# Patient Record
Sex: MALE | Race: WHITE | NOT HISPANIC OR LATINO | Employment: OTHER | URBAN - METROPOLITAN AREA
[De-identification: names, ages, dates, MRNs, and addresses within clinical notes are randomized per-mention and may not be internally consistent; named-entity substitution may affect disease eponyms.]

---

## 2017-01-10 RX ORDER — PRAVASTATIN SODIUM 40 MG
40 TABLET ORAL
Qty: 30 TAB | Refills: 0 | Status: SHIPPED | OUTPATIENT
Start: 2017-01-10 | End: 2017-01-12 | Stop reason: SDUPTHER

## 2017-01-12 ENCOUNTER — OFFICE VISIT (OUTPATIENT)
Dept: MEDICAL GROUP | Facility: MEDICAL CENTER | Age: 62
End: 2017-01-12
Payer: COMMERCIAL

## 2017-01-12 VITALS
BODY MASS INDEX: 27.43 KG/M2 | DIASTOLIC BLOOD PRESSURE: 98 MMHG | WEIGHT: 191.58 LBS | RESPIRATION RATE: 16 BRPM | SYSTOLIC BLOOD PRESSURE: 130 MMHG | HEIGHT: 70 IN | OXYGEN SATURATION: 95 % | TEMPERATURE: 97.2 F | HEART RATE: 75 BPM

## 2017-01-12 DIAGNOSIS — I10 ESSENTIAL HYPERTENSION: ICD-10-CM

## 2017-01-12 DIAGNOSIS — Z12.5 SCREENING PSA (PROSTATE SPECIFIC ANTIGEN): ICD-10-CM

## 2017-01-12 DIAGNOSIS — E11.9 TYPE 2 DIABETES MELLITUS WITHOUT COMPLICATION, WITHOUT LONG-TERM CURRENT USE OF INSULIN (HCC): ICD-10-CM

## 2017-01-12 DIAGNOSIS — E78.5 HYPERLIPIDEMIA, UNSPECIFIED HYPERLIPIDEMIA TYPE: ICD-10-CM

## 2017-01-12 PROCEDURE — 99214 OFFICE O/P EST MOD 30 MIN: CPT | Performed by: PHYSICIAN ASSISTANT

## 2017-01-12 RX ORDER — METOPROLOL SUCCINATE 25 MG/1
25 TABLET, EXTENDED RELEASE ORAL
Qty: 90 TAB | Refills: 1 | Status: SHIPPED | OUTPATIENT
Start: 2017-01-12 | End: 2017-08-10 | Stop reason: SDUPTHER

## 2017-01-12 RX ORDER — LISINOPRIL 10 MG/1
10 TABLET ORAL
Qty: 90 TAB | Refills: 1 | Status: SHIPPED | OUTPATIENT
Start: 2017-01-12 | End: 2017-01-17

## 2017-01-12 RX ORDER — HYDROCHLOROTHIAZIDE 12.5 MG/1
CAPSULE, GELATIN COATED ORAL
Qty: 90 CAP | Refills: 1 | Status: SHIPPED | OUTPATIENT
Start: 2017-01-12 | End: 2017-01-17

## 2017-01-12 RX ORDER — PRAVASTATIN SODIUM 40 MG
40 TABLET ORAL
Qty: 90 TAB | Refills: 1 | Status: SHIPPED | OUTPATIENT
Start: 2017-01-12 | End: 2017-08-10 | Stop reason: SDUPTHER

## 2017-01-12 ASSESSMENT — PATIENT HEALTH QUESTIONNAIRE - PHQ9: CLINICAL INTERPRETATION OF PHQ2 SCORE: 0

## 2017-01-12 NOTE — PROGRESS NOTES
Chief Complaint   Patient presents with   • Follow-Up       HISTORY OF PRESENT ILLNESS: Patient is a 61 y.o. male established patient who presents today to follow-up    Hypertension  chronic in nature. Currently taking metoprolol, hydrochlorothiazide, lisinopril. Stable. A symptomatically. Requesting refill on medications.    Hyperlipidemia  Chronic. Currently taking pravastatin on a daily basis. States no side effects to medication. Review of medical records shows labs out of date, ordered accordingly today    Type 2 diabetes mellitus without complication  Initially diagnosed while living in [facility]. Currently taking metformin 500 mg twice a day. Patient states doing well. Patient states doing well. Patient states that he is not checking his sugars. Patient does need a refill on his metformin. Last A1c shows controlled diabetes.      Patient Active Problem List    Diagnosis Date Noted   • Essential hypertension 11/10/2015   • HLD (hyperlipidemia) 11/10/2015   • Type 2 diabetes mellitus without complication (HCC) 11/10/2015   • Pain, chronic 11/10/2015       Allergies:Flexeril and Penicillins    Current Outpatient Prescriptions   Medication Sig Dispense Refill   • hydrochlorothiazide (MICROZIDE) 12.5 MG capsule TAKE  ONE CAPSULE BY MOUTH DAILY 90 Cap 01   • metoprolol SR (TOPROL XL) 25 MG TABLET SR 24 HR Take 1 Tab by mouth every day. 90 Tab 1   • pravastatin (PRAVACHOL) 40 MG tablet Take 1 Tab by mouth every day. 90 Tab 1   • lisinopril (PRINIVIL) 10 MG Tab Take 1 Tab by mouth every day. 90 Tab 01   • metformin (GLUCOPHAGE) 500 MG Tab Take 1 Tab by mouth 2 times a day, with meals. 180 Tab 1   • naproxen (NAPROSYN) 500 MG Tab Take 1 Tab by mouth 2 times a day, with meals. 60 Tab 0   • vitamin D (CHOLECALCIFEROL) 1000 UNIT Tab Take 2,000 Units by mouth every day.     • aspirin EC (ECOTRIN) 81 MG TBEC Take 81 mg by mouth every day.     • gabapentin (NEURONTIN) 100 MG CAPS Take 100 mg by mouth 3 times a day.   "   • tramadol (ULTRAM) 50 MG TABS Take  mg by mouth every four hours as needed for Mild Pain.       No current facility-administered medications for this visit.       Social History   Substance Use Topics   • Smoking status: Former Smoker   • Smokeless tobacco: Never Used      Comment: quit one year ago (October 2014)   • Alcohol Use: 0.0 oz/week     0 Standard drinks or equivalent per week      Comment: 6 pack a week )only beer)       No family status information on file.   No family history on file.    Review of Systems:   Constitutional: Negative for fever, chills, weight loss and malaise/fatigue.   HENT: Negative for ear pain, nosebleeds, congestion, sore throat and neck pain.    Eyes: Negative for blurred vision.   Respiratory: Negative for cough, sputum production, shortness of breath and wheezing.    Cardiovascular: Negative for chest pain, palpitations, orthopnea and leg swelling.   Gastrointestinal: Negative for heartburn, nausea, vomiting and abdominal pain.   Genitourinary: Negative for dysuria, urgency and frequency.   Musculoskeletal: Negative for myalgias, back pain and joint pain.   Skin: Negative for rash and itching.   Neurological: Negative for dizziness, tingling, tremors, sensory change, focal weakness and headaches.   Endo/Heme/Allergies: Does not bruise/bleed easily.   Psychiatric/Behavioral: Negative for depression, suicidal ideas and memory loss.  The patient is not nervous/anxious and does not have insomnia.    All other systems reviewed and are negative except as in HPI.    Exam:  Blood pressure 130/98, pulse 75, temperature 36.2 °C (97.2 °F), resp. rate 16, height 1.778 m (5' 10\"), weight 86.9 kg (191 lb 9.3 oz), SpO2 95 %.  General:  Well nourished, well developed male in NAD  Head: is grossly normal.  Neck: Supple without JVD or bruit. Thyroid is not enlarged.  Pulmonary: Clear to ausculation. Normal effort. No rales, ronchi, or wheezing.  Cardiovascular: Regular rate and rhythm " without murmur. Carotid and radial pulses are intact and equal bilaterally.  Extremities: no clubbing, cyanosis, or edema.  Diabetic Foot Exam: No ulcers or skin lesions present, patient tested with a 10 g force and is sensitive bilaterally throughout the ball of the foot, great toe and heel. Positive onychomycosis noted bilateral feet      Please note that this dictation was created using voice recognition software. I have made every reasonable attempt to correct obvious errors, but I expect that there are errors of grammar and possibly content that I did not discover before finalizing the note.    Assessment/Plan:  1. Type 2 diabetes mellitus without complication, without long-term current use of insulin (HCC)  metformin (GLUCOPHAGE) 500 MG Tab    CBC WITH DIFFERENTIAL    COMP METABOLIC PANEL    URINALYSIS,CULTURE IF INDICATED    HEMOGLOBIN A1C    MICROALBUMIN CREAT RATIO URINE    Diabetic Monofilament Lower Extremity Exam   2. Essential hypertension  hydrochlorothiazide (MICROZIDE) 12.5 MG capsule    metoprolol SR (TOPROL XL) 25 MG TABLET SR 24 HR    lisinopril (PRINIVIL) 10 MG Tab    CBC WITH DIFFERENTIAL    COMP METABOLIC PANEL    TSH    URINALYSIS,CULTURE IF INDICATED    MICROALBUMIN CREAT RATIO URINE   3. Screening PSA (prostate specific antigen)  CBC WITH DIFFERENTIAL    COMP METABOLIC PANEL    URINALYSIS,CULTURE IF INDICATED    PROSTATE SPECIFIC AG SCREENING   4. Hyperlipidemia, unspecified hyperlipidemia type  pravastatin (PRAVACHOL) 40 MG tablet    LIPID PROFILE

## 2017-01-13 ENCOUNTER — HOSPITAL ENCOUNTER (OUTPATIENT)
Dept: LAB | Facility: MEDICAL CENTER | Age: 62
End: 2017-01-13
Attending: PHYSICIAN ASSISTANT
Payer: COMMERCIAL

## 2017-01-13 DIAGNOSIS — E78.5 HYPERLIPIDEMIA, UNSPECIFIED HYPERLIPIDEMIA TYPE: ICD-10-CM

## 2017-01-13 DIAGNOSIS — I10 ESSENTIAL HYPERTENSION: ICD-10-CM

## 2017-01-13 DIAGNOSIS — Z12.5 SCREENING PSA (PROSTATE SPECIFIC ANTIGEN): ICD-10-CM

## 2017-01-13 DIAGNOSIS — E11.9 TYPE 2 DIABETES MELLITUS WITHOUT COMPLICATION, WITHOUT LONG-TERM CURRENT USE OF INSULIN (HCC): ICD-10-CM

## 2017-01-13 LAB
ALBUMIN SERPL BCP-MCNC: 4.3 G/DL (ref 3.2–4.9)
ALBUMIN/GLOB SERPL: 1.4 G/DL
ALP SERPL-CCNC: 57 U/L (ref 30–99)
ALT SERPL-CCNC: 19 U/L (ref 2–50)
ANION GAP SERPL CALC-SCNC: 7 MMOL/L (ref 0–11.9)
APPEARANCE UR: CLEAR
AST SERPL-CCNC: 19 U/L (ref 12–45)
BASOPHILS # BLD AUTO: 0.1 K/UL (ref 0–0.12)
BASOPHILS NFR BLD AUTO: 1.1 % (ref 0–1.8)
BILIRUB SERPL-MCNC: 0.5 MG/DL (ref 0.1–1.5)
BILIRUB UR QL STRIP.AUTO: NEGATIVE
BUN SERPL-MCNC: 8 MG/DL (ref 8–22)
CALCIUM SERPL-MCNC: 9.5 MG/DL (ref 8.5–10.5)
CHLORIDE SERPL-SCNC: 99 MMOL/L (ref 96–112)
CHOLEST SERPL-MCNC: 179 MG/DL (ref 100–199)
CO2 SERPL-SCNC: 27 MMOL/L (ref 20–33)
COLOR UR AUTO: NORMAL
CREAT SERPL-MCNC: 0.69 MG/DL (ref 0.5–1.4)
CREAT UR-MCNC: 58.5 MG/DL
CULTURE IF INDICATED INDCX: NO UA CULTURE
EOSINOPHIL # BLD: 0.16 K/UL (ref 0–0.51)
EOSINOPHIL NFR BLD AUTO: 1.8 % (ref 0–6.9)
ERYTHROCYTE [DISTWIDTH] IN BLOOD BY AUTOMATED COUNT: 46 FL (ref 35.9–50)
EST. AVERAGE GLUCOSE BLD GHB EST-MCNC: 143 MG/DL
GLOBULIN SER CALC-MCNC: 3.1 G/DL (ref 1.9–3.5)
GLUCOSE SERPL-MCNC: 148 MG/DL (ref 65–99)
GLUCOSE UR STRIP.AUTO-MCNC: NEGATIVE MG/DL
HBA1C MFR BLD: 6.6 % (ref 0–5.6)
HCT VFR BLD AUTO: 42.2 % (ref 42–52)
HDLC SERPL-MCNC: 81 MG/DL
HGB BLD-MCNC: 15.2 G/DL (ref 14–18)
IMM GRANULOCYTES # BLD AUTO: 0.02 K/UL (ref 0–0.11)
IMM GRANULOCYTES NFR BLD AUTO: 0.2 % (ref 0–0.9)
KETONES UR STRIP.AUTO-MCNC: NEGATIVE MG/DL
LDLC SERPL CALC-MCNC: 84 MG/DL
LEUKOCYTE ESTERASE UR QL STRIP.AUTO: NEGATIVE
LYMPHOCYTES # BLD: 3.15 K/UL (ref 1–4.8)
LYMPHOCYTES NFR BLD AUTO: 34.5 % (ref 22–41)
MCH RBC QN AUTO: 34.8 PG (ref 27–33)
MCHC RBC AUTO-ENTMCNC: 36 G/DL (ref 33.7–35.3)
MCV RBC AUTO: 96.6 FL (ref 81.4–97.8)
MICRO URNS: NORMAL
MICROALBUMIN UR-MCNC: 6 MG/DL
MICROALBUMIN/CREAT UR: 103 MG/G (ref 0–30)
MONOCYTES # BLD: 0.79 K/UL (ref 0–0.85)
MONOCYTES NFR BLD AUTO: 8.6 % (ref 0–13.4)
NEUTROPHILS # BLD: 4.92 K/UL (ref 1.82–7.42)
NEUTROPHILS NFR BLD AUTO: 53.8 % (ref 44–72)
NITRITE UR QL STRIP.AUTO: NEGATIVE
NRBC # BLD AUTO: 0 K/UL
NRBC BLD-RTO: 0 /100 WBC
PH UR: 7 [PH]
PLATELET # BLD AUTO: 350 K/UL (ref 164–446)
PMV BLD AUTO: 10.3 FL (ref 9–12.9)
POTASSIUM SERPL-SCNC: 4.6 MMOL/L (ref 3.6–5.5)
PROT SERPL-MCNC: 7.4 G/DL (ref 6–8.2)
PROT UR QL STRIP: NEGATIVE MG/DL
PSA SERPL DL<=0.01 NG/ML-MCNC: 0.78 NG/ML (ref 0–4)
RBC # BLD AUTO: 4.37 M/UL (ref 4.7–6.1)
RBC UR QL AUTO: NEGATIVE
SODIUM SERPL-SCNC: 133 MMOL/L (ref 135–145)
SP GR UR STRIP.AUTO: 1.01
TRIGL SERPL-MCNC: 70 MG/DL (ref 0–149)
TSH SERPL DL<=0.005 MIU/L-ACNC: 1.04 UIU/ML (ref 0.3–3.7)
WBC # BLD AUTO: 9.1 K/UL (ref 4.8–10.8)

## 2017-01-13 PROCEDURE — 82570 ASSAY OF URINE CREATININE: CPT

## 2017-01-13 PROCEDURE — 80053 COMPREHEN METABOLIC PANEL: CPT

## 2017-01-13 PROCEDURE — 82043 UR ALBUMIN QUANTITATIVE: CPT

## 2017-01-13 PROCEDURE — 84443 ASSAY THYROID STIM HORMONE: CPT

## 2017-01-13 PROCEDURE — 83036 HEMOGLOBIN GLYCOSYLATED A1C: CPT

## 2017-01-13 PROCEDURE — 80061 LIPID PANEL: CPT

## 2017-01-13 PROCEDURE — 81003 URINALYSIS AUTO W/O SCOPE: CPT

## 2017-01-13 PROCEDURE — 84153 ASSAY OF PSA TOTAL: CPT

## 2017-01-13 PROCEDURE — 85025 COMPLETE CBC W/AUTO DIFF WBC: CPT

## 2017-01-13 PROCEDURE — 36415 COLL VENOUS BLD VENIPUNCTURE: CPT

## 2017-01-16 ENCOUNTER — TELEPHONE (OUTPATIENT)
Dept: MEDICAL GROUP | Facility: MEDICAL CENTER | Age: 62
End: 2017-01-16

## 2017-01-16 NOTE — TELEPHONE ENCOUNTER
----- Message from Agustín Nguyen PA-C sent at 1/16/2017  7:27 AM PST -----  Review of labs: Please have patient schedule follow-up to discuss    (Note to provider during visit:  microalb  And need to change meds)

## 2017-01-17 ENCOUNTER — OFFICE VISIT (OUTPATIENT)
Dept: MEDICAL GROUP | Facility: MEDICAL CENTER | Age: 62
End: 2017-01-17
Payer: COMMERCIAL

## 2017-01-17 VITALS
TEMPERATURE: 97.7 F | DIASTOLIC BLOOD PRESSURE: 89 MMHG | HEIGHT: 70 IN | OXYGEN SATURATION: 95 % | RESPIRATION RATE: 16 BRPM | HEART RATE: 63 BPM | WEIGHT: 186.73 LBS | BODY MASS INDEX: 26.73 KG/M2 | SYSTOLIC BLOOD PRESSURE: 130 MMHG

## 2017-01-17 DIAGNOSIS — I10 ESSENTIAL HYPERTENSION: ICD-10-CM

## 2017-01-17 DIAGNOSIS — E78.5 HYPERLIPIDEMIA, UNSPECIFIED HYPERLIPIDEMIA TYPE: ICD-10-CM

## 2017-01-17 DIAGNOSIS — E11.9 TYPE 2 DIABETES MELLITUS WITHOUT COMPLICATION, WITHOUT LONG-TERM CURRENT USE OF INSULIN (HCC): ICD-10-CM

## 2017-01-17 DIAGNOSIS — R80.9 MICROALBUMINURIA: ICD-10-CM

## 2017-01-17 PROCEDURE — 99214 OFFICE O/P EST MOD 30 MIN: CPT | Performed by: PHYSICIAN ASSISTANT

## 2017-01-17 RX ORDER — LISINOPRIL 20 MG/1
20 TABLET ORAL DAILY
Qty: 30 TAB | Refills: 2 | Status: SHIPPED | OUTPATIENT
Start: 2017-01-17 | End: 2017-04-27 | Stop reason: SDUPTHER

## 2017-01-17 NOTE — MR AVS SNAPSHOT
"        Fabio Vergara   2017 2:00 PM   Office Visit   MRN: [id number]    Department:  [place] [address]   Dept Phone:  [phone]    Description:  Male : 1955   Provider:  Agustín Nguyen PA-C           Reason for Visit     Follow-Up           Allergies as of 2017     Allergen Noted Reactions    Flexeril [Cyclobenzaprine] 10/07/2014       Weakness, spacie    Penicillins 2009       Doesn't know the reaction  had this allergy when he was a child      You were diagnosed with     Microalbuminuria   [476714]   Monitor for the next 2-3 months. Adjust medications. If trending upward, referred to nephrology    Essential hypertension   [9710160]   Discontinue hydrochlorothiazide. Increase lisinopril to 20 mg. Continue on metoprolol    Type 2 diabetes mellitus without complication, without long-term current use of insulin (CMS-HCC)   [3019935]       Hyperlipidemia, unspecified hyperlipidemia type   [7963696]         Vital Signs     Blood Pressure Pulse Temperature Respirations Height Weight    130/89 mmHg 63 36.5 °C (97.7 °F) 16 1.778 m (5' 10\") 84.7 kg (186 lb 11.7 oz)    Body Mass Index Oxygen Saturation Smoking Status             26.79 kg/m2 95% Former Smoker         Basic Information     Date Of Birth Sex Race Ethnicity Preferred Language    1955 Male White Non- English      Problem List              ICD-10-CM Priority Class Noted - Resolved    Essential hypertension I10   11/10/2015 - Present    HLD (hyperlipidemia) E78.5   11/10/2015 - Present    Type 2 diabetes mellitus without complication (CMS-HCC) E11.9   11/10/2015 - Present    Pain, chronic G89.29   11/10/2015 - Present      Health Maintenance        Date Due Completion Dates    RETINAL SCREENING 5/15/1971 ---    IMM PNEUMOCOCCAL 19-64 (ADULT) MEDIUM RISK SERIES (1 of 1 - PPSV23) 5/15/1972 ---    COLONOSCOPY 5/15/2003 ---    IMM ZOSTER VACCINE 5/15/2013 ---    A1C SCREENING 2017, 2016, 2016, " 8/28/2015, 3/2/2015, 7/2/2014, 7/22/2013, 8/7/2012, 3/28/2012    DIABETES MONOFILAMENT / LE EXAM 1/12/2018 1/12/2017    FASTING LIPID PROFILE 1/13/2018 1/13/2017, 1/12/2016, 8/28/2015, 3/2/2015, 7/2/2014, 7/22/2013, 8/7/2012, 3/28/2012    URINE ACR / MICROALBUMIN 1/13/2018 1/13/2017, 1/12/2016, 8/28/2015, 7/22/2013, 3/28/2012    SERUM CREATININE 1/13/2018 1/13/2017, 5/2/2016, 1/12/2016, 8/28/2015, 3/2/2015, 7/2/2014, 7/22/2013, 8/7/2012, 3/28/2012    IMM DTaP/Tdap/Td Vaccine (2 - Td) 11/19/2020 11/19/2010            Current Immunizations     Influenza TIV (IM) 10/13/2016    Tdap Vaccine 11/19/2010      Below and/or attached are the medications your provider expects you to take. Review all of your home medications and newly ordered medications with your provider and/or pharmacist. Follow medication instructions as directed by your provider and/or pharmacist. Please keep your medication list with you and share with your provider. Update the information when medications are discontinued, doses are changed, or new medications (including over-the-counter products) are added; and carry medication information at all times in the event of emergency situations     Allergies:  FLEXERIL - (reactions not documented)     PENICILLINS - (reactions not documented)               Medications  Valid as of: January 17, 2017 -  2:34 PM    Generic Name Brand Name Tablet Size Instructions for use    Aspirin (Tablet Delayed Response) ECOTRIN 81 MG Take 81 mg by mouth every day.        Cholecalciferol (Tab) cholecalciferol 1000 UNIT Take 2,000 Units by mouth every day.        Gabapentin (Cap) NEURONTIN 100 MG Take 100 mg by mouth 3 times a day.        Lisinopril (Tab) PRINIVIL 20 MG Take 1 Tab by mouth every day.        MetFORMIN HCl (Tab) GLUCOPHAGE 500 MG Take 1 Tab by mouth 2 times a day, with meals.        Metoprolol Succinate (TABLET SR 24 HR) TOPROL XL 25 MG Take 1 Tab by mouth every day.        Naproxen (Tab) NAPROSYN 500 MG Take 1  Tab by mouth 2 times a day, with meals.        Pravastatin Sodium (Tab) PRAVACHOL 40 MG Take 1 Tab by mouth every day.        TraMADol HCl (Tab) ULTRAM 50 MG Take  mg by mouth every four hours as needed for Mild Pain.        .                 Medicines prescribed today were sent to:     [id number] - [place], [place] - [address]    [address] [place] [address]    Phone: [phone] Fax: [phone]    Open 24 Hours?: No      Medication refill instructions:       If your prescription bottle indicates you have medication refills left, it is not necessary to call your provider’s office. Please contact your pharmacy and they will refill your medication.    If your prescription bottle indicates you do not have any refills left, you may request refills at any time through one of the following ways: The online DataFlyte system (except Urgent Care), by calling your provider’s office, or by asking your pharmacy to contact your provider’s office with a refill request. Medication refills are processed only during regular business hours and may not be available until the next business day. Your provider may request additional information or to have a follow-up visit with you prior to refilling your medication.   *Please Note: Medication refills are assigned a new Rx number when refilled electronically. Your pharmacy may indicate that no refills were authorized even though a new prescription for the same medication is available at the pharmacy. Please request the medicine by name with the pharmacy before contacting your provider for a refill.        Your To Do List     Future Labs/Procedures Complete By Expires    BASIC METABOLIC PANEL  As directed 1/18/2018    MICROALBUMIN CREAT RATIO URINE  As directed 1/17/2018         DataFlyte Access Code: [email or web address]  Expires: 2/16/2017  2:34 PM    DataFlyte  A secure, online tool to manage your health information     [employer]’s DataFlyte® is a secure, online  tool that connects you to your personalized health information from the privacy of your home -- day or night - making it very easy for you to manage your healthcare. Once the activation process is completed, you can even access your medical information using the Odojo gissell, which is available for free in the Apple Gissell store or Google Play store.     Odojo provides the following levels of access (as shown below):   My Chart Features   [facility] Doctor [facility]  Specialists [facility]  Urgent  Care Non-[facility]  Primary Care  Doctor   Email your healthcare team securely and privately 24/7 X X X    Manage appointments: schedule your next appointment; view details of past/upcoming appointments X      Request prescription refills. X      View recent personal medical records, including lab and immunizations X X X X   View health record, including health history, allergies, medications X X X X   Read reports about your outpatient visits, procedures, consult and ER notes X X X X   See your discharge summary, which is a recap of your hospital and/or ER visit that includes your diagnosis, lab results, and care plan. X X       How to register for Odojo:  1. Go to  [email or web address].  2. Click on the Sign Up Now box, which takes you to the New Member Sign Up page. You will need to provide the following information:  a. Enter your Odojo Access Code exactly as it appears at the top of this page. (You will not need to use this code after you’ve completed the sign-up process. If you do not sign up before the expiration date, you must request a new code.)   b. Enter your date of birth.   c. Enter your home email address.   d. Click Submit, and follow the next screen’s instructions.  3. Create a Odojo ID. This will be your Odojo login ID and cannot be changed, so think of one that is secure and easy to remember.  4. Create a Odojo password. You can change your password at any time.  5. Enter your Password  Reset Question and Answer. This can be used at a later time if you forget your password.   6. Enter your e-mail address. This allows you to receive e-mail notifications when new information is available in [employer].  7. Click Sign Up. You can now view your health information.    For assistance activating your [employer] account, call [phone]

## 2017-01-17 NOTE — ASSESSMENT & PLAN NOTE
Chronic in nature. Currently taking metformin 500 mg twice a day. Recent labs have been drawn, see below.

## 2017-01-17 NOTE — ASSESSMENT & PLAN NOTE
Chronic in nature. Currently taking metoprolol 5 mg once a day, lisinopril 10 mg once a day, and hydrochlorothiazide 25 mg once a day.

## 2017-01-17 NOTE — PROGRESS NOTES
Chief Complaint   Patient presents with   • Follow-Up       HISTORY OF PRESENT ILLNESS: Patient is a 61 y.o. male established patient who presents today to follow-up on chronic conditions    Type 2 diabetes mellitus without complication  Chronic in nature. Currently taking metformin 500 mg twice a day. Recent labs have been drawn, see below.    HLD (hyperlipidemia)  Chronic nature. Patient taking pravastatin 40 mg daily basis.    Essential hypertension  Chronic in nature. Currently taking metoprolol 5 mg once a day, lisinopril 10 mg once a day, and hydrochlorothiazide 25 mg once a day.        Patient Active Problem List    Diagnosis Date Noted   • Essential hypertension 11/10/2015   • HLD (hyperlipidemia) 11/10/2015   • Type 2 diabetes mellitus without complication (CMS-[facility]) 11/10/2015   • Pain, chronic 11/10/2015       Allergies:Flexeril and Penicillins    Current Outpatient Prescriptions   Medication Sig Dispense Refill   • metoprolol SR (TOPROL XL) 25 MG TABLET SR 24 HR Take 1 Tab by mouth every day. 90 Tab 1   • pravastatin (PRAVACHOL) 40 MG tablet Take 1 Tab by mouth every day. 90 Tab 1   • metformin (GLUCOPHAGE) 500 MG Tab Take 1 Tab by mouth 2 times a day, with meals. 180 Tab 1   • naproxen (NAPROSYN) 500 MG Tab Take 1 Tab by mouth 2 times a day, with meals. 60 Tab 0   • vitamin D (CHOLECALCIFEROL) 1000 UNIT Tab Take 2,000 Units by mouth every day.     • aspirin EC (ECOTRIN) 81 MG TBEC Take 81 mg by mouth every day.     • gabapentin (NEURONTIN) 100 MG CAPS Take 100 mg by mouth 3 times a day.     • tramadol (ULTRAM) 50 MG TABS Take  mg by mouth every four hours as needed for Mild Pain.       No current facility-administered medications for this visit.       Social History   Substance Use Topics   • Smoking status: Former Smoker   • Smokeless tobacco: Never Used      Comment: quit one year ago (October 2014)   • Alcohol Use: 0.0 oz/week     0 Standard drinks or equivalent per week      Comment: 6 pack a  "week )only beer)       No family status information on file.   No family history on file.    Review of Systems:   Constitutional: Negative for fever, chills, weight loss and malaise/fatigue.   HENT: Negative for ear pain, nosebleeds, congestion, sore throat and neck pain.    Eyes: Negative for blurred vision.   Respiratory: Negative for cough, sputum production, shortness of breath and wheezing.    Cardiovascular: Negative for chest pain, palpitations, orthopnea and leg swelling.   Gastrointestinal: Negative for heartburn, nausea, vomiting and abdominal pain.   Genitourinary: Negative for dysuria, urgency and frequency.   Musculoskeletal: Negative for myalgias, back pain and joint pain.   Skin: Negative for rash and itching.   Neurological: Negative for dizziness, tingling, tremors, sensory change, focal weakness and headaches.   Endo/Heme/Allergies: Does not bruise/bleed easily.   Psychiatric/Behavioral: Negative for depression, suicidal ideas and memory loss.  The patient is not nervous/anxious and does not have insomnia.    All other systems reviewed and are negative except as in HPI.    Exam:  Blood pressure 130/89, pulse 63, temperature 36.5 °C (97.7 °F), resp. rate 16, height 1.778 m (5' 10\"), weight 84.7 kg (186 lb 11.7 oz), SpO2 95 %.  General:  Well nourished, well developed male in NAD  Head: is grossly normal.  HEENT: Eyes conjunctiva is clear, lids without ptosis, pupils equal round and reactive to light and accommodation.  Ears normal shape and contour, canals are clear bilaterally, TMs with good light reflex and appear normal.  Nasal mucosa pale and edematous with clear rhinorrhea.  Oropharynx benign.  Sinuses (frontal and maxillary) nontender to palpation.  Neck: Supple without JVD or bruit. Thyroid is not enlarged.  Pulmonary: Clear to ausculation. Normal effort. No rales, ronchi, or wheezing.  Cardiovascular: Regular rate and rhythm without murmur. Carotid and radial pulses are intact and equal " bilaterally.  Extremities: no clubbing, cyanosis, or edema.    LABS: January 13, 2017: Results reviewed and discussed with the patient, questions answered. CBC: Blood cell count 9.1, hemoglobin 15.2, hematocrit 42.2, platelet 350. Comprehensive metabolic panel: Sodium 133, potassium 4.4, chloride 99, bicarbonate 27, BUN 8, creatinine 0.69, glucose 148, GFR 60. Alkaline phosphatase 57, AST 19, ALT 19. Urinalysis negative leukocytes, negative nitrites, negative glucose, negative protein, negative bilirubin. Lipid panel: Total cholesterol 179, tragus rate 70, HDL 81, LDL 84. Microalbumin creatinine ratio 103. Hemoglobin A1c 6.6. TSH 1.040. PSA 0.78.    Medical decision-making and discussion: A 61-year-old sickly to follow-up on chronic conditions. cholesterol well-controlled, blood pressure well controlled, and diabetes shows good control at 6.6 and stable. positive microalbumin urea noted. after discussion with patient we are going to hold off on nephrology consultation at this present time. we are going to discontinue hydrochlorothiazide and increase lisinopril which may be stressing patient's kidney. we will recheck microalbumin as well as metabolic panel in 1-2 months. if still elevated we may look at possibly changing patient from metformin to a different diabetes medication such as glyburide or glimepiride.  The patient that if microalbumin increasing, we will refer nephrology at such time.    Please note that this dictation was created using voice recognition software. I have made every reasonable attempt to correct obvious errors, but I expect that there are errors of grammar and possibly content that I did not discover before finalizing the note.    Assessment/Plan:  1. Microalbuminuria  lisinopril (PRINIVIL) 20 MG Tab    BASIC METABOLIC PANEL    MICROALBUMIN CREAT RATIO URINE    Monitor for the next 2-3 months. Adjust medications. If trending upward, referred to nephrology   2. Essential hypertension   lisinopril (PRINIVIL) 20 MG Tab    BASIC METABOLIC PANEL    MICROALBUMIN CREAT RATIO URINE    Discontinue hydrochlorothiazide. Increase lisinopril to 20 mg. Continue on metoprolol   3. Type 2 diabetes mellitus without complication, without long-term current use of insulin (CMS-HCC)  lisinopril (PRINIVIL) 20 MG Tab    BASIC METABOLIC PANEL    MICROALBUMIN CREAT RATIO URINE   4. Hyperlipidemia, unspecified hyperlipidemia type

## 2017-03-16 ENCOUNTER — HOSPITAL ENCOUNTER (OUTPATIENT)
Dept: LAB | Facility: MEDICAL CENTER | Age: 62
End: 2017-03-16
Attending: PHYSICIAN ASSISTANT
Payer: COMMERCIAL

## 2017-03-16 DIAGNOSIS — E11.9 TYPE 2 DIABETES MELLITUS WITHOUT COMPLICATION, WITHOUT LONG-TERM CURRENT USE OF INSULIN (HCC): ICD-10-CM

## 2017-03-16 DIAGNOSIS — I10 ESSENTIAL HYPERTENSION: ICD-10-CM

## 2017-03-16 DIAGNOSIS — R80.9 MICROALBUMINURIA: ICD-10-CM

## 2017-03-16 LAB
ANION GAP SERPL CALC-SCNC: 13 MMOL/L (ref 0–11.9)
BUN SERPL-MCNC: 6 MG/DL (ref 8–22)
CALCIUM SERPL-MCNC: 9.1 MG/DL (ref 8.5–10.5)
CHLORIDE SERPL-SCNC: 99 MMOL/L (ref 96–112)
CO2 SERPL-SCNC: 25 MMOL/L (ref 20–33)
CREAT SERPL-MCNC: 0.66 MG/DL (ref 0.5–1.4)
CREAT UR-MCNC: 127.9 MG/DL
GLUCOSE SERPL-MCNC: 130 MG/DL (ref 65–99)
MICROALBUMIN UR-MCNC: 10.6 MG/DL
MICROALBUMIN/CREAT UR: 83 MG/G (ref 0–30)
POTASSIUM SERPL-SCNC: 4 MMOL/L (ref 3.6–5.5)
SODIUM SERPL-SCNC: 137 MMOL/L (ref 135–145)

## 2017-03-16 PROCEDURE — 36415 COLL VENOUS BLD VENIPUNCTURE: CPT

## 2017-03-16 PROCEDURE — 80048 BASIC METABOLIC PNL TOTAL CA: CPT

## 2017-03-16 PROCEDURE — 82043 UR ALBUMIN QUANTITATIVE: CPT

## 2017-03-16 PROCEDURE — 82570 ASSAY OF URINE CREATININE: CPT

## 2017-03-20 ENCOUNTER — TELEPHONE (OUTPATIENT)
Dept: MEDICAL GROUP | Facility: MEDICAL CENTER | Age: 62
End: 2017-03-20

## 2017-03-20 NOTE — TELEPHONE ENCOUNTER
----- Message from Agustín Nguyen PA-C sent at 3/20/2017  8:03 AM PDT -----  Kidneys are getting slightly better. Please have patient's schedule follow-up so we can discuss in more detail, and I would like to alter medications little more

## 2017-03-20 NOTE — TELEPHONE ENCOUNTER
Phone Number Called: [phone]     Message: Left message for patient about labs and to make an appointment.     Left Message for patient to call back: N\A

## 2017-03-22 NOTE — TELEPHONE ENCOUNTER
Phone Number Called: [phone] (home)     Message: Left msg with wife to have pt call back.     Left Message for patient to call back: N\A

## 2017-03-24 ENCOUNTER — HOSPITAL ENCOUNTER (OUTPATIENT)
Dept: RADIOLOGY | Facility: MEDICAL CENTER | Age: 62
End: 2017-03-24
Attending: PHYSICIAN ASSISTANT
Payer: COMMERCIAL

## 2017-03-24 DIAGNOSIS — M54.12 BRACHIAL NEURITIS OR RADICULITIS NOS: ICD-10-CM

## 2017-03-24 PROCEDURE — 72050 X-RAY EXAM NECK SPINE 4/5VWS: CPT

## 2017-03-27 ENCOUNTER — NON-PROVIDER VISIT (OUTPATIENT)
Dept: MEDICAL GROUP | Facility: MEDICAL CENTER | Age: 62
End: 2017-03-27
Payer: COMMERCIAL

## 2017-03-27 ENCOUNTER — OFFICE VISIT (OUTPATIENT)
Dept: MEDICAL GROUP | Facility: MEDICAL CENTER | Age: 62
End: 2017-03-27
Payer: COMMERCIAL

## 2017-03-27 VITALS
WEIGHT: 186 LBS | BODY MASS INDEX: 26.63 KG/M2 | HEART RATE: 86 BPM | TEMPERATURE: 98.2 F | DIASTOLIC BLOOD PRESSURE: 88 MMHG | RESPIRATION RATE: 16 BRPM | SYSTOLIC BLOOD PRESSURE: 130 MMHG | OXYGEN SATURATION: 95 % | HEIGHT: 70 IN

## 2017-03-27 DIAGNOSIS — I10 ESSENTIAL HYPERTENSION: ICD-10-CM

## 2017-03-27 DIAGNOSIS — E11.9 TYPE 2 DIABETES MELLITUS WITHOUT COMPLICATION, WITHOUT LONG-TERM CURRENT USE OF INSULIN (HCC): ICD-10-CM

## 2017-03-27 PROCEDURE — 99214 OFFICE O/P EST MOD 30 MIN: CPT | Performed by: PHYSICIAN ASSISTANT

## 2017-03-27 NOTE — PROGRESS NOTES
Chief Complaint   Patient presents with   • Follow-Up       HISTORY OF PRESENT ILLNESS: Patient is a 61 y.o. male established patient who presents today to follow up    Type 2 diabetes mellitus without complication  Chronic in nature. Patient states that he is currently taking metformin 1000 mg twice a day. Patient recently had labs in January 2017 which did show positive microalbuminuria. It was initially determined that we would work with patient's blood pressure and therefore we went and discontinue hydrochlorothiazide and increased lisinopril. Patient recently had labs redrawn please see labs below.    Essential hypertension  Patient was taking metoprolol 25 mg, lisinopril 10 mg and hydrochlorothiazide 25 mg. In January 2017 was noted elevated microalbumin and therefore we discontinue hydrochlorothiazide and we increase the fosinopril to 20 mg. Patient states he's been doing well, stable. assymptomatic.     Also reviewed health maintenance shows that retinal screening is out of date. He does follow up with optometrist.  new retinal  screening will be done today at completion of appointment    Patient Active Problem List    Diagnosis Date Noted   • Essential hypertension 11/10/2015   • HLD (hyperlipidemia) 11/10/2015   • Type 2 diabetes mellitus without complication (CMS-[facility]) 11/10/2015   • Pain, chronic 11/10/2015     Allergies:Flexeril and Penicillins    Current Outpatient Prescriptions   Medication Sig Dispense Refill   • lisinopril (PRINIVIL) 20 MG Tab Take 1 Tab by mouth every day. 30 Tab 2   • metoprolol SR (TOPROL XL) 25 MG TABLET SR 24 HR Take 1 Tab by mouth every day. 90 Tab 1   • pravastatin (PRAVACHOL) 40 MG tablet Take 1 Tab by mouth every day. 90 Tab 1   • metformin (GLUCOPHAGE) 500 MG Tab Take 1 Tab by mouth 2 times a day, with meals. 180 Tab 1   • naproxen (NAPROSYN) 500 MG Tab Take 1 Tab by mouth 2 times a day, with meals. 60 Tab 0   • vitamin D (CHOLECALCIFEROL) 1000 UNIT Tab Take 2,000 Units by  "mouth every day.     • aspirin EC (ECOTRIN) 81 MG TBEC Take 81 mg by mouth every day.     • gabapentin (NEURONTIN) 100 MG CAPS Take 100 mg by mouth 3 times a day.     • tramadol (ULTRAM) 50 MG TABS Take  mg by mouth every four hours as needed for Mild Pain.       No current facility-administered medications for this visit.     Social History   Substance Use Topics   • Smoking status: Former Smoker   • Smokeless tobacco: Never Used      Comment: quit one year ago (October 2014)   • Alcohol Use: 0.0 oz/week     0 Standard drinks or equivalent per week      Comment: 6 pack a week )only beer)       No family status information on file.   No family history on file.    Review of Systems:   Constitutional: Negative for fever, chills, weight loss and malaise/fatigue.   HENT: Negative for ear pain, nosebleeds, congestion, sore throat and neck pain.    Eyes: Negative for blurred vision.   Respiratory: Negative for cough, sputum production, shortness of breath and wheezing.    Cardiovascular: Negative for chest pain, palpitations, orthopnea and leg swelling.   Gastrointestinal: Negative for heartburn, nausea, vomiting and abdominal pain.   Genitourinary: Negative for dysuria, urgency and frequency.   Musculoskeletal: Negative for myalgias, back pain and joint pain.   Skin: Negative for rash and itching.   Neurological: Negative for dizziness, tingling, tremors, sensory change, focal weakness and headaches.   Endo/Heme/Allergies: Does not bruise/bleed easily.   Psychiatric/Behavioral: Negative for depression, suicidal ideas and memory loss.  The patient is not nervous/anxious and does not have insomnia.    All other systems reviewed and are negative except as in HPI.    Exam:  Blood pressure 130/88, pulse 86, temperature 36.8 °C (98.2 °F), resp. rate 16, height 1.778 m (5' 10\"), weight 84.369 kg (186 lb), SpO2 95 %.  General:  Well nourished, well developed male in NAD  Head: is grossly normal.  Neck: Supple without JVD " or bruit. Thyroid is not enlarged.  Pulmonary: Clear to ausculation. Normal effort. No rales, ronchi, or wheezing.  Cardiovascular: Regular rate and rhythm without murmur. Carotid and radial pulses are intact and equal bilaterally.  Extremities: no clubbing, cyanosis, or edema.    Medical decision-making and discussion: In review of labs it is noted that patient does have elevated glucose. Microalbumin has gone down from 103 down to 83 but is still elevated. In discussion with patient we are going to half patient's metformin to which she is taking 1000 mg twice a day and go down to 500 mg twice a day. We will continue to monitor. Discussed with patient that if sugars and A1c trend upward we will place patient on a new medication that will be more kidney friendly. Also did discuss that even with discontinuation of hydrochlorothiazide and if we get patient off metformin if A1c is still greater than 50 we will refer to nephrology, pt agrees with plan    Please note that this dictation was created using voice recognition software. I have made every reasonable attempt to correct obvious errors, but I expect that there are errors of grammar and possibly content that I did not discover before finalizing the note.    Assessment/Plan:  1. Type 2 diabetes mellitus without complication, without long-term current use of insulin (CMS-[facility])     2. Essential hypertension

## 2017-03-27 NOTE — ASSESSMENT & PLAN NOTE
Patient was taking metoprolol 25 mg, lisinopril 10 mg and hydrochlorothiazide 25 mg. In January 2017 was noted elevated microalbumin and therefore we discontinue hydrochlorothiazide and we increase the fosinopril to 20 mg. Patient states he's been doing well, stable. assymptomatic.

## 2017-03-27 NOTE — MR AVS SNAPSHOT
"Fabio Vergara   3/27/2017 10:20 AM   Office Visit   MRN: [id number]    Department:  [place] [address]   Dept Phone:  [phone]    Description:  Male : 1955   Provider:  Agustín Nguyen PA-C           Reason for Visit     Follow-Up           Allergies as of 3/27/2017     Allergen Noted Reactions    Flexeril [Cyclobenzaprine] 10/07/2014       Weakness, spacie    Penicillins 2009       Doesn't know the reaction  had this allergy when he was a child      You were diagnosed with     Type 2 diabetes mellitus without complication, without long-term current use of insulin (CMS-HCC)   [6264804]       Essential hypertension   [4813968]         Vital Signs     Blood Pressure Pulse Temperature Respirations Height Weight    130/88 mmHg 86 36.8 °C (98.2 °F) 16 1.778 m (5' 10\") 84.369 kg (186 lb)    Body Mass Index Oxygen Saturation Smoking Status             26.69 kg/m2 95% Former Smoker         Basic Information     Date Of Birth Sex Race Ethnicity Preferred Language    1955 Male White Non- English      Your appointments     2017 10:00 AM   Established Patient with Agustín Nguyen PA-C   [facility]    [address]  [address]  [facility] [address] [phone]           You will be receiving a confirmation call a few days before your appointment from our automated call confirmation system.              Problem List              ICD-10-CM Priority Class Noted - Resolved    Essential hypertension I10   11/10/2015 - Present    HLD (hyperlipidemia) E78.5   11/10/2015 - Present    Type 2 diabetes mellitus without complication (CMS-HCC) E11.9   11/10/2015 - Present    Pain, chronic G89.29   11/10/2015 - Present      Health Maintenance        Date Due Completion Dates    RETINAL SCREENING 5/15/1971 ---    IMM PNEUMOCOCCAL 19-64 (ADULT) MEDIUM RISK SERIES (1 of 1 - PPSV23) 5/15/1972 ---    COLONOSCOPY 5/15/2003 ---    IMM ZOSTER " VACCINE 5/15/2013 ---    A1C SCREENING 7/13/2017 1/13/2017, 5/2/2016, 1/12/2016, 8/28/2015, 3/2/2015, 7/2/2014, 7/22/2013, 8/7/2012, 3/28/2012    DIABETES MONOFILAMENT / LE EXAM 1/12/2018 1/12/2017    FASTING LIPID PROFILE 1/13/2018 1/13/2017, 1/12/2016, 8/28/2015, 3/2/2015, 7/2/2014, 7/22/2013, 8/7/2012, 3/28/2012    URINE ACR / MICROALBUMIN 3/16/2018 3/16/2017, 1/13/2017, 1/12/2016, 8/28/2015, 7/22/2013, 3/28/2012    SERUM CREATININE 3/16/2018 3/16/2017, 1/13/2017, 5/2/2016, 1/12/2016, 8/28/2015, 3/2/2015, 7/2/2014, 7/22/2013, 8/7/2012, 3/28/2012    IMM DTaP/Tdap/Td Vaccine (2 - Td) 11/19/2020 11/19/2010            Current Immunizations     Influenza TIV (IM) 10/13/2016    Tdap Vaccine 11/19/2010      Below and/or attached are the medications your provider expects you to take. Review all of your home medications and newly ordered medications with your provider and/or pharmacist. Follow medication instructions as directed by your provider and/or pharmacist. Please keep your medication list with you and share with your provider. Update the information when medications are discontinued, doses are changed, or new medications (including over-the-counter products) are added; and carry medication information at all times in the event of emergency situations     Allergies:  FLEXERIL - (reactions not documented)     PENICILLINS - (reactions not documented)               Medications  Valid as of: March 27, 2017 - 11:03 AM    Generic Name Brand Name Tablet Size Instructions for use    Aspirin (Tablet Delayed Response) ECOTRIN 81 MG Take 81 mg by mouth every day.        Cholecalciferol (Tab) cholecalciferol 1000 UNIT Take 2,000 Units by mouth every day.        Gabapentin (Cap) NEURONTIN 100 MG Take 100 mg by mouth 3 times a day.        Lisinopril (Tab) PRINIVIL 20 MG Take 1 Tab by mouth every day.        MetFORMIN HCl (Tab) GLUCOPHAGE 500 MG Take 1 Tab by mouth 2 times a day, with meals.        Metoprolol Succinate (TABLET SR  24 HR) TOPROL XL 25 MG Take 1 Tab by mouth every day.        Naproxen (Tab) NAPROSYN 500 MG Take 1 Tab by mouth 2 times a day, with meals.        Pravastatin Sodium (Tab) PRAVACHOL 40 MG Take 1 Tab by mouth every day.        TraMADol HCl (Tab) ULTRAM 50 MG Take  mg by mouth every four hours as needed for Mild Pain.        .                 Medicines prescribed today were sent to:     [id number] - [place], [place] - [address]    [address] [facility] [place] [address]    Phone: [phone] Fax: [phone]    Open 24 Hours?: No      Medication refill instructions:       If your prescription bottle indicates you have medication refills left, it is not necessary to call your provider’s office. Please contact your pharmacy and they will refill your medication.    If your prescription bottle indicates you do not have any refills left, you may request refills at any time through one of the following ways: The online [employer] system (except Urgent Care), by calling your provider’s office, or by asking your pharmacy to contact your provider’s office with a refill request. Medication refills are processed only during regular business hours and may not be available until the next business day. Your provider may request additional information or to have a follow-up visit with you prior to refilling your medication.   *Please Note: Medication refills are assigned a new Rx number when refilled electronically. Your pharmacy may indicate that no refills were authorized even though a new prescription for the same medication is available at the pharmacy. Please request the medicine by name with the pharmacy before contacting your provider for a refill.           OQVestirhart Status: Patient Declined

## 2017-03-27 NOTE — ASSESSMENT & PLAN NOTE
Chronic in nature. Patient states that he is currently taking metformin 1000 mg twice a day. Patient recently had labs in January 2017 which did show positive microalbuminuria. It was initially determined that we would work with patient's blood pressure and therefore we went and discontinue hydrochlorothiazide and increased lisinopril. Patient recently had labs redrawn please see labs below.

## 2017-03-27 NOTE — MR AVS SNAPSHOT
Fabio Gonzales Ashlis   3/27/2017 10:45 AM   Non-Provider Visit   MRN: [id number]    Department:  [facility]   Dept Phone:  [phone]    Description:  Male : 1955   Provider:  [facility]           Reason for Visit     Other retinal eye exam      Allergies as of 3/27/2017     Allergen Noted Reactions    Flexeril [Cyclobenzaprine] 10/07/2014       Weakness, spacie    Penicillins 2009       Doesn't know the reaction  had this allergy when he was a child      Vital Signs     Smoking Status                   Former Smoker           Basic Information     Date Of Birth Sex Race Ethnicity Preferred Language    1955 Male White Non- English      Your appointments     2017 10:00 AM   Established Patient with Agustín Nguyen PA-C   [facility] ([facility]    [address]  [address]  [place] [address] [phone]           You will be receiving a confirmation call a few days before your appointment from our automated call confirmation system.              Problem List              ICD-10-CM Priority Class Noted - Resolved    Essential hypertension I10   11/10/2015 - Present    HLD (hyperlipidemia) E78.5   11/10/2015 - Present    Type 2 diabetes mellitus without complication (CMS-HCC) E11.9   11/10/2015 - Present    Pain, chronic G89.29   11/10/2015 - Present      Health Maintenance        Date Due Completion Dates    RETINAL SCREENING 5/15/1971 ---    IMM PNEUMOCOCCAL 19-64 (ADULT) MEDIUM RISK SERIES (1 of 1 - PPSV23) 5/15/1972 ---    COLONOSCOPY 5/15/2003 ---    IMM ZOSTER VACCINE 5/15/2013 ---    A1C SCREENING 2017, 2016, 2016, 2015, 3/2/2015, 2014, 2013, 2012, 3/28/2012    DIABETES MONOFILAMENT / LE EXAM 2018    FASTING LIPID PROFILE 2018, 2016, 2015, 3/2/2015, 2014, 2013, 2012, 3/28/2012    URINE ACR / MICROALBUMIN 3/16/2018  3/16/2017, 1/13/2017, 1/12/2016, 8/28/2015, 7/22/2013, 3/28/2012    SERUM CREATININE 3/16/2018 3/16/2017, 1/13/2017, 5/2/2016, 1/12/2016, 8/28/2015, 3/2/2015, 7/2/2014, 7/22/2013, 8/7/2012, 3/28/2012    IMM DTaP/Tdap/Td Vaccine (2 - Td) 11/19/2020 11/19/2010            Current Immunizations     Influenza TIV (IM) 10/13/2016    Tdap Vaccine 11/19/2010      Below and/or attached are the medications your provider expects you to take. Review all of your home medications and newly ordered medications with your provider and/or pharmacist. Follow medication instructions as directed by your provider and/or pharmacist. Please keep your medication list with you and share with your provider. Update the information when medications are discontinued, doses are changed, or new medications (including over-the-counter products) are added; and carry medication information at all times in the event of emergency situations     Allergies:  FLEXERIL - (reactions not documented)     PENICILLINS - (reactions not documented)               Medications  Valid as of: March 27, 2017 - 11:15 AM    Generic Name Brand Name Tablet Size Instructions for use    Aspirin (Tablet Delayed Response) ECOTRIN 81 MG Take 81 mg by mouth every day.        Cholecalciferol (Tab) cholecalciferol 1000 UNIT Take 2,000 Units by mouth every day.        Gabapentin (Cap) NEURONTIN 100 MG Take 100 mg by mouth 3 times a day.        Lisinopril (Tab) PRINIVIL 20 MG Take 1 Tab by mouth every day.        MetFORMIN HCl (Tab) GLUCOPHAGE 500 MG Take 1 Tab by mouth 2 times a day, with meals.        Metoprolol Succinate (TABLET SR 24 HR) TOPROL XL 25 MG Take 1 Tab by mouth every day.        Naproxen (Tab) NAPROSYN 500 MG Take 1 Tab by mouth 2 times a day, with meals.        Pravastatin Sodium (Tab) PRAVACHOL 40 MG Take 1 Tab by mouth every day.        TraMADol HCl (Tab) ULTRAM 50 MG Take  mg by mouth every four hours as needed for Mild Pain.        .                    Medicines prescribed today were sent to:     [id number] - [place], [place] - [address]    [address] [place] [address]    Phone: [phone] Fax: [phone]    Open 24 Hours?: No      Medication refill instructions:       If your prescription bottle indicates you have medication refills left, it is not necessary to call your provider’s office. Please contact your pharmacy and they will refill your medication.    If your prescription bottle indicates you do not have any refills left, you may request refills at any time through one of the following ways: The online [employer] system (except Urgent Care), by calling your provider’s office, or by asking your pharmacy to contact your provider’s office with a refill request. Medication refills are processed only during regular business hours and may not be available until the next business day. Your provider may request additional information or to have a follow-up visit with you prior to refilling your medication.   *Please Note: Medication refills are assigned a new Rx number when refilled electronically. Your pharmacy may indicate that no refills were authorized even though a new prescription for the same medication is available at the pharmacy. Please request the medicine by name with the pharmacy before contacting your provider for a refill.           MyChart Status: Patient Declined

## 2017-03-27 NOTE — PROGRESS NOTES
Fabio Vergara is a 61 y.o. male here for a non-provider visit for diabetic eye exam. Eye dilation was unsuccessful and has been notified to contact his eye physician for proper review.    If abnormal was an in office provider notified today (if so, indicate provider)? Yes  Routed to PCP? Yes

## 2017-04-11 ENCOUNTER — HOSPITAL ENCOUNTER (OUTPATIENT)
Dept: RADIOLOGY | Facility: REHABILITATION | Age: 62
End: 2017-04-11
Attending: SPECIALIST
Payer: COMMERCIAL

## 2017-04-11 ENCOUNTER — HOSPITAL ENCOUNTER (OUTPATIENT)
Dept: PAIN MANAGEMENT | Facility: REHABILITATION | Age: 62
End: 2017-04-11
Attending: SPECIALIST
Payer: COMMERCIAL

## 2017-04-11 VITALS
HEART RATE: 70 BPM | TEMPERATURE: 97.3 F | BODY MASS INDEX: 27.36 KG/M2 | HEIGHT: 70 IN | OXYGEN SATURATION: 99 % | DIASTOLIC BLOOD PRESSURE: 97 MMHG | WEIGHT: 191.14 LBS | SYSTOLIC BLOOD PRESSURE: 181 MMHG | RESPIRATION RATE: 16 BRPM

## 2017-04-11 PROCEDURE — 700117 HCHG RX CONTRAST REV CODE 255

## 2017-04-11 PROCEDURE — 62321 NJX INTERLAMINAR CRV/THRC: CPT

## 2017-04-11 PROCEDURE — 700111 HCHG RX REV CODE 636 W/ 250 OVERRIDE (IP)

## 2017-04-11 RX ORDER — METHYLPREDNISOLONE ACETATE 80 MG/ML
INJECTION, SUSPENSION INTRA-ARTICULAR; INTRALESIONAL; INTRAMUSCULAR; SOFT TISSUE
Status: COMPLETED
Start: 2017-04-11 | End: 2017-04-11

## 2017-04-11 RX ADMIN — IOHEXOL 1 ML: 240 INJECTION, SOLUTION INTRATHECAL; INTRAVASCULAR; INTRAVENOUS; ORAL at 10:41

## 2017-04-11 RX ADMIN — METHYLPREDNISOLONE ACETATE 1 MG: 80 INJECTION, SUSPENSION INTRA-ARTICULAR; INTRALESIONAL; INTRAMUSCULAR; SOFT TISSUE at 10:41

## 2017-04-11 ASSESSMENT — PAIN SCALES - GENERAL
PAINLEVEL_OUTOF10: 4
PAINLEVEL_OUTOF10: 8
PAINLEVEL_OUTOF10: 3

## 2017-04-11 NOTE — PROGRESS NOTES
Patient positioned pre-procedure by RN,CST and xray tech. Pillow placed under lower legs and feet for support.

## 2017-04-11 NOTE — PROGRESS NOTES
Current medications reviewed with pt, see medications reconciliation form. Pt david taking ASA or other blood thinners or anti-inflammatories.  Pt has a ride post-procedure(wife to drive).  Printed and verbal discharge instructions given to pt who verbalized understanding.

## 2017-04-12 NOTE — PROCEDURES
DATE OF SERVICE:  04/11/2017    PROCEDURES:  1.  Cervical epidural steroid injection.  2.  Fluoroscopy for needle guidance, confirmation of placement, and   epidurogram.    DIAGNOSIS:  Right cervical radiculopathy.    DESCRIPTION:  After informed consent with the patient prone, neck flexed,   fluoroscopy was utilized to identify the C6-C7 interspace.  This area was   prepped with Betadine, sterile draped and anesthetized.  Under intermittent   fluoroscopic guidance and local anesthesia, 20-gauge epidural needle was   passed right of midline to the dorsal epidural space using loss of resistance   technique with saline.  Contrast was injected confirming right-sided   epidurogram.  I slowly injected 80 mg Depo-Medrol, 1.5 mL 1% lidocaine, 3 mL   normal saline.  This did reproduce mild concordant arm paresthesia.  Needle   was removed.  He tolerated this well.    PLAN:  Follow up for ongoing pain management needs.       ____________________________________     MD YOHANA MARIE / ALLAN    DD:  04/11/2017 10:43:57  DT:  04/11/2017 21:34:43    D#:  [id number]  Job#:  [id number]    cc: [facility]

## 2017-06-12 ENCOUNTER — HOSPITAL ENCOUNTER (OUTPATIENT)
Dept: PHYSICAL THERAPY | Facility: REHABILITATION | Age: 62
End: 2017-06-12
Attending: SPECIALIST
Payer: COMMERCIAL

## 2017-06-12 PROCEDURE — 97110 THERAPEUTIC EXERCISES: CPT

## 2017-06-12 PROCEDURE — 97161 PT EVAL LOW COMPLEX 20 MIN: CPT

## 2017-06-15 ENCOUNTER — HOSPITAL ENCOUNTER (OUTPATIENT)
Dept: PHYSICAL THERAPY | Facility: REHABILITATION | Age: 62
End: 2017-06-15
Attending: SPECIALIST
Payer: COMMERCIAL

## 2017-06-15 PROCEDURE — 97110 THERAPEUTIC EXERCISES: CPT

## 2017-06-21 ENCOUNTER — HOSPITAL ENCOUNTER (OUTPATIENT)
Dept: PHYSICAL THERAPY | Facility: REHABILITATION | Age: 62
End: 2017-06-21
Attending: SPECIALIST
Payer: COMMERCIAL

## 2017-06-21 PROCEDURE — 97110 THERAPEUTIC EXERCISES: CPT

## 2017-06-21 PROCEDURE — 97014 ELECTRIC STIMULATION THERAPY: CPT

## 2017-06-21 PROCEDURE — 97012 MECHANICAL TRACTION THERAPY: CPT

## 2017-06-26 ENCOUNTER — TELEPHONE (OUTPATIENT)
Dept: MEDICAL GROUP | Facility: MEDICAL CENTER | Age: 62
End: 2017-06-26

## 2017-06-26 NOTE — TELEPHONE ENCOUNTER
ESTABLISHED PATIENT PRE-VISIT PLANNING     Note: Patient will not be contacted if there is no indication to call.     1.  Reviewed notes from the last few office visits within the medical group: Yes 03/27/2017    2.  If any orders were placed at last visit or intended to be done for this visit (i.e. 6 mos follow-up), do we have Results/Consult Notes?        •  Labs - Labs ordered, completed and results are in chart.       •  Imaging - Imaging ordered, completed and results are in chart.       •  Referrals -05/24/2017 Referral comp     3. Is this appointment scheduled as a Hospital Follow-Up? No    4.  Immunizations were updated in [employer] using [employer]?: Yes       •  [employer] Recommendations: PNEUMOVAX (PPSV23)    5.  Patient is due for the following Health Maintenance Topics:   Health Maintenance Due   Topic Date Due   • IMM PNEUMOCOCCAL 19-64 (ADULT) MEDIUM RISK SERIES (1 of 1 - PPSV23) 05/15/1972   • COLONOSCOPY  05/15/2003   • IMM ZOSTER VACCINE  05/15/2013           6.  Patient was NOT informed to arrive 15 min prior to their scheduled appointment and bring in their medication bottles.

## 2017-06-27 ENCOUNTER — HOSPITAL ENCOUNTER (OUTPATIENT)
Dept: PHYSICAL THERAPY | Facility: REHABILITATION | Age: 62
End: 2017-06-27
Attending: SPECIALIST
Payer: COMMERCIAL

## 2017-06-27 ENCOUNTER — OFFICE VISIT (OUTPATIENT)
Dept: MEDICAL GROUP | Facility: MEDICAL CENTER | Age: 62
End: 2017-06-27
Payer: COMMERCIAL

## 2017-06-27 VITALS
HEIGHT: 70 IN | BODY MASS INDEX: 26.63 KG/M2 | HEART RATE: 74 BPM | DIASTOLIC BLOOD PRESSURE: 90 MMHG | TEMPERATURE: 98.8 F | SYSTOLIC BLOOD PRESSURE: 130 MMHG | WEIGHT: 186 LBS | OXYGEN SATURATION: 97 %

## 2017-06-27 DIAGNOSIS — R80.9 MICROALBUMINURIA: ICD-10-CM

## 2017-06-27 DIAGNOSIS — E11.9 TYPE 2 DIABETES MELLITUS WITHOUT COMPLICATION, WITHOUT LONG-TERM CURRENT USE OF INSULIN (HCC): ICD-10-CM

## 2017-06-27 DIAGNOSIS — Z12.11 SCREENING FOR COLON CANCER: ICD-10-CM

## 2017-06-27 PROCEDURE — 97110 THERAPEUTIC EXERCISES: CPT

## 2017-06-27 PROCEDURE — 99214 OFFICE O/P EST MOD 30 MIN: CPT | Performed by: PHYSICIAN ASSISTANT

## 2017-06-27 PROCEDURE — 97140 MANUAL THERAPY 1/> REGIONS: CPT

## 2017-06-27 PROCEDURE — 97012 MECHANICAL TRACTION THERAPY: CPT

## 2017-06-27 NOTE — ASSESSMENT & PLAN NOTE
Review of medical records shows that last hemoglobin A1c 6.6 done in January 2017. Need to order new A1c today. Patient currently using metformin 500 mg once a day.

## 2017-06-27 NOTE — PROGRESS NOTES
Chief Complaint   Patient presents with   • Orders Needed     Lab Orders       HISTORY OF PRESENT ILLNESS: Patient is a 62 y.o. male established patient who presents today to follow up    Type 2 diabetes mellitus without complication  Review of medical records shows that last hemoglobin A1c 6.6 done in January 2017. Need to order new A1c today. Patient currently using metformin 500 mg once a day.       Patient Active Problem List    Diagnosis Date Noted   • Essential hypertension 11/10/2015   • HLD (hyperlipidemia) 11/10/2015   • Type 2 diabetes mellitus without complication (CMS-HCC) 11/10/2015   • Pain, chronic 11/10/2015       Allergies:Flexeril and Penicillins    Current Outpatient Prescriptions   Medication Sig Dispense Refill   • lisinopril (PRINIVIL) 20 MG Tab TAKE ONE TABLET BY MOUTH EVERY DAY 30 Tab 5   • metoprolol SR (TOPROL XL) 25 MG TABLET SR 24 HR Take 1 Tab by mouth every day. 90 Tab 1   • pravastatin (PRAVACHOL) 40 MG tablet Take 1 Tab by mouth every day. 90 Tab 1   • metformin (GLUCOPHAGE) 500 MG Tab Take 1 Tab by mouth 2 times a day, with meals. 180 Tab 1   • naproxen (NAPROSYN) 500 MG Tab Take 1 Tab by mouth 2 times a day, with meals. 60 Tab 0   • vitamin D (CHOLECALCIFEROL) 1000 UNIT Tab Take 2,000 Units by mouth every day.     • aspirin EC (ECOTRIN) 81 MG TBEC Take 81 mg by mouth every day.     • gabapentin (NEURONTIN) 100 MG CAPS Take 100 mg by mouth 3 times a day.     • tramadol (ULTRAM) 50 MG TABS Take  mg by mouth every four hours as needed for Mild Pain.       No current facility-administered medications for this visit.       Social History   Substance Use Topics   • Smoking status: Former Smoker   • Smokeless tobacco: Never Used      Comment: quit one year ago (October 2014)   • Alcohol Use: 0.0 oz/week     0 Standard drinks or equivalent per week      Comment: 6 pack a week )only beer)       No family status information on file.   No family history on file.    Review of Systems:  "  Constitutional: Negative for fever, chills, weight loss and malaise/fatigue.   HENT: Negative for ear pain, nosebleeds, congestion, sore throat and neck pain.    Eyes: Negative for blurred vision.   Respiratory: Negative for cough, sputum production, shortness of breath and wheezing.    Cardiovascular: Negative for chest pain, palpitations, orthopnea and leg swelling.   Gastrointestinal: Negative for heartburn, nausea, vomiting and abdominal pain.   Genitourinary: Negative for dysuria, urgency and frequency.   Musculoskeletal: Negative for myalgias, back pain and joint pain.   Skin: Negative for rash and itching.   Neurological: Negative for dizziness, tingling, tremors, sensory change, focal weakness and headaches.   Endo/Heme/Allergies: Does not bruise/bleed easily.   Psychiatric/Behavioral: Negative for depression, suicidal ideas and memory loss.  The patient is not nervous/anxious and does not have insomnia.    All other systems reviewed and are negative except as in HPI.    Exam:  Blood pressure 130/90, pulse 74, temperature 37.1 °C (98.8 °F), height 1.778 m (5' 10\"), weight 84.369 kg (186 lb), SpO2 97 %.  General:  Well nourished, well developed male in NAD  Head: is grossly normal.  Neck: Supple without JVD or bruit. Thyroid is not enlarged.  Pulmonary: Clear to ausculation. Normal effort. No rales, ronchi, or wheezing.  Cardiovascular: Regular rate and rhythm without murmur. Carotid and radial pulses are intact and equal bilaterally.  Extremities: no clubbing, cyanosis, or edema.    Medical decision-making and discussion: A 62-year-old male presents to clinic today to follow-up on previous labs, overall health. It was noted during previous encounters that patient had positive microalbumin noted in his urine. Therefore we have been altering medications. We have taken patient off of diuretic medication and have him on the Cipro 20 mg and doing well. Blood pressure stable in holding steady at 130/90 with a " heart rate of 74 bpm. Patient asymptomatic. Also we have decreased metformin down to only 500 mg once a day. Which a recheck microalbumin, A1c and kidney function checked overall health status. In trending in looking at microalbumin from January until March microalbumin hasn't really gone down from 100-80. This number is trending down towards the normal range that we would like to see it.  New labs been ordered accordingly today to further evaluate. Also health maintenance shows patient is deficient in colonoscopy and therefore fit test has been ordered to look at possible blood in the stool. Patient is unaware if positive he will need to get a colonoscopy.    Please note that this dictation was created using voice recognition software. I have made every reasonable attempt to correct obvious errors, but I expect that there are errors of grammar and possibly content that I did not discover before finalizing the note.    Assessment/Plan:  1. Type 2 diabetes mellitus without complication, without long-term current use of insulin (CMS-HCC)     2. Microalbuminuria     3. Screening for colon cancer

## 2017-06-28 ENCOUNTER — HOSPITAL ENCOUNTER (OUTPATIENT)
Facility: MEDICAL CENTER | Age: 62
End: 2017-06-28
Attending: PHYSICIAN ASSISTANT
Payer: COMMERCIAL

## 2017-06-28 ENCOUNTER — HOSPITAL ENCOUNTER (OUTPATIENT)
Dept: LAB | Facility: MEDICAL CENTER | Age: 62
End: 2017-06-28
Attending: PHYSICIAN ASSISTANT
Payer: COMMERCIAL

## 2017-06-28 DIAGNOSIS — R80.9 MICROALBUMINURIA: ICD-10-CM

## 2017-06-28 DIAGNOSIS — E11.9 TYPE 2 DIABETES MELLITUS WITHOUT COMPLICATION, WITHOUT LONG-TERM CURRENT USE OF INSULIN (HCC): ICD-10-CM

## 2017-06-28 LAB
ALBUMIN SERPL BCP-MCNC: 3.7 G/DL (ref 3.2–4.9)
ALBUMIN/GLOB SERPL: 1.1 G/DL
ALP SERPL-CCNC: 76 U/L (ref 30–99)
ALT SERPL-CCNC: 17 U/L (ref 2–50)
ANION GAP SERPL CALC-SCNC: 8 MMOL/L (ref 0–11.9)
AST SERPL-CCNC: 16 U/L (ref 12–45)
BASOPHILS # BLD AUTO: 1.2 % (ref 0–1.8)
BASOPHILS # BLD: 0.1 K/UL (ref 0–0.12)
BILIRUB SERPL-MCNC: 0.6 MG/DL (ref 0.1–1.5)
BUN SERPL-MCNC: 7 MG/DL (ref 8–22)
CALCIUM SERPL-MCNC: 9.3 MG/DL (ref 8.5–10.5)
CHLORIDE SERPL-SCNC: 98 MMOL/L (ref 96–112)
CO2 SERPL-SCNC: 29 MMOL/L (ref 20–33)
CREAT SERPL-MCNC: 0.77 MG/DL (ref 0.5–1.4)
CREAT UR-MCNC: 174.4 MG/DL
EOSINOPHIL # BLD AUTO: 0.19 K/UL (ref 0–0.51)
EOSINOPHIL NFR BLD: 2.2 % (ref 0–6.9)
ERYTHROCYTE [DISTWIDTH] IN BLOOD BY AUTOMATED COUNT: 46.8 FL (ref 35.9–50)
EST. AVERAGE GLUCOSE BLD GHB EST-MCNC: 258 MG/DL
GFR SERPL CREATININE-BSD FRML MDRD: >60 ML/MIN/1.73 M 2
GLOBULIN SER CALC-MCNC: 3.5 G/DL (ref 1.9–3.5)
GLUCOSE SERPL-MCNC: 250 MG/DL (ref 65–99)
HBA1C MFR BLD: 10.6 % (ref 0–5.6)
HCT VFR BLD AUTO: 42.7 % (ref 42–52)
HGB BLD-MCNC: 14.9 G/DL (ref 14–18)
IMM GRANULOCYTES # BLD AUTO: 0.02 K/UL (ref 0–0.11)
IMM GRANULOCYTES NFR BLD AUTO: 0.2 % (ref 0–0.9)
LYMPHOCYTES # BLD AUTO: 3.65 K/UL (ref 1–4.8)
LYMPHOCYTES NFR BLD: 42.1 % (ref 22–41)
MCH RBC QN AUTO: 33 PG (ref 27–33)
MCHC RBC AUTO-ENTMCNC: 34.9 G/DL (ref 33.7–35.3)
MCV RBC AUTO: 94.7 FL (ref 81.4–97.8)
MICROALBUMIN UR-MCNC: 14.5 MG/DL
MICROALBUMIN/CREAT UR: 83 MG/G (ref 0–30)
MONOCYTES # BLD AUTO: 0.83 K/UL (ref 0–0.85)
MONOCYTES NFR BLD AUTO: 9.6 % (ref 0–13.4)
NEUTROPHILS # BLD AUTO: 3.87 K/UL (ref 1.82–7.42)
NEUTROPHILS NFR BLD: 44.7 % (ref 44–72)
NRBC # BLD AUTO: 0.02 K/UL
NRBC BLD AUTO-RTO: 0.2 /100 WBC
PLATELET # BLD AUTO: 322 K/UL (ref 164–446)
PMV BLD AUTO: 11.2 FL (ref 9–12.9)
POTASSIUM SERPL-SCNC: 4.9 MMOL/L (ref 3.6–5.5)
PROT SERPL-MCNC: 7.2 G/DL (ref 6–8.2)
RBC # BLD AUTO: 4.51 M/UL (ref 4.7–6.1)
SODIUM SERPL-SCNC: 135 MMOL/L (ref 135–145)
WBC # BLD AUTO: 8.7 K/UL (ref 4.8–10.8)

## 2017-06-28 PROCEDURE — 36415 COLL VENOUS BLD VENIPUNCTURE: CPT

## 2017-06-28 PROCEDURE — 82043 UR ALBUMIN QUANTITATIVE: CPT

## 2017-06-28 PROCEDURE — 80053 COMPREHEN METABOLIC PANEL: CPT

## 2017-06-28 PROCEDURE — 82274 ASSAY TEST FOR BLOOD FECAL: CPT

## 2017-06-28 PROCEDURE — 85025 COMPLETE CBC W/AUTO DIFF WBC: CPT

## 2017-06-28 PROCEDURE — 82570 ASSAY OF URINE CREATININE: CPT

## 2017-06-28 PROCEDURE — 83036 HEMOGLOBIN GLYCOSYLATED A1C: CPT

## 2017-06-29 ENCOUNTER — HOSPITAL ENCOUNTER (OUTPATIENT)
Dept: PHYSICAL THERAPY | Facility: REHABILITATION | Age: 62
End: 2017-06-29
Attending: SPECIALIST
Payer: COMMERCIAL

## 2017-06-29 ENCOUNTER — TELEPHONE (OUTPATIENT)
Dept: MEDICAL GROUP | Facility: MEDICAL CENTER | Age: 62
End: 2017-06-29

## 2017-06-29 PROCEDURE — 97110 THERAPEUTIC EXERCISES: CPT

## 2017-06-29 PROCEDURE — 97012 MECHANICAL TRACTION THERAPY: CPT

## 2017-06-29 NOTE — TELEPHONE ENCOUNTER
----- Message from Agustín Nguyen PA-C sent at 6/29/2017  3:03 PM PDT -----  Review of labs: Please have patient schedule follow-up to discuss

## 2017-06-29 NOTE — TELEPHONE ENCOUNTER
Phone Number Called: [phone] (home)     Message: Left msg for patient to make appt to discuss labs     Left Message for patient to call back: yes

## 2017-06-30 DIAGNOSIS — Z12.11 SCREENING FOR COLON CANCER: ICD-10-CM

## 2017-06-30 LAB — HEMOCCULT STL QL IA: NEGATIVE

## 2017-07-05 ENCOUNTER — TELEPHONE (OUTPATIENT)
Dept: MEDICAL GROUP | Facility: MEDICAL CENTER | Age: 62
End: 2017-07-05

## 2017-07-05 NOTE — TELEPHONE ENCOUNTER
----- Message from Yen Jaimes PA-C sent at 7/5/2017  7:12 AM PDT -----  Please inform Agustín's patient that his FIT was negative for blood.   Recommend next test in one year.  Thank you  Yen

## 2017-07-05 NOTE — TELEPHONE ENCOUNTER
Phone Number Called: [phone] (home)     Message: Left msg for patient to call back.     Left Message for patient to call back: yes

## 2017-07-05 NOTE — TELEPHONE ENCOUNTER
Phone Number Called: [phone] (home)     Message: Left vm for pt to schedule f/v with pcp    Left Message for patient to call back: yes

## 2017-07-10 ENCOUNTER — OFFICE VISIT (OUTPATIENT)
Dept: MEDICAL GROUP | Facility: MEDICAL CENTER | Age: 62
End: 2017-07-10
Payer: COMMERCIAL

## 2017-07-10 VITALS
WEIGHT: 184 LBS | BODY MASS INDEX: 26.34 KG/M2 | TEMPERATURE: 98.1 F | HEART RATE: 71 BPM | OXYGEN SATURATION: 91 % | DIASTOLIC BLOOD PRESSURE: 80 MMHG | SYSTOLIC BLOOD PRESSURE: 120 MMHG | HEIGHT: 70 IN

## 2017-07-10 DIAGNOSIS — E11.9 TYPE 2 DIABETES MELLITUS WITHOUT COMPLICATION, WITHOUT LONG-TERM CURRENT USE OF INSULIN (HCC): ICD-10-CM

## 2017-07-10 DIAGNOSIS — R80.9 MICROALBUMINURIA: ICD-10-CM

## 2017-07-10 PROCEDURE — 99214 OFFICE O/P EST MOD 30 MIN: CPT | Performed by: PHYSICIAN ASSISTANT

## 2017-07-10 RX ORDER — GLYBURIDE 5 MG/1
5 TABLET ORAL
Qty: 30 TAB | Refills: 2 | Status: SHIPPED | OUTPATIENT
Start: 2017-07-10 | End: 2017-08-29

## 2017-07-10 NOTE — ASSESSMENT & PLAN NOTE
Last hemoglobin A1c 6.6. Patient has recently had medications altered secondary to microalbumin. Patient currently taking 500 mg once a day. Patient states only taking metformin 500 mg once a day.  It is to note the patient was metformin twice a day but has microalbumin was 103 we did cut him down to once a day. Patient also states that he is not currently taking ibuprofen/NSAIDs

## 2017-07-10 NOTE — PROGRESS NOTES
Chief Complaint   Patient presents with   • Follow-Up       HISTORY OF PRESENT ILLNESS: Patient is a 62 y.o. male established patient who presents today to follow up on labs.    Patient presents to follow up on labs. Labs were ordered June 27, 2017. Patient did have labs ordered and is here today to go over them.    Type 2 diabetes mellitus without complication  Last hemoglobin A1c 6.6. Patient has recently had medications altered secondary to microalbumin. Patient currently taking 500 mg once a day. Patient states only taking metformin 500 mg once a day.  It is to note the patient was metformin twice a day but has microalbumin was 103 we did cut him down to once a day. Patient also states that he is not currently taking ibuprofen/NSAIDs       Patient Active Problem List    Diagnosis Date Noted   • Essential hypertension 11/10/2015   • HLD (hyperlipidemia) 11/10/2015   • Type 2 diabetes mellitus without complication (CMS-HCC) 11/10/2015   • Pain, chronic 11/10/2015     Allergies:Flexeril and Penicillins    Current Outpatient Prescriptions   Medication Sig Dispense Refill   • glyBURIDE (DIABETA) 5 MG Tab Take 1 Tab by mouth every morning with breakfast. 30 Tab 2   • lisinopril (PRINIVIL) 20 MG Tab TAKE ONE TABLET BY MOUTH EVERY DAY 30 Tab 5   • metoprolol SR (TOPROL XL) 25 MG TABLET SR 24 HR Take 1 Tab by mouth every day. 90 Tab 1   • pravastatin (PRAVACHOL) 40 MG tablet Take 1 Tab by mouth every day. 90 Tab 1   • naproxen (NAPROSYN) 500 MG Tab Take 1 Tab by mouth 2 times a day, with meals. 60 Tab 0   • vitamin D (CHOLECALCIFEROL) 1000 UNIT Tab Take 2,000 Units by mouth every day.     • aspirin EC (ECOTRIN) 81 MG TBEC Take 81 mg by mouth every day.     • gabapentin (NEURONTIN) 100 MG CAPS Take 100 mg by mouth 3 times a day.     • tramadol (ULTRAM) 50 MG TABS Take  mg by mouth every four hours as needed for Mild Pain.       No current facility-administered medications for this visit.     Social History  "  Substance Use Topics   • Smoking status: Former Smoker   • Smokeless tobacco: Never Used      Comment: quit one year ago (October 2014)   • Alcohol Use: 0.0 oz/week     0 Standard drinks or equivalent per week      Comment: 6 pack a week )only beer)     No family status information on file.   History reviewed. No pertinent family history.    Review of Systems:   Constitutional: Negative for fever, chills, weight loss and malaise/fatigue.   HENT: Negative for ear pain, nosebleeds, congestion, sore throat and neck pain.    Eyes: Negative for blurred vision.   Respiratory: Negative for cough, sputum production, shortness of breath and wheezing.    Cardiovascular: Negative for chest pain, palpitations, orthopnea and leg swelling.   Gastrointestinal: Negative for heartburn, nausea, vomiting and abdominal pain.   Genitourinary: Negative for dysuria, urgency and frequency.   Musculoskeletal: Negative for myalgias, back pain and joint pain.   Skin: Negative for rash and itching.   Neurological: Negative for dizziness, tingling, tremors, sensory change, focal weakness and headaches.   Endo/Heme/Allergies: Does not bruise/bleed easily.   Psychiatric/Behavioral: Negative for depression, suicidal ideas and memory loss.  The patient is not nervous/anxious and does not have insomnia.    All other systems reviewed and are negative except as in HPI.    Exam:  Blood pressure 120/80, pulse 71, temperature 36.7 °C (98.1 °F), height 1.778 m (5' 10\"), weight 83.462 kg (184 lb), SpO2 91 %.  General:  Well nourished, well developed male in NAD  Head: is grossly normal.  Neck: Supple without JVD or bruit. Thyroid is not enlarged.  Pulmonary: Clear to ausculation. Normal effort. No rales, ronchi, or wheezing.  Cardiovascular: Regular rate and rhythm without murmur. Carotid and radial pulses are intact and equal bilaterally.  Extremities: no clubbing, cyanosis, or edema.    LABS: 06/28/2017: Results reviewed and discussed with the patient, " questions answered. CBC: The blood cell count 8.7, hemoglobin 14.9, platelet count 322. Competent to metabolic panel: Sodium 135, potassium 4.9, chloride 98, bicarbonate 29, BUN 7, creatinine 0.77, glucose 250, alkaline phosphatase 76, AST 16, ALT 17. Microalbumin random 14.5. A1c 10.6. GFR greater than 60. Fecal occult negative.    Medical decision-making and discussion: A 62-year-old male presents to clinic today follow-up on labs. Microalbumin has bottomed out at 83 and is consistent with 3 months ago U until we had altered medication. We have referred to nephrology for further evaluation. Also we have discontinued metformin and placed patient on glyburide 5 mg once a day. Discussed with patient that he was doing well and controlled at 500 mg twice a day but as this may be causing positive his kidneys I would like to change med and see how he reacts. Patient agrees with plan. Otherwise, labs look good and patient is to recheck labs in 3-6 months    Please note that this dictation was created using voice recognition software. I have made every reasonable attempt to correct obvious errors, but I expect that there are errors of grammar and possibly content that I did not discover before finalizing the note.    Assessment/Plan:  1. Type 2 diabetes mellitus without complication, without long-term current use of insulin (CMS-[facility])  glyBURIDE (DIABETA) 5 MG Tab    REFERRAL TO NEPHROLOGY   2. Microalbuminuria  REFERRAL TO NEPHROLOGY

## 2017-07-18 ENCOUNTER — HOSPITAL ENCOUNTER (OUTPATIENT)
Dept: RADIOLOGY | Facility: MEDICAL CENTER | Age: 62
End: 2017-07-18
Attending: SPECIALIST
Payer: COMMERCIAL

## 2017-07-18 DIAGNOSIS — M54.12 CERVICAL RADICULOPATHY: ICD-10-CM

## 2017-07-18 PROCEDURE — 72141 MRI NECK SPINE W/O DYE: CPT

## 2017-07-20 ENCOUNTER — TELEPHONE (OUTPATIENT)
Dept: HEMATOLOGY ONCOLOGY | Facility: MEDICAL CENTER | Age: 62
End: 2017-07-20

## 2017-07-20 ENCOUNTER — OFFICE VISIT (OUTPATIENT)
Dept: MEDICAL GROUP | Facility: PHYSICIAN GROUP | Age: 62
End: 2017-07-20
Payer: COMMERCIAL

## 2017-07-20 VITALS
HEIGHT: 70 IN | WEIGHT: 186 LBS | TEMPERATURE: 97.9 F | BODY MASS INDEX: 26.63 KG/M2 | HEART RATE: 78 BPM | DIASTOLIC BLOOD PRESSURE: 90 MMHG | SYSTOLIC BLOOD PRESSURE: 140 MMHG | RESPIRATION RATE: 12 BRPM | OXYGEN SATURATION: 94 %

## 2017-07-20 DIAGNOSIS — R80.9 TYPE 2 DIABETES MELLITUS WITH MICROALBUMINURIA, WITHOUT LONG-TERM CURRENT USE OF INSULIN (HCC): ICD-10-CM

## 2017-07-20 DIAGNOSIS — M54.12 CERVICAL RADICULOPATHY AT C7: ICD-10-CM

## 2017-07-20 DIAGNOSIS — E78.2 MIXED HYPERLIPIDEMIA: ICD-10-CM

## 2017-07-20 DIAGNOSIS — Z76.89 ENCOUNTER TO ESTABLISH CARE: ICD-10-CM

## 2017-07-20 DIAGNOSIS — E11.29 TYPE 2 DIABETES MELLITUS WITH MICROALBUMINURIA, WITHOUT LONG-TERM CURRENT USE OF INSULIN (HCC): ICD-10-CM

## 2017-07-20 DIAGNOSIS — Z87.891 HISTORY OF TOBACCO USE: ICD-10-CM

## 2017-07-20 DIAGNOSIS — I10 ESSENTIAL HYPERTENSION: ICD-10-CM

## 2017-07-20 PROCEDURE — 99214 OFFICE O/P EST MOD 30 MIN: CPT | Performed by: NURSE PRACTITIONER

## 2017-07-20 NOTE — ASSESSMENT & PLAN NOTE
Controlled: previously well controlled until metformin was discontinued d/t microalbumin    Medications: glyburide 5 mg daily     Glucose at home: not currently     Hypoglycemia episodes: none    Statin: yes  ACEI/ARB: yes  Aspirin: yes     Denies any polyuria, polydipsia, polyphagia, blurred or cloudy vision, rash or thrush, or numbness or tingling of feet. Last dilated retinal eye exam was May 2017    Ref. Range 5/2/2016 11:28 1/13/2017 09:17 6/28/2017 08:38   Glycohemoglobin Latest Ref Range: 0.0-5.6 % 6.4 (H) 6.6 (H) 10.6 (H)   Estim. Avg Glu Latest Units: mg/dL 137 143 258

## 2017-07-20 NOTE — ASSESSMENT & PLAN NOTE
Established problem, on lisinopril for renal protection. Diabetes uncontrolled now since metformin stopped to address microalbuminuria. Had been referred to nephrologist, but has not gone to appointment yet    Ref. Range 6/28/2017 08:38   Micro Alb Creat Ratio Latest Ref Range: 0-30 mg/g 83 (H)   Creatinine, Urine Latest Units: mg/dL 174.40   Microalbumin, Urine Random Latest Units: mg/dL 14.5

## 2017-07-20 NOTE — PROGRESS NOTES
Subjective:     Chief Complaint   Patient presents with   • New Patient     establish care       HPI  Fabio Vergara is a 62 y.o. male here today to establish care. No needs today     Microalbuminuria due to type 2 diabetes mellitus (CMS-HCC)  Established problem, on lisinopril for renal protection. Diabetes uncontrolled now since metformin stopped to address microalbuminuria. Had been referred to nephrologist, but has not gone to appointment yet    Ref. Range 6/28/2017 08:38   Micro Alb Creat Ratio Latest Ref Range: 0-30 mg/g 83 (H)   Creatinine, Urine Latest Units: mg/dL 174.40   Microalbumin, Urine Random Latest Units: mg/dL 14.5       Type 2 diabetes mellitus with microalbuminuria (CMS-HCC)  Controlled: previously well controlled until metformin was discontinued d/t microalbumin    Medications: glyburide 5 mg daily     Glucose at home: not currently     Hypoglycemia episodes: none    Statin: yes  ACEI/ARB: yes  Aspirin: yes     Denies any polyuria, polydipsia, polyphagia, blurred or cloudy vision, rash or thrush, or numbness or tingling of feet. Last dilated retinal eye exam was May 2017    Ref. Range 5/2/2016 11:28 1/13/2017 09:17 6/28/2017 08:38   Glycohemoglobin Latest Ref Range: 0.0-5.6 % 6.4 (H) 6.6 (H) 10.6 (H)   Estim. Avg Glu Latest Units: mg/dL 137 143 258       HLD (hyperlipidemia)  Chronic problem stable on statin therapy    Ref. Range 1/13/2017 09:17   Cholesterol,Tot Latest Ref Range: 100-199 mg/dL 179   Triglycerides Latest Ref Range: 0-149 mg/dL 70   HDL Latest Ref Range: >=40 mg/dL 81   LDL Latest Ref Range: <100 mg/dL 84       Essential hypertension  Chronic problem well controlled on current medications. Does not monitor blood pressure at home. Denies any severe headache, dizziness, vision changes, chest pain, QUIÑONES, palpitations, or lower extremity edema      Cervical radiculopathy at C7  Current problem being worked up by Dr. Don. There is no neck pain, but when patient turns his head  to the right, there is sudden onset of tingling sensation of the right extremity. No pain. MRI just completed. Appointment with Arian Tuesday  Impression        1.  C6-7 moderate to severe spinal canal narrowing. Right lateral disc extrusion producing severe right lateral recess and foraminal narrowing    2.  Mild-moderate central spinal canal narrowing at C5-6    3.  Mild central spinal canal narrowing at C4-5    4.  Multilevel foraminal stenoses describing the findings section    5.  Degenerative subluxation at C5-6    6.  Abnormal motion at C4-5 as subluxation identified on conventional radiography reduces with supine position on MRI            Diagnoses of Encounter to establish care, Type 2 diabetes mellitus with microalbuminuria, without long-term current use of insulin (CMS-HCC), Mixed hyperlipidemia, Essential hypertension, Cervical radiculopathy at C7, and History of tobacco use were pertinent to this visit.    Allergies: Flexeril and Penicillins  Current medicines (including changes today)  Current Outpatient Prescriptions   Medication Sig Dispense Refill   • glyBURIDE (DIABETA) 5 MG Tab Take 1 Tab by mouth every morning with breakfast. 30 Tab 2   • lisinopril (PRINIVIL) 20 MG Tab TAKE ONE TABLET BY MOUTH EVERY DAY 30 Tab 5   • metoprolol SR (TOPROL XL) 25 MG TABLET SR 24 HR Take 1 Tab by mouth every day. 90 Tab 1   • pravastatin (PRAVACHOL) 40 MG tablet Take 1 Tab by mouth every day. 90 Tab 1   • naproxen (NAPROSYN) 500 MG Tab Take 1 Tab by mouth 2 times a day, with meals. 60 Tab 0   • aspirin EC (ECOTRIN) 81 MG TBEC Take 81 mg by mouth every day.     • gabapentin (NEURONTIN) 100 MG CAPS Take 100 mg by mouth 3 times a day.     • tramadol (ULTRAM) 50 MG TABS Take  mg by mouth every four hours as needed for Mild Pain.     • vitamin D (CHOLECALCIFEROL) 1000 UNIT Tab Take 2,000 Units by mouth every day.       No current facility-administered medications for this visit.       He  has a past medical  "history of Hypertension; Hyperlipidemia; Diabetes (CMS-HCC); and Heart attack (CMS-HCC) (2002).    Health Maintenance: UTD    ROS  As stated in HPI and additionally  General: No night sweats, fatigue, weight loss  Neuro: No headache, neck pain, dizziness. No weakness  CV: No chest pain or LE edema  Pulm: No sob, dyspnea, wheezing, cough, hemoptysis  Endo: see HPI      Objective:     Blood pressure 140/90, pulse 78, temperature 36.6 °C (97.9 °F), resp. rate 12, height 1.778 m (5' 10\"), weight 84.369 kg (186 lb), SpO2 94 %. Body mass index is 26.69 kg/(m^2).  Physical Exam:  General: Alert, oriented, in no acute distress.  Eye contact is good, speech goal directed, affect calm  CNs grossly intact.  HEENT: conjunctiva non-injected, sclera non-icteric, EOMs intact.   Gross hearing intact.  Oral mucous membranes pink and moist with no lesions. Oropharynx without erythema, or exudate.   Neck: Supple. No adenopathy or masses in the cervical or supraclavicular regions.   Lungs: unlabored. clear to auscultation bilaterally with good excursion.  CV: regular rate and rhythm  Ext: no edema  Gait steady.     Assessment and Plan:   Assessment/Plan:  1. Encounter to establish care    2. Type 2 diabetes mellitus with microalbuminuria, without long-term current use of insulin (CMS-HCC)  Not controlled, but only on new medication for 3 weeks now. Check labs in 3 months. Monitor. Hold on nephrology referral temporarily. Monitor microalbumin next visit   - COMP METABOLIC PANEL; Future  - HEMOGLOBIN A1C; Future  - MICROALBUMIN CREAT RATIO URINE; Future    3. Mixed hyperlipidemia  Stable on statin     4. Essential hypertension  controlled on current medications     5. Cervical radiculopathy at C7  MRI with significant changes. F/u with pain management and possible NS referral     6. History of tobacco use  - REFERRAL TO LUNG CANCER SCREENING PROGRAM      Follow up:  Return in about 3 months (around 10/20/2017).    Educated in proper " administration of medication(s) ordered today including safety, possible SE, risks, benefits, rationale and alternatives to therapy.   Supportive care, differential diagnoses, and indications for immediate follow-up discussed with patient.    Pathogenesis of diagnosis discussed including typical length and natural progression.    Instructed to return to clinic or nearest emergency department for any change in condition, further concerns, or worsening of symptoms.  Patient states understanding of the plan of care and discharge instructions.      Please note that this dictation was created using voice recognition software. I have made every reasonable attempt to correct obvious errors, but I expect that there are errors of grammar and possibly content that I did not discover before finalizing the note.    Followup: Return in about 3 months (around 10/20/2017). sooner should new symptoms or problems arise.

## 2017-07-20 NOTE — MR AVS SNAPSHOT
"        Fabio Gonzales Jai   2017 9:00 AM   Office Visit   MRN: [id number]    Department:  [facility]   Dept Phone:  [phone]    Description:  Male : 1955   Provider:  TANISHA Ochoa           Reason for Visit     New Patient establish care      Allergies as of 2017     Allergen Noted Reactions    Flexeril [Cyclobenzaprine] 10/07/2014       Weakness, spacie    Penicillins 2009       Doesn't know the reaction  had this allergy when he was a child      You were diagnosed with     Encounter to establish care   [366492]       Type 2 diabetes mellitus without complication, without long-term current use of insulin (CMS-HCC)   [4555509]       Microalbuminuria due to type 2 diabetes mellitus (CMS-HCC)   [5966527]       Mixed hyperlipidemia   [272.2.ICD-9-CM]       Essential hypertension   [1904195]       Cervical radiculopathy at C7   [783596]       Type 2 diabetes mellitus with microalbuminuria, without long-term current use of insulin (CMS-HCC)   [7946619]         Vital Signs     Blood Pressure Pulse Temperature Respirations Height Weight    140/90 mmHg 78 36.6 °C (97.9 °F) 12 1.778 m (5' 10\") 84.369 kg (186 lb)    Body Mass Index Oxygen Saturation Smoking Status             26.69 kg/m2 94% Former Smoker         Basic Information     Date Of Birth Sex Race Ethnicity Preferred Language    1955 Male White Non- English      Your appointments     2017 11:30 AM   PROCEDURE 15 with Ash Don M.D.   PAIN MANAGEMENT  (--)    [address] [place] [id number]   [phone]           Your procedure is scheduled at [facility] at [facility] located at [address] [place] [address] just east of the main campus. Please check in at the front lobby desk 1 hour prior to your appointment time. For your safety, please have a ride home with a responsible adult.             2017 10:00 AM   Established Patient with TANISHA Ochoa   [facility]    [address] [facility] [id number] [phone]           You will be receiving a confirmation call a few days before your appointment from our automated call confirmation system.              Problem List              ICD-10-CM Priority Class Noted - Resolved    Essential hypertension I10   11/10/2015 - Present    HLD (hyperlipidemia) E78.5   11/10/2015 - Present    Type 2 diabetes mellitus with microalbuminuria (CMS-HCC) E11.29, R80.9   11/10/2015 - Present    Pain, chronic G89.29   11/10/2015 - Present    Microalbuminuria due to type 2 diabetes mellitus (CMS-HCC) E11.29, R80.9   7/20/2017 - Present    Cervical radiculopathy at C7 M54.12   7/20/2017 - Present      Health Maintenance        Date Due Completion Dates    IMM PNEUMOCOCCAL 19-64 (ADULT) MEDIUM RISK SERIES (1 of 1 - PPSV23) 5/15/1972 ---    IMM ZOSTER VACCINE 5/15/2013 ---    IMM INFLUENZA (1) 9/1/2017 10/13/2016    A1C SCREENING 12/28/2017 6/28/2017, 1/13/2017, 5/2/2016, 1/12/2016, 8/28/2015, 3/2/2015, 7/2/2014, 7/22/2013, 8/7/2012, 3/28/2012    DIABETES MONOFILAMENT / LE EXAM 1/12/2018 1/12/2017    FASTING LIPID PROFILE 1/13/2018 1/13/2017, 1/12/2016, 8/28/2015, 3/2/2015, 7/2/2014, 7/22/2013, 8/7/2012, 3/28/2012    RETINAL SCREENING 5/2/2018 5/2/2017    URINE ACR / MICROALBUMIN 6/28/2018 6/28/2017, 3/16/2017, 1/13/2017, 1/12/2016, 8/28/2015, 7/22/2013, 3/28/2012    SERUM CREATININE 6/28/2018 6/28/2017, 3/16/2017, 1/13/2017, 5/2/2016, 1/12/2016, 8/28/2015, 3/2/2015, 7/2/2014, 7/22/2013, 8/7/2012, 3/28/2012    COLON CANCER SCREENING ANNUAL FIT 6/28/2018 6/28/2017    IMM DTaP/Tdap/Td Vaccine (2 - Td) 11/19/2020 11/19/2010            Current Immunizations     13-VALENT PCV PREVNAR 10/31/2015    Influenza TIV (IM) 10/13/2016    Tdap Vaccine 11/19/2010      Below and/or attached are the medications your provider expects you to take. Review all of your home medications and newly ordered medications with your provider and/or pharmacist.  Follow medication instructions as directed by your provider and/or pharmacist. Please keep your medication list with you and share with your provider. Update the information when medications are discontinued, doses are changed, or new medications (including over-the-counter products) are added; and carry medication information at all times in the event of emergency situations     Allergies:  FLEXERIL - (reactions not documented)     PENICILLINS - (reactions not documented)               Medications  Valid as of: July 20, 2017 -  9:48 AM    Generic Name Brand Name Tablet Size Instructions for use    Aspirin (Tablet Delayed Response) ECOTRIN 81 MG Take 81 mg by mouth every day.        Cholecalciferol (Tab) cholecalciferol 1000 UNIT Take 2,000 Units by mouth every day.        Gabapentin (Cap) NEURONTIN 100 MG Take 100 mg by mouth 3 times a day.        GlyBURIDE (Tab) DIABETA 5 MG Take 1 Tab by mouth every morning with breakfast.        Lisinopril (Tab) PRINIVIL 20 MG TAKE ONE TABLET BY MOUTH EVERY DAY        Metoprolol Succinate (TABLET SR 24 HR) TOPROL XL 25 MG Take 1 Tab by mouth every day.        Naproxen (Tab) NAPROSYN 500 MG Take 1 Tab by mouth 2 times a day, with meals.        Pravastatin Sodium (Tab) PRAVACHOL 40 MG Take 1 Tab by mouth every day.        TraMADol HCl (Tab) ULTRAM 50 MG Take  mg by mouth every four hours as needed for Mild Pain.        .                 Medicines prescribed today were sent to:     [id number] - [place], [place] - [address]    [address] [facility] [place] [address]    Phone: [phone] Fax: [phone]    Open 24 Hours?: No      Medication refill instructions:       If your prescription bottle indicates you have medication refills left, it is not necessary to call your provider’s office. Please contact your pharmacy and they will refill your medication.    If your prescription bottle indicates you do not have any refills left, you may request refills at any time  through one of the following ways: The online Scan[employer]â‚¬Â[employer]Jour system (except Urgent Care), by calling your provider’s office, or by asking your pharmacy to contact your provider’s office with a refill request. Medication refills are processed only during regular business hours and may not be available until the next business day. Your provider may request additional information or to have a follow-up visit with you prior to refilling your medication.   *Please Note: Medication refills are assigned a new Rx number when refilled electronically. Your pharmacy may indicate that no refills were authorized even though a new prescription for the same medication is available at the pharmacy. Please request the medicine by name with the pharmacy before contacting your provider for a refill.        Your To Do List     Future Labs/Procedures Complete By Expires    COMP METABOLIC PANEL  As directed 7/21/2018    HEMOGLOBIN A1C  As directed 7/21/2018    MICROALBUMIN CREAT RATIO URINE  As directed 7/21/2018         ScanÃ¢â‚¬Â¢Jour Status: Patient Declined

## 2017-07-20 NOTE — ASSESSMENT & PLAN NOTE
Current problem being worked up by Dr. Don. There is no neck pain, but when patient turns his head to the right, there is sudden onset of tingling sensation of the right extremity. No pain. MRI just completed. Appointment with Arian Tuesday  Impression        1.  C6-7 moderate to severe spinal canal narrowing. Right lateral disc extrusion producing severe right lateral recess and foraminal narrowing    2.  Mild-moderate central spinal canal narrowing at C5-6    3.  Mild central spinal canal narrowing at C4-5    4.  Multilevel foraminal stenoses describing the findings section    5.  Degenerative subluxation at C5-6    6.  Abnormal motion at C4-5 as subluxation identified on conventional radiography reduces with supine position on MRI

## 2017-07-20 NOTE — ASSESSMENT & PLAN NOTE
Chronic problem stable on statin therapy    Ref. Range 1/13/2017 09:17   Cholesterol,Tot Latest Ref Range: 100-199 mg/dL 179   Triglycerides Latest Ref Range: 0-149 mg/dL 70   HDL Latest Ref Range: >=40 mg/dL 81   LDL Latest Ref Range: <100 mg/dL 84

## 2017-07-20 NOTE — TELEPHONE ENCOUNTER
Received referral to lung cancer screening program.  Chart review to assess for lung cancer screening program eligibility.   1. Age 55-77 yrs of age? Yes 62 y.o.  2. 30 pack year hx of smoking, or greater? Yes 1 ppdx 35 yrs=  35 pkyr hx  3. Current smoker or if quit, has pt quit within last 15 yrs?Yes  Quit 7/20/2013  4. Any signs or symptoms of lung cancer? None noted  5. Previous history of lung cancer? None noted  6. Chest CT within past 12 mos.? None noted  Patient does meet eligibility criteria. [facility] scheduling notified to schedule the shared decision making visit.

## 2017-07-25 ENCOUNTER — APPOINTMENT (OUTPATIENT)
Dept: PAIN MANAGEMENT | Facility: REHABILITATION | Age: 62
End: 2017-07-25
Attending: SPECIALIST
Payer: COMMERCIAL

## 2017-07-25 ENCOUNTER — HOSPITAL ENCOUNTER (OUTPATIENT)
Dept: RADIOLOGY | Facility: REHABILITATION | Age: 62
End: 2017-07-25
Attending: SPECIALIST
Payer: COMMERCIAL

## 2017-07-25 VITALS
DIASTOLIC BLOOD PRESSURE: 99 MMHG | WEIGHT: 184.97 LBS | TEMPERATURE: 98.2 F | HEART RATE: 66 BPM | HEIGHT: 70 IN | SYSTOLIC BLOOD PRESSURE: 171 MMHG | RESPIRATION RATE: 16 BRPM | OXYGEN SATURATION: 95 % | BODY MASS INDEX: 26.48 KG/M2

## 2017-07-25 PROCEDURE — 62321 NJX INTERLAMINAR CRV/THRC: CPT

## 2017-07-25 PROCEDURE — 700111 HCHG RX REV CODE 636 W/ 250 OVERRIDE (IP)

## 2017-07-25 PROCEDURE — 700117 HCHG RX CONTRAST REV CODE 255

## 2017-07-25 RX ORDER — METHYLPREDNISOLONE ACETATE 80 MG/ML
INJECTION, SUSPENSION INTRA-ARTICULAR; INTRALESIONAL; INTRAMUSCULAR; SOFT TISSUE
Status: COMPLETED
Start: 2017-07-25 | End: 2017-07-25

## 2017-07-25 RX ADMIN — IOHEXOL 3 ML: 240 INJECTION, SOLUTION INTRATHECAL; INTRAVASCULAR; INTRAVENOUS; ORAL at 11:34

## 2017-07-25 RX ADMIN — METHYLPREDNISOLONE ACETATE 80 MG: 80 INJECTION, SUSPENSION INTRA-ARTICULAR; INTRALESIONAL; INTRAMUSCULAR; SOFT TISSUE at 11:35

## 2017-07-25 ASSESSMENT — PAIN SCALES - GENERAL
PAINLEVEL_OUTOF10: 3
PAINLEVEL_OUTOF10: 0

## 2017-07-25 NOTE — PROGRESS NOTES
"Reviewed Plan of Care with patient. Confirmed that he has stopped all blood thinning medications for last 10 days.  Confirmed that he has a . Pt took BP Medication this morning; states BP is always high here even though \"I'm calm\". Reviewed home care instructions with patient prior to procedure. All questions and concerns addressed.     "

## 2017-07-25 NOTE — PROGRESS NOTES
Pt position prone by RN.  Arms tucked to the sides.  Pillow placed under feet and lower legs by CNA.

## 2017-08-10 DIAGNOSIS — I10 ESSENTIAL HYPERTENSION: ICD-10-CM

## 2017-08-10 DIAGNOSIS — E78.5 HYPERLIPIDEMIA, UNSPECIFIED HYPERLIPIDEMIA TYPE: ICD-10-CM

## 2017-08-10 RX ORDER — PRAVASTATIN SODIUM 40 MG
40 TABLET ORAL
Qty: 90 TAB | Refills: 3 | Status: SHIPPED | OUTPATIENT
Start: 2017-08-10 | End: 2018-06-26 | Stop reason: SDUPTHER

## 2017-08-10 RX ORDER — METOPROLOL SUCCINATE 25 MG/1
25 TABLET, EXTENDED RELEASE ORAL
Qty: 90 TAB | Refills: 3 | Status: SHIPPED | OUTPATIENT
Start: 2017-08-10 | End: 2018-06-26 | Stop reason: SDUPTHER

## 2017-08-28 ENCOUNTER — TELEPHONE (OUTPATIENT)
Dept: MEDICAL GROUP | Facility: PHYSICIAN GROUP | Age: 62
End: 2017-08-28

## 2017-08-28 NOTE — TELEPHONE ENCOUNTER
ESTABLISHED PATIENT PRE-VISIT PLANNING     Note: Patient will not be contacted if there is no indication to call.     1.  Reviewed notes from the last few office visits within the medical group: Yes    2.  If any orders were placed at last visit or intended to be done for this visit (i.e. 6 mos follow-up), do we have Results/Consult Notes?        •  Labs - sick visit       •  Imaging - sick visit       •  Referrals - sick visit    3. Is this appointment scheduled as a Hospital Follow-Up? No    4.  Immunizations were updated in Epic using WebIZ?: Epic matches WebIZ       •  Web Iz Recommendations:  ZOSTAVAX (Shingles)    5.  Patient is due for the following Health Maintenance Topics:   Health Maintenance Due   Topic Date Due   • IMM ZOSTER VACCINE  05/15/2013   • IMM INFLUENZA (1) 09/01/2017       - Patient is up-to-date on all Health Maintenance topics. No records have been requested at this time.    6.  Patient was informed to arrive 15 min prior to their scheduled appointment and bring in their medication bottles.

## 2017-08-29 ENCOUNTER — OFFICE VISIT (OUTPATIENT)
Dept: MEDICAL GROUP | Facility: PHYSICIAN GROUP | Age: 62
End: 2017-08-29
Payer: COMMERCIAL

## 2017-08-29 VITALS
HEART RATE: 69 BPM | OXYGEN SATURATION: 96 % | DIASTOLIC BLOOD PRESSURE: 80 MMHG | WEIGHT: 182 LBS | TEMPERATURE: 97.5 F | SYSTOLIC BLOOD PRESSURE: 152 MMHG | BODY MASS INDEX: 26.05 KG/M2 | RESPIRATION RATE: 12 BRPM | HEIGHT: 70 IN

## 2017-08-29 DIAGNOSIS — R80.9 TYPE 2 DIABETES MELLITUS WITH MICROALBUMINURIA, WITHOUT LONG-TERM CURRENT USE OF INSULIN (HCC): ICD-10-CM

## 2017-08-29 DIAGNOSIS — R80.9 MICROALBUMINURIA DUE TO TYPE 2 DIABETES MELLITUS (HCC): ICD-10-CM

## 2017-08-29 DIAGNOSIS — E11.29 TYPE 2 DIABETES MELLITUS WITH MICROALBUMINURIA, WITHOUT LONG-TERM CURRENT USE OF INSULIN (HCC): ICD-10-CM

## 2017-08-29 DIAGNOSIS — E11.29 MICROALBUMINURIA DUE TO TYPE 2 DIABETES MELLITUS (HCC): ICD-10-CM

## 2017-08-29 DIAGNOSIS — I10 ESSENTIAL HYPERTENSION: ICD-10-CM

## 2017-08-29 PROCEDURE — 99214 OFFICE O/P EST MOD 30 MIN: CPT | Performed by: NURSE PRACTITIONER

## 2017-08-29 RX ORDER — LISINOPRIL 30 MG/1
30 TABLET ORAL DAILY
Qty: 90 TAB | Refills: 3 | Status: SHIPPED | OUTPATIENT
Start: 2017-08-29 | End: 2017-11-07

## 2017-08-29 NOTE — ASSESSMENT & PLAN NOTE
Ongoing problem, not well controlled. Last bp 140/90, now today 152/80, neither at goal. On lisinopril 20 mg daily. Does not monitor bp at home. No dizziness, severe headache, vision changes, chest pain, LE edema

## 2017-08-29 NOTE — ASSESSMENT & PLAN NOTE
Ongoing chronic problem. Not controlled. Glyburide instead of metformin has caused significant increase in sugars.     Medications:  Metformin 500 mg BID for 3 weeks now because glyburide was not helping with sugars     Glucose at home:  His home control solutions have not been registering so he has not been checking glucose    Hypoglycemia episodes: none    Statin: yes  ACEI/ARB: yes  Aspirin: yes    +fatigue and polydipsia occasionally. Denies any polyuria, polyphagia, blurred or cloudy vision, rash or thrush, or numbness or tingling of feet. Last dilated retinal eye exam was May this year    A1C June 2016 10.6, now today 13

## 2017-08-29 NOTE — ASSESSMENT & PLAN NOTE
Ongoing issue. On lisinopril 20 mg daily    Ref. Range 6/28/2017 08:38   Micro Alb Creat Ratio Latest Ref Range: 0 - 30 mg/g 83 (H)   Creatinine, Urine Latest Units: mg/dL 174.40   Microalbumin, Urine Random Latest Units: mg/dL 14.5

## 2017-08-29 NOTE — PROGRESS NOTES
Subjective:     Chief Complaint   Patient presents with   • Follow-Up     1 month       HPI  Fabio Vergara is a 62 y.o. male here today for f/u on diabetes     Microalbuminuria due to type 2 diabetes mellitus (CMS-HCC)  Ongoing issue. On lisinopril 20 mg daily    Ref. Range 6/28/2017 08:38   Micro Alb Creat Ratio Latest Ref Range: 0 - 30 mg/g 83 (H)   Creatinine, Urine Latest Units: mg/dL 174.40   Microalbumin, Urine Random Latest Units: mg/dL 14.5       Type 2 diabetes mellitus with microalbuminuria (CMS-HCC)  Ongoing chronic problem. Not controlled. Glyburide instead of metformin has caused significant increase in sugars.     Medications:  Metformin 500 mg BID for 3 weeks now because glyburide was not helping with sugars     Glucose at home:  His home control solutions have not been registering so he has not been checking glucose    Hypoglycemia episodes: none    Statin: yes  ACEI/ARB: yes  Aspirin: yes    +fatigue and polydipsia occasionally. Denies any polyuria, polyphagia, blurred or cloudy vision, rash or thrush, or numbness or tingling of feet. Last dilated retinal eye exam was May this year    A1C June 2016 10.6, now today 13        Essential hypertension  Ongoing problem, not well controlled. Last bp 140/90, now today 152/80, neither at goal. On lisinopril 20 mg daily. Does not monitor bp at home. No dizziness, severe headache, vision changes, chest pain, LE edema       Diagnoses of Type 2 diabetes mellitus with microalbuminuria, without long-term current use of insulin (CMS-HCC), Microalbuminuria due to type 2 diabetes mellitus (CMS-HCC), and Essential hypertension were pertinent to this visit.    Allergies: Flexeril [cyclobenzaprine] and Penicillins  Current medicines (including changes today)  Current Outpatient Prescriptions   Medication Sig Dispense Refill   • metformin (GLUCOPHAGE) 1000 MG tablet Take 1 Tab by mouth 2 times a day, with meals. 180 Tab 1   • lisinopril (PRINIVIL, ZESTRIL) 30 MG  "tablet Take 1 Tab by mouth every day. 90 Tab 3   • metoprolol SR (TOPROL XL) 25 MG TABLET SR 24 HR Take 1 Tab by mouth every day. 90 Tab 3   • pravastatin (PRAVACHOL) 40 MG tablet Take 1 Tab by mouth every day. 90 Tab 3   • naproxen (NAPROSYN) 500 MG Tab Take 1 Tab by mouth 2 times a day, with meals. 60 Tab 0   • aspirin EC (ECOTRIN) 81 MG TBEC Take 81 mg by mouth every day.     • gabapentin (NEURONTIN) 100 MG CAPS Take 100 mg by mouth 3 times a day.     • tramadol (ULTRAM) 50 MG TABS Take  mg by mouth every four hours as needed for Mild Pain.       No current facility-administered medications for this visit.        He  has a past medical history of Diabetes (CMS-HCC); Heart attack (CMS-HCC) (2002); Hyperlipidemia; and Hypertension.      ROS  As stated in HPI      Objective:     Blood pressure 152/80, pulse 69, temperature 36.4 °C (97.5 °F), resp. rate 12, height 1.778 m (5' 10\"), weight 82.6 kg (182 lb), SpO2 96 %. Body mass index is 26.11 kg/m².  Physical Exam:  General: Alert, oriented, in no acute distress.  Eye contact is good, speech goal directed, affect calm  CNs grossly intact.  HEENT: conjunctiva non-injected, sclera non-icteric, EOMs intact.   Gross hearing intact.  Oral mucous membranes pink and moist with no lesions. Oropharynx without erythema, or exudate.   Lungs: unlabored. clear to auscultation bilaterally with good excursion.  CV: regular rate and rhythm. No murmurs. No carotid bruits.   Ext: no edema, normal color and temperature.   Skin: No rashes or lesions in visible areas  Gait steady.     Assessment and Plan:   Assessment/Plan:  1. Type 2 diabetes mellitus with microalbuminuria, without long-term current use of insulin (CMS-HCC)  Not controlled. Discontinue glyburide. Start metformin 1,000 mg BID. F/u A1C in 12 weeks   - metformin (GLUCOPHAGE) 1000 MG tablet; Take 1 Tab by mouth 2 times a day, with meals.  Dispense: 180 Tab; Refill: 1    2. Microalbuminuria due to type 2 diabetes " mellitus (CMS-HCC)  Not controlled. Labs already ordered to be drawn in 10 weeks for monitoring. Continue lisinopril     3. Essential hypertension  Not controlled. Increase lisinopril to 30 mg daily. F/u 3 months   - lisinopril (PRINIVIL, ZESTRIL) 30 MG tablet; Take 1 Tab by mouth every day.  Dispense: 90 Tab; Refill: 3       Follow up:  Return in about 3 months (around 11/29/2017).    Educated in proper administration of medication(s) ordered today including safety, possible SE, risks, benefits, rationale and alternatives to therapy.   Supportive care, differential diagnoses, and indications for immediate follow-up discussed with patient.    Pathogenesis of diagnosis discussed including typical length and natural progression.    Instructed to return to clinic or nearest emergency department for any change in condition, further concerns, or worsening of symptoms.  Patient states understanding of the plan of care and discharge instructions.      Please note that this dictation was created using voice recognition software. I have made every reasonable attempt to correct obvious errors, but I expect that there are errors of grammar and possibly content that I did not discover before finalizing the note.    Followup: Return in about 3 months (around 11/29/2017). sooner should new symptoms or problems arise.

## 2017-09-12 ENCOUNTER — HOSPITAL ENCOUNTER (OUTPATIENT)
Dept: RADIOLOGY | Facility: REHABILITATION | Age: 62
End: 2017-09-12
Attending: SPECIALIST
Payer: COMMERCIAL

## 2017-09-12 ENCOUNTER — HOSPITAL ENCOUNTER (OUTPATIENT)
Dept: PAIN MANAGEMENT | Facility: REHABILITATION | Age: 62
End: 2017-09-12
Attending: SPECIALIST
Payer: COMMERCIAL

## 2017-09-12 VITALS
BODY MASS INDEX: 25.66 KG/M2 | TEMPERATURE: 98 F | HEART RATE: 67 BPM | DIASTOLIC BLOOD PRESSURE: 95 MMHG | SYSTOLIC BLOOD PRESSURE: 155 MMHG | OXYGEN SATURATION: 97 % | RESPIRATION RATE: 18 BRPM | WEIGHT: 179.23 LBS | HEIGHT: 70 IN

## 2017-09-12 PROCEDURE — 700117 HCHG RX CONTRAST REV CODE 255

## 2017-09-12 PROCEDURE — 62321 NJX INTERLAMINAR CRV/THRC: CPT

## 2017-09-12 PROCEDURE — 700111 HCHG RX REV CODE 636 W/ 250 OVERRIDE (IP)

## 2017-09-12 RX ORDER — METHYLPREDNISOLONE ACETATE 80 MG/ML
INJECTION, SUSPENSION INTRA-ARTICULAR; INTRALESIONAL; INTRAMUSCULAR; SOFT TISSUE
Status: COMPLETED
Start: 2017-09-12 | End: 2017-09-12

## 2017-09-12 RX ORDER — DEXAMETHASONE SODIUM PHOSPHATE 10 MG/ML
INJECTION, SOLUTION INTRAMUSCULAR; INTRAVENOUS
Status: COMPLETED
Start: 2017-09-12 | End: 2017-09-12

## 2017-09-12 RX ORDER — MIDAZOLAM HYDROCHLORIDE 1 MG/ML
INJECTION INTRAMUSCULAR; INTRAVENOUS
Status: COMPLETED
Start: 2017-09-12 | End: 2017-09-12

## 2017-09-12 RX ORDER — BUPIVACAINE HYDROCHLORIDE 2.5 MG/ML
INJECTION, SOLUTION EPIDURAL; INFILTRATION; INTRACAUDAL
Status: COMPLETED
Start: 2017-09-12 | End: 2017-09-12

## 2017-09-12 RX ADMIN — METHYLPREDNISOLONE ACETATE 80 MG: 80 INJECTION, SUSPENSION INTRA-ARTICULAR; INTRALESIONAL; INTRAMUSCULAR; SOFT TISSUE at 09:15

## 2017-09-12 RX ADMIN — IOHEXOL 3 ML: 240 INJECTION, SOLUTION INTRATHECAL; INTRAVASCULAR; INTRAVENOUS; ORAL at 09:15

## 2017-09-12 ASSESSMENT — PAIN SCALES - GENERAL
PAINLEVEL_OUTOF10: 5
PAINLEVEL_OUTOF10: 1

## 2017-09-12 NOTE — PROGRESS NOTES
Medication reconciliation reviewed with patient denied taking    any anti- inflammatories medication.Patient stated that he's been  off  Aspirin 81 mg.  for  one  week. Discharge instruction given to patient and verbalized understanding. Patient  has ride home ( Due/ spouse /  ). Dr. Don notified.

## 2017-09-13 NOTE — PROCEDURES
DATE OF SERVICE:  09/12/2017    PROCEDURES:  1.  Cervical epidural steroid injection.  2.  Fluoroscopy for needle guidance, confirmation of placement, and   epidurogram.    DIAGNOSIS:  Cervical radiculopathy.    DESCRIPTION:  After informed consent with the patient prone, fluoroscopy was   utilized to identify the C7-T1 interspace.  Neck is prepped with Betadine,   sterile draped and anesthetized under intermittent fluoroscopic guidance and   local anesthesia, a 20-gauge epidural needle was passed just right of midline   at C7-T1 to dorsal interlaminar epidural space using loss of resistance   technique with saline.  Contrast was injected confirming right greater than   left, bilateral epidurogram with cephalad and caudad spread and filling of   nerve roots.  I slowly injected 80 mg Depo-Medrol, 1.5 mL 1% lidocaine, 3 mL   normal saline.  Needle was removed.  He tolerated this well.    PLAN:  Follow up for ongoing pain management needs.       ____________________________________     MD YOHANA MARIE / ALLAN    DD:  09/12/2017 09:18:30  DT:  09/13/2017 04:38:11    D#:  [id number]  Job#:  [id number]

## 2017-11-06 ENCOUNTER — HOSPITAL ENCOUNTER (OUTPATIENT)
Dept: LAB | Facility: MEDICAL CENTER | Age: 62
End: 2017-11-06
Attending: NURSE PRACTITIONER
Payer: COMMERCIAL

## 2017-11-06 DIAGNOSIS — E11.29 TYPE 2 DIABETES MELLITUS WITH MICROALBUMINURIA, WITHOUT LONG-TERM CURRENT USE OF INSULIN (HCC): ICD-10-CM

## 2017-11-06 DIAGNOSIS — R80.9 TYPE 2 DIABETES MELLITUS WITH MICROALBUMINURIA, WITHOUT LONG-TERM CURRENT USE OF INSULIN (HCC): ICD-10-CM

## 2017-11-06 LAB
ALBUMIN SERPL BCP-MCNC: 3.9 G/DL (ref 3.2–4.9)
ALBUMIN/GLOB SERPL: 1.3 G/DL
ALP SERPL-CCNC: 52 U/L (ref 30–99)
ALT SERPL-CCNC: 20 U/L (ref 2–50)
ANION GAP SERPL CALC-SCNC: 11 MMOL/L (ref 0–11.9)
AST SERPL-CCNC: 26 U/L (ref 12–45)
BILIRUB SERPL-MCNC: 0.5 MG/DL (ref 0.1–1.5)
BUN SERPL-MCNC: 8 MG/DL (ref 8–22)
CALCIUM SERPL-MCNC: 9.7 MG/DL (ref 8.5–10.5)
CHLORIDE SERPL-SCNC: 101 MMOL/L (ref 96–112)
CO2 SERPL-SCNC: 24 MMOL/L (ref 20–33)
CREAT SERPL-MCNC: 0.69 MG/DL (ref 0.5–1.4)
CREAT UR-MCNC: 63.7 MG/DL
EST. AVERAGE GLUCOSE BLD GHB EST-MCNC: 186 MG/DL
GFR SERPL CREATININE-BSD FRML MDRD: >60 ML/MIN/1.73 M 2
GLOBULIN SER CALC-MCNC: 3.1 G/DL (ref 1.9–3.5)
GLUCOSE SERPL-MCNC: 78 MG/DL (ref 65–99)
HBA1C MFR BLD: 8.1 % (ref 0–5.6)
MICROALBUMIN UR-MCNC: 3.4 MG/DL
MICROALBUMIN/CREAT UR: 53 MG/G (ref 0–30)
POTASSIUM SERPL-SCNC: 4.5 MMOL/L (ref 3.6–5.5)
PROT SERPL-MCNC: 7 G/DL (ref 6–8.2)
SODIUM SERPL-SCNC: 136 MMOL/L (ref 135–145)

## 2017-11-06 PROCEDURE — 36415 COLL VENOUS BLD VENIPUNCTURE: CPT

## 2017-11-06 PROCEDURE — 83036 HEMOGLOBIN GLYCOSYLATED A1C: CPT

## 2017-11-06 PROCEDURE — 82570 ASSAY OF URINE CREATININE: CPT

## 2017-11-06 PROCEDURE — 80053 COMPREHEN METABOLIC PANEL: CPT

## 2017-11-06 PROCEDURE — 82043 UR ALBUMIN QUANTITATIVE: CPT

## 2017-11-07 ENCOUNTER — OFFICE VISIT (OUTPATIENT)
Dept: MEDICAL GROUP | Facility: PHYSICIAN GROUP | Age: 62
End: 2017-11-07
Payer: COMMERCIAL

## 2017-11-07 VITALS
TEMPERATURE: 97.6 F | RESPIRATION RATE: 14 BRPM | WEIGHT: 183 LBS | BODY MASS INDEX: 26.2 KG/M2 | OXYGEN SATURATION: 95 % | HEIGHT: 70 IN | DIASTOLIC BLOOD PRESSURE: 86 MMHG | HEART RATE: 86 BPM | SYSTOLIC BLOOD PRESSURE: 144 MMHG

## 2017-11-07 DIAGNOSIS — I10 ESSENTIAL HYPERTENSION: ICD-10-CM

## 2017-11-07 DIAGNOSIS — R80.9 MICROALBUMINURIA DUE TO TYPE 2 DIABETES MELLITUS (HCC): ICD-10-CM

## 2017-11-07 DIAGNOSIS — E11.29 TYPE 2 DIABETES MELLITUS WITH MICROALBUMINURIA, WITHOUT LONG-TERM CURRENT USE OF INSULIN (HCC): ICD-10-CM

## 2017-11-07 DIAGNOSIS — E11.29 MICROALBUMINURIA DUE TO TYPE 2 DIABETES MELLITUS (HCC): ICD-10-CM

## 2017-11-07 DIAGNOSIS — R80.9 TYPE 2 DIABETES MELLITUS WITH MICROALBUMINURIA, WITHOUT LONG-TERM CURRENT USE OF INSULIN (HCC): ICD-10-CM

## 2017-11-07 PROCEDURE — 99214 OFFICE O/P EST MOD 30 MIN: CPT | Performed by: NURSE PRACTITIONER

## 2017-11-07 RX ORDER — LISINOPRIL 40 MG/1
40 TABLET ORAL DAILY
Qty: 90 TAB | Refills: 3 | Status: SHIPPED | OUTPATIENT
Start: 2017-11-07 | End: 2018-09-12 | Stop reason: SDUPTHER

## 2017-11-07 ASSESSMENT — PAIN SCALES - GENERAL: PAINLEVEL: NO PAIN

## 2017-11-07 NOTE — ASSESSMENT & PLAN NOTE
Controlled: improving, but not yet at goal  Medications: metformin 1,000 mg BID, but reporting forgetting evening dose 1-2 nights a week   Glucose at home: has not been checking it as has been on hunting trip   Hypoglycemia episodes: none  Statin: yes  ACEI/ARB: yes  Aspirin: yes  Exercise: active in hunting with hiking  24 hour diet recall: deep fried chicken with salad and blue cheese dressing, cereal in am, and a lot of water  Denies any polyuria, polydipsia, polyphagia, blurred or cloudy vision, rash or thrush, or numbness or tingling of feet. Last dilated retinal eye exam was May 2017   Ref. Range 1/13/2017 09:17 6/28/2017 08:38 11/6/2017 08:47   Glycohemoglobin Latest Ref Range: 0.0 - 5.6 % 6.6 (H) 10.6 (H) 8.1 (H)   Estim. Avg Glu Latest Units: mg/dL 143 258 186      Ref. Range 11/6/2017 08:47   Micro Alb Creat Ratio Latest Ref Range: 0 - 30 mg/g 53 (H)   Creatinine, Urine Latest Units: mg/dL 63.70   Microalbumin, Urine Random Latest Units: mg/dL 3.4

## 2017-11-07 NOTE — ASSESSMENT & PLAN NOTE
Improving since lisinopril increased from 20 to 30 mg, and sulfonylurea changed to metformin.    Ref. Range 6/28/2017 08:38 11/6/2017 08:47   Micro Alb Creat Ratio Latest Ref Range: 0 - 30 mg/g 83 (H) 53 (H)   Creatinine, Urine Latest Units: mg/dL 174.40 63.70   Microalbumin, Urine Random Latest Units: mg/dL 14.5 3.4

## 2017-11-07 NOTE — ASSESSMENT & PLAN NOTE
Ongoing chronic problem managed with lisinopril 30 mg daily. Does not monitor bp at home. bp still elevated today. Initial 144.86, repeat 160.90. Denies any chest pain, dizziness, QUIÑONES, or LE edema

## 2017-11-08 NOTE — PROGRESS NOTES
Subjective:     Chief Complaint   Patient presents with   • Follow-Up     lab results       HPI  Fabio Vergara is a 62 y.o. male here today for routine f/u     Type 2 diabetes mellitus with microalbuminuria (CMS-HCC)  Controlled: improving, but not yet at goal  Medications: metformin 1,000 mg BID, but reporting forgetting evening dose 1-2 nights a week   Glucose at home: has not been checking it as has been on hunting trip   Hypoglycemia episodes: none  Statin: yes  ACEI/ARB: yes  Aspirin: yes  Exercise: active in hunting with hiking  24 hour diet recall: deep fried chicken with salad and blue cheese dressing, cereal in am, and a lot of water  Denies any polyuria, polydipsia, polyphagia, blurred or cloudy vision, rash or thrush, or numbness or tingling of feet. Last dilated retinal eye exam was May 2017   Ref. Range 1/13/2017 09:17 6/28/2017 08:38 11/6/2017 08:47   Glycohemoglobin Latest Ref Range: 0.0 - 5.6 % 6.6 (H) 10.6 (H) 8.1 (H)   Estim. Avg Glu Latest Units: mg/dL 143 258 186      Ref. Range 11/6/2017 08:47   Micro Alb Creat Ratio Latest Ref Range: 0 - 30 mg/g 53 (H)   Creatinine, Urine Latest Units: mg/dL 63.70   Microalbumin, Urine Random Latest Units: mg/dL 3.4       Essential hypertension  Ongoing chronic problem managed with lisinopril 30 mg daily. Does not monitor bp at home. bp still elevated today. Initial 144.86, repeat 160.90. Denies any chest pain, dizziness, QUIÑONES, or LE edema    Microalbuminuria due to type 2 diabetes mellitus (CMS-HCC)  Improving since lisinopril increased from 20 to 30 mg, and sulfonylurea changed to metformin.    Ref. Range 6/28/2017 08:38 11/6/2017 08:47   Micro Alb Creat Ratio Latest Ref Range: 0 - 30 mg/g 83 (H) 53 (H)   Creatinine, Urine Latest Units: mg/dL 174.40 63.70   Microalbumin, Urine Random Latest Units: mg/dL 14.5 3.4        Diagnoses of Type 2 diabetes mellitus with microalbuminuria, without long-term current use of insulin (CMS-HCC), Microalbuminuria due to  "type 2 diabetes mellitus (CMS-HCC), and Essential hypertension were pertinent to this visit.    Allergies: Flexeril [cyclobenzaprine] and Penicillins  Current medicines (including changes today)  Current Outpatient Prescriptions   Medication Sig Dispense Refill   • lisinopril (PRINIVIL, ZESTRIL) 40 MG tablet Take 1 Tab by mouth every day. 90 Tab 3   • metformin (GLUCOPHAGE) 1000 MG tablet Take 1 Tab by mouth 2 times a day, with meals. 180 Tab 1   • metoprolol SR (TOPROL XL) 25 MG TABLET SR 24 HR Take 1 Tab by mouth every day. 90 Tab 3   • pravastatin (PRAVACHOL) 40 MG tablet Take 1 Tab by mouth every day. 90 Tab 3   • aspirin EC (ECOTRIN) 81 MG TBEC Take 81 mg by mouth every day.     • gabapentin (NEURONTIN) 100 MG CAPS Take 100 mg by mouth 3 times a day.     • tramadol (ULTRAM) 50 MG TABS Take  mg by mouth every four hours as needed for Mild Pain.       No current facility-administered medications for this visit.        He  has a past medical history of Diabetes (CMS-HCC); Heart attack (2002); Hyperlipidemia; and Hypertension.    Health Maintenance: [employer] states he got this, we will check    ROS  As stated in HPI and additionally  General: No fatigue or weight loss  Neuro: No unusual headache or dizziness  CV: No chest pain, QUIÑONES, LE edema  Endo: see HPI      Objective:     Blood pressure 144/86, pulse 86, temperature 36.4 °C (97.6 °F), resp. rate 14, height 1.778 m (5' 10\"), weight 83 kg (183 lb), SpO2 95 %. Body mass index is 26.26 kg/m².  Physical Exam:  General: Alert, oriented, in no acute distress.  Eye contact is good, speech goal directed, affect calm  CNs grossly intact.  HEENT: conjunctiva non-injected, sclera non-icteric, EOMs intact. No lid edema or eye drainage.   Gross hearing intact.  Oral mucous membranes pink and moist with no lesions. Oropharynx without erythema, or exudate.   Lungs: unlabored. clear to auscultation bilaterally with good excursion.  CV: regular rate and rhythm. No " murmurs.  Ext: no edema, normal color and temperature.   Skin: No rashes or lesions in visible areas  Gait steady.     Assessment and Plan:   Assessment/Plan:  1. Type 2 diabetes mellitus with microalbuminuria, without long-term current use of insulin (CMS-[facility])  Improving, but not at goal. Goal A1C <7. Continue metformin for another 3 months. If A1C not at goal in Feb, will add Victoza or Jardiance. Enc OTC vitamin B12 sublingual with metformin use. Enc healthy diet choices and continued physical activity   - COMP METABOLIC PANEL; Future  - HEMOGLOBIN A1C; Future  - MICROALBUMIN CREAT RATIO URINE; Future  - LIPID PROFILE; Future    2. Microalbuminuria due to type 2 diabetes mellitus (CMS-[facility])  Not controlled. Continue metformin for another 3 months and increase lisinopril to 40 mg daily. Recheck labs. If not at goal, will add more diabetes medication   - MICROALBUMIN CREAT RATIO URINE; Future    3. Essential hypertension  Not controlled. Increase lisinopril to 40 mg daily. Goal bp <140/90. Monitor.        Follow up:  Return in about 3 months (around 2/7/2018).    Educated in proper administration of medication(s) ordered today including safety, possible SE, risks, benefits, rationale and alternatives to therapy.   Supportive care, differential diagnoses, and indications for immediate follow-up discussed with patient.    Pathogenesis of diagnosis discussed including typical length and natural progression.    Instructed to return to clinic or nearest emergency department for any change in condition, further concerns, or worsening of symptoms.  Patient states understanding of the plan of care and discharge instructions.      Please note that this dictation was created using voice recognition software. I have made every reasonable attempt to correct obvious errors, but I expect that there are errors of grammar and possibly content that I did not discover before finalizing the note.    Followup: Return in about 3 months  (around 2/7/2018). sooner should new symptoms or problems arise.

## 2018-01-04 ENCOUNTER — APPOINTMENT (RX ONLY)
Dept: URBAN - METROPOLITAN AREA CLINIC 4 | Facility: CLINIC | Age: 63
Setting detail: DERMATOLOGY
End: 2018-01-04

## 2018-01-04 DIAGNOSIS — L82.1 OTHER SEBORRHEIC KERATOSIS: ICD-10-CM

## 2018-01-04 DIAGNOSIS — D22 MELANOCYTIC NEVI: ICD-10-CM

## 2018-01-04 DIAGNOSIS — Z85.828 PERSONAL HISTORY OF OTHER MALIGNANT NEOPLASM OF SKIN: ICD-10-CM

## 2018-01-04 DIAGNOSIS — L81.4 OTHER MELANIN HYPERPIGMENTATION: ICD-10-CM

## 2018-01-04 DIAGNOSIS — L21.8 OTHER SEBORRHEIC DERMATITIS: ICD-10-CM

## 2018-01-04 PROBLEM — D22.5 MELANOCYTIC NEVI OF TRUNK: Status: ACTIVE | Noted: 2018-01-04

## 2018-01-04 PROBLEM — E78.5 HYPERLIPIDEMIA, UNSPECIFIED: Status: ACTIVE | Noted: 2018-01-04

## 2018-01-04 PROBLEM — D22.61 MELANOCYTIC NEVI OF RIGHT UPPER LIMB, INCLUDING SHOULDER: Status: ACTIVE | Noted: 2018-01-04

## 2018-01-04 PROBLEM — D22.39 MELANOCYTIC NEVI OF OTHER PARTS OF FACE: Status: ACTIVE | Noted: 2018-01-04

## 2018-01-04 PROBLEM — I10 ESSENTIAL (PRIMARY) HYPERTENSION: Status: ACTIVE | Noted: 2018-01-04

## 2018-01-04 PROBLEM — D22.62 MELANOCYTIC NEVI OF LEFT UPPER LIMB, INCLUDING SHOULDER: Status: ACTIVE | Noted: 2018-01-04

## 2018-01-04 PROCEDURE — 99213 OFFICE O/P EST LOW 20 MIN: CPT

## 2018-01-04 PROCEDURE — ? COUNSELING

## 2018-01-04 ASSESSMENT — LOCATION DETAILED DESCRIPTION DERM
LOCATION DETAILED: STERNUM
LOCATION DETAILED: LEFT SUPERIOR HELIX
LOCATION DETAILED: LEFT ANTERIOR PROXIMAL UPPER ARM
LOCATION DETAILED: RIGHT ANTERIOR PROXIMAL UPPER ARM
LOCATION DETAILED: SUPERIOR THORACIC SPINE
LOCATION DETAILED: LEFT FOREHEAD
LOCATION DETAILED: LEFT CENTRAL MALAR CHEEK
LOCATION DETAILED: LEFT PROXIMAL POSTERIOR UPPER ARM
LOCATION DETAILED: MID POSTERIOR NECK
LOCATION DETAILED: RIGHT SUPERIOR HELIX
LOCATION DETAILED: NASAL DORSUM
LOCATION DETAILED: LEFT INFERIOR CENTRAL MALAR CHEEK
LOCATION DETAILED: RIGHT PROXIMAL POSTERIOR UPPER ARM

## 2018-01-04 ASSESSMENT — LOCATION SIMPLE DESCRIPTION DERM
LOCATION SIMPLE: RIGHT UPPER ARM
LOCATION SIMPLE: RIGHT EAR
LOCATION SIMPLE: LEFT EAR
LOCATION SIMPLE: UPPER BACK
LOCATION SIMPLE: CHEST
LOCATION SIMPLE: NOSE
LOCATION SIMPLE: LEFT CHEEK
LOCATION SIMPLE: LEFT FOREHEAD
LOCATION SIMPLE: LEFT UPPER ARM
LOCATION SIMPLE: POSTERIOR NECK

## 2018-01-04 ASSESSMENT — LOCATION ZONE DERM
LOCATION ZONE: TRUNK
LOCATION ZONE: NECK
LOCATION ZONE: NOSE
LOCATION ZONE: FACE
LOCATION ZONE: EAR
LOCATION ZONE: ARM

## 2018-02-07 ENCOUNTER — HOSPITAL ENCOUNTER (OUTPATIENT)
Dept: LAB | Facility: MEDICAL CENTER | Age: 63
End: 2018-02-07
Attending: NURSE PRACTITIONER
Payer: COMMERCIAL

## 2018-02-07 DIAGNOSIS — E11.29 MICROALBUMINURIA DUE TO TYPE 2 DIABETES MELLITUS (HCC): ICD-10-CM

## 2018-02-07 DIAGNOSIS — R80.9 MICROALBUMINURIA DUE TO TYPE 2 DIABETES MELLITUS (HCC): ICD-10-CM

## 2018-02-07 DIAGNOSIS — R80.9 TYPE 2 DIABETES MELLITUS WITH MICROALBUMINURIA, WITHOUT LONG-TERM CURRENT USE OF INSULIN (HCC): ICD-10-CM

## 2018-02-07 DIAGNOSIS — E11.29 TYPE 2 DIABETES MELLITUS WITH MICROALBUMINURIA, WITHOUT LONG-TERM CURRENT USE OF INSULIN (HCC): ICD-10-CM

## 2018-02-07 LAB
ALBUMIN SERPL BCP-MCNC: 4.3 G/DL (ref 3.2–4.9)
ALBUMIN/GLOB SERPL: 1.5 G/DL
ALP SERPL-CCNC: 50 U/L (ref 30–99)
ALT SERPL-CCNC: 17 U/L (ref 2–50)
ANION GAP SERPL CALC-SCNC: 8 MMOL/L (ref 0–11.9)
AST SERPL-CCNC: 19 U/L (ref 12–45)
BILIRUB SERPL-MCNC: 0.4 MG/DL (ref 0.1–1.5)
BUN SERPL-MCNC: 9 MG/DL (ref 8–22)
CALCIUM SERPL-MCNC: 9.4 MG/DL (ref 8.5–10.5)
CHLORIDE SERPL-SCNC: 102 MMOL/L (ref 96–112)
CHOLEST SERPL-MCNC: 149 MG/DL (ref 100–199)
CO2 SERPL-SCNC: 28 MMOL/L (ref 20–33)
CREAT SERPL-MCNC: 0.72 MG/DL (ref 0.5–1.4)
CREAT UR-MCNC: 101.6 MG/DL
EST. AVERAGE GLUCOSE BLD GHB EST-MCNC: 131 MG/DL
GLOBULIN SER CALC-MCNC: 2.9 G/DL (ref 1.9–3.5)
GLUCOSE SERPL-MCNC: 93 MG/DL (ref 65–99)
HBA1C MFR BLD: 6.2 % (ref 0–5.6)
HDLC SERPL-MCNC: 72 MG/DL
LDLC SERPL CALC-MCNC: 64 MG/DL
MICROALBUMIN UR-MCNC: 7.1 MG/DL
MICROALBUMIN/CREAT UR: 70 MG/G (ref 0–30)
POTASSIUM SERPL-SCNC: 4.9 MMOL/L (ref 3.6–5.5)
PROT SERPL-MCNC: 7.2 G/DL (ref 6–8.2)
SODIUM SERPL-SCNC: 138 MMOL/L (ref 135–145)
TRIGL SERPL-MCNC: 63 MG/DL (ref 0–149)

## 2018-02-07 PROCEDURE — 80061 LIPID PANEL: CPT

## 2018-02-07 PROCEDURE — 83036 HEMOGLOBIN GLYCOSYLATED A1C: CPT

## 2018-02-07 PROCEDURE — 36415 COLL VENOUS BLD VENIPUNCTURE: CPT

## 2018-02-07 PROCEDURE — 80053 COMPREHEN METABOLIC PANEL: CPT

## 2018-02-07 PROCEDURE — 82043 UR ALBUMIN QUANTITATIVE: CPT

## 2018-02-07 PROCEDURE — 82570 ASSAY OF URINE CREATININE: CPT

## 2018-02-08 ENCOUNTER — OFFICE VISIT (OUTPATIENT)
Dept: MEDICAL GROUP | Facility: PHYSICIAN GROUP | Age: 63
End: 2018-02-08
Payer: COMMERCIAL

## 2018-02-08 VITALS
SYSTOLIC BLOOD PRESSURE: 134 MMHG | RESPIRATION RATE: 16 BRPM | TEMPERATURE: 97.7 F | WEIGHT: 178 LBS | BODY MASS INDEX: 25.48 KG/M2 | OXYGEN SATURATION: 100 % | HEIGHT: 70 IN | DIASTOLIC BLOOD PRESSURE: 80 MMHG | HEART RATE: 86 BPM

## 2018-02-08 DIAGNOSIS — R80.9 TYPE 2 DIABETES MELLITUS WITH MICROALBUMINURIA, WITHOUT LONG-TERM CURRENT USE OF INSULIN (HCC): ICD-10-CM

## 2018-02-08 DIAGNOSIS — E78.2 MIXED HYPERLIPIDEMIA: ICD-10-CM

## 2018-02-08 DIAGNOSIS — R80.9 MICROALBUMINURIA DUE TO TYPE 2 DIABETES MELLITUS (HCC): ICD-10-CM

## 2018-02-08 DIAGNOSIS — N52.8 OTHER MALE ERECTILE DYSFUNCTION: ICD-10-CM

## 2018-02-08 DIAGNOSIS — E11.29 TYPE 2 DIABETES MELLITUS WITH MICROALBUMINURIA, WITHOUT LONG-TERM CURRENT USE OF INSULIN (HCC): ICD-10-CM

## 2018-02-08 DIAGNOSIS — E11.29 MICROALBUMINURIA DUE TO TYPE 2 DIABETES MELLITUS (HCC): ICD-10-CM

## 2018-02-08 PROCEDURE — 99213 OFFICE O/P EST LOW 20 MIN: CPT | Performed by: NURSE PRACTITIONER

## 2018-02-08 RX ORDER — SILDENAFIL 50 MG/1
50 TABLET, FILM COATED ORAL PRN
Qty: 10 TAB | Refills: 3 | Status: SHIPPED | OUTPATIENT
Start: 2018-02-08 | End: 2018-11-13

## 2018-02-08 ASSESSMENT — PAIN SCALES - GENERAL: PAINLEVEL: NO PAIN

## 2018-02-08 ASSESSMENT — PATIENT HEALTH QUESTIONNAIRE - PHQ9: CLINICAL INTERPRETATION OF PHQ2 SCORE: 0

## 2018-02-08 NOTE — ASSESSMENT & PLAN NOTE
On lisinopril   Ref. Range 11/6/2017 08:47 2/7/2018 08:50   Micro Alb Creat Ratio Latest Ref Range: 0 - 30 mg/g 53 (H) 70 (H)   Creatinine, Urine Latest Units: mg/dL 63.70 101.60   Microalbumin, Urine Random Latest Units: mg/dL 3.4 7.1

## 2018-02-08 NOTE — ASSESSMENT & PLAN NOTE
Controlled: yes  Medications: metformin 1,000 mg BID   Glucose at home: not monitoring   Hypoglycemia episodes: none  Statin: yes  ACEI/ARB: yes  Aspirin: yes  Exercise: working around house, otherwise none  Denies any polyuria, polydipsia, polyphagia, blurred or cloudy vision, rash or thrush, or numbness or tingling of feet. Last dilated retinal eye exam was May 2017    Ref. Range 11/6/2017 08:47 2/7/2018 08:51   Glycohemoglobin Latest Ref Range: 0.0 - 5.6 % 8.1 (H) 6.2 (H)   Estim. Avg Glu Latest Units: mg/dL 186 131

## 2018-02-08 NOTE — ASSESSMENT & PLAN NOTE
Established problem. Complaints of erectile dysfunction and has not tried any medication for this. He is on metoprolol due to history of MI. Also has diabetes

## 2018-02-08 NOTE — ASSESSMENT & PLAN NOTE
Long standing problem well controlled on pravastatin without myalgias.    Ref. Range 1/13/2017 09:17 2/7/2018 08:51   Cholesterol,Tot Latest Ref Range: 100 - 199 mg/dL 179 149   Triglycerides Latest Ref Range: 0 - 149 mg/dL 70 63   HDL Latest Ref Range: >=40 mg/dL 81 72   LDL Latest Ref Range: <100 mg/dL 84 64

## 2018-02-08 NOTE — PROGRESS NOTES
Subjective:     Chief Complaint   Patient presents with   • Follow-Up       HPI  Fabio Vergara is a 62 y.o. male here today for routine f/u     Type 2 diabetes mellitus with microalbuminuria (CMS-HCC)  Controlled: yes  Medications: metformin 1,000 mg BID   Glucose at home: not monitoring   Hypoglycemia episodes: none  Statin: yes  ACEI/ARB: yes  Aspirin: yes  Exercise: working around house, otherwise none  Denies any polyuria, polydipsia, polyphagia, blurred or cloudy vision, rash or thrush, or numbness or tingling of feet. Last dilated retinal eye exam was May 2017    Ref. Range 11/6/2017 08:47 2/7/2018 08:51   Glycohemoglobin Latest Ref Range: 0.0 - 5.6 % 8.1 (H) 6.2 (H)   Estim. Avg Glu Latest Units: mg/dL 186 131       Microalbuminuria due to type 2 diabetes mellitus (CMS-HCC)  On lisinopril   Ref. Range 11/6/2017 08:47 2/7/2018 08:50   Micro Alb Creat Ratio Latest Ref Range: 0 - 30 mg/g 53 (H) 70 (H)   Creatinine, Urine Latest Units: mg/dL 63.70 101.60   Microalbumin, Urine Random Latest Units: mg/dL 3.4 7.1       HLD (hyperlipidemia)  Long standing problem well controlled on pravastatin without myalgias.    Ref. Range 1/13/2017 09:17 2/7/2018 08:51   Cholesterol,Tot Latest Ref Range: 100 - 199 mg/dL 179 149   Triglycerides Latest Ref Range: 0 - 149 mg/dL 70 63   HDL Latest Ref Range: >=40 mg/dL 81 72   LDL Latest Ref Range: <100 mg/dL 84 64       Other male erectile dysfunction  Established problem. Complaints of erectile dysfunction and has not tried any medication for this. He is on metoprolol due to history of MI. Also has diabetes       Diagnoses of Type 2 diabetes mellitus with microalbuminuria, without long-term current use of insulin (CMS-HCC), Microalbuminuria due to type 2 diabetes mellitus (CMS-HCC), Mixed hyperlipidemia, and Other male erectile dysfunction were pertinent to this visit.    Allergies: Flexeril [cyclobenzaprine] and Penicillins  Current medicines (including changes  "today)  Current Outpatient Prescriptions   Medication Sig Dispense Refill   • sildenafil citrate (VIAGRA) 50 MG tablet Take 1 Tab by mouth as needed for Erectile Dysfunction. 10 Tab 3   • lisinopril (PRINIVIL, ZESTRIL) 40 MG tablet Take 1 Tab by mouth every day. 90 Tab 3   • metformin (GLUCOPHAGE) 1000 MG tablet Take 1 Tab by mouth 2 times a day, with meals. 180 Tab 1   • metoprolol SR (TOPROL XL) 25 MG TABLET SR 24 HR Take 1 Tab by mouth every day. 90 Tab 3   • pravastatin (PRAVACHOL) 40 MG tablet Take 1 Tab by mouth every day. 90 Tab 3   • aspirin EC (ECOTRIN) 81 MG TBEC Take 81 mg by mouth every day.     • gabapentin (NEURONTIN) 100 MG CAPS Take 100 mg by mouth 3 times a day.     • tramadol (ULTRAM) 50 MG TABS Take  mg by mouth every four hours as needed for Mild Pain.       No current facility-administered medications for this visit.        He  has a past medical history of Diabetes (CMS-HCC); Heart attack (2002); Hyperlipidemia; and Hypertension.        ROS  As stated in HPI     Objective:     Blood pressure 134/80, pulse 86, temperature 36.5 °C (97.7 °F), resp. rate 16, height 1.778 m (5' 10\"), weight 80.7 kg (178 lb), SpO2 100 %. Body mass index is 25.54 kg/m².  Physical Exam:  General: Alert, oriented, in no acute distress.  Eye contact is good, speech goal directed, affect calm  CNs grossly intact.  HEENT: conjunctiva non-injected, sclera non-icteric, EOMs intact.   Gross hearing intact.  Oral mucous membranes pink and moist with no lesions. Oropharynx without erythema, or exudate.   Ext: no edema, normal color and temperature.   Skin: No rashes or lesions in visible areas  Gait steady.     Assessment and Plan:   Assessment/Plan:  1. Type 2 diabetes mellitus with microalbuminuria, without long-term current use of insulin (CMS-HCC)  Improving and at goal. Continue metformin. F/u 6 months A1C in clinic    2. Microalbuminuria due to type 2 diabetes mellitus (CMS-HCC)  Stable continue lisinopril     3. " Mixed hyperlipidemia  Stable continue pravastatin     4. Other male erectile dysfunction  Not controlled. Cannot dc beta blocker. Diabetes well controlled. Trial of viagra  - sildenafil citrate (VIAGRA) 50 MG tablet; Take 1 Tab by mouth as needed for Erectile Dysfunction.  Dispense: 10 Tab; Refill: 3       Follow up:  Return in about 6 months (around 8/8/2018).    Educated in proper administration of medication(s) ordered today including safety, possible SE, risks, benefits, rationale and alternatives to therapy.   Supportive care, differential diagnoses, and indications for immediate follow-up discussed with patient.    Pathogenesis of diagnosis discussed including typical length and natural progression.    Instructed to return to clinic or nearest emergency department for any change in condition, further concerns, or worsening of symptoms.  Patient states understanding of the plan of care and discharge instructions.      Please note that this dictation was created using voice recognition software. I have made every reasonable attempt to correct obvious errors, but I expect that there are errors of grammar and possibly content that I did not discover before finalizing the note.    Followup: Return in about 6 months (around 8/8/2018). sooner should new symptoms or problems arise.

## 2018-03-12 DIAGNOSIS — E11.29 TYPE 2 DIABETES MELLITUS WITH MICROALBUMINURIA, WITHOUT LONG-TERM CURRENT USE OF INSULIN (HCC): ICD-10-CM

## 2018-03-12 DIAGNOSIS — R80.9 TYPE 2 DIABETES MELLITUS WITH MICROALBUMINURIA, WITHOUT LONG-TERM CURRENT USE OF INSULIN (HCC): ICD-10-CM

## 2018-05-04 DIAGNOSIS — R80.9 TYPE 2 DIABETES MELLITUS WITH MICROALBUMINURIA, WITHOUT LONG-TERM CURRENT USE OF INSULIN (HCC): ICD-10-CM

## 2018-05-04 DIAGNOSIS — E11.29 TYPE 2 DIABETES MELLITUS WITH MICROALBUMINURIA, WITHOUT LONG-TERM CURRENT USE OF INSULIN (HCC): ICD-10-CM

## 2018-06-26 ENCOUNTER — OFFICE VISIT (OUTPATIENT)
Dept: MEDICAL GROUP | Facility: PHYSICIAN GROUP | Age: 63
End: 2018-06-26
Payer: MEDICARE

## 2018-06-26 VITALS
DIASTOLIC BLOOD PRESSURE: 74 MMHG | HEART RATE: 70 BPM | OXYGEN SATURATION: 95 % | HEIGHT: 70 IN | BODY MASS INDEX: 25.91 KG/M2 | TEMPERATURE: 97.8 F | SYSTOLIC BLOOD PRESSURE: 126 MMHG | WEIGHT: 181 LBS

## 2018-06-26 DIAGNOSIS — R80.9 TYPE 2 DIABETES MELLITUS WITH MICROALBUMINURIA, WITHOUT LONG-TERM CURRENT USE OF INSULIN (HCC): ICD-10-CM

## 2018-06-26 DIAGNOSIS — Z12.11 SCREEN FOR COLON CANCER: ICD-10-CM

## 2018-06-26 DIAGNOSIS — I10 ESSENTIAL HYPERTENSION: ICD-10-CM

## 2018-06-26 DIAGNOSIS — B35.1 ONYCHOMYCOSIS: ICD-10-CM

## 2018-06-26 DIAGNOSIS — E11.29 TYPE 2 DIABETES MELLITUS WITH MICROALBUMINURIA, WITHOUT LONG-TERM CURRENT USE OF INSULIN (HCC): ICD-10-CM

## 2018-06-26 DIAGNOSIS — Z23 NEED FOR VACCINATION: ICD-10-CM

## 2018-06-26 DIAGNOSIS — K21.9 GASTROESOPHAGEAL REFLUX DISEASE, ESOPHAGITIS PRESENCE NOT SPECIFIED: ICD-10-CM

## 2018-06-26 DIAGNOSIS — M72.0 DUPUYTREN'S CONTRACTURE OF LEFT HAND: ICD-10-CM

## 2018-06-26 DIAGNOSIS — Z00.00 ROUTINE HEALTH MAINTENANCE: ICD-10-CM

## 2018-06-26 LAB
HBA1C MFR BLD: 5.9 % (ref ?–5.8)
INT CON NEG: NEGATIVE
INT CON POS: POSITIVE

## 2018-06-26 PROCEDURE — 83036 HEMOGLOBIN GLYCOSYLATED A1C: CPT | Performed by: NURSE PRACTITIONER

## 2018-06-26 PROCEDURE — 99214 OFFICE O/P EST MOD 30 MIN: CPT | Mod: 25 | Performed by: NURSE PRACTITIONER

## 2018-06-26 PROCEDURE — G0009 ADMIN PNEUMOCOCCAL VACCINE: HCPCS | Performed by: NURSE PRACTITIONER

## 2018-06-26 PROCEDURE — 90732 PPSV23 VACC 2 YRS+ SUBQ/IM: CPT | Performed by: NURSE PRACTITIONER

## 2018-06-26 RX ORDER — PRAVASTATIN SODIUM 40 MG
40 TABLET ORAL
Qty: 90 TAB | Refills: 3 | Status: SHIPPED | OUTPATIENT
Start: 2018-06-26 | End: 2019-08-12 | Stop reason: SDUPTHER

## 2018-06-26 RX ORDER — METOPROLOL SUCCINATE 25 MG/1
25 TABLET, EXTENDED RELEASE ORAL
Qty: 90 TAB | Refills: 3 | Status: SHIPPED | OUTPATIENT
Start: 2018-06-26 | End: 2018-08-14 | Stop reason: SDUPTHER

## 2018-06-26 NOTE — ASSESSMENT & PLAN NOTE
This is a new problem that has occasionally caused symptoms recently. Symptoms start in the morning with waking up with some nausea in stomach and he has emesis with bile like color. After vomiting, feels better. In the past month this has occurred only once. There is no associated abdominal pain, hematemesis, weight loss, dysphagia, or hoarseness.

## 2018-06-26 NOTE — ASSESSMENT & PLAN NOTE
This is an ongoing problem over the past few years. Slightly worsening. It has not really caused him pain so he has never mentioned it.

## 2018-06-26 NOTE — PROGRESS NOTES
Subjective:     Chief Complaint   Patient presents with   • Nail Problem     both feet   • Gastrophageal Reflux     on and off        HPI  Fabio Vergara is a 63 y.o. male here today for GERD and toenail fungus    Type 2 diabetes mellitus with microalbuminuria (CMS-HCC)  Ongoing problem that has been improving significantly with metformin.   Ref. Range 6/28/2017 08:38 11/6/2017 08:47 2/7/2018 08:51 6/26/2018 12:42   Glycohemoglobin Latest Units: % 10.6 (H) 8.1 (H) 6.2 (H) 5.9       Onychomycosis  Ongoing problem over the past few years on most of his toenails. Not causing him any pain. Previous provider recommended he soak in apple cider vinegar, which did not help.     GERD (gastroesophageal reflux disease)  This is a new problem that has occasionally caused symptoms recently. Symptoms start in the morning with waking up with some nausea in stomach and he has emesis with bile like color. After vomiting, feels better. In the past month this has occurred only once. There is no associated abdominal pain, hematemesis, weight loss, dysphagia, or hoarseness.    Essential hypertension  Chronic problem well controlled on current medications. Does not monitor blood pressure at home.     Dupuytren's contracture of left hand  This is an ongoing problem over the past few years. Slightly worsening. It has not really caused him pain so he has never mentioned it.        Diagnoses of Type 2 diabetes mellitus with microalbuminuria, without long-term current use of insulin ([facility]), Essential hypertension, Gastroesophageal reflux disease, esophagitis presence not specified, Dupuytren's contracture of left hand, Onychomycosis, Screen for colon cancer, Need for vaccination, and Routine health maintenance were pertinent to this visit.    Allergies: Flexeril [cyclobenzaprine] and Penicillins  Current medicines (including changes today)  Current Outpatient Prescriptions   Medication Sig Dispense Refill   • pravastatin (PRAVACHOL) 40 MG  "tablet Take 1 Tab by mouth every day. 90 Tab 3   • metoprolol SR (TOPROL XL) 25 MG TABLET SR 24 HR Take 1 Tab by mouth every day. 90 Tab 3   • metformin (GLUCOPHAGE) 1000 MG tablet TAKE ONE TABLET BY MOUTH TWICE DAILY WITH MEALS 180 Tab 3   • sildenafil citrate (VIAGRA) 50 MG tablet Take 1 Tab by mouth as needed for Erectile Dysfunction. 10 Tab 3   • lisinopril (PRINIVIL, ZESTRIL) 40 MG tablet Take 1 Tab by mouth every day. 90 Tab 3   • aspirin EC (ECOTRIN) 81 MG TBEC Take 81 mg by mouth every day.     • gabapentin (NEURONTIN) 100 MG CAPS Take 100 mg by mouth 3 times a day.     • tramadol (ULTRAM) 50 MG TABS Take  mg by mouth every four hours as needed for Mild Pain.       No current facility-administered medications for this visit.        He  has a past medical history of Diabetes ([facility]); Heart attack ([facility]) (2002); Hyperlipidemia; and Hypertension.      ROS  As stated in HPI and additionally  Gen: No excessive fatigue or weight loss.  Neuro: No unusual headache or dizziness  CV: No chest pain, QUIÑONES, or LE edema     Objective:     Blood pressure 126/74, pulse 70, temperature 36.6 °C (97.8 °F), height 1.778 m (5' 10\"), weight 82.1 kg (181 lb), SpO2 95 %. Body mass index is 25.97 kg/m².  Physical Exam:  General: Alert, oriented, in no acute distress.  Eye contact is good, speech goal directed, affect calm  CNs grossly intact.  HEENT: conjunctiva non-injected, sclera non-icteric, EOMs intact. No lid edema or eye drainage.   Gross hearing intact.  Lungs: unlabored. clear to auscultation bilaterally with good excursion.  CV: regular rate and rhythm. No murmurs  Ext: no edema, normal color and temperature. Right first, third, and fifth, and left all but third toenails with thickening and yellow discoloration  Monofilament testing with a 10 gram force: sensation intact: intact bilaterally  Visual Inspection: Feet without maceration, ulcers, fissures.  Pedal pulses: intact bilaterally   MSK: Left hand with palpable " nodule palmar aspect with contracture of left fifth and some of left fourth fingers  Skin: No rashes or lesions in visible areas  Gait steady.     Assessment and Plan:   Assessment/Plan:  1. Type 2 diabetes mellitus with microalbuminuria, without long-term current use of insulin (HCC)  Improving significantly. Continue metformin. Follow-up labs in 6 months and consider decreasing metformin at this time pending A1c  - POCT Hemoglobin A1C  - COMP METABOLIC PANEL; Future  - LIPID PROFILE; Future  - HEMOGLOBIN A1C; Future  - MICROALBUMIN CREAT RATIO URINE; Future    2. Essential hypertension  Stable on current meds  - COMP METABOLIC PANEL; Future  - metoprolol SR (TOPROL XL) 25 MG TABLET SR 24 HR; Take 1 Tab by mouth every day.  Dispense: 90 Tab; Refill: 3    3. Gastroesophageal reflux disease, esophagitis presence not specified  This is then very episodic. I do not feel he requires any medication for treatment. No red-like symptoms. Encouraged small meals especially at dinner, sleeping with head elevated, and avoidance of meals at least 2 hours before bedtime    4. Dupuytren's contracture of left hand  - REFERRAL TO HAND SURGERY    5. Onychomycosis  Encouraged continued conservative measures and referral to podiatry to see if we can at least get the nails filed to a stable fit to help him manage it at home on his own. Would prefer to not have to use oral terbinafine as this is not causing him any symptoms at this time  - REFERRAL TO PODIATRY    6. Screen for colon cancer  - OCCULT BLOOD FECES IMMUNOASSAY; Future    7. Need for vaccination  I have placed the below orders and discussed them with an approved delegating provider. The MA is performing the below orders under the direction of Dr. Fuller  - PNEUMOCOCCAL POLYSACCHARIDE VACCINE 23-VALENT =>1YO SQ/IM    8. Routine health maintenance  - CBC WITH DIFFERENTIAL; Future       Follow up:  Return in about 6 months (around 12/26/2018).    Educated in proper administration  of medication(s) ordered today including safety, possible SE, risks, benefits, rationale and alternatives to therapy.   Supportive care, differential diagnoses, and indications for immediate follow-up discussed with patient.    Pathogenesis of diagnosis discussed including typical length and natural progression.    Instructed to return to clinic or nearest emergency department for any change in condition, further concerns, or worsening of symptoms.  Patient states understanding of the plan of care and discharge instructions.      Please note that this dictation was created using voice recognition software. I have made every reasonable attempt to correct obvious errors, but I expect that there are errors of grammar and possibly content that I did not discover before finalizing the note.    Followup: Return in about 6 months (around 12/26/2018). sooner should new symptoms or problems arise.

## 2018-06-26 NOTE — ASSESSMENT & PLAN NOTE
Ongoing problem that has been improving significantly with metformin.   Ref. Range 6/28/2017 08:38 11/6/2017 08:47 2/7/2018 08:51 6/26/2018 12:42   Glycohemoglobin Latest Units: % 10.6 (H) 8.1 (H) 6.2 (H) 5.9

## 2018-06-26 NOTE — ASSESSMENT & PLAN NOTE
Ongoing problem over the past few years on most of his toenails. Not causing him any pain. Previous provider recommended he soak in apple cider vinegar, which did not help.

## 2018-08-14 ENCOUNTER — OFFICE VISIT (OUTPATIENT)
Dept: MEDICAL GROUP | Facility: PHYSICIAN GROUP | Age: 63
End: 2018-08-14
Payer: MEDICARE

## 2018-08-14 VITALS
TEMPERATURE: 97.7 F | WEIGHT: 184 LBS | HEIGHT: 70 IN | OXYGEN SATURATION: 90 % | HEART RATE: 96 BPM | SYSTOLIC BLOOD PRESSURE: 152 MMHG | BODY MASS INDEX: 26.34 KG/M2 | DIASTOLIC BLOOD PRESSURE: 88 MMHG

## 2018-08-14 DIAGNOSIS — Z87.891 PERSONAL HISTORY OF TOBACCO USE: ICD-10-CM

## 2018-08-14 DIAGNOSIS — I10 ESSENTIAL HYPERTENSION: ICD-10-CM

## 2018-08-14 PROCEDURE — 99213 OFFICE O/P EST LOW 20 MIN: CPT | Performed by: NURSE PRACTITIONER

## 2018-08-14 RX ORDER — METOPROLOL SUCCINATE 25 MG/1
50 TABLET, EXTENDED RELEASE ORAL
Qty: 180 TAB | Refills: 3 | Status: SHIPPED | OUTPATIENT
Start: 2018-08-14 | End: 2019-08-21 | Stop reason: SDUPTHER

## 2018-08-14 NOTE — LETTER
[facility]  TANISHA Castro  [address] [place] [address]  Fax: [phone]   Authorization for Release/Disclosure of   Protected Health Information   Name: FABIO ROLDAN : 1955 SSN: [id number]   Address: [address] [place] [address] [place] [address] Phone:    [phone] (home)    I authorize the entity listed below to release/disclose the PHI below to:   [facility]/TANISHA Castro and TANISHA Castro   Provider or Entity Name:   Dr. Izquierdo    Address   City, State, [facility]   Phone:      Fax:     Reason for request: continuity of care   Information to be released:    [  ] LAST COLONOSCOPY,  including any PATH REPORT and follow-up  [  ] LAST FIT/COLOGUARD RESULT [  ] LAST DEXA  [  ] LAST MAMMOGRAM  [  ] LAST PAP  [  ] LAST LABS [xx  ] RETINA EXAM REPORT  [  ] IMMUNIZATION RECORDS  [  ] Release all info      [  ] Check here and initial the line next to each item to release ALL health information INCLUDING  _____ Care and treatment for drug and / or alcohol abuse  _____ HIV testing, infection status, or AIDS  _____ Genetic Testing    DATES OF SERVICE OR TIME PERIOD TO BE DISCLOSED: _____________  I understand and acknowledge that:  * This Authorization may be revoked at any time by you in writing, except if your health information has already been used or disclosed.  * Your health information that will be used or disclosed as a result of you signing this authorization could be re-disclosed by the recipient. If this occurs, your re-disclosed health information may no longer be protected by State or Federal laws.  * You may refuse to sign this Authorization. Your refusal will not affect your ability to obtain treatment.  * This Authorization becomes effective upon signing and will  on (date) __________.      If no date is indicated, this Authorization will  one (1) year from the signature date.    Name: Fabio Roldan    Signature:   Date:     2018            PLEASE FAX REQUESTED RECORDS BACK TO: [phone]

## 2018-08-14 NOTE — PROGRESS NOTES
Subjective:     Chief Complaint   Patient presents with   • Hypertension     high bp   • Other     requesting lung test       HPI  Fabio Vegrara is a 63 y.o. male here today for uncontrolled HTN    Essential hypertension  Ongoing chronic problem worsened over this past few weeks. Generally is controlled on lisinopril 40 mg and metoprolol 25 mg daily. He does have bp monitor at home, but is not monitoring it.   He went to spine doctor twice and both times was 152, and 168 systolic. He has been in some slightly worse pain this past few weeks, otherwise no changes that could have triggered this.     Caffeine: 3 c/day  NSAID: naproxen occasionally   Sleep: good     Personal history of tobacco use  Age: 63 years  Asymptomatic of any signs of lung cancer  No previous history of lung cancer  35 pack year smoking history- quit 5 years ago 2013  No chest CT within 12 months        Diagnoses of Essential hypertension and Personal history of tobacco use were pertinent to this visit.    Allergies: Flexeril [cyclobenzaprine] and Penicillins  Current medicines (including changes today)  Current Outpatient Prescriptions   Medication Sig Dispense Refill   • metoprolol SR (TOPROL XL) 25 MG TABLET SR 24 HR Take 2 Tabs by mouth every day. 180 Tab 3   • pravastatin (PRAVACHOL) 40 MG tablet Take 1 Tab by mouth every day. 90 Tab 3   • metformin (GLUCOPHAGE) 1000 MG tablet TAKE ONE TABLET BY MOUTH TWICE DAILY WITH MEALS 180 Tab 3   • sildenafil citrate (VIAGRA) 50 MG tablet Take 1 Tab by mouth as needed for Erectile Dysfunction. 10 Tab 3   • lisinopril (PRINIVIL, ZESTRIL) 40 MG tablet Take 1 Tab by mouth every day. 90 Tab 3   • aspirin EC (ECOTRIN) 81 MG TBEC Take 81 mg by mouth every day.     • gabapentin (NEURONTIN) 100 MG CAPS Take 100 mg by mouth 3 times a day.     • tramadol (ULTRAM) 50 MG TABS Take  mg by mouth every four hours as needed for Mild Pain.       No current facility-administered medications for this visit.   "      He  has a past medical history of Diabetes (HCC); Heart attack (HCC) (2002); Hyperlipidemia; and Hypertension.    H    ROS  As stated in HPI and additionally  Neuro: No unusual HA or dizziness  CV: No chest pain, QUIÑONES, or LE edema  Pulm: No sob or dyspnea     Objective:     Blood pressure 152/88, pulse 96, temperature 36.5 °C (97.7 °F), height 1.778 m (5' 10\"), weight 83.5 kg (184 lb), SpO2 90 %. Body mass index is 26.4 kg/m².  Physical Exam:  General: Alert, oriented, in no acute distress.  Eye contact is good, speech goal directed, affect calm  CNs grossly intact.  HEENT: conjunctiva non-injected, sclera non-icteric, EOMs intact.   Gross hearing intact.  Ext: no edema, normal color   Skin: No rashes or lesions in visible areas  Gait steady.     Assessment and Plan:   Assessment/Plan:  1. Essential hypertension  Not controlled. Increase metoprolol to 50 mg daily. RTC in 1 week for bp check. If not improving, consider addition of hctz 25 mg to lisinopril   - metoprolol SR (TOPROL XL) 25 MG TABLET SR 24 HR; Take 2 Tabs by mouth every day.  Dispense: 180 Tab; Refill: 3    2. Personal history of tobacco use  We have discussed risks vs benefits of lung cancer screening, and he would like to proceed with referral.   - REFERRAL TO LUNG CANCER SCREENING PROGRAM       Follow up:  Return keep dec appt.    Educated in proper administration of medication(s) ordered today including safety, possible SE, risks, benefits, rationale and alternatives to therapy.   Supportive care, differential diagnoses, and indications for immediate follow-up discussed with patient.    Pathogenesis of diagnosis discussed including typical length and natural progression.    Instructed to return to clinic or nearest emergency department for any change in condition, further concerns, or worsening of symptoms.  Patient states understanding of the plan of care and discharge instructions.      Please note that this dictation was created using voice " recognition software. I have made every reasonable attempt to correct obvious errors, but I expect that there are errors of grammar and possibly content that I did not discover before finalizing the note.    Followup: Return keep dec appt. sooner should new symptoms or problems arise.

## 2018-08-16 ENCOUNTER — TELEPHONE (OUTPATIENT)
Dept: HEMATOLOGY ONCOLOGY | Facility: MEDICAL CENTER | Age: 63
End: 2018-08-16

## 2018-08-17 NOTE — TELEPHONE ENCOUNTER
Received referral to lung cancer screening program.  Chart review to assess for lung cancer screening program eligibility.   1. Age 55-77 yrs of age? Yes 63 y.o.  2. 30 pack year hx of smoking, or greater? Yes 1 ppdx 35 yrs= 35 pkyr hx  3. Current smoker or if quit, has pt quit within last 15 yrs?Yes Quit 7/2013  4. Any signs or symptoms of lung cancer? None noted  5. Previous history of lung cancer? None noted  6. Chest CT within past 12 mos.? None noted  Patient does meet eligibility criteria. [facility] scheduling notified to schedule the shared decision making visit.

## 2018-08-22 ENCOUNTER — TELEPHONE (OUTPATIENT)
Dept: HEMATOLOGY ONCOLOGY | Facility: MEDICAL CENTER | Age: 63
End: 2018-08-22

## 2018-08-23 NOTE — TELEPHONE ENCOUNTER
1st attempt to contact the patient,- left voicemail for patient requesting a return call to schedule their shared decision making appointment.   [facility]/ SCP/ Nicotine Dependence

## 2018-08-24 ENCOUNTER — TELEPHONE (OUTPATIENT)
Dept: MEDICAL GROUP | Facility: PHYSICIAN GROUP | Age: 63
End: 2018-08-24

## 2018-08-24 ENCOUNTER — NON-PROVIDER VISIT (OUTPATIENT)
Dept: MEDICAL GROUP | Facility: PHYSICIAN GROUP | Age: 63
End: 2018-08-24
Payer: MEDICARE

## 2018-08-24 VITALS — DIASTOLIC BLOOD PRESSURE: 82 MMHG | SYSTOLIC BLOOD PRESSURE: 144 MMHG

## 2018-08-24 DIAGNOSIS — I10 ESSENTIAL HYPERTENSION: ICD-10-CM

## 2018-08-24 RX ORDER — HYDROCHLOROTHIAZIDE 25 MG/1
25 TABLET ORAL DAILY
Qty: 90 TAB | Refills: 3 | Status: SHIPPED | OUTPATIENT
Start: 2018-08-24 | End: 2019-08-26 | Stop reason: SDUPTHER

## 2018-08-24 NOTE — TELEPHONE ENCOUNTER
Blood pressure is not at goal. Please ask him to  the new prescription of hydrochlorothiazide from the pharmacy and start taking this once a day in the morning in addition to his current blood pressure medications. Marked calendar to repeat blood work in about 2 weeks to make sure that this is not making his potassium or sodium too low. If he experiences any dizziness with these new medications he needs to let us know right away. Please schedule him for follow-up visit in office in about 4-6 weeks    JOSÉ MIGUEL Castro.

## 2018-08-24 NOTE — PROGRESS NOTES
Fabio Vergara is a 63 y.o. male here for a non-provider visit for blood pressure check    Vitals:    08/24/18 1029   BP: 144/82     If abnormal, was an in office provider notified today? Yes  Routed to PCP? Yes

## 2018-08-28 ENCOUNTER — OFFICE VISIT (OUTPATIENT)
Dept: HEMATOLOGY ONCOLOGY | Facility: MEDICAL CENTER | Age: 63
End: 2018-08-28
Payer: MEDICARE

## 2018-08-28 VITALS
DIASTOLIC BLOOD PRESSURE: 80 MMHG | RESPIRATION RATE: 18 BRPM | HEART RATE: 95 BPM | SYSTOLIC BLOOD PRESSURE: 138 MMHG | OXYGEN SATURATION: 99 % | TEMPERATURE: 97.9 F | BODY MASS INDEX: 26.35 KG/M2 | WEIGHT: 184.08 LBS | HEIGHT: 70 IN

## 2018-08-28 DIAGNOSIS — Z87.891 PERSONAL HISTORY OF NICOTINE DEPENDENCE: ICD-10-CM

## 2018-08-28 PROCEDURE — G0296 VISIT TO DETERM LDCT ELIG: HCPCS | Performed by: NURSE PRACTITIONER

## 2018-08-28 ASSESSMENT — ENCOUNTER SYMPTOMS
SPUTUM PRODUCTION: 0
HEMOPTYSIS: 0
COUGH: 0
WHEEZING: 0
WEIGHT LOSS: 0
SHORTNESS OF BREATH: 0

## 2018-08-28 ASSESSMENT — PAIN SCALES - GENERAL: PAINLEVEL: NO PAIN

## 2018-08-28 NOTE — PROGRESS NOTES
Subjective:      Fabio Vergara is a 63 y.o. male who presents with Lung Cancer Screening Program Prescreen (REF BY AVEL REYES DX:Personal history of tobacco use) for lung cancer screening shared decision making visit.         HPI    Patient seen today for initial lung cancer screening visit. Patient referred by his PCP AMY Ochoa.     The patient meets eligibility criteria including age, smoking history (30+ pack years), if former smoker, quit in the last 15 years, and absence of signs or symptoms of lung cancer.    - Age - 63  - Smoking history - Patient has smoked for 35 years at an average of 1 ppd = 35 pack year smoking history.  - Current smoking status - Former smoker, quit in July 2013, 5 years ago.   - No symptoms of lung cancer and no previous history of lung cancer     Allergies   Allergen Reactions   • Flexeril [Cyclobenzaprine]      Weakness, spacie   • Penicillins      Doesn't know the reaction  had this allergy when he was a child     Current Outpatient Prescriptions on File Prior to Visit   Medication Sig Dispense Refill   • hydroCHLOROthiazide (HYDRODIURIL) 25 MG Tab Take 1 Tab by mouth every day. 90 Tab 3   • metoprolol SR (TOPROL XL) 25 MG TABLET SR 24 HR Take 2 Tabs by mouth every day. 180 Tab 3   • pravastatin (PRAVACHOL) 40 MG tablet Take 1 Tab by mouth every day. 90 Tab 3   • metformin (GLUCOPHAGE) 1000 MG tablet TAKE ONE TABLET BY MOUTH TWICE DAILY WITH MEALS 180 Tab 3   • sildenafil citrate (VIAGRA) 50 MG tablet Take 1 Tab by mouth as needed for Erectile Dysfunction. 10 Tab 3   • lisinopril (PRINIVIL, ZESTRIL) 40 MG tablet Take 1 Tab by mouth every day. 90 Tab 3   • aspirin EC (ECOTRIN) 81 MG TBEC Take 81 mg by mouth every day.     • tramadol (ULTRAM) 50 MG TABS Take  mg by mouth every four hours as needed for Mild Pain.     • gabapentin (NEURONTIN) 100 MG CAPS Take 100 mg by mouth 3 times a day.       No current facility-administered medications on file prior to visit.   "      Review of Systems   Constitutional: Negative for malaise/fatigue and weight loss.   Respiratory: Negative for cough, hemoptysis, sputum production, shortness of breath and wheezing.           Objective:     /80   Pulse 95   Temp 36.6 °C (97.9 °F)   Resp 18   Ht 1.778 m (5' 10\")   Wt 83.5 kg (184 lb 1.4 oz)   SpO2 99%   BMI 26.41 kg/m²      Physical Exam   Constitutional: He is oriented to person, place, and time. He appears well-developed and well-nourished. No distress.   HENT:   Head: Normocephalic and atraumatic.   Cardiovascular: Normal rate, regular rhythm and normal heart sounds.  Exam reveals no gallop and no friction rub.    No murmur heard.  Pulmonary/Chest: Effort normal and breath sounds normal. No respiratory distress. He has no wheezes.   Musculoskeletal: Normal range of motion.   Neurological: He is alert and oriented to person, place, and time.   Skin: Skin is warm and dry. He is not diaphoretic.   Vitals reviewed.              Assessment/Plan:     1. Personal history of nicotine dependence  CT-LUNG CANCER-SCREENING       We conducted a shared decision-making process using a decision aid. We reviewed benefits and harms of screening, including false positives and potential need for additional diagnostic testing, the possibility of over diagnosis, and total radiation exposure.    We discussed the importance of adhering to annual LDCT screening. We also discussed the impact of comorbities on the patient's the ability or willingness to undergo diagnostic procedure(s) and treatment.    Counseling on the importance of maintaining cigarette smoking abstinence if former smoker; or the importance of smoking cessation if current smoker and, if appropriate, furnishing of information about tobacco cessation interventions.    Based on our discussion, we have decided to begin annual lung cancer screening starting now.    "

## 2018-09-11 ENCOUNTER — HOSPITAL ENCOUNTER (OUTPATIENT)
Dept: RADIOLOGY | Facility: MEDICAL CENTER | Age: 63
End: 2018-09-11
Attending: NURSE PRACTITIONER
Payer: MEDICARE

## 2018-09-11 ENCOUNTER — TELEPHONE (OUTPATIENT)
Dept: HEMATOLOGY ONCOLOGY | Facility: MEDICAL CENTER | Age: 63
End: 2018-09-11

## 2018-09-11 DIAGNOSIS — Z87.891 PERSONAL HISTORY OF NICOTINE DEPENDENCE: ICD-10-CM

## 2018-09-11 PROCEDURE — G0297 LDCT FOR LUNG CA SCREEN: HCPCS

## 2018-09-11 NOTE — LETTER
" [address]  [place] [address]  P [phone]  F [phone]         Date: September 11, 2018    Fabio Vergara  [address] [place] [address]    Re:  Low-dose chest CT performed on 9/11/2018    Medical Record Number: [id number]    Dear Fabio,    We are pleased to let you know that the results of your recent low-dose chest CT (LDCT) examination were negative, or showed no evidence of lung nodule or mass.  The radiologist recommends to continue annual screening with LDCT in 12 months.  In the event that any additional \"incidental\" findings were identified from this exam, we have communicated back to your primary care provider for follow-up.    Here are some other important points you should know:  · Your low-dose Chest CT report has been sent to your referring or primary health care provider and is available to participants in [facility].  As a part of our Lung Cancer Screening program we will remind you and your referring health care provider when your next LDCT screening is due.  · Although low-dose chest CT is very effective at finding lung cancer early, it cannot find all lung cancers. If you develop any new symptoms such as shortness of breath, chest pain, or coughing up blood, please call your doctor.  · Please keep in mind that good health involves quitting smoking (for help, call [facility] Quit Tobacco program at [phone]), an annual physical exam, and continued screening with low-dose chest CT.    Thank you for participating in the Lung Cancer Screening program.  If you have any questions about this letter or our program, please call our Nurse Navigator at [phone].    Sincerely,  Rosa Yoo MD, [facility]  Medical Director  [facility] Lung Cancer Screening Program    "

## 2018-09-11 NOTE — TELEPHONE ENCOUNTER
"Phoned patient with results of LDCT exam performed 9/11/2018.  Notified him that the results showed no pulmonary nodules and no concern for lung cancer.  Recommend follow up CT in 12 months. Informed patient of incidental findings of \"sequela of prior granulomatous infection\" with recommendation to follow up with PCP.   Patient agrees to all recommendations. Referring provider AMY Ochoa notified of results and incidental findings.  Health maintenance updated and patient sent lung cancer screening result letter.    "

## 2018-09-13 RX ORDER — LISINOPRIL 40 MG/1
TABLET ORAL
Qty: 90 TAB | Refills: 3 | Status: SHIPPED | OUTPATIENT
Start: 2018-09-13 | End: 2019-08-08 | Stop reason: SDUPTHER

## 2018-09-21 ENCOUNTER — HOSPITAL ENCOUNTER (OUTPATIENT)
Dept: LAB | Facility: MEDICAL CENTER | Age: 63
End: 2018-09-21
Attending: NURSE PRACTITIONER
Payer: MEDICARE

## 2018-09-21 DIAGNOSIS — I10 ESSENTIAL HYPERTENSION: ICD-10-CM

## 2018-09-21 LAB
ANION GAP SERPL CALC-SCNC: 11 MMOL/L (ref 0–11.9)
BUN SERPL-MCNC: 8 MG/DL (ref 8–22)
CALCIUM SERPL-MCNC: 9.1 MG/DL (ref 8.5–10.5)
CHLORIDE SERPL-SCNC: 94 MMOL/L (ref 96–112)
CO2 SERPL-SCNC: 26 MMOL/L (ref 20–33)
CREAT SERPL-MCNC: 0.77 MG/DL (ref 0.5–1.4)
GLUCOSE SERPL-MCNC: 75 MG/DL (ref 65–99)
POTASSIUM SERPL-SCNC: 4.3 MMOL/L (ref 3.6–5.5)
SODIUM SERPL-SCNC: 131 MMOL/L (ref 135–145)

## 2018-09-21 PROCEDURE — 36415 COLL VENOUS BLD VENIPUNCTURE: CPT

## 2018-09-21 PROCEDURE — 80048 BASIC METABOLIC PNL TOTAL CA: CPT

## 2018-09-25 ENCOUNTER — OFFICE VISIT (OUTPATIENT)
Dept: MEDICAL GROUP | Facility: PHYSICIAN GROUP | Age: 63
End: 2018-09-25
Payer: MEDICARE

## 2018-09-25 VITALS
SYSTOLIC BLOOD PRESSURE: 130 MMHG | OXYGEN SATURATION: 96 % | TEMPERATURE: 98.3 F | HEART RATE: 83 BPM | HEIGHT: 70 IN | WEIGHT: 181 LBS | DIASTOLIC BLOOD PRESSURE: 82 MMHG | BODY MASS INDEX: 25.91 KG/M2

## 2018-09-25 DIAGNOSIS — J84.10 CALCIFIED GRANULOMA OF LUNG (HCC): ICD-10-CM

## 2018-09-25 DIAGNOSIS — Z11.1 SCREENING FOR TUBERCULOSIS: ICD-10-CM

## 2018-09-25 DIAGNOSIS — I10 ESSENTIAL HYPERTENSION: ICD-10-CM

## 2018-09-25 DIAGNOSIS — Z23 NEED FOR VACCINATION: ICD-10-CM

## 2018-09-25 PROCEDURE — G0008 ADMIN INFLUENZA VIRUS VAC: HCPCS | Performed by: NURSE PRACTITIONER

## 2018-09-25 PROCEDURE — 99213 OFFICE O/P EST LOW 20 MIN: CPT | Mod: 25 | Performed by: NURSE PRACTITIONER

## 2018-09-25 PROCEDURE — 90662 IIV NO PRSV INCREASED AG IM: CPT | Performed by: NURSE PRACTITIONER

## 2018-09-25 NOTE — ASSESSMENT & PLAN NOTE
Ongoing problem. Blood pressure was well-controlled for quite some time on metoprolol and lisinopril. He began to increase, therefore metoprolol increased to 50 mg daily. This made no difference in blood pressure, therefore hydrochlorothiazide was then added. He has had 2 blood pressure readings while on this that are stable 138/80 and today 130/82. BMP was completed that reveals stable potassium levels and very minimally decreased sodium levels. Renal function preserved on this medication.   Ref. Range 2/7/2018 08:51 9/21/2018 08:11   Sodium Latest Ref Range: 135 - 145 mmol/L 138 131 (L)   Potassium Latest Ref Range: 3.6 - 5.5 mmol/L 4.9 4.3   Chloride Latest Ref Range: 96 - 112 mmol/L 102 94 (L)   Co2 Latest Ref Range: 20 - 33 mmol/L 28 26   Anion Gap Latest Ref Range: 0.0 - 11.9  8.0 11.0   Glucose Latest Ref Range: 65 - 99 mg/dL 93 75   Bun Latest Ref Range: 8 - 22 mg/dL 9 8   Creatinine Latest Ref Range: 0.50 - 1.40 mg/dL 0.72 0.77   GFR If  Latest Ref Range: >60 mL/min/1.73 m 2 >60 >60   GFR If Non  Latest Ref Range: >60 mL/min/1.73 m 2 >60 >60

## 2018-09-25 NOTE — PROGRESS NOTES
Subjective:     Chief Complaint   Patient presents with   • Hypertension     med change fv   • Follow-Up     lung screening       HPI  Fabio Vergara is a 63 y.o. male here today for follow-up on hypertension. He additionally just had CT scan of chest for lung cancer screening and right lower lobe revealed calcified granuloma. He has questions about this today.    Essential hypertension  Ongoing problem. Blood pressure was well-controlled for quite some time on metoprolol and lisinopril. He began to increase, therefore metoprolol increased to 50 mg daily. This made no difference in blood pressure, therefore hydrochlorothiazide was then added. He has had 2 blood pressure readings while on this that are stable 138/80 and today 130/82. BMP was completed that reveals stable potassium levels and very minimally decreased sodium levels. Renal function preserved on this medication.   Ref. Range 2/7/2018 08:51 9/21/2018 08:11   Sodium Latest Ref Range: 135 - 145 mmol/L 138 131 (L)   Potassium Latest Ref Range: 3.6 - 5.5 mmol/L 4.9 4.3   Chloride Latest Ref Range: 96 - 112 mmol/L 102 94 (L)   Co2 Latest Ref Range: 20 - 33 mmol/L 28 26   Anion Gap Latest Ref Range: 0.0 - 11.9  8.0 11.0   Glucose Latest Ref Range: 65 - 99 mg/dL 93 75   Bun Latest Ref Range: 8 - 22 mg/dL 9 8   Creatinine Latest Ref Range: 0.50 - 1.40 mg/dL 0.72 0.77   GFR If  Latest Ref Range: >60 mL/min/1.73 m 2 >60 >60   GFR If Non  Latest Ref Range: >60 mL/min/1.73 m 2 >60 >60        Diagnoses of Essential hypertension, Calcified granuloma of lung (HCC), Need for vaccination, and Screening for tuberculosis were pertinent to this visit.    Allergies: Flexeril [cyclobenzaprine] and Penicillins  Current medicines (including changes today)  Current Outpatient Prescriptions   Medication Sig Dispense Refill   • lisinopril (PRINIVIL, ZESTRIL) 40 MG tablet TAKE ONE TABLET BY MOUTH EVERY DAY 90 Tab 3   • hydroCHLOROthiazide  "(HYDRODIURIL) 25 MG Tab Take 1 Tab by mouth every day. 90 Tab 3   • metoprolol SR (TOPROL XL) 25 MG TABLET SR 24 HR Take 2 Tabs by mouth every day. 180 Tab 3   • pravastatin (PRAVACHOL) 40 MG tablet Take 1 Tab by mouth every day. 90 Tab 3   • metformin (GLUCOPHAGE) 1000 MG tablet TAKE ONE TABLET BY MOUTH TWICE DAILY WITH MEALS 180 Tab 3   • sildenafil citrate (VIAGRA) 50 MG tablet Take 1 Tab by mouth as needed for Erectile Dysfunction. 10 Tab 3   • aspirin EC (ECOTRIN) 81 MG TBEC Take 81 mg by mouth every day.     • gabapentin (NEURONTIN) 100 MG CAPS Take 100 mg by mouth 3 times a day.     • tramadol (ULTRAM) 50 MG TABS Take  mg by mouth every four hours as needed for Mild Pain.       No current facility-administered medications for this visit.        He  has a past medical history of Diabetes ([facility]); Heart attack ([facility]) (2002); Hyperlipidemia; and Hypertension.    ROS  As stated in HPI and additionally  CV: No chest pain, palpitations, QUIÑONES, or LE edema  Pulm: No cough, sob, dyspnea, purulent sputum, or hemoptysis     Objective:     Blood pressure 130/82, pulse 83, temperature 36.8 °C (98.3 °F), temperature source Temporal, height 1.778 m (5' 10\"), weight 82.1 kg (181 lb), SpO2 96 %. Body mass index is 25.97 kg/m².  Physical Exam:  General: Alert, oriented, in no acute distress.  Eye contact is good, speech goal directed, affect calm  CNs grossly intact.  HEENT: conjunctiva non-injected, sclera non-icteric, EOMs intact.  Gross hearing intact.  Ext: no edema, normal color   Skin: No rashes or lesions in visible areas  Gait steady.     Assessment and Plan:   Assessment/Plan:  1. Essential hypertension  Stable on current medications. Follow-up 3 months. Lab work is already been ordered.    2. Calcified granuloma of lung (HCC)  Educated patient on diagnosis. Screening for TB.  - Quantiferon Gold TB (PPD); Future    3. Need for vaccination  I have placed the below orders and discussed them with an approved delegating " provider. The MA is performing the below orders under the direction of Dr. Johnston  - INFLUENZA VACCINE, HIGH DOSE (65+ ONLY)    4. Screening for tuberculosis  - Quantiferon Gold TB (PPD); Future       Follow up:  Return keep 12/21 appt.    Educated in proper administration of medication(s) ordered today including safety, possible SE, risks, benefits, rationale and alternatives to therapy.   Supportive care, differential diagnoses, and indications for immediate follow-up discussed with patient.    Pathogenesis of diagnosis discussed including typical length and natural progression.    Instructed to return to clinic or nearest emergency department for any change in condition, further concerns, or worsening of symptoms.  Patient states understanding of the plan of care and discharge instructions.      Please note that this dictation was created using voice recognition software. I have made every reasonable attempt to correct obvious errors, but I expect that there are errors of grammar and possibly content that I did not discover before finalizing the note.    Followup: Return keep 12/21 appt. sooner should new symptoms or problems arise.

## 2018-10-04 ENCOUNTER — PATIENT OUTREACH (OUTPATIENT)
Dept: HEALTH INFORMATION MANAGEMENT | Facility: OTHER | Age: 63
End: 2018-10-04

## 2018-10-04 NOTE — PROGRESS NOTES
Outcome: Left Message    Please transfer to Patient Outreach Team at [phone] when patient returns call.    WebIZ Checked & Epic Updated:  yes    [facility] Verified: yes    Attempt # 1

## 2018-10-12 ENCOUNTER — OFFICE VISIT (OUTPATIENT)
Dept: MEDICAL GROUP | Facility: PHYSICIAN GROUP | Age: 63
End: 2018-10-12
Payer: MEDICARE

## 2018-10-12 VITALS
TEMPERATURE: 96.9 F | SYSTOLIC BLOOD PRESSURE: 120 MMHG | DIASTOLIC BLOOD PRESSURE: 84 MMHG | OXYGEN SATURATION: 94 % | HEART RATE: 57 BPM | HEIGHT: 70 IN | WEIGHT: 183 LBS | BODY MASS INDEX: 26.2 KG/M2

## 2018-10-12 DIAGNOSIS — R29.898 RIGHT HAND WEAKNESS: ICD-10-CM

## 2018-10-12 DIAGNOSIS — M89.8X1 PAIN OF RIGHT SCAPULA: ICD-10-CM

## 2018-10-12 PROCEDURE — 99214 OFFICE O/P EST MOD 30 MIN: CPT | Performed by: NURSE PRACTITIONER

## 2018-10-12 NOTE — PROGRESS NOTES
Subjective:     Chief Complaint   Patient presents with   • Hand Pain     x2 weeks fingers and arm weak        HPI  Fabio Vergara is a 63 y.o. male here today for right hand pain and weakness.   This is a new problem over the past few weeks. His right hand just seems to not be working right. There is decreased strength. He can hold the screwdriver, but it is hard to twist it. He can make a fist, but has a hard time completely extending fingers. No pain, numbness, tingling. No RUE arm pain, numbness, tingling, or weakness. No shoulder joint pain, neck pain or stiffness. When reaching up to grab coffee, it is difficult to hold above head, but when down lower is easier to grasp. He intermittently is getting aching under right scapula.  Had cervical spine MRI revealing moderate to severe spinal canal narrowing with right lateral disc extrusion and severe right lateral recess and foraminal narrowing at C6-7, which is the exact dermatome that is aligning down his arm and the right scapula. He follows Dr. Turcios with pain clinic and next appointment is in December. Has had nerve ablation, facet injections, and epidurals in lower spine.   Impression   MRI 7/2017    1.  C6-7 moderate to severe spinal canal narrowing. Right lateral disc extrusion producing severe right lateral recess and foraminal narrowing    2.  Mild-moderate central spinal canal narrowing at C5-6    3.  Mild central spinal canal narrowing at C4-5    4.  Multilevel foraminal stenoses describing the findings section    5.  Degenerative subluxation at C5-6    6.  Abnormal motion at C4-5 as subluxation identified on conventional radiography reduces with supine position on MRI       Diagnoses of Right hand weakness and Pain of right scapula were pertinent to this visit.    Allergies: Flexeril [cyclobenzaprine] and Penicillins  Current medicines (including changes today)  Current Outpatient Prescriptions   Medication Sig Dispense Refill   • lisinopril  "(PRINIVIL, ZESTRIL) 40 MG tablet TAKE ONE TABLET BY MOUTH EVERY DAY 90 Tab 3   • hydroCHLOROthiazide (HYDRODIURIL) 25 MG Tab Take 1 Tab by mouth every day. 90 Tab 3   • metoprolol SR (TOPROL XL) 25 MG TABLET SR 24 HR Take 2 Tabs by mouth every day. 180 Tab 3   • pravastatin (PRAVACHOL) 40 MG tablet Take 1 Tab by mouth every day. 90 Tab 3   • metformin (GLUCOPHAGE) 1000 MG tablet TAKE ONE TABLET BY MOUTH TWICE DAILY WITH MEALS 180 Tab 3   • sildenafil citrate (VIAGRA) 50 MG tablet Take 1 Tab by mouth as needed for Erectile Dysfunction. 10 Tab 3   • aspirin EC (ECOTRIN) 81 MG TBEC Take 81 mg by mouth every day.     • gabapentin (NEURONTIN) 100 MG CAPS Take 100 mg by mouth 3 times a day.     • tramadol (ULTRAM) 50 MG TABS Take  mg by mouth every four hours as needed for Mild Pain.       No current facility-administered medications for this visit.        He  has a past medical history of Diabetes ([facility]); Heart attack ([facility]) (2002); Hyperlipidemia; and Hypertension.        ROS  As stated in HPI   Neuro: No HA or dizziness, gait changes, vision changes, speech changes,  MSK: No shoulder, elbow, or wrist joint pain. No erythema or edema of joints. No stiffness of hand or trigger     Objective:     Blood pressure 120/84, pulse (!) 57, temperature 36.1 °C (96.9 °F), temperature source Temporal, height 1.778 m (5' 10\"), weight 83 kg (183 lb), SpO2 94 %. Body mass index is 26.26 kg/m².  Physical Exam:  General: Alert, oriented, in no acute distress.  Eye contact is good, speech goal directed, affect calm  CNs grossly intact.  HEENT: conjunctiva non-injected, sclera non-icteric, EOMs intact.  Gross hearing intact.  Neck: FAROM of neck without pain or worsening of symptoms in RUE.   RUE: FAROM of shoulder, elbow, and wrist without pain or aggravation of symptoms. RUE with mild weakness with resistance to forward flexion of shoulder. No weakness through other range of motion of arm. Sensation is intact to light touch but " decreased with pinprick. No TTP over palmar aspect of hand or tendon sheaths. Tinel's sign negative at elbow and wrist. He is easily able to place hand flat on hard surface of table  Ext: no edema, normal color and temperature.   Skin: No rashes or lesions in visible areas  Gait steady.     Assessment and Plan:   Assessment/Plan:  1. Right hand weakness  New problem not controlled. Low suspicion for any tendinitis as patient is able to place hand on flat surface without difficulty. Suspect this is more cervical etiology. Patient will call his pain specialist to see if they can get him in for a nerve conduction study. If not I will refer him for this. I do not feel further imaging is needed at this time but we do need the EMG.    2. Pain of right scapula  Same as #1       Follow up:  Return in about 1 week (around 10/19/2018).    Educated in proper administration of medication(s) ordered today including safety, possible SE, risks, benefits, rationale and alternatives to therapy.   Supportive care, differential diagnoses, and indications for immediate follow-up discussed with patient.    Pathogenesis of diagnosis discussed including typical length and natural progression.    Instructed to return to clinic or nearest emergency department for any change in condition, further concerns, or worsening of symptoms.  Patient states understanding of the plan of care and discharge instructions.      Please note that this dictation was created using voice recognition software. I have made every reasonable attempt to correct obvious errors, but I expect that there are errors of grammar and possibly content that I did not discover before finalizing the note.    Followup: Return in about 1 week (around 10/19/2018). sooner should new symptoms or problems arise.

## 2018-10-18 ENCOUNTER — TELEPHONE (OUTPATIENT)
Dept: MEDICAL GROUP | Facility: PHYSICIAN GROUP | Age: 63
End: 2018-10-18

## 2018-10-18 NOTE — TELEPHONE ENCOUNTER
Please call patient to follow up with him and see if he has contacted his pain specialist to see if they will do a nerve conduction study for him. If not, I will need to get referral in place for him    JOSÉ MIGUEL Castro.

## 2018-10-19 NOTE — TELEPHONE ENCOUNTER
Phone Number Called: [phone] (home)       Message: spoke with family member they inform me that Fabio is on vacation but they are going to have him call us when he comes back.     Left Message for patient to call back: N\A

## 2018-10-26 NOTE — PROGRESS NOTES
Outcome: Left Message    Please transfer to Patient Outreach Team at [phone] when patient returns call.    Attempt # 2

## 2018-11-07 ENCOUNTER — TELEPHONE (OUTPATIENT)
Dept: MEDICAL GROUP | Facility: PHYSICIAN GROUP | Age: 63
End: 2018-11-07

## 2018-11-07 NOTE — PROGRESS NOTES
1. Attempt #: Final    2. HealthConnect Verified: yes    3. Verify PCP: yes    4. Review Care Team: yes    5. WebIZ Checked & Epic Updated: Yes  · WebIZ Recommendations: TD and SHINGRIX (Shingles)  · Is patient due for Tdap? NO  · Is patient due for Shingles? YES. Patient was not notified of copay/out of pocket cost.    6. Reviewed/Updated the following with patient:       •   Communication Preference Obtained? YES       •   Preferred Pharmacy? YES       •   Preferred Lab? YES       •   Family History (document living status of immediate family members and if + hx of cancer, diabetes, hypertension, hyperlipidemia, heart attack, stroke) YES    7. Annual Wellness Visit Scheduling  · Scheduling Status:Scheduled     8. Care Gap Scheduling (Attempt to Schedule EACH Overdue Care Gap!)     Health Maintenance Due   Topic Date Due   • Annual Wellness Visit  1955   • IMM HEP B VACCINE (1 of 3 - Risk 3-dose series) 05/15/1972   • IMM ZOSTER VACCINES (1 of 2) 05/15/2003   • COLON CANCER SCREENING ANNUAL FIT  06/28/2018 /  Orders in chart already/ Mailed Kit for Fit Test.         9. Gigwalk Activation: declined    10. Gigwalk Gissell: no    11. Virtual Visits: no    12. Opt In to Text Messages: no    13. Patient was advised: “This is a free wellness visit. The provider will screen for medical conditions to help you stay healthy. If you have other concerns to address you may be asked to discuss these at a separate visit or there may be an additional fee.”     14. Patient was informed to arrive 15 min prior to their scheduled appointment and bring in their medication bottles.

## 2018-11-08 ENCOUNTER — TELEPHONE (OUTPATIENT)
Dept: MEDICAL GROUP | Facility: PHYSICIAN GROUP | Age: 63
End: 2018-11-08

## 2018-11-08 NOTE — TELEPHONE ENCOUNTER
ANNUAL WELLNESS VISIT PRE-VISIT PLANNING WITH OUTREACH    1.  If any orders were placed at last visit, do we have Results/Consult Notes?        •  Labs - Labs were not ordered at last office visit.       •  Imaging - Imaging was not ordered at last office visit.       •  Referrals - No referrals were ordered at last office visit.    2.  Immunizations were updated in [facility] using WebIZ?:Yes       •  WebIZ Recommendations: FLU and SHINGRIX (Shingles)       •  Is patient due for Tdap? NO       •  Is patient due for Shingles?YES. Patient was notified of copay/out of pocket cost.    3.  Patient has the following Care Path diagnoses on Problem List: DIABETES    Health Maintenance Due   Topic Date Due   • Annual Wellness Visit  11/13/18   • IMM HEP B VACCINE (1 of 3 - Risk 3-dose series) 05/15/1972   • IMM ZOSTER VACCINES (1 of 2) 05/15/2003   • COLON CANCER SCREENING ANNUAL FIT  06/28/2018

## 2018-11-13 ENCOUNTER — OFFICE VISIT (OUTPATIENT)
Dept: MEDICAL GROUP | Facility: PHYSICIAN GROUP | Age: 63
End: 2018-11-13
Payer: MEDICARE

## 2018-11-13 VITALS
HEIGHT: 70 IN | DIASTOLIC BLOOD PRESSURE: 82 MMHG | WEIGHT: 180 LBS | SYSTOLIC BLOOD PRESSURE: 132 MMHG | OXYGEN SATURATION: 96 % | BODY MASS INDEX: 25.77 KG/M2 | HEART RATE: 72 BPM | TEMPERATURE: 98 F

## 2018-11-13 DIAGNOSIS — Z87.891 PERSONAL HISTORY OF TOBACCO USE: ICD-10-CM

## 2018-11-13 DIAGNOSIS — M54.12 CERVICAL RADICULOPATHY AT C7: ICD-10-CM

## 2018-11-13 DIAGNOSIS — E11.29 MICROALBUMINURIA DUE TO TYPE 2 DIABETES MELLITUS (HCC): ICD-10-CM

## 2018-11-13 DIAGNOSIS — I10 ESSENTIAL HYPERTENSION: ICD-10-CM

## 2018-11-13 DIAGNOSIS — R80.9 MICROALBUMINURIA DUE TO TYPE 2 DIABETES MELLITUS (HCC): ICD-10-CM

## 2018-11-13 DIAGNOSIS — Z00.00 MEDICARE ANNUAL WELLNESS VISIT, INITIAL: ICD-10-CM

## 2018-11-13 DIAGNOSIS — M72.0 DUPUYTREN'S CONTRACTURE OF LEFT HAND: ICD-10-CM

## 2018-11-13 DIAGNOSIS — G89.4 CHRONIC PAIN SYNDROME: ICD-10-CM

## 2018-11-13 DIAGNOSIS — E11.29 TYPE 2 DIABETES MELLITUS WITH MICROALBUMINURIA, WITHOUT LONG-TERM CURRENT USE OF INSULIN (HCC): ICD-10-CM

## 2018-11-13 DIAGNOSIS — N52.8 OTHER MALE ERECTILE DYSFUNCTION: ICD-10-CM

## 2018-11-13 DIAGNOSIS — E78.2 MIXED HYPERLIPIDEMIA: ICD-10-CM

## 2018-11-13 DIAGNOSIS — R80.9 TYPE 2 DIABETES MELLITUS WITH MICROALBUMINURIA, WITHOUT LONG-TERM CURRENT USE OF INSULIN (HCC): ICD-10-CM

## 2018-11-13 PROBLEM — K21.9 GERD (GASTROESOPHAGEAL REFLUX DISEASE): Status: RESOLVED | Noted: 2018-06-26 | Resolved: 2018-11-13

## 2018-11-13 PROBLEM — B35.1 ONYCHOMYCOSIS: Status: RESOLVED | Noted: 2018-06-26 | Resolved: 2018-11-13

## 2018-11-13 LAB
HBA1C MFR BLD: 5.9 % (ref ?–5.8)
INT CON NEG: NEGATIVE
INT CON POS: POSITIVE

## 2018-11-13 PROCEDURE — 83036 HEMOGLOBIN GLYCOSYLATED A1C: CPT | Performed by: NURSE PRACTITIONER

## 2018-11-13 PROCEDURE — G0402 INITIAL PREVENTIVE EXAM: HCPCS | Performed by: NURSE PRACTITIONER

## 2018-11-13 ASSESSMENT — ACTIVITIES OF DAILY LIVING (ADL): BATHING_REQUIRES_ASSISTANCE: 0

## 2018-11-13 ASSESSMENT — PATIENT HEALTH QUESTIONNAIRE - PHQ9: CLINICAL INTERPRETATION OF PHQ2 SCORE: 0

## 2018-11-13 ASSESSMENT — ENCOUNTER SYMPTOMS: GENERAL WELL-BEING: GOOD
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
## 2018-11-15 NOTE — TELEPHONE ENCOUNTER
Please have patient schedule followup with primary care provider for any subsequent medication refills.  Patient needs labs (6 month follow up)  
Was the patient seen in the last year in this department? Yes     Does patient have an active prescription for medications requested? Yes     Received Request Via: Pharmacy    Last office visit: 05/11/2016  Last labs: 05/02/2016    
WDL

## 2018-12-01 ENCOUNTER — HOSPITAL ENCOUNTER (OUTPATIENT)
Dept: RADIOLOGY | Facility: MEDICAL CENTER | Age: 63
End: 2018-12-01
Attending: NURSE PRACTITIONER
Payer: MEDICARE

## 2018-12-01 DIAGNOSIS — M48.02 SPINAL STENOSIS IN CERVICAL REGION: ICD-10-CM

## 2018-12-01 PROCEDURE — 72050 X-RAY EXAM NECK SPINE 4/5VWS: CPT

## 2018-12-01 PROCEDURE — 72141 MRI NECK SPINE W/O DYE: CPT

## 2018-12-19 ENCOUNTER — HOSPITAL ENCOUNTER (OUTPATIENT)
Facility: MEDICAL CENTER | Age: 63
End: 2018-12-19
Attending: NURSE PRACTITIONER
Payer: MEDICARE

## 2018-12-19 PROCEDURE — 82274 ASSAY TEST FOR BLOOD FECAL: CPT

## 2018-12-23 LAB — AMBIGUOUS DTTM AMBI4: NORMAL

## 2018-12-24 LAB — HEMOCCULT STL QL IA: NEGATIVE

## 2019-01-29 ENCOUNTER — APPOINTMENT (RX ONLY)
Dept: URBAN - METROPOLITAN AREA CLINIC 4 | Facility: CLINIC | Age: 64
Setting detail: DERMATOLOGY
End: 2019-01-29

## 2019-01-29 DIAGNOSIS — L82.1 OTHER SEBORRHEIC KERATOSIS: ICD-10-CM

## 2019-01-29 DIAGNOSIS — Z85.828 PERSONAL HISTORY OF OTHER MALIGNANT NEOPLASM OF SKIN: ICD-10-CM

## 2019-01-29 DIAGNOSIS — L81.4 OTHER MELANIN HYPERPIGMENTATION: ICD-10-CM

## 2019-01-29 DIAGNOSIS — L30.9 DERMATITIS, UNSPECIFIED: ICD-10-CM

## 2019-01-29 DIAGNOSIS — D22 MELANOCYTIC NEVI: ICD-10-CM

## 2019-01-29 DIAGNOSIS — L21.8 OTHER SEBORRHEIC DERMATITIS: ICD-10-CM

## 2019-01-29 PROBLEM — D22.5 MELANOCYTIC NEVI OF TRUNK: Status: ACTIVE | Noted: 2019-01-29

## 2019-01-29 PROCEDURE — ? PRESCRIPTION

## 2019-01-29 PROCEDURE — ? COUNSELING

## 2019-01-29 PROCEDURE — 99213 OFFICE O/P EST LOW 20 MIN: CPT

## 2019-01-29 PROCEDURE — ? MEDICATION COUNSELING

## 2019-01-29 PROCEDURE — ? OBSERVATION

## 2019-01-29 RX ORDER — FLUOCINONIDE 0.5 MG/ML
SOLUTION TOPICAL
Qty: 1 | Refills: 6 | Status: ERX

## 2019-01-29 ASSESSMENT — LOCATION SIMPLE DESCRIPTION DERM
LOCATION SIMPLE: RIGHT UPPER ARM
LOCATION SIMPLE: LEFT CHEEK
LOCATION SIMPLE: LEFT UPPER BACK
LOCATION SIMPLE: SCALP
LOCATION SIMPLE: LEFT FOREARM
LOCATION SIMPLE: LEFT ELBOW
LOCATION SIMPLE: RIGHT LOWER BACK
LOCATION SIMPLE: RIGHT FOREARM
LOCATION SIMPLE: POSTERIOR NECK

## 2019-01-29 ASSESSMENT — LOCATION ZONE DERM
LOCATION ZONE: FACE
LOCATION ZONE: TRUNK
LOCATION ZONE: NECK
LOCATION ZONE: SCALP
LOCATION ZONE: ARM

## 2019-01-29 ASSESSMENT — LOCATION DETAILED DESCRIPTION DERM
LOCATION DETAILED: LEFT PROXIMAL DORSAL FOREARM
LOCATION DETAILED: LEFT ELBOW
LOCATION DETAILED: LEFT MEDIAL UPPER BACK
LOCATION DETAILED: RIGHT PROXIMAL DORSAL FOREARM
LOCATION DETAILED: RIGHT PROXIMAL POSTERIOR UPPER ARM
LOCATION DETAILED: LEFT CENTRAL MALAR CHEEK
LOCATION DETAILED: LEFT SUPERIOR MEDIAL MALAR CHEEK
LOCATION DETAILED: RIGHT POSTERIOR NECK
LOCATION DETAILED: RIGHT SUPERIOR PARIETAL SCALP
LOCATION DETAILED: RIGHT SUPERIOR MEDIAL MIDBACK
LOCATION DETAILED: LEFT SUPERIOR POSTERIOR NECK
LOCATION DETAILED: LEFT INFERIOR MEDIAL UPPER BACK

## 2019-01-29 NOTE — PROCEDURE: MEDICATION COUNSELING
Azithromycin Pregnancy And Lactation Text: This medication is considered safe during pregnancy and is also secreted in breast milk.
Elidel Pregnancy And Lactation Text: This medication is Pregnancy Category C. It is unknown if this medication is excreted in breast milk.
Ilumya Counseling: I discussed with the patient the risks of tildrakizumab including but not limited to immunosuppression, malignancy, posterior leukoencephalopathy syndrome, and serious infections.  The patient understands that monitoring is required including a PPD at baseline and must alert us or the primary physician if symptoms of infection or other concerning signs are noted.
Benzoyl Peroxide Counseling: Patient counseled that medicine may cause skin irritation and bleach clothing.  In the event of skin irritation, the patient was advised to reduce the amount of the drug applied or use it less frequently.   The patient verbalized understanding of the proper use and possible adverse effects of benzoyl peroxide.  All of the patient's questions and concerns were addressed.
Tetracycline Counseling: Patient counseled regarding possible photosensitivity and increased risk for sunburn.  Patient instructed to avoid sunlight, if possible.  When exposed to sunlight, patients should wear protective clothing, sunglasses, and sunscreen.  The patient was instructed to call the office immediately if the following severe adverse effects occur:  hearing changes, easy bruising/bleeding, severe headache, or vision changes.  The patient verbalized understanding of the proper use and possible adverse effects of tetracycline.  All of the patient's questions and concerns were addressed. Patient understands to avoid pregnancy while on therapy due to potential birth defects.
Cimzia Pregnancy And Lactation Text: This medication crosses the placenta but can be considered safe in certain situations. Cimzia may be excreted in breast milk.
Topical Clindamycin Counseling: Patient counseled that this medication may cause skin irritation or allergic reactions.  In the event of skin irritation, the patient was advised to reduce the amount of the drug applied or use it less frequently.   The patient verbalized understanding of the proper use and possible adverse effects of clindamycin.  All of the patient's questions and concerns were addressed.
Sski Pregnancy And Lactation Text: This medication is Pregnancy Category D and isn't considered safe during pregnancy. It is excreted in breast milk.
Gabapentin Counseling: I discussed with the patient the risks of gabapentin including but not limited to dizziness, somnolence, fatigue and ataxia.
Stelara Counseling:  I discussed with the patient the risks of ustekinumab including but not limited to immunosuppression, malignancy, posterior leukoencephalopathy syndrome, and serious infections.  The patient understands that monitoring is required including a PPD at baseline and must alert us or the primary physician if symptoms of infection or other concerning signs are noted.
Erythromycin Pregnancy And Lactation Text: This medication is Pregnancy Category B and is considered safe during pregnancy. It is also excreted in breast milk.
Methotrexate Counseling:  Patient counseled regarding adverse effects of methotrexate including but not limited to nausea, vomiting, abnormalities in liver function tests. Patients may develop mouth sores, rash, diarrhea, and abnormalities in blood counts. The patient understands that monitoring is required including LFT's and blood counts.  There is a rare possibility of scarring of the liver and lung problems that can occur when taking methotrexate. Persistent nausea, loss of appetite, pale stools, dark urine, cough, and shortness of breath should be reported immediately. Patient advised to discontinue methotrexate treatment at least three months before attempting to become pregnant.  I discussed the need for folate supplements while taking methotrexate.  These supplements can decrease side effects during methotrexate treatment. The patient verbalized understanding of the proper use and possible adverse effects of methotrexate.  All of the patient's questions and concerns were addressed.
Ketoconazole Pregnancy And Lactation Text: This medication is Pregnancy Category C and it isn't know if it is safe during pregnancy. It is also excreted in breast milk and breast feeding isn't recommended.
Otezla Counseling: The side effects of Otezla were discussed with the patient, including but not limited to worsening or new depression, weight loss, diarrhea, nausea, upper respiratory tract infection, and headache. Patient instructed to call the office should any adverse effect occur.  The patient verbalized understanding of the proper use and possible adverse effects of Otezla.  All the patient's questions and concerns were addressed.
Arava Pregnancy And Lactation Text: This medication is Pregnancy Category X and is absolutely contraindicated during pregnancy. It is unknown if it is excreted in breast milk.
Acitretin Counseling:  I discussed with the patient the risks of acitretin including but not limited to hair loss, dry lips/skin/eyes, liver damage, hyperlipidemia, depression/suicidal ideation, photosensitivity.  Serious rare side effects can include but are not limited to pancreatitis, pseudotumor cerebri, bony changes, clot formation/stroke/heart attack.  Patient understands that alcohol is contraindicated since it can result in liver toxicity and significantly prolong the elimination of the drug by many years.
Bactrim Counseling:  I discussed with the patient the risks of sulfa antibiotics including but not limited to GI upset, allergic reaction, drug rash, diarrhea, dizziness, photosensitivity, and yeast infections.  Rarely, more serious reactions can occur including but not limited to aplastic anemia, agranulocytosis, methemoglobinemia, blood dyscrasias, liver or kidney failure, lung infiltrates or desquamative/blistering drug rashes.
Ilumya Pregnancy And Lactation Text: The risk during pregnancy and breastfeeding is uncertain with this medication.
Eucrisa Counseling: Patient may experience a mild burning sensation during topical application. Eucrisa is not approved in children less than 2 years of age.
Tetracycline Pregnancy And Lactation Text: This medication is Pregnancy Category D and not consider safe during pregnancy. It is also excreted in breast milk.
Benzoyl Peroxide Pregnancy And Lactation Text: This medication is Pregnancy Category C. It is unknown if benzoyl peroxide is excreted in breast milk.
Topical Clindamycin Pregnancy And Lactation Text: This medication is Pregnancy Category B and is considered safe during pregnancy. It is unknown if it is excreted in breast milk.
Stelara Pregnancy And Lactation Text: This medication is Pregnancy Category B and is considered safe during pregnancy. It is unknown if this medication is excreted in breast milk.
Metronidazole Counseling:  I discussed with the patient the risks of metronidazole including but not limited to seizures, nausea/vomiting, a metallic taste in the mouth, nausea/vomiting and severe allergy.
Gabapentin Pregnancy And Lactation Text: This medication is Pregnancy Category C and isn't considered safe during pregnancy. It is excreted in breast milk.
Otezla Pregnancy And Lactation Text: This medication is Pregnancy Category C and it isn't known if it is safe during pregnancy. It is unknown if it is excreted in breast milk.
Terbinafine Counseling: Patient counseling regarding adverse effects of terbinafine including but not limited to headache, diarrhea, rash, upset stomach, liver function test abnormalities, itching, taste/smell disturbance, nausea, abdominal pain, and flatulence.  There is a rare possibility of liver failure that can occur when taking terbinafine.  The patient understands that a baseline LFT and kidney function test may be required. The patient verbalized understanding of the proper use and possible adverse effects of terbinafine.  All of the patient's questions and concerns were addressed.
Methotrexate Pregnancy And Lactation Text: This medication is Pregnancy Category X and is known to cause fetal harm. This medication is excreted in breast milk.
Thalidomide Counseling: I discussed with the patient the risks of thalidomide including but not limited to birth defects, anxiety, weakness, chest pain, dizziness, cough and severe allergy.
Azathioprine Counseling:  I discussed with the patient the risks of azathioprine including but not limited to myelosuppression, immunosuppression, hepatotoxicity, lymphoma, and infections.  The patient understands that monitoring is required including baseline LFTs, Creatinine, possible TPMP genotyping and weekly CBCs for the first month and then every 2 weeks thereafter.  The patient verbalized understanding of the proper use and possible adverse effects of azathioprine.  All of the patient's questions and concerns were addressed.
Cosentyx Counseling:  I discussed with the patient the risks of Cosentyx including but not limited to worsening of Crohn's disease, immunosuppression, allergic reactions and infections.  The patient understands that monitoring is required including a PPD at baseline and must alert us or the primary physician if symptoms of infection or other concerning signs are noted.
Acitretin Pregnancy And Lactation Text: This medication is Pregnancy Category X and should not be given to women who are pregnant or may become pregnant in the future. This medication is excreted in breast milk.
Albendazole Counseling:  I discussed with the patient the risks of albendazole including but not limited to cytopenia, kidney damage, nausea/vomiting and severe allergy.  The patient understands that this medication is being used in an off-label manner.
Protopic Counseling: Patient may experience a mild burning sensation during topical application. Protopic is not approved in children less than 2 years of age. There have been case reports of hematologic and skin malignancies in patients using topical calcineurin inhibitors although causality is questionable.
Eucrisa Pregnancy And Lactation Text: This medication has not been assigned a Pregnancy Risk Category but animal studies failed to show danger with the topical medication. It is unknown if the medication is excreted in breast milk.
Clofazimine Counseling:  I discussed with the patient the risks of clofazimine including but not limited to skin and eye pigmentation, liver damage, nausea/vomiting, gastrointestinal bleeding and allergy.
Infliximab Counseling:  I discussed with the patient the risks of infliximab including but not limited to myelosuppression, immunosuppression, autoimmune hepatitis, demyelinating diseases, lymphoma, and serious infections.  The patient understands that monitoring is required including a PPD at baseline and must alert us or the primary physician if symptoms of infection or other concerning signs are noted.
Bactrim Pregnancy And Lactation Text: This medication is Pregnancy Category D and is known to cause fetal risk.  It is also excreted in breast milk.
Topical Sulfur Applications Counseling: Topical Sulfur Counseling: Patient counseled that this medication may cause skin irritation or allergic reactions.  In the event of skin irritation, the patient was advised to reduce the amount of the drug applied or use it less frequently.   The patient verbalized understanding of the proper use and possible adverse effects of topical sulfur application.  All of the patient's questions and concerns were addressed.
Glycopyrrolate Counseling:  I discussed with the patient the risks of glycopyrrolate including but not limited to skin rash, drowsiness, dry mouth, difficulty urinating, and blurred vision.
Metronidazole Pregnancy And Lactation Text: This medication is Pregnancy Category B and considered safe during pregnancy.  It is also excreted in breast milk.
Taltz Counseling: I discussed with the patient the risks of ixekizumab including but not limited to immunosuppression, serious infections, worsening of inflammatory bowel disease and drug reactions.  The patient understands that monitoring is required including a PPD at baseline and must alert us or the primary physician if symptoms of infection or other concerning signs are noted.
Oxybutynin Counseling:  I discussed with the patient the risks of oxybutynin including but not limited to skin rash, drowsiness, dry mouth, difficulty urinating, and blurred vision.
Terbinafine Pregnancy And Lactation Text: This medication is Pregnancy Category B and is considered safe during pregnancy. It is also excreted in breast milk and breast feeding isn't recommended.
Prednisone Counseling:  I discussed with the patient the risks of prolonged use of prednisone including but not limited to weight gain, insomnia, osteoporosis, mood changes, diabetes, susceptibility to infection, glaucoma and high blood pressure.  In cases where prednisone use is prolonged, patients should be monitored with blood pressure checks, serum glucose levels and an eye exam.  Additionally, the patient may need to be placed on GI prophylaxis, PCP prophylaxis, and calcium and vitamin D supplementation and/or a bisphosphonate.  The patient verbalized understanding of the proper use and the possible adverse effects of prednisone.  All of the patient's questions and concerns were addressed.
Detail Level: Zone
Carac Counseling:  I discussed with the patient the risks of Carac including but not limited to erythema, scaling, itching, weeping, crusting, and pain.
Azathioprine Pregnancy And Lactation Text: This medication is Pregnancy Category D and isn't considered safe during pregnancy. It is unknown if this medication is excreted in breast milk.
Albendazole Pregnancy And Lactation Text: This medication is Pregnancy Category C and it isn't known if it is safe during pregnancy. It is also excreted in breast milk.
Protopic Pregnancy And Lactation Text: This medication is Pregnancy Category C. It is unknown if this medication is excreted in breast milk when applied topically.
Bexarotene Counseling:  I discussed with the patient the risks of bexarotene including but not limited to hair loss, dry lips/skin/eyes, liver abnormalities, hyperlipidemia, pancreatitis, depression/suicidal ideation, photosensitivity, drug rash/allergic reactions, hypothyroidism, anemia, leukopenia, infection, cataracts, and teratogenicity.  Patient understands that they will need regular blood tests to check lipid profile, liver function tests, white blood cell count, thyroid function tests and pregnancy test if applicable.
Cephalexin Counseling: I counseled the patient regarding use of cephalexin as an antibiotic for prophylactic and/or therapeutic purposes. Cephalexin (commonly prescribed under brand name Keflex) is a cephalosporin antibiotic which is active against numerous classes of bacteria, including most skin bacteria. Side effects may include nausea, diarrhea, gastrointestinal upset, rash, hives, yeast infections, and in rare cases, hepatitis, kidney disease, seizures, fever, confusion, neurologic symptoms, and others. Patients with severe allergies to penicillin medications are cautioned that there is about a 10% incidence of cross-reactivity with cephalosporins. When possible, patients with penicillin allergies should use alternatives to cephalosporins for antibiotic therapy.
Hydroquinone Counseling:  Patient advised that medication may result in skin irritation, lightening (hypopigmentation), dryness, and burning.  In the event of skin irritation, the patient was advised to reduce the amount of the drug applied or use it less frequently.  Rarely, spots that are treated with hydroquinone can become darker (pseudoochronosis).  Should this occur, patient instructed to stop medication and call the office. The patient verbalized understanding of the proper use and possible adverse effects of hydroquinone.  All of the patient's questions and concerns were addressed.
Fluconazole Counseling:  Patient counseled regarding adverse effects of fluconazole including but not limited to headache, diarrhea, nausea, upset stomach, liver function test abnormalities, taste disturbance, and stomach pain.  There is a rare possibility of liver failure that can occur when taking fluconazole.  The patient understands that monitoring of LFTs and kidney function test may be required, especially at baseline. The patient verbalized understanding of the proper use and possible adverse effects of fluconazole.  All of the patient's questions and concerns were addressed.
Topical Sulfur Applications Pregnancy And Lactation Text: This medication is Pregnancy Category C and has an unknown safety profile during pregnancy. It is unknown if this topical medication is excreted in breast milk.
Valtrex Counseling: I discussed with the patient the risks of valacyclovir including but not limited to kidney damage, nausea, vomiting and severe allergy.  The patient understands that if the infection seems to be worsening or is not improving, they are to call.
Minocycline Counseling: Patient advised regarding possible photosensitivity and discoloration of the teeth, skin, lips, tongue and gums.  Patient instructed to avoid sunlight, if possible.  When exposed to sunlight, patients should wear protective clothing, sunglasses, and sunscreen.  The patient was instructed to call the office immediately if the following severe adverse effects occur:  hearing changes, easy bruising/bleeding, severe headache, or vision changes.  The patient verbalized understanding of the proper use and possible adverse effects of minocycline.  All of the patient's questions and concerns were addressed.
Glycopyrrolate Pregnancy And Lactation Text: This medication is Pregnancy Category B and is considered safe during pregnancy. It is unknown if it is excreted breast milk.
Carac Pregnancy And Lactation Text: This medication is Pregnancy Category X and contraindicated in pregnancy and in women who may become pregnant. It is unknown if this medication is excreted in breast milk.
Cellcept Counseling:  I discussed with the patient the risks of mycophenolate mofetil including but not limited to infection/immunosuppression, GI upset, hypokalemia, hypercholesterolemia, bone marrow suppression, lymphoproliferative disorders, malignancy, GI ulceration/bleed/perforation, colitis, interstitial lung disease, kidney failure, progressive multifocal leukoencephalopathy, and birth defects.  The patient understands that monitoring is required including a baseline creatinine and regular CBC testing. In addition, patient must alert us immediately if symptoms of infection or other concerning signs are noted.
Dupixent Counseling: I discussed with the patient the risks of dupilumab including but not limited to eye infection and irritation, cold sores, injection site reactions, worsening of asthma, allergic reactions and increased risk of parasitic infection.  Live vaccines should be avoided while taking dupilumab. Dupilumab will also interact with certain medications such as warfarin and cyclosporine. The patient understands that monitoring is required and they must alert us or the primary physician if symptoms of infection or other concerning signs are noted.
Bexarotene Pregnancy And Lactation Text: This medication is Pregnancy Category X and should not be given to women who are pregnant or may become pregnant. This medication should not be used if you are breast feeding.
Solaraze Counseling:  I discussed with the patient the risks of Solaraze including but not limited to erythema, scaling, itching, weeping, crusting, and pain.
Ivermectin Counseling:  Patient instructed to take medication on an empty stomach with a full glass of water.  Patient informed of potential adverse effects including but not limited to nausea, diarrhea, dizziness, itching, and swelling of the extremities or lymph nodes.  The patient verbalized understanding of the proper use and possible adverse effects of ivermectin.  All of the patient's questions and concerns were addressed.
Colchicine Counseling:  Patient counseled regarding adverse effects including but not limited to stomach upset (nausea, vomiting, stomach pain, or diarrhea).  Patient instructed to limit alcohol consumption while taking this medication.  Colchicine may reduce blood counts especially with prolonged use.  The patient understands that monitoring of kidney function and blood counts may be required, especially at baseline. The patient verbalized understanding of the proper use and possible adverse effects of colchicine.  All of the patient's questions and concerns were addressed.
Wartpeel Counseling:  I discussed with the patient the risks of Wartpeel including but not limited to erythema, scaling, itching, weeping, crusting, and pain.
Cephalexin Pregnancy And Lactation Text: This medication is Pregnancy Category B and considered safe during pregnancy.  It is also excreted in breast milk but can be used safely for shorter doses.
Rituxan Counseling:  I discussed with the patient the risks of Rituxan infusions. Side effects can include infusion reactions, severe drug rashes including mucocutaneous reactions, reactivation of latent hepatitis and other infections and rarely progressive multifocal leukoencephalopathy.  All of the patient's questions and concerns were addressed.
Fluconazole Pregnancy And Lactation Text: This medication is Pregnancy Category C and it isn't know if it is safe during pregnancy. It is also excreted in breast milk.
Valtrex Pregnancy And Lactation Text: this medication is Pregnancy Category B and is considered safe during pregnancy. This medication is not directly found in breast milk but it's metabolite acyclovir is present.
Tremfya Counseling: I discussed with the patient the risks of guselkumab including but not limited to immunosuppression, serious infections, worsening of inflammatory bowel disease and drug reactions.  The patient understands that monitoring is required including a PPD at baseline and must alert us or the primary physician if symptoms of infection or other concerning signs are noted.
Hydroxychloroquine Counseling:  I discussed with the patient that a baseline ophthalmologic exam is needed at the start of therapy and every year thereafter while on therapy. A CBC may also be warranted for monitoring.  The side effects of this medication were discussed with the patient, including but not limited to agranulocytosis, aplastic anemia, seizures, rashes, retinopathy, and liver toxicity. Patient instructed to call the office should any adverse effect occur.  The patient verbalized understanding of the proper use and possible adverse effects of Plaquenil.  All the patient's questions and concerns were addressed.
Cimetidine Counseling:  I discussed with the patient the risks of Cimetidine including but not limited to gynecomastia, headache, diarrhea, nausea, drowsiness, arrhythmias, pancreatitis, skin rashes, psychosis, bone marrow suppression and kidney toxicity.
Dupixent Pregnancy And Lactation Text: This medication likely crosses the placenta but the risk for the fetus is uncertain. This medication is excreted in breast milk.
Include Pregnancy/Lactation Warning?: No
5-Fu Counseling: 5-Fluorouracil Counseling:  I discussed with the patient the risks of 5-fluorouracil including but not limited to erythema, scaling, itching, weeping, crusting, and pain.
Isotretinoin Counseling: Patient should get monthly blood tests, not donate blood, not drive at night if vision affected, not share medication, and not undergo elective surgery for 6 months after tx completed. Side effects reviewed, pt to contact office should one occur.
Solaraze Pregnancy And Lactation Text: This medication is Pregnancy Category B and is considered safe. There is some data to suggest avoiding during the third trimester. It is unknown if this medication is excreted in breast milk.
Imiquimod Counseling:  I discussed with the patient the risks of imiquimod including but not limited to erythema, scaling, itching, weeping, crusting, and pain.  Patient understands that the inflammatory response to imiquimod is variable from person to person and was educated regarded proper titration schedule.  If flu-like symptoms develop, patient knows to discontinue the medication and contact us.
Clindamycin Counseling: I counseled the patient regarding use of clindamycin as an antibiotic for prophylactic and/or therapeutic purposes. Clindamycin is active against numerous classes of bacteria, including skin bacteria. Side effects may include nausea, diarrhea, gastrointestinal upset, rash, hives, yeast infections, and in rare cases, colitis.
Rituxan Pregnancy And Lactation Text: This medication is Pregnancy Category C and it isn't know if it is safe during pregnancy. It is unknown if this medication is excreted in breast milk but similar antibodies are known to be excreted.
Hydroxychloroquine Pregnancy And Lactation Text: This medication has been shown to cause fetal harm but it isn't assigned a Pregnancy Risk Category. There are small amounts excreted in breast milk.
Griseofulvin Counseling:  I discussed with the patient the risks of griseofulvin including but not limited to photosensitivity, cytopenia, liver damage, nausea/vomiting and severe allergy.  The patient understands that this medication is best absorbed when taken with a fatty meal (e.g., ice cream or french fries).
Birth Control Pills Counseling: Birth Control Pill Counseling: I discussed with the patient the potential side effects of OCPs including but not limited to increased risk of stroke, heart attack, thrombophlebitis, deep venous thrombosis, hepatic adenomas, breast changes, GI upset, headaches, and depression.  The patient verbalized understanding of the proper use and possible adverse effects of OCPs. All of the patient's questions and concerns were addressed.
Quinolones Counseling:  I discussed with the patient the risks of fluoroquinolones including but not limited to GI upset, allergic reaction, drug rash, diarrhea, dizziness, photosensitivity, yeast infections, liver function test abnormalities, tendonitis/tendon rupture.
Topical Retinoid counseling:  Patient advised to apply a pea-sized amount only at bedtime and wait 30 minutes after washing their face before applying.  If too drying, patient may add a non-comedogenic moisturizer. The patient verbalized understanding of the proper use and possible adverse effects of retinoids.  All of the patient's questions and concerns were addressed.
Enbrel Counseling:  I discussed with the patient the risks of etanercept including but not limited to myelosuppression, immunosuppression, autoimmune hepatitis, demyelinating diseases, lymphoma, and infections.  The patient understands that monitoring is required including a PPD at baseline and must alert us or the primary physician if symptoms of infection or other concerning signs are noted.
Dapsone Counseling: I discussed with the patient the risks of dapsone including but not limited to hemolytic anemia, agranulocytosis, rashes, methemoglobinemia, kidney failure, peripheral neuropathy, headaches, GI upset, and liver toxicity.  Patients who start dapsone require monitoring including baseline LFTs and weekly CBCs for the first month, then every month thereafter.  The patient verbalized understanding of the proper use and possible adverse effects of dapsone.  All of the patient's questions and concerns were addressed.
Isotretinoin Pregnancy And Lactation Text: This medication is Pregnancy Category X and is considered extremely dangerous during pregnancy. It is unknown if it is excreted in breast milk.
Siliq Counseling:  I discussed with the patient the risks of Siliq including but not limited to new or worsening depression, suicidal thoughts and behavior, immunosuppression, malignancy, posterior leukoencephalopathy syndrome, and serious infections.  The patient understands that monitoring is required including a PPD at baseline and must alert us or the primary physician if symptoms of infection or other concerning signs are noted. There is also a special program designed to monitor depression which is required with Siliq.
Clindamycin Pregnancy And Lactation Text: This medication can be used in pregnancy if certain situations. Clindamycin is also present in breast milk.
Griseofulvin Pregnancy And Lactation Text: This medication is Pregnancy Category X and is known to cause serious birth defects. It is unknown if this medication is excreted in breast milk but breast feeding should be avoided.
Cyclophosphamide Counseling:  I discussed with the patient the risks of cyclophosphamide including but not limited to hair loss, hormonal abnormalities, decreased fertility, abdominal pain, diarrhea, nausea and vomiting, bone marrow suppression and infection. The patient understands that monitoring is required while taking this medication.
Zyclara Counseling:  I discussed with the patient the risks of imiquimod including but not limited to erythema, scaling, itching, weeping, crusting, and pain.  Patient understands that the inflammatory response to imiquimod is variable from person to person and was educated regarded proper titration schedule.  If flu-like symptoms develop, patient knows to discontinue the medication and contact us.
Birth Control Pills Pregnancy And Lactation Text: This medication should be avoided if pregnant and for the first 30 days post-partum.
Xeljanz Counseling: I discussed with the patient the risks of Xeljanz therapy including increased risk of infection, liver issues, headache, diarrhea, or cold symptoms. Live vaccines should be avoided. They were instructed to call if they have any problems.
Nsaids Counseling: NSAID Counseling: I discussed with the patient that NSAIDs should be taken with food. Prolonged use of NSAIDs can result in the development of stomach ulcers.  Patient advised to stop taking NSAIDs if abdominal pain occurs.  The patient verbalized understanding of the proper use and possible adverse effects of NSAIDs.  All of the patient's questions and concerns were addressed.
Doxepin Counseling:  Patient advised that the medication is sedating and not to drive a car after taking this medication. Patient informed of potential adverse effects including but not limited to dry mouth, urinary retention, and blurry vision.  The patient verbalized understanding of the proper use and possible adverse effects of doxepin.  All of the patient's questions and concerns were addressed.
Drysol Counseling:  I discussed with the patient the risks of drysol/aluminum chloride including but not limited to skin rash, itching, irritation, burning.
High Dose Vitamin A Counseling: Side effects reviewed, pt to contact office should one occur.
Minoxidil Counseling: Minoxidil is a topical medication which can increase blood flow where it is applied. It is uncertain how this medication increases hair growth. Side effects are uncommon and include stinging and allergic reactions.
Dapsone Pregnancy And Lactation Text: This medication is Pregnancy Category C and is not considered safe during pregnancy or breast feeding.
Spironolactone Counseling: Patient advised regarding risks of diarrhea, abdominal pain, hyperkalemia, birth defects (for female patients), liver toxicity and renal toxicity. The patient may need blood work to monitor liver and kidney function and potassium levels while on therapy. The patient verbalized understanding of the proper use and possible adverse effects of spironolactone.  All of the patient's questions and concerns were addressed.
Doxycycline Counseling:  Patient counseled regarding possible photosensitivity and increased risk for sunburn.  Patient instructed to avoid sunlight, if possible.  When exposed to sunlight, patients should wear protective clothing, sunglasses, and sunscreen.  The patient was instructed to call the office immediately if the following severe adverse effects occur:  hearing changes, easy bruising/bleeding, severe headache, or vision changes.  The patient verbalized understanding of the proper use and possible adverse effects of doxycycline.  All of the patient's questions and concerns were addressed.
Cyclophosphamide Pregnancy And Lactation Text: This medication is Pregnancy Category D and it isn't considered safe during pregnancy. This medication is excreted in breast milk.
Itraconazole Counseling:  I discussed with the patient the risks of itraconazole including but not limited to liver damage, nausea/vomiting, neuropathy, and severe allergy.  The patient understands that this medication is best absorbed when taken with acidic beverages such as non-diet cola or ginger ale.  The patient understands that monitoring is required including baseline LFTs and repeat LFTs at intervals.  The patient understands that they are to contact us or the primary physician if concerning signs are noted.
Xelricharz Pregnancy And Lactation Text: This medication is Pregnancy Category D and is not considered safe during pregnancy.  The risk during breast feeding is also uncertain.
Nsaids Pregnancy And Lactation Text: These medications are considered safe up to 30 weeks gestation. It is excreted in breast milk.
Doxepin Pregnancy And Lactation Text: This medication is Pregnancy Category C and it isn't known if it is safe during pregnancy. It is also excreted in breast milk and breast feeding isn't recommended.
Humira Counseling:  I discussed with the patient the risks of adalimumab including but not limited to myelosuppression, immunosuppression, autoimmune hepatitis, demyelinating diseases, lymphoma, and serious infections.  The patient understands that monitoring is required including a PPD at baseline and must alert us or the primary physician if symptoms of infection or other concerning signs are noted.
Drysol Pregnancy And Lactation Text: This medication is considered safe during pregnancy and breast feeding.
Rifampin Counseling: I discussed with the patient the risks of rifampin including but not limited to liver damage, kidney damage, red-orange body fluids, nausea/vomiting and severe allergy.
High Dose Vitamin A Pregnancy And Lactation Text: High dose vitamin A therapy is contraindicated during pregnancy and breast feeding.
Tazorac Counseling:  Patient advised that medication is irritating and drying.  Patient may need to apply sparingly and wash off after an hour before eventually leaving it on overnight.  The patient verbalized understanding of the proper use and possible adverse effects of tazorac.  All of the patient's questions and concerns were addressed.
Doxycycline Pregnancy And Lactation Text: This medication is Pregnancy Category D and not consider safe during pregnancy. It is also excreted in breast milk but is considered safe for shorter treatment courses.
Erivedge Counseling- I discussed with the patient the risks of Erivedge including but not limited to nausea, vomiting, diarrhea, constipation, weight loss, changes in the sense of taste, decreased appetite, muscle spasms, and hair loss.  The patient verbalized understanding of the proper use and possible adverse effects of Erivedge.  All of the patient's questions and concerns were addressed.
Simponi Counseling:  I discussed with the patient the risks of golimumab including but not limited to myelosuppression, immunosuppression, autoimmune hepatitis, demyelinating diseases, lymphoma, and serious infections.  The patient understands that monitoring is required including a PPD at baseline and must alert us or the primary physician if symptoms of infection or other concerning signs are noted.
Cyclosporine Counseling:  I discussed with the patient the risks of cyclosporine including but not limited to hypertension, gingival hyperplasia,myelosuppression, immunosuppression, liver damage, kidney damage, neurotoxicity, lymphoma, and serious infections. The patient understands that monitoring is required including baseline blood pressure, CBC, CMP, lipid panel and uric acid, and then 1-2 times monthly CMP and blood pressure.
Odomzo Counseling- I discussed with the patient the risks of Odomzo including but not limited to nausea, vomiting, diarrhea, constipation, weight loss, changes in the sense of taste, decreased appetite, muscle spasms, and hair loss.  The patient verbalized understanding of the proper use and possible adverse effects of Odomzo.  All of the patient's questions and concerns were addressed.
Spironolactone Pregnancy And Lactation Text: This medication can cause feminization of the male fetus and should be avoided during pregnancy. The active metabolite is also found in breast milk.
Xolair Counseling:  Patient informed of potential adverse effects including but not limited to fever, muscle aches, rash and allergic reactions.  The patient verbalized understanding of the proper use and possible adverse effects of Xolair.  All of the patient's questions and concerns were addressed.
Prednisone Pregnancy And Lactation Text: This medication is Pregnancy Category C and it isn't know if it is safe during pregnancy. This medication is excreted in breast milk.
Hydroxyzine Counseling: Patient advised that the medication is sedating and not to drive a car after taking this medication.  Patient informed of potential adverse effects including but not limited to dry mouth, urinary retention, and blurry vision.  The patient verbalized understanding of the proper use and possible adverse effects of hydroxyzine.  All of the patient's questions and concerns were addressed.
Elidel Counseling: Patient may experience a mild burning sensation during topical application. Elidel is not approved in children less than 2 years of age. There have been case reports of hematologic and skin malignancies in patients using topical calcineurin inhibitors although causality is questionable.
Azithromycin Counseling:  I discussed with the patient the risks of azithromycin including but not limited to GI upset, allergic reaction, drug rash, diarrhea, and yeast infections.
Tazorac Pregnancy And Lactation Text: This medication is not safe during pregnancy. It is unknown if this medication is excreted in breast milk.
Rifampin Pregnancy And Lactation Text: This medication is Pregnancy Category C and it isn't know if it is safe during pregnancy. It is also excreted in breast milk and should not be used if you are breast feeding.
Cimzia Counseling:  I discussed with the patient the risks of Cimzia including but not limited to immunosuppression, allergic reactions and infections.  The patient understands that monitoring is required including a PPD at baseline and must alert us or the primary physician if symptoms of infection or other concerning signs are noted.
Erythromycin Counseling:  I discussed with the patient the risks of erythromycin including but not limited to GI upset, allergic reaction, drug rash, diarrhea, increase in liver enzymes, and yeast infections.
Ketoconazole Counseling:   Patient counseled regarding improving absorption with orange juice.  Adverse effects include but are not limited to breast enlargement, headache, diarrhea, nausea, upset stomach, liver function test abnormalities, taste disturbance, and stomach pain.  There is a rare possibility of liver failure that can occur when taking ketoconazole. The patient understands that monitoring of LFTs may be required, especially at baseline. The patient verbalized understanding of the proper use and possible adverse effects of ketoconazole.  All of the patient's questions and concerns were addressed.
SSKI Counseling:  I discussed with the patient the risks of SSKI including but not limited to thyroid abnormalities, metallic taste, GI upset, fever, headache, acne, arthralgias, paraesthesias, lymphadenopathy, easy bleeding, arrhythmias, and allergic reaction.
Arava Counseling:  Patient counseled regarding adverse effects of Arava including but not limited to nausea, vomiting, abnormalities in liver function tests. Patients may develop mouth sores, rash, diarrhea, and abnormalities in blood counts. The patient understands that monitoring is required including LFTs and blood counts.  There is a rare possibility of scarring of the liver and lung problems that can occur when taking methotrexate. Persistent nausea, loss of appetite, pale stools, dark urine, cough, and shortness of breath should be reported immediately. Patient advised to discontinue Arava treatment and consult with a physician prior to attempting conception. The patient will have to undergo a treatment to eliminate Arava from the body prior to conception.
Xolair Pregnancy And Lactation Text: This medication is Pregnancy Category B and is considered safe during pregnancy. This medication is excreted in breast milk.
Hydroxyzine Pregnancy And Lactation Text: This medication is not safe during pregnancy and should not be taken. It is also excreted in breast milk and breast feeding isn't recommended.
Picato Counseling:  I discussed with the patient the risks of Picato including but not limited to erythema, scaling, itching, weeping, crusting, and pain.

## 2019-03-15 ENCOUNTER — HOSPITAL ENCOUNTER (OUTPATIENT)
Dept: LAB | Facility: MEDICAL CENTER | Age: 64
End: 2019-03-15
Attending: PSYCHIATRY & NEUROLOGY
Payer: MEDICARE

## 2019-03-15 LAB
ALBUMIN SERPL BCP-MCNC: 4.2 G/DL (ref 3.2–4.9)
ALBUMIN/GLOB SERPL: 1.4 G/DL
ALP SERPL-CCNC: 49 U/L (ref 30–99)
ALT SERPL-CCNC: 17 U/L (ref 2–50)
ANION GAP SERPL CALC-SCNC: 12 MMOL/L (ref 0–11.9)
AST SERPL-CCNC: 21 U/L (ref 12–45)
BASOPHILS # BLD AUTO: 0.9 % (ref 0–1.8)
BASOPHILS # BLD: 0.08 K/UL (ref 0–0.12)
BILIRUB SERPL-MCNC: 0.4 MG/DL (ref 0.1–1.5)
BUN SERPL-MCNC: 8 MG/DL (ref 8–22)
CALCIUM SERPL-MCNC: 9.6 MG/DL (ref 8.5–10.5)
CHLORIDE SERPL-SCNC: 99 MMOL/L (ref 96–112)
CO2 SERPL-SCNC: 24 MMOL/L (ref 20–33)
CREAT SERPL-MCNC: 0.68 MG/DL (ref 0.5–1.4)
EOSINOPHIL # BLD AUTO: 0.22 K/UL (ref 0–0.51)
EOSINOPHIL NFR BLD: 2.4 % (ref 0–6.9)
ERYTHROCYTE [DISTWIDTH] IN BLOOD BY AUTOMATED COUNT: 49.5 FL (ref 35.9–50)
FASTING STATUS PATIENT QL REPORTED: NORMAL
FOLATE SERPL-MCNC: 13.8 NG/ML
GLOBULIN SER CALC-MCNC: 2.9 G/DL (ref 1.9–3.5)
GLUCOSE SERPL-MCNC: 105 MG/DL (ref 65–99)
HCT VFR BLD AUTO: 40.6 % (ref 42–52)
HGB BLD-MCNC: 14.1 G/DL (ref 14–18)
IMM GRANULOCYTES # BLD AUTO: 0.02 K/UL (ref 0–0.11)
IMM GRANULOCYTES NFR BLD AUTO: 0.2 % (ref 0–0.9)
LYMPHOCYTES # BLD AUTO: 3.68 K/UL (ref 1–4.8)
LYMPHOCYTES NFR BLD: 39.9 % (ref 22–41)
MCH RBC QN AUTO: 34.9 PG (ref 27–33)
MCHC RBC AUTO-ENTMCNC: 34.7 G/DL (ref 33.7–35.3)
MCV RBC AUTO: 100.5 FL (ref 81.4–97.8)
MONOCYTES # BLD AUTO: 0.68 K/UL (ref 0–0.85)
MONOCYTES NFR BLD AUTO: 7.4 % (ref 0–13.4)
NEUTROPHILS # BLD AUTO: 4.54 K/UL (ref 1.82–7.42)
NEUTROPHILS NFR BLD: 49.2 % (ref 44–72)
NRBC # BLD AUTO: 0 K/UL
NRBC BLD-RTO: 0 /100 WBC
PLATELET # BLD AUTO: 271 K/UL (ref 164–446)
PMV BLD AUTO: 10.8 FL (ref 9–12.9)
POTASSIUM SERPL-SCNC: 3.8 MMOL/L (ref 3.6–5.5)
PROT SERPL-MCNC: 7.1 G/DL (ref 6–8.2)
RBC # BLD AUTO: 4.04 M/UL (ref 4.7–6.1)
SODIUM SERPL-SCNC: 135 MMOL/L (ref 135–145)
T3 SERPL-MCNC: 76.1 NG/DL (ref 60–181)
T4 FREE SERPL-MCNC: 0.81 NG/DL (ref 0.53–1.43)
T4 SERPL-MCNC: 7.6 UG/DL (ref 4–12)
TSH SERPL DL<=0.005 MIU/L-ACNC: 1.13 UIU/ML (ref 0.38–5.33)
VIT B12 SERPL-MCNC: 536 PG/ML (ref 211–911)
WBC # BLD AUTO: 9.2 K/UL (ref 4.8–10.8)

## 2019-03-15 PROCEDURE — 84480 ASSAY TRIIODOTHYRONINE (T3): CPT

## 2019-03-15 PROCEDURE — 82784 ASSAY IGA/IGD/IGG/IGM EACH: CPT

## 2019-03-15 PROCEDURE — 85025 COMPLETE CBC W/AUTO DIFF WBC: CPT

## 2019-03-15 PROCEDURE — 80053 COMPREHEN METABOLIC PANEL: CPT

## 2019-03-15 PROCEDURE — 84165 PROTEIN E-PHORESIS SERUM: CPT

## 2019-03-15 PROCEDURE — 84156 ASSAY OF PROTEIN URINE: CPT

## 2019-03-15 PROCEDURE — 36415 COLL VENOUS BLD VENIPUNCTURE: CPT

## 2019-03-15 PROCEDURE — 84443 ASSAY THYROID STIM HORMONE: CPT

## 2019-03-15 PROCEDURE — 84160 ASSAY OF PROTEIN ANY SOURCE: CPT

## 2019-03-15 PROCEDURE — 82607 VITAMIN B-12: CPT

## 2019-03-15 PROCEDURE — 82746 ASSAY OF FOLIC ACID SERUM: CPT

## 2019-03-15 PROCEDURE — 84439 ASSAY OF FREE THYROXINE: CPT

## 2019-03-15 PROCEDURE — 86334 IMMUNOFIX E-PHORESIS SERUM: CPT

## 2019-03-19 LAB
ALBUMIN SERPL-MCNC: 4.2 G/DL (ref 3.75–5.01)
ALPHA1 GLOB SERPL ELPH-MCNC: 0.31 G/DL (ref 0.19–0.46)
ALPHA2 GLOB SERPL ELPH-MCNC: 0.73 G/DL (ref 0.48–1.05)
B-GLOBULIN SERPL ELPH-MCNC: 0.97 G/DL (ref 0.48–1.1)
GAMMA GLOB SERPL ELPH-MCNC: 1.08 G/DL (ref 0.62–1.51)
IGA SERPL-MCNC: 420 MG/DL (ref 68–408)
IGG SERPL-MCNC: 1050 MG/DL (ref 768–1632)
IGM SERPL-MCNC: 14 MG/DL (ref 35–263)
INTERPRETATION SERPL IFE-IMP: ABNORMAL
INTERPRETATION SERPL IFE-IMP: ABNORMAL
PATHOLOGY STUDY: ABNORMAL
PROT SERPL-MCNC: 7.3 G/DL (ref 6–8.3)
PROT UR-MCNC: 10.1 MG/DL (ref 0–15)

## 2019-03-20 ENCOUNTER — HOSPITAL ENCOUNTER (OUTPATIENT)
Dept: RADIOLOGY | Facility: MEDICAL CENTER | Age: 64
End: 2019-03-20
Attending: NEUROLOGICAL SURGERY
Payer: MEDICARE

## 2019-03-20 ENCOUNTER — HOSPITAL ENCOUNTER (OUTPATIENT)
Dept: RADIOLOGY | Facility: MEDICAL CENTER | Age: 64
End: 2019-03-20
Attending: PSYCHIATRY & NEUROLOGY
Payer: MEDICARE

## 2019-03-20 DIAGNOSIS — M47.22 CERVICAL SPONDYLOSIS WITH RADICULOPATHY: ICD-10-CM

## 2019-03-20 DIAGNOSIS — G60.3 IDIOPATHIC PROGRESSIVE POLYNEUROPATHY: ICD-10-CM

## 2019-03-20 PROCEDURE — 72125 CT NECK SPINE W/O DYE: CPT

## 2019-03-20 PROCEDURE — 71250 CT THORAX DX C-: CPT

## 2019-05-01 ENCOUNTER — TELEPHONE (OUTPATIENT)
Dept: MEDICAL GROUP | Facility: PHYSICIAN GROUP | Age: 64
End: 2019-05-01

## 2019-05-01 NOTE — TELEPHONE ENCOUNTER
Future Appointments       Provider Department Center    5/7/2019 2:00 PM TANISHA Siddiqui [facility] VIS    5/22/2019 9:45 AM PREADMIT 5 PREADMIT TESTS [facility]         ESTABLISHED PATIENT PRE-VISIT PLANNING     Patient was NOT contacted to complete PVP.       1.  Reviewed notes from the last few office visits within the medical group: Yes 11/13/18    2.  If any orders were placed at last visit or intended to be done for this visit (i.e. 6 mos follow-up), do we have Results/Consult Notes?        •  Labs - Labs were not ordered at last office visit.          •  Imaging - Imaging was not ordered at last office visit.       •  Referrals - Referral ordered, patient has NOT been seen.    3. Is this appointment scheduled as a Hospital Follow-Up? No    4.  Immunizations were updated in [facility] using WebIZ?: Yes       •  Web Iz Recommendations: FLU, TD, VARICELLA (Chicken Pox)  and SHINGRIX (Shingles)    5.  Patient is due for the following Health Maintenance Topics:   Health Maintenance Due   Topic Date Due   • IMM HEP B VACCINE (1 of 3 - Risk 3-dose series) 05/[birth year]/1972   • IMM ZOSTER VACCINES (1 of 2) 05/15/2003   • FASTING LIPID PROFILE  02/07/2019   • URINE ACR / MICROALBUMIN  02/07/2019       6. Orders for overdue Health Maintenance topics pended in Pre-Charting? NO    7.  AHA (MDX) form printed for Provider? YES    8.  Patient was NOT informed to arrive 15 min prior to their scheduled appointment and bring in their medication bottles.

## 2019-05-07 ENCOUNTER — OFFICE VISIT (OUTPATIENT)
Dept: MEDICAL GROUP | Facility: PHYSICIAN GROUP | Age: 64
End: 2019-05-07
Payer: MEDICARE

## 2019-05-07 VITALS
SYSTOLIC BLOOD PRESSURE: 118 MMHG | RESPIRATION RATE: 12 BRPM | HEART RATE: 80 BPM | DIASTOLIC BLOOD PRESSURE: 70 MMHG | OXYGEN SATURATION: 96 % | BODY MASS INDEX: 25.77 KG/M2 | WEIGHT: 180 LBS | TEMPERATURE: 98.1 F | HEIGHT: 70 IN

## 2019-05-07 DIAGNOSIS — N52.8 OTHER MALE ERECTILE DYSFUNCTION: ICD-10-CM

## 2019-05-07 DIAGNOSIS — M72.0 DUPUYTREN'S CONTRACTURE OF LEFT HAND: ICD-10-CM

## 2019-05-07 DIAGNOSIS — E11.29 MICROALBUMINURIA DUE TO TYPE 2 DIABETES MELLITUS (HCC): ICD-10-CM

## 2019-05-07 DIAGNOSIS — G89.4 CHRONIC PAIN SYNDROME: ICD-10-CM

## 2019-05-07 DIAGNOSIS — E78.2 MIXED HYPERLIPIDEMIA: ICD-10-CM

## 2019-05-07 DIAGNOSIS — I10 ESSENTIAL HYPERTENSION: ICD-10-CM

## 2019-05-07 DIAGNOSIS — J84.10 CALCIFIED GRANULOMA OF LUNG (HCC): ICD-10-CM

## 2019-05-07 DIAGNOSIS — E11.29 TYPE 2 DIABETES MELLITUS WITH MICROALBUMINURIA, WITHOUT LONG-TERM CURRENT USE OF INSULIN (HCC): ICD-10-CM

## 2019-05-07 DIAGNOSIS — M54.12 CERVICAL RADICULOPATHY AT C7: ICD-10-CM

## 2019-05-07 DIAGNOSIS — R80.9 TYPE 2 DIABETES MELLITUS WITH MICROALBUMINURIA, WITHOUT LONG-TERM CURRENT USE OF INSULIN (HCC): ICD-10-CM

## 2019-05-07 DIAGNOSIS — R80.9 MICROALBUMINURIA DUE TO TYPE 2 DIABETES MELLITUS (HCC): ICD-10-CM

## 2019-05-07 PROBLEM — Z87.891 PERSONAL HISTORY OF TOBACCO USE: Status: RESOLVED | Noted: 2018-08-14 | Resolved: 2019-05-07

## 2019-05-07 PROCEDURE — 8041 PR SCP AHA: Performed by: NURSE PRACTITIONER

## 2019-05-07 PROCEDURE — 92250 FUNDUS PHOTOGRAPHY W/I&R: CPT | Mod: TC | Performed by: NURSE PRACTITIONER

## 2019-05-07 PROCEDURE — 99214 OFFICE O/P EST MOD 30 MIN: CPT | Mod: 25 | Performed by: NURSE PRACTITIONER

## 2019-05-07 RX ORDER — BACLOFEN 10 MG/1
10 TABLET ORAL PRN
COMMUNITY
Start: 2019-04-25 | End: 2020-01-05

## 2019-05-07 RX ORDER — NAPROXEN 500 MG/1
500 TABLET ORAL
Status: ON HOLD | COMMUNITY
Start: 2019-04-25 | End: 2019-06-09

## 2019-05-07 ASSESSMENT — PATIENT HEALTH QUESTIONNAIRE - PHQ9: CLINICAL INTERPRETATION OF PHQ2 SCORE: 0

## 2019-05-07 NOTE — ASSESSMENT & PLAN NOTE
This is a chronic problem for the patient that is controlled. The patient's most recent lab work was completed on 11/13/2018 with a hemoglobin A1c 5.9%.  Current medications:  Metformin 1000 mg BID  ACEI/ARB: /70 today.  Has been normotensive. Denies any chest pain, sob, leg swelling. Is currently on Lisinopril 40 mg/day.  Aspirin: Takes 81mg daily, no stomach or stool issues.  Statin: LDL 64, last checked 2/7/2018.  Is on pravastatin 40 mg/day.  No myalgias  Tobacco: former smoker, quit 2013  Last dilated retinal eye exam: last exam 5/4/2018.  No vision changes.  Diabetes monofilament lower extremity exam: last exam 6/26/2018.  Denies any foot, skin problems.  No numbness/tingling.  Micro albumin: last checked 2/7/2018.  No urinary symptoms.  A1C goal: <7.0%  Denies any polyuria, polydipsia, polyphagia, blurred or cloudy vision, rash or thrush, or numbness or tingling of feet.

## 2019-05-07 NOTE — ASSESSMENT & PLAN NOTE
This is a chronic problem for the patient that is ongoing.  The patient continues to take lisinopril 40 mg/day for this issue. Updated values are due.   1/13/2017 09:17 3/16/2017 12:10 6/28/2017 08:38 11/6/2017 08:47 2/7/2018 08:50   Micro Alb Creat Ratio 103 (H) 83 (H) 83 (H) 53 (H) 70 (H)   Creatinine, Urine 58.50 127.90 174.40 63.70 101.60   Microalbumin, Urine Random 6.0 10.6 14.5 3.4 7.1

## 2019-05-07 NOTE — ASSESSMENT & PLAN NOTE
This is a chronic problem for the patient that is controlled.  The patient's blood pressure today is 118/70 with a heart rate of 80.  The patient continues to take hydrochlorothiazide 25 mg/day, lisinopril 40 mg daily, metoprolol SR 50 mg daily, denies any side effects of these medications.  Insert no chest pain.

## 2019-05-07 NOTE — ASSESSMENT & PLAN NOTE
This is a chronic problem for the patient that is stable.  The patient continues to take pravastatin 40 mg daily, denies any side effects of this medication.  The patient's last lipid profile was completed on 2/7/2018.  Lab Results  Component Value Date/Time   CHOLSTRLTOT 149 02/07/2018 0851   TRIGLYCERIDE 63 02/07/2018 0851   HDL 72 02/07/2018 0851   LDL 64 02/07/2018 0851

## 2019-05-07 NOTE — ASSESSMENT & PLAN NOTE
This is a chronic problem for the patient that is ongoing.  Patient did try medication for this issue in the past but it did not help.
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
## 2019-05-07 NOTE — ASSESSMENT & PLAN NOTE
This is a chronic problem for the patient that is ongoing.  Patient reports that he has chronic pain in his neck and low back.  The patient is being seen by [facility] and is scheduled to have a bone spur removed on 6/5/2018 with Dr. Bennett.  The patient takes gabapentin 100 mg 3 times daily, baclofen 10 mg as needed, and tramadol  mg every 4 hours as needed.

## 2019-05-07 NOTE — ASSESSMENT & PLAN NOTE
"This is a chronic problem for the patient that is ongoing.  The patient has been seen at the [facility] [facility] for this issue and was told that it can \"be fixed by an enzyme injection\".  Patient states that the co-pay for this injection is a $1000 cost.  The patient does plan to have this done after he has his neck surgery completed.  "

## 2019-05-07 NOTE — ASSESSMENT & PLAN NOTE
This is a chronic problem for the patient that is ongoing and managed by [facility].  The patient is scheduled to have surgery to remove a bone spur on 6/5/2019 with Dr. Bennett.  The patient takes gabapentin 100 mg 3 times daily, baclofen 10 mg as needed, and tramadol  mg every 4 hours as needed.

## 2019-05-10 PROBLEM — J84.10 CALCIFIED GRANULOMA OF LUNG (HCC): Status: ACTIVE | Noted: 2019-05-10

## 2019-05-10 PROBLEM — J98.4 CALCIFIED GRANULOMA OF LUNG: Status: ACTIVE | Noted: 2019-05-10

## 2019-05-11 NOTE — ASSESSMENT & PLAN NOTE
This problem was an incidental finding on a CT scan of thorax for the Lung Cancer Screening Program. Quantiferon Gold PPD test ordered by previous PCP, not yet completed. Will be due for repeat CT scan for lung cancer screening in March 2020.

## 2019-05-14 ENCOUNTER — TELEPHONE (OUTPATIENT)
Dept: MEDICAL GROUP | Facility: PHYSICIAN GROUP | Age: 64
End: 2019-05-14

## 2019-05-14 LAB — RETINAL SCREEN: NEGATIVE

## 2019-05-14 NOTE — TELEPHONE ENCOUNTER
Phone Number Called: [phone] (home)     Message: spoke to wife (Yue), informed of result. Yue will relay message to Patient.    Left Message for patient to call back: no

## 2019-05-22 ENCOUNTER — HOSPITAL ENCOUNTER (OUTPATIENT)
Facility: MEDICAL CENTER | Age: 64
End: 2019-05-22
Attending: NURSE PRACTITIONER
Payer: MEDICARE

## 2019-05-22 ENCOUNTER — HOSPITAL ENCOUNTER (OUTPATIENT)
Dept: RADIOLOGY | Facility: MEDICAL CENTER | Age: 64
End: 2019-05-22
Attending: NEUROLOGICAL SURGERY
Payer: MEDICARE

## 2019-05-22 DIAGNOSIS — Z01.811 PRE-OPERATIVE RESPIRATORY EXAMINATION: ICD-10-CM

## 2019-05-22 DIAGNOSIS — Z01.810 PRE-OPERATIVE CARDIOVASCULAR EXAMINATION: ICD-10-CM

## 2019-05-22 DIAGNOSIS — Z01.812 PRE-PROCEDURAL LABORATORY EXAMINATION: ICD-10-CM

## 2019-05-22 LAB
ANION GAP SERPL CALC-SCNC: 12 MMOL/L (ref 0–11.9)
APPEARANCE UR: CLEAR
APTT PPP: 28.2 SEC (ref 24.7–36)
BASOPHILS # BLD AUTO: 0.8 % (ref 0–1.8)
BASOPHILS # BLD: 0.06 K/UL (ref 0–0.12)
BILIRUB UR QL STRIP.AUTO: NEGATIVE
BUN SERPL-MCNC: 9 MG/DL (ref 8–22)
CALCIUM SERPL-MCNC: 9.7 MG/DL (ref 8.5–10.5)
CHLORIDE SERPL-SCNC: 95 MMOL/L (ref 96–112)
CO2 SERPL-SCNC: 27 MMOL/L (ref 20–33)
COLOR UR: YELLOW
CREAT SERPL-MCNC: 0.66 MG/DL (ref 0.5–1.4)
CREAT UR-MCNC: 56.6 MG/DL
EKG IMPRESSION: NORMAL
EOSINOPHIL # BLD AUTO: 0.13 K/UL (ref 0–0.51)
EOSINOPHIL NFR BLD: 1.7 % (ref 0–6.9)
ERYTHROCYTE [DISTWIDTH] IN BLOOD BY AUTOMATED COUNT: 45.6 FL (ref 35.9–50)
EST. AVERAGE GLUCOSE BLD GHB EST-MCNC: 126 MG/DL
GLUCOSE SERPL-MCNC: 91 MG/DL (ref 65–99)
GLUCOSE UR STRIP.AUTO-MCNC: NEGATIVE MG/DL
HBA1C MFR BLD: 6 % (ref 0–5.6)
HCT VFR BLD AUTO: 40.1 % (ref 42–52)
HGB BLD-MCNC: 13.8 G/DL (ref 14–18)
IMM GRANULOCYTES # BLD AUTO: 0.03 K/UL (ref 0–0.11)
IMM GRANULOCYTES NFR BLD AUTO: 0.4 % (ref 0–0.9)
INR PPP: 1.02 (ref 0.87–1.13)
KETONES UR STRIP.AUTO-MCNC: NEGATIVE MG/DL
LEUKOCYTE ESTERASE UR QL STRIP.AUTO: NEGATIVE
LYMPHOCYTES # BLD AUTO: 2.54 K/UL (ref 1–4.8)
LYMPHOCYTES NFR BLD: 33 % (ref 22–41)
MCH RBC QN AUTO: 33.6 PG (ref 27–33)
MCHC RBC AUTO-ENTMCNC: 34.4 G/DL (ref 33.7–35.3)
MCV RBC AUTO: 97.6 FL (ref 81.4–97.8)
MICRO URNS: NORMAL
MICROALBUMIN UR-MCNC: 3.7 MG/DL
MICROALBUMIN/CREAT UR: 65 MG/G (ref 0–30)
MONOCYTES # BLD AUTO: 0.86 K/UL (ref 0–0.85)
MONOCYTES NFR BLD AUTO: 11.2 % (ref 0–13.4)
NEUTROPHILS # BLD AUTO: 4.07 K/UL (ref 1.82–7.42)
NEUTROPHILS NFR BLD: 52.9 % (ref 44–72)
NITRITE UR QL STRIP.AUTO: NEGATIVE
NRBC # BLD AUTO: 0 K/UL
NRBC BLD-RTO: 0 /100 WBC
PH UR STRIP.AUTO: 6.5 [PH]
PLATELET # BLD AUTO: 295 K/UL (ref 164–446)
PMV BLD AUTO: 10.9 FL (ref 9–12.9)
POTASSIUM SERPL-SCNC: 4.3 MMOL/L (ref 3.6–5.5)
PROT UR QL STRIP: NEGATIVE MG/DL
PROTHROMBIN TIME: 13.5 SEC (ref 12–14.6)
RBC # BLD AUTO: 4.11 M/UL (ref 4.7–6.1)
RBC UR QL AUTO: NEGATIVE
SODIUM SERPL-SCNC: 134 MMOL/L (ref 135–145)
SP GR UR STRIP.AUTO: 1.01
UROBILINOGEN UR STRIP.AUTO-MCNC: 0.2 MG/DL
WBC # BLD AUTO: 7.7 K/UL (ref 4.8–10.8)

## 2019-05-22 PROCEDURE — 85610 PROTHROMBIN TIME: CPT

## 2019-05-22 PROCEDURE — 83036 HEMOGLOBIN GLYCOSYLATED A1C: CPT

## 2019-05-22 PROCEDURE — 71046 X-RAY EXAM CHEST 2 VIEWS: CPT

## 2019-05-22 PROCEDURE — 80048 BASIC METABOLIC PNL TOTAL CA: CPT

## 2019-05-22 PROCEDURE — 93010 ELECTROCARDIOGRAM REPORT: CPT | Performed by: INTERNAL MEDICINE

## 2019-05-22 PROCEDURE — 85730 THROMBOPLASTIN TIME PARTIAL: CPT

## 2019-05-22 PROCEDURE — 93005 ELECTROCARDIOGRAM TRACING: CPT

## 2019-05-22 PROCEDURE — 36415 COLL VENOUS BLD VENIPUNCTURE: CPT

## 2019-05-22 PROCEDURE — 82043 UR ALBUMIN QUANTITATIVE: CPT

## 2019-05-22 PROCEDURE — 85025 COMPLETE CBC W/AUTO DIFF WBC: CPT

## 2019-05-22 PROCEDURE — 82570 ASSAY OF URINE CREATININE: CPT

## 2019-05-22 PROCEDURE — 81003 URINALYSIS AUTO W/O SCOPE: CPT

## 2019-06-07 DIAGNOSIS — R80.9 TYPE 2 DIABETES MELLITUS WITH MICROALBUMINURIA, WITHOUT LONG-TERM CURRENT USE OF INSULIN (HCC): ICD-10-CM

## 2019-06-07 DIAGNOSIS — E11.29 TYPE 2 DIABETES MELLITUS WITH MICROALBUMINURIA, WITHOUT LONG-TERM CURRENT USE OF INSULIN (HCC): ICD-10-CM

## 2019-06-08 ENCOUNTER — HOSPITAL ENCOUNTER (OUTPATIENT)
Facility: MEDICAL CENTER | Age: 64
End: 2019-06-10
Attending: NEUROLOGICAL SURGERY | Admitting: NEUROLOGICAL SURGERY
Payer: MEDICARE

## 2019-06-08 ENCOUNTER — APPOINTMENT (OUTPATIENT)
Dept: RADIOLOGY | Facility: MEDICAL CENTER | Age: 64
End: 2019-06-08
Attending: NEUROLOGICAL SURGERY
Payer: MEDICARE

## 2019-06-08 ENCOUNTER — ANESTHESIA (OUTPATIENT)
Dept: SURGERY | Facility: MEDICAL CENTER | Age: 64
End: 2019-06-08
Payer: MEDICARE

## 2019-06-08 ENCOUNTER — ANESTHESIA EVENT (OUTPATIENT)
Dept: SURGERY | Facility: MEDICAL CENTER | Age: 64
End: 2019-06-08
Payer: MEDICARE

## 2019-06-08 DIAGNOSIS — G89.18 ACUTE POSTOPERATIVE PAIN: ICD-10-CM

## 2019-06-08 LAB — GLUCOSE BLD-MCNC: 103 MG/DL (ref 65–99)

## 2019-06-08 PROCEDURE — 160029 HCHG SURGERY MINUTES - 1ST 30 MINS LEVEL 4: Performed by: NEUROLOGICAL SURGERY

## 2019-06-08 PROCEDURE — 160048 HCHG OR STATISTICAL LEVEL 1-5: Performed by: NEUROLOGICAL SURGERY

## 2019-06-08 PROCEDURE — 700101 HCHG RX REV CODE 250: Performed by: ANESTHESIOLOGY

## 2019-06-08 PROCEDURE — 700111 HCHG RX REV CODE 636 W/ 250 OVERRIDE (IP): Performed by: NURSE PRACTITIONER

## 2019-06-08 PROCEDURE — 82962 GLUCOSE BLOOD TEST: CPT

## 2019-06-08 PROCEDURE — 95938 SOMATOSENSORY TESTING: CPT | Performed by: NEUROLOGICAL SURGERY

## 2019-06-08 PROCEDURE — 500075 HCHG BLADE, CLIPPER NEURO: Performed by: NEUROLOGICAL SURGERY

## 2019-06-08 PROCEDURE — 160036 HCHG PACU - EA ADDL 30 MINS PHASE I: Performed by: NEUROLOGICAL SURGERY

## 2019-06-08 PROCEDURE — 95940 IONM IN OPERATNG ROOM 15 MIN: CPT | Performed by: NEUROLOGICAL SURGERY

## 2019-06-08 PROCEDURE — A9270 NON-COVERED ITEM OR SERVICE: HCPCS | Performed by: ANESTHESIOLOGY

## 2019-06-08 PROCEDURE — G0378 HOSPITAL OBSERVATION PER HR: HCPCS

## 2019-06-08 PROCEDURE — 700102 HCHG RX REV CODE 250 W/ 637 OVERRIDE(OP): Performed by: NURSE PRACTITIONER

## 2019-06-08 PROCEDURE — 700111 HCHG RX REV CODE 636 W/ 250 OVERRIDE (IP): Performed by: ANESTHESIOLOGY

## 2019-06-08 PROCEDURE — 501838 HCHG SUTURE GENERAL: Performed by: NEUROLOGICAL SURGERY

## 2019-06-08 PROCEDURE — 72020 X-RAY EXAM OF SPINE 1 VIEW: CPT

## 2019-06-08 PROCEDURE — 700101 HCHG RX REV CODE 250: Performed by: NURSE PRACTITIONER

## 2019-06-08 PROCEDURE — 160041 HCHG SURGERY MINUTES - EA ADDL 1 MIN LEVEL 4: Performed by: NEUROLOGICAL SURGERY

## 2019-06-08 PROCEDURE — 700105 HCHG RX REV CODE 258: Performed by: NEUROLOGICAL SURGERY

## 2019-06-08 PROCEDURE — 700102 HCHG RX REV CODE 250 W/ 637 OVERRIDE(OP): Performed by: ANESTHESIOLOGY

## 2019-06-08 PROCEDURE — 160009 HCHG ANES TIME/MIN: Performed by: NEUROLOGICAL SURGERY

## 2019-06-08 PROCEDURE — C1713 ANCHOR/SCREW BN/BN,TIS/BN: HCPCS | Performed by: NEUROLOGICAL SURGERY

## 2019-06-08 PROCEDURE — A9270 NON-COVERED ITEM OR SERVICE: HCPCS | Performed by: NURSE PRACTITIONER

## 2019-06-08 PROCEDURE — 92585 HCHG BER: CPT | Performed by: NEUROLOGICAL SURGERY

## 2019-06-08 PROCEDURE — 95861 NEEDLE EMG 2 EXTREMITIES: CPT | Performed by: NEUROLOGICAL SURGERY

## 2019-06-08 PROCEDURE — 160035 HCHG PACU - 1ST 60 MINS PHASE I: Performed by: NEUROLOGICAL SURGERY

## 2019-06-08 PROCEDURE — 700112 HCHG RX REV CODE 229: Performed by: NURSE PRACTITIONER

## 2019-06-08 PROCEDURE — 500367 HCHG DRAIN KIT, HEMOVAC: Performed by: NEUROLOGICAL SURGERY

## 2019-06-08 PROCEDURE — 700105 HCHG RX REV CODE 258: Performed by: ANESTHESIOLOGY

## 2019-06-08 PROCEDURE — 95939 C MOTOR EVOKED UPR&LWR LIMBS: CPT | Performed by: NEUROLOGICAL SURGERY

## 2019-06-08 PROCEDURE — 160002 HCHG RECOVERY MINUTES (STAT): Performed by: NEUROLOGICAL SURGERY

## 2019-06-08 RX ORDER — PRAVASTATIN SODIUM 20 MG
40 TABLET ORAL DAILY
Status: DISCONTINUED | OUTPATIENT
Start: 2019-06-08 | End: 2019-06-10 | Stop reason: HOSPADM

## 2019-06-08 RX ORDER — POLYETHYLENE GLYCOL 3350 17 G/17G
1 POWDER, FOR SOLUTION ORAL 2 TIMES DAILY PRN
Status: DISCONTINUED | OUTPATIENT
Start: 2019-06-08 | End: 2019-06-10 | Stop reason: HOSPADM

## 2019-06-08 RX ORDER — HYDROCHLOROTHIAZIDE 25 MG/1
25 TABLET ORAL DAILY
Status: DISCONTINUED | OUTPATIENT
Start: 2019-06-08 | End: 2019-06-10 | Stop reason: HOSPADM

## 2019-06-08 RX ORDER — ONDANSETRON 2 MG/ML
4 INJECTION INTRAMUSCULAR; INTRAVENOUS
Status: DISCONTINUED | OUTPATIENT
Start: 2019-06-08 | End: 2019-06-08 | Stop reason: HOSPADM

## 2019-06-08 RX ORDER — SODIUM CHLORIDE AND POTASSIUM CHLORIDE 150; 900 MG/100ML; MG/100ML
INJECTION, SOLUTION INTRAVENOUS CONTINUOUS
Status: DISCONTINUED | OUTPATIENT
Start: 2019-06-08 | End: 2019-06-10 | Stop reason: HOSPADM

## 2019-06-08 RX ORDER — DEXAMETHASONE SODIUM PHOSPHATE 4 MG/ML
INJECTION, SOLUTION INTRA-ARTICULAR; INTRALESIONAL; INTRAMUSCULAR; INTRAVENOUS; SOFT TISSUE PRN
Status: DISCONTINUED | OUTPATIENT
Start: 2019-06-08 | End: 2019-06-08 | Stop reason: SURG

## 2019-06-08 RX ORDER — HYDROCODONE BITARTRATE AND ACETAMINOPHEN 10; 325 MG/1; MG/1
1-2 TABLET ORAL EVERY 6 HOURS PRN
Status: DISCONTINUED | OUTPATIENT
Start: 2019-06-08 | End: 2019-06-10 | Stop reason: HOSPADM

## 2019-06-08 RX ORDER — ONDANSETRON 4 MG/1
4 TABLET, ORALLY DISINTEGRATING ORAL EVERY 4 HOURS PRN
Status: DISCONTINUED | OUTPATIENT
Start: 2019-06-08 | End: 2019-06-10 | Stop reason: HOSPADM

## 2019-06-08 RX ORDER — HYDROMORPHONE HYDROCHLORIDE 2 MG/ML
INJECTION, SOLUTION INTRAMUSCULAR; INTRAVENOUS; SUBCUTANEOUS PRN
Status: DISCONTINUED | OUTPATIENT
Start: 2019-06-08 | End: 2019-06-08 | Stop reason: SURG

## 2019-06-08 RX ORDER — ENEMA 19; 7 G/133ML; G/133ML
1 ENEMA RECTAL
Status: DISCONTINUED | OUTPATIENT
Start: 2019-06-08 | End: 2019-06-10 | Stop reason: HOSPADM

## 2019-06-08 RX ORDER — HYDROMORPHONE HYDROCHLORIDE 1 MG/ML
0.5 INJECTION, SOLUTION INTRAMUSCULAR; INTRAVENOUS; SUBCUTANEOUS
Status: DISCONTINUED | OUTPATIENT
Start: 2019-06-08 | End: 2019-06-10 | Stop reason: HOSPADM

## 2019-06-08 RX ORDER — CEFAZOLIN SODIUM 2 G/100ML
2 INJECTION, SOLUTION INTRAVENOUS EVERY 8 HOURS
Status: COMPLETED | OUTPATIENT
Start: 2019-06-08 | End: 2019-06-09

## 2019-06-08 RX ORDER — DIPHENHYDRAMINE HCL 25 MG
25 TABLET ORAL EVERY 6 HOURS PRN
Status: DISCONTINUED | OUTPATIENT
Start: 2019-06-08 | End: 2019-06-10 | Stop reason: HOSPADM

## 2019-06-08 RX ORDER — GABAPENTIN 100 MG/1
100 CAPSULE ORAL EVERY 6 HOURS PRN
Status: DISCONTINUED | OUTPATIENT
Start: 2019-06-08 | End: 2019-06-10 | Stop reason: HOSPADM

## 2019-06-08 RX ORDER — CEFAZOLIN SODIUM 1 G/3ML
INJECTION, POWDER, FOR SOLUTION INTRAMUSCULAR; INTRAVENOUS PRN
Status: DISCONTINUED | OUTPATIENT
Start: 2019-06-08 | End: 2019-06-08 | Stop reason: SURG

## 2019-06-08 RX ORDER — DOCUSATE SODIUM 100 MG/1
100 CAPSULE, LIQUID FILLED ORAL 2 TIMES DAILY
Status: DISCONTINUED | OUTPATIENT
Start: 2019-06-08 | End: 2019-06-10 | Stop reason: HOSPADM

## 2019-06-08 RX ORDER — HYDROMORPHONE HYDROCHLORIDE 1 MG/ML
0.2 INJECTION, SOLUTION INTRAMUSCULAR; INTRAVENOUS; SUBCUTANEOUS
Status: DISCONTINUED | OUTPATIENT
Start: 2019-06-08 | End: 2019-06-08 | Stop reason: HOSPADM

## 2019-06-08 RX ORDER — LISINOPRIL 20 MG/1
40 TABLET ORAL DAILY
Status: DISCONTINUED | OUTPATIENT
Start: 2019-06-08 | End: 2019-06-10 | Stop reason: HOSPADM

## 2019-06-08 RX ORDER — SODIUM CHLORIDE, SODIUM LACTATE, POTASSIUM CHLORIDE, CALCIUM CHLORIDE 600; 310; 30; 20 MG/100ML; MG/100ML; MG/100ML; MG/100ML
INJECTION, SOLUTION INTRAVENOUS CONTINUOUS
Status: DISCONTINUED | OUTPATIENT
Start: 2019-06-08 | End: 2019-06-08 | Stop reason: HOSPADM

## 2019-06-08 RX ORDER — ONDANSETRON 2 MG/ML
4 INJECTION INTRAMUSCULAR; INTRAVENOUS EVERY 4 HOURS PRN
Status: DISCONTINUED | OUTPATIENT
Start: 2019-06-08 | End: 2019-06-10 | Stop reason: HOSPADM

## 2019-06-08 RX ORDER — DIPHENHYDRAMINE HYDROCHLORIDE 50 MG/ML
12.5 INJECTION INTRAMUSCULAR; INTRAVENOUS
Status: DISCONTINUED | OUTPATIENT
Start: 2019-06-08 | End: 2019-06-08 | Stop reason: HOSPADM

## 2019-06-08 RX ORDER — BUPIVACAINE HYDROCHLORIDE AND EPINEPHRINE 5; 5 MG/ML; UG/ML
INJECTION, SOLUTION EPIDURAL; INTRACAUDAL; PERINEURAL
Status: DISCONTINUED | OUTPATIENT
Start: 2019-06-08 | End: 2019-06-08 | Stop reason: HOSPADM

## 2019-06-08 RX ORDER — BACLOFEN 10 MG/1
10 TABLET ORAL 3 TIMES DAILY PRN
Status: DISCONTINUED | OUTPATIENT
Start: 2019-06-08 | End: 2019-06-10 | Stop reason: HOSPADM

## 2019-06-08 RX ORDER — BACITRACIN 50000 [IU]/1
INJECTION, POWDER, FOR SOLUTION INTRAMUSCULAR
Status: DISCONTINUED | OUTPATIENT
Start: 2019-06-08 | End: 2019-06-08 | Stop reason: HOSPADM

## 2019-06-08 RX ORDER — MEPERIDINE HYDROCHLORIDE 25 MG/ML
6.25 INJECTION INTRAMUSCULAR; INTRAVENOUS; SUBCUTANEOUS
Status: DISCONTINUED | OUTPATIENT
Start: 2019-06-08 | End: 2019-06-08 | Stop reason: HOSPADM

## 2019-06-08 RX ORDER — BISACODYL 10 MG
10 SUPPOSITORY, RECTAL RECTAL
Status: DISCONTINUED | OUTPATIENT
Start: 2019-06-08 | End: 2019-06-10 | Stop reason: HOSPADM

## 2019-06-08 RX ORDER — HALOPERIDOL 5 MG/ML
1 INJECTION INTRAMUSCULAR
Status: DISCONTINUED | OUTPATIENT
Start: 2019-06-08 | End: 2019-06-08 | Stop reason: HOSPADM

## 2019-06-08 RX ORDER — SODIUM CHLORIDE, SODIUM LACTATE, POTASSIUM CHLORIDE, CALCIUM CHLORIDE 600; 310; 30; 20 MG/100ML; MG/100ML; MG/100ML; MG/100ML
INJECTION, SOLUTION INTRAVENOUS CONTINUOUS
Status: DISCONTINUED | OUTPATIENT
Start: 2019-06-08 | End: 2019-06-08

## 2019-06-08 RX ORDER — AMOXICILLIN 250 MG
1 CAPSULE ORAL
Status: DISCONTINUED | OUTPATIENT
Start: 2019-06-08 | End: 2019-06-10 | Stop reason: HOSPADM

## 2019-06-08 RX ORDER — HYDROMORPHONE HYDROCHLORIDE 1 MG/ML
0.1 INJECTION, SOLUTION INTRAMUSCULAR; INTRAVENOUS; SUBCUTANEOUS
Status: DISCONTINUED | OUTPATIENT
Start: 2019-06-08 | End: 2019-06-08 | Stop reason: HOSPADM

## 2019-06-08 RX ORDER — HYDROMORPHONE HYDROCHLORIDE 1 MG/ML
0.4 INJECTION, SOLUTION INTRAMUSCULAR; INTRAVENOUS; SUBCUTANEOUS
Status: DISCONTINUED | OUTPATIENT
Start: 2019-06-08 | End: 2019-06-08 | Stop reason: HOSPADM

## 2019-06-08 RX ORDER — OXYCODONE HYDROCHLORIDE AND ACETAMINOPHEN 5; 325 MG/1; MG/1
1 TABLET ORAL
Status: COMPLETED | OUTPATIENT
Start: 2019-06-08 | End: 2019-06-08

## 2019-06-08 RX ORDER — DIAZEPAM 5 MG/1
5 TABLET ORAL EVERY 6 HOURS PRN
Status: DISCONTINUED | OUTPATIENT
Start: 2019-06-08 | End: 2019-06-10 | Stop reason: HOSPADM

## 2019-06-08 RX ORDER — DIPHENHYDRAMINE HYDROCHLORIDE 50 MG/ML
25 INJECTION INTRAMUSCULAR; INTRAVENOUS EVERY 6 HOURS PRN
Status: DISCONTINUED | OUTPATIENT
Start: 2019-06-08 | End: 2019-06-10 | Stop reason: HOSPADM

## 2019-06-08 RX ORDER — PHENYLEPHRINE HYDROCHLORIDE 10 MG/ML
INJECTION, SOLUTION INTRAMUSCULAR; INTRAVENOUS; SUBCUTANEOUS PRN
Status: DISCONTINUED | OUTPATIENT
Start: 2019-06-08 | End: 2019-06-08 | Stop reason: SURG

## 2019-06-08 RX ORDER — OXYCODONE HYDROCHLORIDE AND ACETAMINOPHEN 5; 325 MG/1; MG/1
2 TABLET ORAL
Status: COMPLETED | OUTPATIENT
Start: 2019-06-08 | End: 2019-06-08

## 2019-06-08 RX ORDER — METOPROLOL SUCCINATE 25 MG/1
50 TABLET, EXTENDED RELEASE ORAL DAILY
Status: DISCONTINUED | OUTPATIENT
Start: 2019-06-09 | End: 2019-06-10 | Stop reason: HOSPADM

## 2019-06-08 RX ADMIN — SODIUM CHLORIDE, POTASSIUM CHLORIDE, SODIUM LACTATE AND CALCIUM CHLORIDE 1000 ML: 600; 310; 30; 20 INJECTION, SOLUTION INTRAVENOUS at 10:29

## 2019-06-08 RX ADMIN — HYDROCHLOROTHIAZIDE 25 MG: 25 TABLET ORAL at 14:00

## 2019-06-08 RX ADMIN — DIAZEPAM 5 MG: 5 TABLET ORAL at 20:50

## 2019-06-08 RX ADMIN — CEFAZOLIN SODIUM 2 G: 2 INJECTION, SOLUTION INTRAVENOUS at 23:54

## 2019-06-08 RX ADMIN — HYDROCODONE BITARTRATE AND ACETAMINOPHEN 2 TABLET: 10; 325 TABLET ORAL at 23:54

## 2019-06-08 RX ADMIN — FENTANYL CITRATE 25 MCG: 50 INJECTION INTRAMUSCULAR; INTRAVENOUS at 10:51

## 2019-06-08 RX ADMIN — PROPOFOL 200 MG: 10 INJECTION, EMULSION INTRAVENOUS at 07:38

## 2019-06-08 RX ADMIN — CEFAZOLIN 1 G: 330 INJECTION, POWDER, FOR SOLUTION INTRAMUSCULAR; INTRAVENOUS at 07:45

## 2019-06-08 RX ADMIN — PROPOFOL 140 MCG/KG/MIN: 10 INJECTION, EMULSION INTRAVENOUS at 07:38

## 2019-06-08 RX ADMIN — SODIUM CHLORIDE, POTASSIUM CHLORIDE, SODIUM LACTATE AND CALCIUM CHLORIDE: 600; 310; 30; 20 INJECTION, SOLUTION INTRAVENOUS at 06:41

## 2019-06-08 RX ADMIN — OXYCODONE HYDROCHLORIDE AND ACETAMINOPHEN 2 TABLET: 5; 325 TABLET ORAL at 10:35

## 2019-06-08 RX ADMIN — SUCCINYLCHOLINE CHLORIDE 100 MG: 20 INJECTION, SOLUTION INTRAMUSCULAR; INTRAVENOUS at 07:38

## 2019-06-08 RX ADMIN — HYDROMORPHONE HYDROCHLORIDE 1.5 MG: 2 INJECTION, SOLUTION INTRAMUSCULAR; INTRAVENOUS; SUBCUTANEOUS at 07:46

## 2019-06-08 RX ADMIN — HYDROCODONE BITARTRATE AND ACETAMINOPHEN 2 TABLET: 10; 325 TABLET ORAL at 17:18

## 2019-06-08 RX ADMIN — HYDROMORPHONE HYDROCHLORIDE 0.5 MG: 2 INJECTION, SOLUTION INTRAMUSCULAR; INTRAVENOUS; SUBCUTANEOUS at 07:07

## 2019-06-08 RX ADMIN — ROCURONIUM BROMIDE 15 MG: 10 INJECTION, SOLUTION INTRAVENOUS at 08:30

## 2019-06-08 RX ADMIN — CEFAZOLIN 1 G: 330 INJECTION, POWDER, FOR SOLUTION INTRAMUSCULAR; INTRAVENOUS at 07:07

## 2019-06-08 RX ADMIN — DEXAMETHASONE SODIUM PHOSPHATE 8 MG: 4 INJECTION, SOLUTION INTRA-ARTICULAR; INTRALESIONAL; INTRAMUSCULAR; INTRAVENOUS; SOFT TISSUE at 07:46

## 2019-06-08 RX ADMIN — HYDROMORPHONE HYDROCHLORIDE 0.5 MG: 1 INJECTION, SOLUTION INTRAMUSCULAR; INTRAVENOUS; SUBCUTANEOUS at 14:00

## 2019-06-08 RX ADMIN — CEFAZOLIN SODIUM 2 G: 2 INJECTION, SOLUTION INTRAVENOUS at 16:23

## 2019-06-08 RX ADMIN — PHENYLEPHRINE HYDROCHLORIDE 100 MCG: 10 INJECTION INTRAVENOUS at 08:58

## 2019-06-08 RX ADMIN — DOCUSATE SODIUM 100 MG: 100 CAPSULE, LIQUID FILLED ORAL at 17:18

## 2019-06-08 RX ADMIN — METFORMIN HYDROCHLORIDE 1000 MG: 500 TABLET, FILM COATED ORAL at 17:18

## 2019-06-08 RX ADMIN — SODIUM CHLORIDE, POTASSIUM CHLORIDE, SODIUM LACTATE AND CALCIUM CHLORIDE: 600; 310; 30; 20 INJECTION, SOLUTION INTRAVENOUS at 07:07

## 2019-06-08 RX ADMIN — POTASSIUM CHLORIDE AND SODIUM CHLORIDE: 900; 150 INJECTION, SOLUTION INTRAVENOUS at 14:01

## 2019-06-08 ASSESSMENT — PATIENT HEALTH QUESTIONNAIRE - PHQ9
1. LITTLE INTEREST OR PLEASURE IN DOING THINGS: NOT AT ALL
2. FEELING DOWN, DEPRESSED, IRRITABLE, OR HOPELESS: NOT AT ALL
SUM OF ALL RESPONSES TO PHQ9 QUESTIONS 1 AND 2: 0

## 2019-06-08 ASSESSMENT — COGNITIVE AND FUNCTIONAL STATUS - GENERAL
MOBILITY SCORE: 24
SUGGESTED CMS G CODE MODIFIER DAILY ACTIVITY: CH
DAILY ACTIVITIY SCORE: 24
SUGGESTED CMS G CODE MODIFIER MOBILITY: CH

## 2019-06-08 ASSESSMENT — LIFESTYLE VARIABLES
EVER_SMOKED: NEVER
ALCOHOL_USE: NO

## 2019-06-08 NOTE — PROGRESS NOTES
Admitted from PACU, alert and oriented, walked to bathroom from [facility], uses walker with soft cervical collar in place.

## 2019-06-08 NOTE — OR NURSING
1006: received to PACU via bed. Sleepy. Respirations even and unlabored. Denies any pain. Mepilex tape to posterior neck CDI. Hemovac intact and compressed.  1035: c/o posterior neck and posterior right shoulder pain. Pain scale 5/10. Medicated. See MAR  1040: soft cervical collar placed by Traction tech.  1051: pain scale remains at 5/10. Medicated. See MAR.  1130: taking sips of water well. Pain sca;e 3-4/10. Denies any nausea.  1230: patient awaken. Pain scale 3/10. Denies any nausea. reading pocket book.  1250: report called to Catalina MACHUCA.  1257: transported via bed to [address] by transport. stable.

## 2019-06-08 NOTE — PROGRESS NOTES
2 RN skin assessment done; skin not WDL.     Surgical incision posterior neck with mepilex silver dressing in place, hemovac, soft collar in place.

## 2019-06-08 NOTE — OR SURGEON
Immediate Post OP Note    PreOp Diagnosis: Cervical spondylosis with cervical radiculopathy    PostOp Diagnosis: same    Procedure(s):  LAMINECTOMY, SPINE, CERVICAL, POSTERIOR APPROACH- C5-T1 HEMILAMINOTOMY - Wound Class: Clean  FORAMINOTOMY, SPINE, CERVICAL - Wound Class: Clean    Surgeon(s):  Sagar Bennett M.D.    Anesthesiologist/Type of Anesthesia:  Anesthesiologist: Jair Sandhu M.D./General    Surgical Staff:  Assistant: TANISHA Benedict  Circulator: Ana Luis RCOOPER; Ale Rosario RLakeshiaNLakeshia  Relief Scrub: ANISHA Murphy IV  Scrub Person: Solitario Draper  Radiology Technologist: Anita Madsen    Specimens removed if any:  * No specimens in log *    Estimated Blood Loss: 50cc    Findings: foraminal stenosis    Complications: none        6/8/2019 3:18 PM Sagar Bennett M.D.

## 2019-06-08 NOTE — ANESTHESIA TIME REPORT
Anesthesia Start and Stop Event Times     Date Time Event    6/8/2019 0707 Anesthesia Start     1023 Anesthesia Stop        Responsible Staff    No responsible staff documented.       Preop Diagnosis (Free Text):  Pre-op Diagnosis     DEGENERTATION OF CERVICAL INTERVERTEBRAL DISC        Preop Diagnosis (Codes):  Diagnosis Information     Diagnosis Code(s):         Post op Diagnosis  Degenerative arthritis of cervical spine      Premium Reason  B. 1st Call    Comments: multilevel cervical spine case

## 2019-06-08 NOTE — ANESTHESIA PREPROCEDURE EVALUATION
Relevant Problems   (+) Essential hypertension   (+) Type 2 diabetes mellitus with microalbuminuria (HCC)       Physical Exam    Airway   Mallampati: II  TM distance: >3 FB  Neck ROM: full       Cardiovascular - normal exam  Rhythm: regular  Rate: normal  (-) murmur     Dental - normal exam         Pulmonary - normal exam  Breath sounds clear to auscultation     Abdominal    Neurological - normal exam                 Anesthesia Plan    ASA 3       Plan - general       (Narcotic dependence, htn)      Induction: intravenous    Postoperative Plan: Postoperative administration of opioids is intended.    Pertinent diagnostic labs and testing reviewed    Informed Consent:    Anesthetic plan and risks discussed with patient.    Use of blood products discussed with: patient whom consented to blood products.

## 2019-06-08 NOTE — OR NURSING
Pre-op complete. FSBS 103. POC reviewed with pt and wife. Call light within reach, educated to call for assistance if needed. Hourly rounding in place. No multimodals ordered at this time.

## 2019-06-08 NOTE — ANESTHESIA QCDR
2019 [facility] Clinical Data Registry (for Quality Improvement)     Postoperative nausea/vomiting risk protocol (Adult = 18 yrs and Pediatric 3-17 yrs)- (430 and 463)  General inhalation anesthetic (NOT TIVA) with PONV risk factors: Yes  Provision of anti-emetic therapy with at least 2 different classes of agents: Yes   Patient DID NOT receive anti-emetic therapy and reason is documented in Medical Record:  N/A    Multimodal Pain Management- (AQI59)  Patient undergoing Elective Surgery (i.e. Outpatient, or ASC, or Prescheduled Surgery prior to Hospital Admission): Yes  Use of Multimodal Pain Management, two or more drugs and/or interventions, NOT including systemic opioids: Yes   Exception: Documented allergy to multiple classes of analgesics:  N/A    PACU assessment of acute postoperative pain prior to Anesthesia Care End- Applies to Patients Age = 18- (ABG7)  Initial PACU pain score is which of the following: < 7/10  Patient unable to report pain score: N/A    Post-anesthetic transfer of care checklist/protocol to PACU/ICU- (426 and 427)  Upon conclusion of case, patient transferred to which of the following locations: PACU/Non-ICU  Use of transfer checklist/protocol: Yes  Exclusion: Service Performed in Patient Hospital Room (and thus did not require transfer): N/A    PACU Reintubation- (AQI31)  General anesthesia requiring endotracheal intubation (ETT) along with subsequent extubation in OR or PACU: No  Required reintubation in the PACU: N/A  Extubation was a planned trial documented in the medical record prior to removal of the original airway device: N/A    Unplanned admission to ICU related to anesthesia service up through end of PACU care- (MD51)  Unplanned admission to ICU (not initially anticipated at anesthesia start time): No

## 2019-06-08 NOTE — ANESTHESIA PROCEDURE NOTES
Airway  Date/Time: 6/8/2019 8:10 AM  Performed by: LIBERTAD MANSFIELD  Authorized by: LIBERTAD MANSFIELD     Location:  OR  Urgency:  Elective  Indications for Airway Management:  Anesthesia  Spontaneous Ventilation: absent    Sedation Level:  Deep  Preoxygenated: Yes    Patient Position:  Sniffing  Final Airway Type:  Endotracheal airway  Final Endotracheal Airway:  ETT  Cuffed: Yes    Technique Used for Successful ETT Placement:  Direct laryngoscopy  Insertion Site:  Oral  Blade Type:  Hardik  Laryngoscope Blade/Videolaryngoscope Blade Size:  4  ETT Size (mm):  8.0  Measured from:  Teeth  ETT to Teeth (cm):  24  Placement Verified by: auscultation and capnometry    Cormack-Lehane Classification:  Grade I - full view of glottis  Number of Attempts at Approach:  1

## 2019-06-08 NOTE — ANESTHESIA POSTPROCEDURE EVALUATION
Patient: Fabio Vergara    Procedure Summary     Date:  06/08/19 Room / Location:  [facility] 05 / [facility]    Anesthesia Start:  0707 Anesthesia Stop:  1012    Procedures:       LAMINECTOMY, SPINE, CERVICAL, POSTERIOR APPROACH- C5-T1 HEMILAMINOTOMY (Right Neck)      FORAMINOTOMY, SPINE, CERVICAL (Right Neck) Diagnosis:  (DEGENERTATION OF CERVICAL INTERVERTEBRAL DISC)    Surgeon:  Sagar Bennett M.D. Responsible Provider:      Anesthesia Type:  general ASA Status:  3          Final Anesthesia Type: No value filed.  Last vitals  BP   Blood Pressure : 146/92, NIBP: 149/83    Temp   36.2 °C (97.1 °F)    Pulse   Pulse: 77, Heart Rate (Monitored): 77   Resp   16    SpO2   100 %      Anesthesia Post Evaluation    Patient location during evaluation: PACU  Patient participation: complete - patient participated  Level of consciousness: awake and alert    Airway patency: patent  Anesthetic complications: no  Cardiovascular status: hemodynamically stable  Respiratory status: acceptable  Hydration status: euvolemic    PONV: none

## 2019-06-09 PROCEDURE — 700102 HCHG RX REV CODE 250 W/ 637 OVERRIDE(OP): Performed by: NURSE PRACTITIONER

## 2019-06-09 PROCEDURE — G0378 HOSPITAL OBSERVATION PER HR: HCPCS

## 2019-06-09 PROCEDURE — A9270 NON-COVERED ITEM OR SERVICE: HCPCS | Performed by: NURSE PRACTITIONER

## 2019-06-09 PROCEDURE — 700112 HCHG RX REV CODE 229: Performed by: NURSE PRACTITIONER

## 2019-06-09 PROCEDURE — 97165 OT EVAL LOW COMPLEX 30 MIN: CPT

## 2019-06-09 PROCEDURE — 700111 HCHG RX REV CODE 636 W/ 250 OVERRIDE (IP): Performed by: NURSE PRACTITIONER

## 2019-06-09 PROCEDURE — 97161 PT EVAL LOW COMPLEX 20 MIN: CPT

## 2019-06-09 RX ORDER — TIZANIDINE 4 MG/1
4 TABLET ORAL EVERY 6 HOURS PRN
Status: DISCONTINUED | OUTPATIENT
Start: 2019-06-09 | End: 2019-06-10 | Stop reason: HOSPADM

## 2019-06-09 RX ORDER — HYDROCODONE BITARTRATE AND ACETAMINOPHEN 10; 325 MG/1; MG/1
1-2 TABLET ORAL EVERY 4 HOURS PRN
Qty: 56 TAB | Refills: 0 | Status: SHIPPED | OUTPATIENT
Start: 2019-06-09 | End: 2019-06-16

## 2019-06-09 RX ORDER — TIZANIDINE 4 MG/1
4 TABLET ORAL EVERY 6 HOURS PRN
Qty: 60 TAB | Refills: 0 | Status: SHIPPED | OUTPATIENT
Start: 2019-06-09 | End: 2019-08-20

## 2019-06-09 RX ADMIN — HYDROCHLOROTHIAZIDE 25 MG: 25 TABLET ORAL at 05:19

## 2019-06-09 RX ADMIN — TIZANIDINE 4 MG: 4 TABLET ORAL at 21:01

## 2019-06-09 RX ADMIN — GABAPENTIN 100 MG: 100 CAPSULE ORAL at 20:26

## 2019-06-09 RX ADMIN — METOPROLOL SUCCINATE 50 MG: 25 TABLET, EXTENDED RELEASE ORAL at 05:19

## 2019-06-09 RX ADMIN — HYDROMORPHONE HYDROCHLORIDE 0.5 MG: 1 INJECTION, SOLUTION INTRAMUSCULAR; INTRAVENOUS; SUBCUTANEOUS at 05:17

## 2019-06-09 RX ADMIN — DIAZEPAM 5 MG: 5 TABLET ORAL at 07:52

## 2019-06-09 RX ADMIN — LISINOPRIL 40 MG: 20 TABLET ORAL at 05:19

## 2019-06-09 RX ADMIN — GABAPENTIN 100 MG: 100 CAPSULE ORAL at 06:07

## 2019-06-09 RX ADMIN — DIAZEPAM 5 MG: 5 TABLET ORAL at 16:34

## 2019-06-09 RX ADMIN — DOCUSATE SODIUM 100 MG: 100 CAPSULE, LIQUID FILLED ORAL at 05:20

## 2019-06-09 RX ADMIN — DOCUSATE SODIUM 100 MG: 100 CAPSULE, LIQUID FILLED ORAL at 18:00

## 2019-06-09 RX ADMIN — HYDROCODONE BITARTRATE AND ACETAMINOPHEN 2 TABLET: 10; 325 TABLET ORAL at 20:26

## 2019-06-09 RX ADMIN — HYDROCODONE BITARTRATE AND ACETAMINOPHEN 2 TABLET: 10; 325 TABLET ORAL at 13:08

## 2019-06-09 RX ADMIN — METFORMIN HYDROCHLORIDE 1000 MG: 500 TABLET, FILM COATED ORAL at 07:30

## 2019-06-09 RX ADMIN — HYDROCODONE BITARTRATE AND ACETAMINOPHEN 2 TABLET: 10; 325 TABLET ORAL at 06:07

## 2019-06-09 RX ADMIN — PRAVASTATIN SODIUM 40 MG: 20 TABLET ORAL at 05:19

## 2019-06-09 RX ADMIN — METFORMIN HYDROCHLORIDE 1000 MG: 500 TABLET, FILM COATED ORAL at 17:30

## 2019-06-09 ASSESSMENT — GAIT ASSESSMENTS
DISTANCE (FEET): 400
ASSISTIVE DEVICE: FRONT WHEEL WALKER
DEVIATION: BRADYKINETIC
GAIT LEVEL OF ASSIST: SUPERVISED

## 2019-06-09 ASSESSMENT — COGNITIVE AND FUNCTIONAL STATUS - GENERAL
MOVING TO AND FROM BED TO CHAIR: A LITTLE
CLIMB 3 TO 5 STEPS WITH RAILING: A LITTLE
SUGGESTED CMS G CODE MODIFIER DAILY ACTIVITY: CH
WALKING IN HOSPITAL ROOM: A LITTLE
MOVING FROM LYING ON BACK TO SITTING ON SIDE OF FLAT BED: A LITTLE
DAILY ACTIVITIY SCORE: 24
SUGGESTED CMS G CODE MODIFIER MOBILITY: CK
STANDING UP FROM CHAIR USING ARMS: A LITTLE
MOBILITY SCORE: 18
TURNING FROM BACK TO SIDE WHILE IN FLAT BAD: A LITTLE

## 2019-06-09 ASSESSMENT — ACTIVITIES OF DAILY LIVING (ADL): TOILETING: INDEPENDENT

## 2019-06-09 NOTE — PROGRESS NOTES
Neurosurgery Progress Note    Subjective:  Pt states he had quite a bit of pain everywhere earlier this am, now better after given pain meds    Exam:  AAO  Soft collar on  LUE 5/  RUE Delt, Bic. Tric 4/. Fe/  4-    BP  Min: 127/75  Max: 158/88  Pulse  Av.6  Min: 63  Max: 80  Resp  Av.3  Min: 13  Max: 23  Temp  Av.5 °C (97.7 °F)  Min: 36 °C (96.8 °F)  Max: 37.1 °C (98.7 °F)  SpO2  Av.3 %  Min: 90 %  Max: 100 %    No Data Recorded                      Intake/Output       19 - 19 - 06/10/19 0659      [id number] 4997-1335 Total  Total       Intake    P.O.  500  -- 500  1000  -- 1000    P.O. 500 -- 500 1000 -- 1000    I.V.  1200  400 1600  --  -- --    Volume (mL) (lactated ringers infusion) 200 -- 200 -- -- --    Volume (mL) (0.9 % NaCl with KCl 20 mEq infusion) -- 400 400 -- -- --    Volume (mL) (lactated ringers infusion) 1000 -- 1000 -- -- --    IV Piggyback  --  100 100  --  -- --    Volume (mL) (ceFAZolin in dextrose (ANCEF) IVPB premix 2 g) -- 100 100 -- -- --    Total Intake 8015 931 6839 1000 -- 1000       Output    Urine  --  -- --  --  -- --    Number of Times Voided 2 x -- 2 x -- -- --    Drains  30  410 440  --  -- --    Output (mL) (Closed/Suction Drain Posterior Neck Hemovac) 30 410 440 -- -- --    Blood  100  -- 100  --  -- --    Est. Blood Loss 100 -- 100 -- -- --    Total Output 130 410 540 -- -- --       Net I/O     1570 90 1660 1000 -- 1000            Intake/Output Summary (Last 24 hours) at 19 0937  Last data filed at 19 0700   Gross per 24 hour   Intake             3200 ml   Output              540 ml   Net             2660 ml            • baclofen  10 mg TID PRN   • gabapentin  100 mg Q6HRS PRN   • hydroCHLOROthiazide  25 mg DAILY   • lisinopril  40 mg DAILY   • metformin  1,000 mg BID WITH MEALS   • metoprolol SR  50 mg DAILY   • pravastatin  40 mg DAILY   • Pharmacy Consult Request  1 Each PHARMACY TO DOSE    • MD ALERT...DO NOT ADMINISTER NSAIDS or ASPIRIN unless ORDERED By Neurosurgery  1 Each PRN   • docusate sodium  100 mg BID   • senna-docusate  1 Tab Q24HRS PRN   • polyethylene glycol/lytes  1 Packet BID PRN   • magnesium hydroxide  30 mL QDAY PRN   • bisacodyl  10 mg Q24HRS PRN   • fleet  1 Each Once PRN   • 0.9 % NaCl with KCl 20 mEq 1,000 mL   Continuous   • HYDROmorphone  0.5 mg Q3HRS PRN   • diphenhydrAMINE  25 mg Q6HRS PRN    Or   • diphenhydrAMINE  25 mg Q6HRS PRN   • ondansetron  4 mg Q4HRS PRN   • ondansetron  4 mg Q4HRS PRN   • diazePAM  5 mg Q6HRS PRN   • benzocaine-menthol  1 Lozenge Q2HRS PRN   • HYDROcodone/acetaminophen  1-2 Tab Q6HRS PRN       Assessment and Plan:  Hospital day # 2  POD # 1 Right C5 - 6, 6 - 7. 7 - T1 decompression  NM as above  Pt to increase activity as able  Prophylactic anticoagulation: no         Start date/time: tbd  Drain 410cc recorded since sx  Monitor drain if able to remove, he might be able to dc home later vs in am

## 2019-06-09 NOTE — THERAPY
"Occupational Therapy Evaluation completed.   Functional Status:  SPV for functional transfers and BADLs  Plan of Care: Patient with no further skilled OT needs in the acute care setting at this time  Discharge Recommendations:  Equipment: No Equipment Needed. Recommend home health or outpatient transitional care services for continued occupational therapy services    See \"Rehab Therapy-Acute\" Patient Summary Report for complete documentation.    OT eval completed on 65 YO M s/p C5-T1 posterior laminectomy. Pt reports numbness and tingling has improved in RUE since surgery. Pt demonstrated decreased RUE AROM and strength. Pt SPV for functional transfers and BADLs. Pt does not require acute OT services however would benefit from outpatient hand therapy services for continued RUE strengthening.   "

## 2019-06-09 NOTE — OP REPORT
DATE OF SERVICE:  06/08/2019    PREOPERATIVE DIAGNOSES:  1.  Right cervical radiculopathy.  2.  Cervical spondylosis.    POSTOPERATIVE DIAGNOSES:  1.  Right cervical radiculopathy.  2.  Cervical spondylosis.    PROCEDURES PERFORMED:  1.  Right C5-C6 hemilaminectomy, medial facetectomy and foraminotomy with   decompression of C6 nerve root.  2.  Right C6-C7 hemilaminectomy, medial facetectomy and foraminotomy with   decompression of C7 nerve root.  3.  Right C7-T1 hemilaminectomy, medial facetectomy and foraminotomy with   decompression of C8 nerve root.  4.  Microscope use with microsurgical technique.    SURGEON:  Sagar Bennett MD    ASSISTANT:  AMY Castaneda    ANESTHESIA:  General anesthetic.    ANESTHESIOLOGIST:  Jair Sandhu MD    PREPARATION:  The patient had somatosensory evoked potentials, motor evoked   and EMGs monitored throughout the case.    INDICATIONS:  The patient presents with progressive weakness in the right   hand, and atrophy.  He also has weakness in his bicep and tricep.  The MRI   examination shows significant degenerative changes at C5-C6, C6-C7 and also at   C7-C1 with primarily foraminal stenosis on the right.  He was not keen for an   anterior cervical fusion and as he had no neck pain, and just radicular   symptoms on the right, we decided on hemilaminectomies and foraminotomies.    The indications and possible complications of the procedure were explained to   the patient and informed consent was obtained.    DESCRIPTION OF PROCEDURE:  The patient was brought to the operating room and   following induction, he was intubated and placed under general anesthetic.  He   was prepared for somatosensory evoked potentials, motor evokes and EMGs.    Rogel tongs were placed and he was rolled prone into the operating room   table with foam pads placed appropriately.  All pressure points were padded   and his head was secured in a neutral and slightly flexed position with the   Rogel  head us.    Intraoperative x-ray confirmed location of the 5-6 level.  The back of his   neck was shaved, prepped and draped in sterile fashion.  Timeout was   performed.  Localizing with a spinal needle, we were able to make an incision   from C4 down to C7-T1, carried down through the subcutaneous tissue, injected   with 20 mL of Marcaine 0.25% with epinephrine.  We did a subperiosteal   dissection and injected another 20 mL of Marcaine 0.25% with epinephrine.    Subperiosteal dissection was carried out and unilateral Hemal retractors   were placed to maintain exposure.  Intraoperative x-ray confirmed location at   C5-C6.  We drilled the junction of the lamina facet to a thin shelf and   ligamentum flavum here, which was removed with Kerrisons.  We did this at   C5-C6, C6-C7 and C7-C1.    Operative microscope was brought in at this time.  At C5-C6, we were able to   use a #1 Kerrisons to decompress over the C6 nerve root, which was flattened   by facet arthropathy.  Going from pedicle to pedicle and using a small #1   Kerrison, we were able to undercut the facet joint, and subsequently using #2   Kerrison to perform a foraminotomy over the nerve root, which was completely   free at this point.  Decompression again was pedicle to pedicle.    At C6-C7, we were able to use under the operative microscope, curettes to   remove ligamentum flavum and the #1 Kerrison to decompress over the C7 nerve   root.  We were able to go out far lateral, and undercut the facet joint,   leaving the dorsal aspect of the facet intact.  We went around the pedicle and   drilled out the superior aspect of C7, and decompress the T1 nerve root   between C7 and T1.  The #1 and #2 Kerrisons were used to decompress this and   after wide decompression, we were able to obtain meticulous hemostasis with   Surgiflo.  The area was copiously irrigated and we confirmed location again   with intraoperative x-ray.  At this time, a medium size  Hemovac was placed and   we closed the fascia with #1 Vicryl sutures, subcutaneous tissue with 2-0   Vicryl, subcuticular 3-0 Vicryl, and skin with Steri-Strips.  All sponge and   needle counts were correct.  Estimated blood loss for the procedure was   approximately 60 mL.    FINDINGS:  The patient had severe foraminal stenosis at C5-C6 and C6-C7.  He   has a C8 nerve root rounded the pedicle of C7 and went out the foramina.  It   was being cut in the lateral recess, which was decompressed as well.  Blood   loss was minimal.  No complications occurred.       ____________________________________     MD JERRY CAMARGO / ALLAN    DD:  06/08/2019 15:27:52  DT:  06/08/2019 17:23:29    D#:  [id number]  Job#:  [id number]    cc: Charley REYES

## 2019-06-09 NOTE — PROGRESS NOTES
RN MOBILITY NOTE     Surgery patient?: yes  Date of surgery: 6/8/19  Ambulated 50 ft on day of surgery? (N/A if today is not date of surgery): yes  Number of times ambulated 50 feet or greater today: 2  Patient has been up to chair, edge of bed or HOB 90 degrees for all meals?: yes  Goal met? (goal is ambulating at least 50 feet 2 times on day shift, one time on night shift):   If patient did not meet mobility goal, why?:

## 2019-06-09 NOTE — THERAPY
"Physical Therapy Evaluation completed.   Bed Mobility:  Supine to Sit: Supervised  Transfers: Sit to Stand: Supervised  Gait: Level Of Assist: Supervised with Front-Wheel Walker       Plan of Care: Patient with no further skilled PT needs in the acute care setting at this time  Discharge Recommendations: Equipment: No Equipment Needed. Recommend outpatient for continued physical therapy services.    See \"Rehab Therapy-Acute\" Patient Summary Report for complete documentation.     Pt is a 65yo male s/p C5-T1 posterior laminectomy by Dr. Bennett on 6/8. Soft collar PRN. Educated pt on post-op spinal precautions and log roll technique. Good return demo. Pt able to perform all mobility at SPV level, ambulated 400ft with FWW and no LOB. Pt negotiated 4 steps with reciprocal gait and no LOB. No further acute PT needs, recommend outpatient PT when medically cleared. Patient is currently not being actively followed for therapy services at this time, however may be seen if requested by attending provider for 1 more visit within 30 days to address any discharge or equipment needs.    "

## 2019-06-09 NOTE — TELEPHONE ENCOUNTER
Requested Prescriptions     Pending Prescriptions Disp Refills   • metformin (GLUCOPHAGE) 1000 MG tablet [Pharmacy Med Name: METFORMIN 1,000 MG  TAB TRINH] 180 Tab 2     Sig: TAKE ONE TABLET BY MOUTH TWICE DAILY WITH FOOD       TANISHA Siddiqui

## 2019-06-09 NOTE — PROGRESS NOTES
Received report and assumed pt care.  A&O x4.  Bed alarm and treaded socks on.  Call light and personal belongings within reach.  Bed locked and at lowest position.  VASQUEZ.  VSS.  Soft collar PRN.  Hemovac in use.

## 2019-06-10 ENCOUNTER — PATIENT OUTREACH (OUTPATIENT)
Dept: HEALTH INFORMATION MANAGEMENT | Facility: OTHER | Age: 64
End: 2019-06-10

## 2019-06-10 VITALS
TEMPERATURE: 98 F | SYSTOLIC BLOOD PRESSURE: 165 MMHG | HEART RATE: 65 BPM | BODY MASS INDEX: 24.49 KG/M2 | DIASTOLIC BLOOD PRESSURE: 89 MMHG | OXYGEN SATURATION: 98 % | HEIGHT: 70 IN | WEIGHT: 171.08 LBS | RESPIRATION RATE: 18 BRPM

## 2019-06-10 DIAGNOSIS — E11.29 TYPE 2 DIABETES MELLITUS WITH MICROALBUMINURIA, WITHOUT LONG-TERM CURRENT USE OF INSULIN (HCC): ICD-10-CM

## 2019-06-10 DIAGNOSIS — R80.9 TYPE 2 DIABETES MELLITUS WITH MICROALBUMINURIA, WITHOUT LONG-TERM CURRENT USE OF INSULIN (HCC): ICD-10-CM

## 2019-06-10 PROCEDURE — A9270 NON-COVERED ITEM OR SERVICE: HCPCS | Performed by: NURSE PRACTITIONER

## 2019-06-10 PROCEDURE — G0378 HOSPITAL OBSERVATION PER HR: HCPCS

## 2019-06-10 PROCEDURE — 700102 HCHG RX REV CODE 250 W/ 637 OVERRIDE(OP): Performed by: NURSE PRACTITIONER

## 2019-06-10 PROCEDURE — 700112 HCHG RX REV CODE 229: Performed by: NURSE PRACTITIONER

## 2019-06-10 RX ADMIN — METFORMIN HYDROCHLORIDE 1000 MG: 500 TABLET, FILM COATED ORAL at 07:58

## 2019-06-10 RX ADMIN — HYDROCHLOROTHIAZIDE 25 MG: 25 TABLET ORAL at 04:44

## 2019-06-10 RX ADMIN — TIZANIDINE 4 MG: 4 TABLET ORAL at 06:58

## 2019-06-10 RX ADMIN — HYDROCODONE BITARTRATE AND ACETAMINOPHEN 2 TABLET: 10; 325 TABLET ORAL at 02:29

## 2019-06-10 RX ADMIN — PRAVASTATIN SODIUM 40 MG: 20 TABLET ORAL at 04:45

## 2019-06-10 RX ADMIN — LISINOPRIL 40 MG: 20 TABLET ORAL at 04:45

## 2019-06-10 RX ADMIN — DOCUSATE SODIUM 100 MG: 100 CAPSULE, LIQUID FILLED ORAL at 04:45

## 2019-06-10 RX ADMIN — METOPROLOL SUCCINATE 50 MG: 25 TABLET, EXTENDED RELEASE ORAL at 04:45

## 2019-06-10 RX ADMIN — HYDROCODONE BITARTRATE AND ACETAMINOPHEN 2 TABLET: 10; 325 TABLET ORAL at 08:29

## 2019-06-10 NOTE — DISCHARGE INSTRUCTIONS
Discharge Instructions    Discharged to home by car with relative. Discharged via wheelchair, hospital escort: Yes.  Special equipment needed: C-Collar    Be sure to schedule a follow-up appointment with your primary care doctor or any specialists as instructed.     Discharge Plan:   Diet Plan: Discussed  Activity Level: Discussed  Confirmed Follow up Appointment: Patient to Call and Schedule Appointment  Confirmed Symptoms Management: Discussed  Medication Reconciliation Updated: Yes  Influenza Vaccine Indication: Not indicated: Previously immunized this influenza season and > 8 years of age    I understand that a diet low in cholesterol, fat, and sodium is recommended for good health. Unless I have been given specific instructions below for another diet, I accept this instruction as my diet prescription.   Other diet: regular    Special Instructions:   Laminectomy  Laminectomy is a surgery to remove:  · Small pieces of bone in the spine (lamina).  · Tough, cord-like tissues that connect bones to other bones (ligaments) underneath the lamina. These ligaments connect the bones in the spine (vertebrae).  · Parts of joints in the spine (facet joints) that have grown too large.  The goals of this surgery are:  · To reduce pressure on nerves and the spinal cord.  · To reduce pain, numbness, and discomfort.  You may need this procedure if you have narrowing of the spinal canal (spinal stenosis) or if you have a spinal tumor, spinal injury, or Paget disease of bone.  Tell a health care provider about:  · Any allergies you have.  · All medicines you are taking, including vitamins, herbs, eye drops, creams, and over-the-counter medicines.  · Any problems you or family members have had with anesthetic medicines.  · Any blood disorders you have.  · Any surgeries you have had.  · Any medical conditions you have, including a cold or any infections.  · Whether you are pregnant or may be pregnant.  What are the risks?  Generally,  this is a safe procedure. However, problems may occur, including:  · Infection.  · Bleeding.  · Allergic reactions to medicines or dyes.  · Damage to other structures or organs, such as nerves. Nerve damage can cause pain, weakness, or numbness.  · Leaking of spinal fluid.  · A blood clot in a leg. The clot can move to the lungs, which can be very serious.  · Inability to control when you urinate (urinary incontinence) or when you have bowel movements (fecal incontinence). This is rare.  What happens before the procedure?  Staying hydrated   Follow instructions from your health care provider about hydration, which may include:  · Up to 2 hours before the procedure - you may continue to drink clear liquids, such as water, clear fruit juice, black coffee, and plain tea.  Eating and drinking restrictions   Follow instructions from your health care provider about eating and drinking, which may include:  · 24 hours before the procedure - stop drinking alcohol.  · 8 hours before the procedure - stop eating heavy meals or foods such as meat, fried foods, or fatty foods.  · 6 hours before the procedure - stop eating light meals or foods, such as toast or cereal.  · 6 hours before the procedure - stop drinking milk or drinks that contain milk.  · 2 hours before the procedure - stop drinking clear liquids.  Medicines  · Ask your health care provider about:  ¨ Changing or stopping your regular medicines. This is especially important if you are taking diabetes medicines or blood thinners. If your health care provider asks you to keep taking some medicines, take them with a sip of water.  ¨ Taking medicines such as aspirin and ibuprofen. These medicines can thin your blood. Do not take these medicines before your procedure if your health care provider instructs you not to.  · You may be given antibiotic medicine to help prevent infection.  General instructions  · Do not use any products that contain nicotine or tobacco, such as  cigarettes and e-cigarettes, for at least 2 weeks before the procedure. If you need help quitting, ask your health care provider.  · Ask your health care provider how your surgical site will be marked or identified.  · You may have tests done, such as blood tests or an electrocardiogram (ECG).  · If you will be going home right after the procedure, plan to have someone with you for 24 hours.  · Plan to have someone:  ¨ Take you home from the hospital or clinic.  ¨ Help you at home for the first week after the procedure.  What happens during the procedure?  · To reduce your risk of infection, your health care team will wash or sanitize their hands.  · Small monitors will be placed on your body to check your heart rate, blood pressure, and oxygen level.  · An IV tube will be inserted into one of your veins.  · You will be given a medicine to make you fall asleep (general anesthetic). You may also be given a medicine to help you relax (sedative).  · A breathing tube will be placed into your lungs.  · Your back will be cleaned with a germ-killing solution.  · An incision will be made in your back. The incision may be 2-5 inches (5-13 cm) long, depending on how many vertebrae are being operated on.  · Muscles in your back will be moved away from the vertebrae and pulled to the side.  · Pieces of lamina will be removed.  · Ligaments and thickened facet joints will be removed. How much tissue and bone is removed depends on how much of the tissue is putting pressure on your nerves.  · Your nerves will be identified and evaluated to check for excessive tightness.  · Your back muscles will be moved back into their normal position.  · The area under your skin will be closed with small, dissolvable stitches (sutures).  · Your skin will be closed with small, absorbable sutures or staples.  · A bandage (dressing) will be placed over your incision.  The procedure may vary among health care providers and hospitals.  What happens  after the procedure?  · Your blood pressure, heart rate, breathing rate, and blood oxygen level will be monitored until the medicines you were given have worn off.  · You may continue receive medicines and fluids through an IV tube.  · You will have some back pain. You will be given pain medicine as needed.  · It is important to be up and moving as soon as possible after this procedure. Physical therapists will help you start walking.  · To prevent blood clots in your legs:  ¨ You may have to wear compression stockings. These stockings also reduce swelling in your legs.  ¨ You may need to take medicines.  · You may need to do breathing exercises to help prevent lung infection.  · Do not drive for 24 hours if you received a sedative.  Summary  · Laminectomy is a procedure that is done to reduce pressure on nerves and the spinal cord and to reduce pain, numbness, and discomfort.  · If you will be going home right after the procedure, plan to have someone with you for 24 hours.  · You will have some back pain. You will be given pain medicine as needed.  · It is important to be up and moving as soon as possible after this procedure. Physical therapists will help you start walking.  This information is not intended to replace advice given to you by your health care provider. Make sure you discuss any questions you have with your health care provider.  Document Released: 12/06/2010 Document Revised: 08/03/2017 Document Reviewed: 06/04/2017  [employer] Patient Education © 2017 [employer]  Surgical Spinal Decompression, Care After  Introduction  Refer to this sheet in the next few weeks. These instructions provide you with information about caring for yourself after your procedure. Your health care provider may also give you more specific instructions. Your treatment has been planned according to current medical practices, but problems sometimes occur. Call your health care provider if you have any problems or  questions after your procedure.  What can I expect after the procedure?  It is common to have pain for the first few days after the procedure. Some people continue to have mild pain even after making a full recovery.  Follow these instructions at home:  Medicine  · Take medicines only as directed by your health care provider.  · Avoid taking over-the-counter pain medicines unless your health care provider tells you otherwise. These medicines interfere with the development and growth of new bone cells.  · If you were prescribed a narcotic pain medicine, take it exactly as told by your health care provider.  ¨ Do not drink alcohol while on the medicine.  ¨ Do not drive while on the medicine.  Injury care  · Care for your back brace as told by your health care provider.  · If directed, apply ice to the injured area:  ¨ Put ice in a plastic bag.  ¨ Place a towel between your skin and the bag.  ¨ Leave the ice on for 20 minutes, 2-3 times a day.  Activity  · Perform physical therapy exercises as told by your health care provider.  · Exercise regularly. Start by taking short walks. Slowly increase your activity level over time. Gentle exercise helps to ease pain.  · Sit, stand, walk, turn in bed, and reposition yourself as told by your health care provider. This will help to keep your spine in proper alignment.  · Avoid bending and twisting your body.  · Avoid doing strenuous household chores, such as vacuuming.  · Do not lift anything that is heavier than 10 lb (4.5 kg).  Other Instructions  · Keep all follow-up visits as directed by your health care provider. This is important.  · Do not use any tobacco products, including cigarettes, chewing tobacco, or electronic cigarettes. If you need help quitting, ask your health care provider. Nicotine affects the way bones heal.  Contact a health care provider if:  · Your pain gets worse.  · You have a fever.  · You have redness, swelling, or pain at the site of your  incision.  · You have fluid, blood, or pus coming from your incision.  · You have numbness, tingling, or weakness in any part of your body.  Get help right away if:  · Your incision feels swollen and tender, and the surrounding area looks like a lump. The lump may be red or bluish in color.  · You cannot move any part of your body (paralysis).  · You cannot control your bladder or bowels.  This information is not intended to replace advice given to you by your health care provider. Make sure you discuss any questions you have with your health care provider.  Document Released: 05/03/2016 Document Revised: 05/25/2017 Document Reviewed: 12/21/2015  © 2017 [employer]      · Is patient discharged on Warfarin / Coumadin?   No     Depression / Suicide Risk    As you are discharged from this [facility] facility, it is important to learn how to keep safe from harming yourself.    Recognize the warning signs:  · Abrupt changes in personality, positive or negative- including increase in energy   · Giving away possessions  · Change in eating patterns- significant weight changes-  positive or negative  · Change in sleeping patterns- unable to sleep or sleeping all the time   · Unwillingness or inability to communicate  · Depression  · Unusual sadness, discouragement and loneliness  · Talk of wanting to die  · Neglect of personal appearance   · Rebelliousness- reckless behavior  · Withdrawal from people/activities they love  · Confusion- inability to concentrate     If you or a loved one observes any of these behaviors or has concerns about self-harm, here's what you can do:  · Talk about it- your feelings and reasons for harming yourself  · Remove any means that you might use to hurt yourself (examples: pills, rope, extension cords, firearm)  · Get professional help from the community (Mental Health, Substance Abuse, psychological counseling)  · Do not be alone:Call your Safe Contact- someone whom you trust who will be there  for you.  · Call your local CRISIS HOTLINE 407-1019 or 621-929-9454  · Call your local Children's Mobile Crisis Response Team [place] (856) 384-6649 or www.ISpeak  · Call the toll free National Suicide Prevention Hotlines   · National Suicide Prevention Lifeline 052-439-CNNE (2322)  · [employer] Line Network 800-SUICIDE (936-6458)    Ok to shower 6/11, pat incision dry, leave open to air- no dressing   Wear soft collar as needed  No Aspirin for one week after surgery  No driving for 2 weeks/ No driving while on narcotic medication  Over the counter stool softeners daily while on narcotics  No lifting greater than 15 pounds, no repetitive motion above shoulder level  Follow up at [facility] 2 weeks after surgery

## 2019-06-10 NOTE — CARE PLAN
Problem: Infection  Goal: Will remain free from infection  Outcome: PROGRESSING AS EXPECTED  Patient educated regarding infection control including hand hygiene, personal cares. Patient educated regarding s/s of infection including pain, fever, heat and redness at incision site. Patient encouraged to discuss any concerns with RN or care team. Patient verbalized understanding.       Problem: Pain Management  Goal: Pain level will decrease to patient's comfort goal  Outcome: PROGRESSING AS EXPECTED  Patient educated regarding pharmacological and non-pharmacological pain management. Review of PRN pain management, medication schedule, pain scale, and reassessment.

## 2019-06-10 NOTE — PROGRESS NOTES
Neurosurgery Progress Note    Subjective:  Pt in bed, continues having periods of pain and cramping throughout body, meds helpful    Exam:  AAO  Soft collar on  LUE 5/5  RUE Delt, Bic. Tric 4/. Fe/  4-  Drain- 180ml- serous    BP  Min: 139/85  Max: 165/89  Pulse  Av.3  Min: 65  Max: 75  Resp  Av  Min: 16  Max: 18  Temp  Av.7 °C (98 °F)  Min: 36.3 °C (97.4 °F)  Max: 36.9 °C (98.5 °F)  SpO2  Av %  Min: 98 %  Max: 98 %    No Data Recorded                      Intake/Output       19 - 06/10/19 0659 06/10/19 0700 -  Total [id number] Total       Intake    P.O.  1480  -- 1480  280  -- 280    P.O. 1480 -- 1480 280 -- 280    Total Intake 1480 -- 1480 280 -- 280       Output    Urine  --  -- --  --  -- --    Number of Times Voided 1 x 1 x 2 x -- -- --    Drains  115  310 425  --  -- --    Output (mL) (Closed/Suction Drain Posterior Neck Hemovac) 115 310 425 -- -- --    Stool  --  -- --  --  -- --    Number of Times Stooled -- 0 x 0 x -- -- --    Total Output 115 310 425 -- -- --       Net I/O     1365 -310 1055 280 -- 280            Intake/Output Summary (Last 24 hours) at 06/10/19 09  Last data filed at 06/10/19 0829   Gross per 24 hour   Intake              760 ml   Output              425 ml   Net              335 ml            • tizanidine  4 mg Q6HRS PRN   • baclofen  10 mg TID PRN   • gabapentin  100 mg Q6HRS PRN   • hydroCHLOROthiazide  25 mg DAILY   • lisinopril  40 mg DAILY   • metformin  1,000 mg BID WITH MEALS   • metoprolol SR  50 mg DAILY   • pravastatin  40 mg DAILY   • Pharmacy Consult Request  1 Each PHARMACY TO DOSE   • MD ALERT...DO NOT ADMINISTER NSAIDS or ASPIRIN unless ORDERED By Neurosurgery  1 Each PRN   • docusate sodium  100 mg BID   • senna-docusate  1 Tab Q24HRS PRN   • polyethylene glycol/lytes  1 Packet BID PRN   • magnesium hydroxide  30 mL QDAY PRN   • bisacodyl  10 mg Q24HRS PRN   • fleet  1 Each Once PRN   •  0.9 % NaCl with KCl 20 mEq 1,000 mL   Continuous   • HYDROmorphone  0.5 mg Q3HRS PRN   • diphenhydrAMINE  25 mg Q6HRS PRN    Or   • diphenhydrAMINE  25 mg Q6HRS PRN   • ondansetron  4 mg Q4HRS PRN   • ondansetron  4 mg Q4HRS PRN   • diazePAM  5 mg Q6HRS PRN   • benzocaine-menthol  1 Lozenge Q2HRS PRN   • HYDROcodone/acetaminophen  1-2 Tab Q6HRS PRN       Assessment and Plan:  POD #2 Right C5 - 6, 6 - 7. 7 - T1 decompression  NM as above  Pt to increase activity as able  Prophylactic anticoagulation: no         Start date/time: tbd  D/c drain ASAP  Continue pain control  Home this afternoon if pain controlled, no h/a

## 2019-06-10 NOTE — TELEPHONE ENCOUNTER
Was the patient seen in the last year in this department? Yes    Does patient have an active prescription for medications requested? No     Received Request Via: Pharmacy Insurance [employer] requesting a 100 day Supply

## 2019-06-10 NOTE — CARE PLAN
Problem: Safety  Goal: Will remain free from injury  Outcome: PROGRESSING AS EXPECTED    Intervention: Provide assistance with mobility  Encourage to call for any assistance needed, call light within reach.      Problem: Pain Management  Goal: Pain level will decrease to patient's comfort goal  Outcome: PROGRESSING AS EXPECTED    Intervention: Educate and implement non-pharmacologic comfort measures. Examples: relaxation, distration, play therapy, activity therapy, massage, etc.  Pain assessment q 2 hours, medicated as needed, ice pack applied, comfort measures provided.

## 2019-06-10 NOTE — PROGRESS NOTES
Patient is A&Ox4. Pain has been controlled by PRN Norco and PRN Zanaflex. Patient wears soft cervical collar intermittently. Patient reports that the numbness and tingling felt in the right arm and hand is diminishing. Call light within reach and patient uses appropriately for assistance with getting out of bed, ambulating.

## 2019-06-10 NOTE — TELEPHONE ENCOUNTER
Requested Prescriptions     Signed Prescriptions Disp Refills   • metformin (GLUCOPHAGE) 1000 MG tablet 200 Tab 3     Sig: Take 1 Tab by mouth 2 times a day, with meals.     Authorizing Provider: DEVORA BRANHAM A.P.R.N.

## 2019-06-10 NOTE — DISCHARGE SUMMARY
DATE OF ADMISSION:  06/08/2019    DATE OF DISCHARGE:  06/10/2019    SURGEON:  Sagar Bennett MD    PROCEDURE PERFORMED:  Right C5-C6, C6-C7, and C7-T1 hemilaminectomy, medial   facetectomy, and foraminotomy.    POSTOPERATIVE DIAGNOSES:  Cervical spondylosis and right cervical   radiculopathy.    HISTORY OF PRESENT ILLNESS:  Has been previously dictated.    HOSPITAL COURSE:  The patient was seen on postoperative day 1 stating he is   having quite a bit of pain everywhere, but this is better after medications.    On exam, his left upper extremity is strong at 5/5.  His right is weaker,   rated about 4/5.  His drain is producing quite a bit of output, 410 since   surgery and this was continued to be monitored.  He was encouraged to   mobilize.  On postoperative day 2, he is seen in bed.  Again, he mentions   having periods of pain and cramping throughout his body.  The medications do   help with this.  Exam remains unchanged.  Vital signs are stable.  Again, his   drain produce 180 mL at the last 8 hours, but this is all clear and is no   longer bloody; therefore, this will be removed as soon as possible.  He will   be continued on pain medication and encouraged to ambulate and he will be   discharged home later this afternoon if his pain remains controlled.    DISCHARGE MEDICATIONS:  He is being prescribed Norco 10/325 1-2 tabs every 4   hours as needed for pain, not to exceed 8 per day for 7 days, #56.  He was   instructed to stop taking his previous doses of tramadol, aspirin, naproxen,   and he can resume all of his regular home medications.  He was also being   prescribed tizanidine 4 mg every 6 hours as needed for spasms, #60.    DISCHARGE INSTRUCTIONS:  He may shower tomorrow, pat the incision dry, and   leave open to air, no dressing is needed.  He is to wear a soft collar as   needed.  He is not to take aspirin for 1 week after surgery.  He is not to   drive for 2 weeks or while on narcotic medications.  He  should take an   over-the-counter stool softener while he takes pain medication.  He is not to   lift greater than 15 pounds, no repetitive motion above shoulder level and   needs to follow up in the office 2 weeks after surgery.       ____________________________________     AMY KIMBROUGH    DD:  06/10/2019 10:10:53  DT:  06/10/2019 11:08:00    D#:  [id number]  Job#:  [id number]    cc: [facility]

## 2019-06-10 NOTE — PROGRESS NOTES
Mobility Note     Surgery patient?: Yes  Date of surgery: 06/08/2019  Ambulated 50 ft on day of surgery? (N/A if today is not date of surgery): NA  Number of times ambulated 50 feet or greater today: 5  Patient has been up to chair, edge of bed or HOB 90 degrees for all meals?: Yes  Goal met? (goal is ambulating at least 50 feet 2 times on day shift, one time on night shift): Yes  If patient did not meet mobility goal, why?: N/A

## 2019-06-11 ENCOUNTER — PATIENT OUTREACH (OUTPATIENT)
Dept: HEALTH INFORMATION MANAGEMENT | Facility: OTHER | Age: 64
End: 2019-06-11

## 2019-06-11 NOTE — DISCHARGE PLANNING
Anticipated Discharge Disposition:   Home with help from spouse    Action:    Spoke with patient.  He has a FWW at home.  His home is equipped with hand rails and there are no stairs.    Barriers to Discharge:    None    Plan:    DC    Care Transition Team Assessment    Information Source  Orientation : Oriented x 4  Information Given By: Patient  Informant's Name: Fabio Vergara  Who is responsible for making decisions for patient? : Patient    Readmission Evaluation  Is this a readmission?: Yes - planned readmission    Elopement Risk  Legal Hold: No  Ambulatory or Self Mobile in Wheelchair: Yes  Disoriented: No  Psychiatric Symptoms: None  History of Wandering: No  Elopement this Admit: No  Vocalizing Wanting to Leave: No  Displays Behaviors, Body Language Wanting to Leave: No-Not at Risk for Elopement  Elopement Risk: Not at Risk for Elopement    Interdisciplinary Discharge Planning  Lives with - Patient's Self Care Capacity: Spouse  Patient or legal guardian wants to designate a caregiver (see row info): No  Housing / Facility: [address] [facility] [address] [place]  Prior Services: None    Discharge Preparedness  What is your plan after discharge?: Home with help  What are your discharge supports?: Spouse  Prior Functional Level: Uses Walker, Independent with Activities of Daily Living, Independent with Medication Management, Drives Self  Difficulity with ADLs: None  Difficulity with IADLs: None    Functional Assesment  Prior Functional Level: Uses Walker, Independent with Activities of Daily Living, Independent with Medication Management, Drives Self    Finances  Financial Barriers to Discharge: No  Prescription Coverage: Yes    Vision / Hearing Impairment  Right Eye Vision: Wears Glasses  Left Eye Vision: Wears Glasses  Hearing Impairment : No    Values / Beliefs / Concerns  Spiritual Requests During Hospitalization: No    Advance Directive  Advance Directive?: None    Domestic Abuse  Have you ever been the victim of abuse or  violence?: No  Physical Abuse or Sexual Abuse: No  Verbal Abuse or Emotional Abuse: No  Possible Abuse Reported to:: Not Applicable         Discharge Risks or Barriers  Discharge risks or barriers?: No    Anticipated Discharge Information  Anticipated discharge disposition: Home  Discharge Address: [address] [place] [address] Dr. Carin OLIVA [id number]  Discharge Contact Phone Number: [phone]

## 2019-06-12 ENCOUNTER — TELEPHONE (OUTPATIENT)
Dept: MEDICAL GROUP | Facility: PHYSICIAN GROUP | Age: 64
End: 2019-06-12

## 2019-06-12 NOTE — TELEPHONE ENCOUNTER
Future Appointments       Provider Department [place]    6/17/2019 8:40 AM TANISHA Ca [facility]    8/20/2019 10:40 AM TANISHA Siddiqui [facility]        ESTABLISHED PATIENT PRE-VISIT PLANNING     Patient was NOT contacted to complete PVP.       1.  Reviewed notes from the last few office visits within the medical group: Yes    2.  If any orders were placed at last visit or intended to be done for this visit (i.e. 6 mos follow-up), do we have Results/Consult Notes?        •  Labs - Labs ordered, completed on 5/22/19 and results are in chart.       •  Imaging - Imaging was not ordered at last office visit.       •  Referrals - No referrals were ordered at last office visit.    3. Is this appointment scheduled as a Hospital Follow-Up? Yes, visit was at [facility].     4.  Immunizations were updated in [employer] using WebIZ?: Yes       •  Web Iz Recommendations: FLU, TD and SHINGRIX (Shingles)    5.  Patient is due for the following Health Maintenance Topics:   Health Maintenance Due   Topic Date Due   • HEPATITIS C SCREENING  1955   • FASTING LIPID PROFILE  02/07/2019       6. Orders for overdue Health Maintenance topics pended in Pre-Charting? N\A    7.  AHA (MDX) form printed for Provider? No, already completed    8.  Patient was NOT informed to arrive 15 min prior to their scheduled appointment and bring in their medication bottles.

## 2019-06-26 ENCOUNTER — PHYSICAL THERAPY (OUTPATIENT)
Dept: PHYSICAL THERAPY | Facility: REHABILITATION | Age: 64
End: 2019-06-26
Attending: NURSE PRACTITIONER
Payer: MEDICARE

## 2019-06-26 DIAGNOSIS — M47.812 CERVICAL SPONDYLOSIS WITHOUT MYELOPATHY: ICD-10-CM

## 2019-06-26 PROCEDURE — 97161 PT EVAL LOW COMPLEX 20 MIN: CPT

## 2019-06-26 PROCEDURE — 97110 THERAPEUTIC EXERCISES: CPT

## 2019-06-26 SDOH — ECONOMIC STABILITY: GENERAL: QUALITY OF LIFE: FAIR

## 2019-06-26 ASSESSMENT — ENCOUNTER SYMPTOMS
PAIN SCALE AT LOWEST: 0
PAIN SCALE AT HIGHEST: 6
PAIN SCALE: 2
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
## 2019-06-28 ENCOUNTER — APPOINTMENT (OUTPATIENT)
Dept: PHYSICAL THERAPY | Facility: REHABILITATION | Age: 64
End: 2019-06-28
Attending: NURSE PRACTITIONER
Payer: MEDICARE

## 2019-07-11 ENCOUNTER — PHYSICAL THERAPY (OUTPATIENT)
Dept: PHYSICAL THERAPY | Facility: REHABILITATION | Age: 64
End: 2019-07-11
Attending: NURSE PRACTITIONER
Payer: MEDICARE

## 2019-07-11 ENCOUNTER — PATIENT OUTREACH (OUTPATIENT)
Dept: HEALTH INFORMATION MANAGEMENT | Facility: OTHER | Age: 64
End: 2019-07-11

## 2019-07-11 DIAGNOSIS — M47.812 CERVICAL SPONDYLOSIS WITHOUT MYELOPATHY: ICD-10-CM

## 2019-07-11 PROCEDURE — 97110 THERAPEUTIC EXERCISES: CPT

## 2019-07-11 NOTE — OP THERAPY DAILY TREATMENT
Outpatient Physical Therapy  DAILY TREATMENT     [facility] [place]  [address], [address]  [facility] [address]  Phone:  [phone]  Fax:  [phone]    Date: 07/11/2019    Patient: Fabio Vergara  YOB: 1955  MRN: [id number]     Time Calculation  Start time: 1500  Stop time: 1530 Time Calculation (min): 30 minutes     Chief Complaint: Post-Op Pain and Neck Problem    Visit #: 2    SUBJECTIVE:  Patient reports little to no pain. States that overall he is feeling good. Notes that his hand is getting fatigued with exercise.     OBJECTIVE:  Current objective measures:   Continued digit weakness          Therapeutic Exercises (CPT 78123):     1. Supine dowel flexion, x20    2. Supine U flexion shoulder, x20    3. Supine elbow flexion to shoulder flexion, x20    4. Scap ret, x20    5.  all fingers L3, x20    6.  single fingers L1, x20    7. Extensor stretching, x20      Time-based treatments/modalities:  Therapeutic exercise minutes (CPT 16032): 30 minutes       Pain rating before treatment: 0  Pain rating after treatment: 0    ASSESSMENT:   Response to treatment: Patient responded well to therapy with no increase in pain with exercise however, an increase in fatigue was noted with hand activity.     PLAN/RECOMMENDATIONS:   Plan for treatment: therapy treatment to continue next visit.  Planned interventions for next visit: continue with current treatment.

## 2019-07-18 ENCOUNTER — PHYSICAL THERAPY (OUTPATIENT)
Dept: PHYSICAL THERAPY | Facility: REHABILITATION | Age: 64
End: 2019-07-18
Attending: NURSE PRACTITIONER
Payer: MEDICARE

## 2019-07-18 DIAGNOSIS — M47.812 CERVICAL SPONDYLOSIS WITHOUT MYELOPATHY: ICD-10-CM

## 2019-07-18 PROCEDURE — 97110 THERAPEUTIC EXERCISES: CPT

## 2019-07-18 NOTE — OP THERAPY DAILY TREATMENT
Outpatient Physical Therapy  DAILY TREATMENT     [facility] [place]  [address]  [facility] [address]  Phone:  [phone]  Fax:  [phone]    Date: 07/18/2019    Patient: Fabio Vergara  YOB: 1955  MRN: [id number]     Time Calculation  Start time: 1500  Stop time: 1530 Time Calculation (min): 30 minutes     Chief Complaint: Post-Op Pain and Neck Problem    Visit #: 3    SUBJECTIVE:  Patient reports no pain and states he has been feeling well enough to work on his [employer] system.     OBJECTIVE:  Current objective measures:   Left Wrist/Hand    (2nd hand position)     Trial 1: 88     Right Wrist/Hand    (2nd hand position)     Trial 1: 43          Therapeutic Exercises (CPT 91578):     1. Supine dowel flexion, x20    2. Supine U flexion shoulder, x20    3. Supine elbow flexion to shoulder flexion, x20    4. Scap ret, x20    5.  all fingers L3, x20    6.  single fingers L1, x20    7. Extensor stretching, x20    8. Rows, x20    9. Scaption raises to 80deg, x20    Therapeutic Treatments and Modalities:     1. Manual Therapy (CPT 53429), STM B UT    Time-based treatments/modalities:  Manual therapy minutes (CPT 26822): 5 minutes  Therapeutic exercise minutes (CPT 88225): 25 minutes       Pain rating before treatment: 0  Pain rating after treatment: 0    ASSESSMENT:   Response to treatment: Patient responded well to therapy with a slight increase in  strength. Patient continues to note increased fatigue with hand exercises.     PLAN/RECOMMENDATIONS:   Plan for treatment: therapy treatment to continue next visit.  Planned interventions for next visit: continue with current treatment.

## 2019-07-23 ENCOUNTER — PHYSICAL THERAPY (OUTPATIENT)
Dept: PHYSICAL THERAPY | Facility: REHABILITATION | Age: 64
End: 2019-07-23
Attending: NURSE PRACTITIONER
Payer: MEDICARE

## 2019-07-23 DIAGNOSIS — M47.812 CERVICAL SPONDYLOSIS WITHOUT MYELOPATHY: ICD-10-CM

## 2019-07-23 PROCEDURE — 97110 THERAPEUTIC EXERCISES: CPT

## 2019-07-23 NOTE — OP THERAPY DAILY TREATMENT
Outpatient Physical Therapy  DAILY TREATMENT     [facility] [place]  [address] [place] [address], [address]  [facility] [address]  Phone:  [phone]  Fax:  [phone]    Date: 07/23/2019    Patient: Fabio Vergara  YOB: 1955  MRN: [id number]     Time Calculation  Start time: 1500  Stop time: 1530 Time Calculation (min): 30 minutes     Chief Complaint: Neck Problem and Post-Op Pain    Visit #: 4    SUBJECTIVE:  Patient reports that he is doing well. Notes that he did over do it and got a HA with using the . Patient educated re: lifting precautions     OBJECTIVE:  Current objective measures:   No change in  strength          Therapeutic Exercises (CPT 88201):     1. Supine dowel flexion, x20    2. Supine U flexion shoulder, x20    3. Supine elbow flexion to shoulder flexion, x20    4. Scap ret, x20    5.  all fingers L3, x20    6.  single fingers L1, x20    7. Extensor stretching, x20    8. Rows, x20    9. Scaption raises to 80deg, x20    Therapeutic Treatments and Modalities:     1. Manual Therapy (CPT 34253), STM B UT    Time-based treatments/modalities:  Therapeutic exercise minutes (CPT 50679): 30 minutes       Pain rating before treatment: 1  Pain rating after treatment: 1    ASSESSMENT:   Response to treatment: Patient is tolerating his current program without an increase in pain. Plan to increase UE work load and focus on scapular stabilization to address prior noted winging.     PLAN/RECOMMENDATIONS:   Plan for treatment: therapy treatment to continue next visit.  Planned interventions for next visit: continue with current treatment.

## 2019-07-25 ENCOUNTER — PHYSICAL THERAPY (OUTPATIENT)
Dept: PHYSICAL THERAPY | Facility: REHABILITATION | Age: 64
End: 2019-07-25
Attending: NURSE PRACTITIONER
Payer: MEDICARE

## 2019-07-25 DIAGNOSIS — M47.812 CERVICAL SPONDYLOSIS WITHOUT MYELOPATHY: ICD-10-CM

## 2019-07-25 PROCEDURE — 97110 THERAPEUTIC EXERCISES: CPT

## 2019-07-25 NOTE — PROGRESS NOTES
A 64-year-old male was an elective admission to [facility] from 6/8/2019 to 6/10/2019 to treat a cervical disc degeneration of an unspecified cervical region. [facility] visited the patient bedside. The patient was discharged Home. The patient's medical conditions included: Diabetes and Musculoskeletal Disease (Arthritis, Degenerative Disk Disease, Chronic Low Back Pain). The patient was not under clinical case management.    The Patient was discharged with the following medications: Zanaflex and Hydrocodone-Acetaminophen. The patient successfully filled all medications.     The patient was ordered to follow-up with PCP, and Specialist.  The patient was scheduled to follow-up with:     1) 6/17/2019 @ 8:40 Karen Ventura certified nurse practitioner - CONFIRMED AS KEPT     2) 8/20/2019 @ 10:40 Charley Harvey, general/family practice - SCHEDULED  The patient has no future appointments scheduled.     [facility] followed the patient for a total of 29 days and patient was receptive to services. In summary, [facility] coordinated physician appointments. As a result, [facility] secured a sooner appointment with providers, Patient adhered with Discharge Orders, Patient attended all appointments, and Patient successfully recovered or avoided further complications.

## 2019-07-25 NOTE — OP THERAPY DAILY TREATMENT
Outpatient Physical Therapy  DAILY TREATMENT     [facility] [place]  [address], [address]  [facility] [address]  Phone:  [phone]  Fax:  [phone]    Date: 07/25/2019    Patient: Fabio Vergara  YOB: 1955  MRN: [id number]     Time Calculation  Start time: 1500  Stop time: 1530 Time Calculation (min): 30 minutes     Chief Complaint: Neck Problem and Post-Op Pain    Visit #: 5    SUBJECTIVE:  Patient reports that he is doing well. States that overall symptoms are still improving and that he is gradually doing more work.     OBJECTIVE:  Current objective measures:   Flexion: Active cervical flexion: 60deg.  Extension: Active cervical extension: 34deg.  Left lateral flexion: Active left cervical lateral flexion: 26deg.  Right lateral flexion: Active right cervical lateral flexion: 24deg.  Left rotation: Active left cervical rotation: 41deg.  Right rotation: Active right cervical rotation: 45deg.     Strength: R: 45lbs  Additional Strength Details  Finger extensors: 3+/5 R gross except for 4-5th digit  Finger flexors : 4/5 R Gross except for 5th digit          Therapeutic Exercises (CPT 55581):     1. Supine dowel flexion, x20    2. Supine U flexion shoulder, x20    3. Supine elbow flexion to shoulder flexion, x20    4. Scap ret, x20    5.  all fingers L3, x20    6.  single fingers L1, x20    7. Extensor stretching, x20    8. Rows, x20    9. Scaption raises to 80deg, x20    Therapeutic Treatments and Modalities:     1. Manual Therapy (CPT 64142), STM B UT    Time-based treatments/modalities:  Therapeutic exercise minutes (CPT 80467): 30 minutes       Pain rating before treatment: 0  Pain rating after treatment: 0    ASSESSMENT:   Response to treatment: Patient is responding well to therapy with increased ROM and decreased pain. Patient is reporting increased overall function.     PLAN/RECOMMENDATIONS:   Plan for treatment: therapy treatment to continue next  visit.  Planned interventions for next visit: continue with current treatment.

## 2019-07-29 ENCOUNTER — PHYSICAL THERAPY (OUTPATIENT)
Dept: PHYSICAL THERAPY | Facility: REHABILITATION | Age: 64
End: 2019-07-29
Attending: NURSE PRACTITIONER
Payer: MEDICARE

## 2019-07-29 DIAGNOSIS — M47.812 CERVICAL SPONDYLOSIS WITHOUT MYELOPATHY: ICD-10-CM

## 2019-07-29 PROCEDURE — 97110 THERAPEUTIC EXERCISES: CPT

## 2019-07-29 NOTE — OP THERAPY DAILY TREATMENT
Outpatient Physical Therapy  DAILY TREATMENT     [facility] Physical Therapy [place]  [address] [place] [address], [address]  [facility] [address]  Phone:  [phone]  Fax:  [phone]    Date: 07/29/2019    Patient: Fabio Vergara  YOB: 1955  MRN: [id number]     Time Calculation  Start time: 1100  Stop time: 1130 Time Calculation (min): 30 minutes     Chief Complaint: Post-Op Pain and Neck Problem    Visit #: 6    SUBJECTIVE:  Patient reports that he is doing well. Notes that his  strength is still not up to par but he states he is working on it. Notes no neck pain.     OBJECTIVE:  Current objective measures:   Patient still unable to oppose first and fifth digit          Therapeutic Exercises (CPT 18429):     1. UBE, x3min    2. Supine U flexion shoulder, x20    3. Active flexion 90deg, x20    4. Scap ret, x20    5.  all fingers L3, x20    6.  single fingers L1, x20    7. Extensor stretching, x20    8. Rows, x20    9.  / Finger strength training , object  x10min : cehpe, pill, eraser, paper B      Time-based treatments/modalities:  Therapeutic exercise minutes (CPT 29225): 30 minutes       Pain rating before treatment: 0  Pain rating after treatment: 0    ASSESSMENT:   Response to treatment: Patient  strength remains unchanged. Patient states that he is able to participate in all ADL. No increase in symptoms with therapy.    PLAN/RECOMMENDATIONS:   Plan for treatment: therapy treatment to continue next visit.  Planned interventions for next visit: continue with current treatment.

## 2019-08-01 ENCOUNTER — PHYSICAL THERAPY (OUTPATIENT)
Dept: PHYSICAL THERAPY | Facility: REHABILITATION | Age: 64
End: 2019-08-01
Attending: NURSE PRACTITIONER
Payer: MEDICARE

## 2019-08-01 DIAGNOSIS — M47.812 CERVICAL SPONDYLOSIS WITHOUT MYELOPATHY: ICD-10-CM

## 2019-08-01 PROCEDURE — 97110 THERAPEUTIC EXERCISES: CPT

## 2019-08-01 NOTE — OP THERAPY DAILY TREATMENT
Outpatient Physical Therapy  DAILY TREATMENT     [facility] Therapy [place]  [address], [address]  [facility] [address]  Phone:  [phone]  Fax:  [phone]    Date: 08/01/2019    Patient: Fabio Vergara  YOB: 1955  MRN: [id number]     Time Calculation  Start time: 1100  Stop time: 1130 Time Calculation (min): 30 minutes       Chief Complaint: Post-Op Pain and Neck Problem    Visit #: 7    SUBJECTIVE:  Patient reports that his neck pain is minimal. States that overall he is feeling good with little to no pain. States he is working his hand with small object pick ups.     OBJECTIVE:  Current objective measures:   Left Wrist/Hand    (2nd hand position)     Trial 1: 88     Right Wrist/Hand    (2nd hand position)     Trial 1: 45          Therapeutic Exercises (CPT 04466):     1. UBE, x3min    2. Supine U flexion shoulder, x20    3. Active flexion 90deg, x20    4. Scap ret, x20    5.  all fingers L3, x20    6.  single fingers L1, x20    7. Extensor stretching, x20    8. Rows, x20    9.  / Finger strength training , object  x10min : chepe, pill, eraser, paper B      Time-based treatments/modalities:  Therapeutic exercise minutes (CPT 93097): 30 minutes       Pain rating before treatment: 0  Pain rating after treatment: 0    ASSESSMENT:   Response to treatment: Patient  strength has slightly improved. Patient is tolerating the advancement of his program in terms of UE strength and mobility. Plan to continue with is current program.     PLAN/RECOMMENDATIONS:   Plan for treatment: therapy treatment to continue next visit.  Planned interventions for next visit: continue with current treatment.

## 2019-08-06 ENCOUNTER — PHYSICAL THERAPY (OUTPATIENT)
Dept: PHYSICAL THERAPY | Facility: REHABILITATION | Age: 64
End: 2019-08-06
Attending: NURSE PRACTITIONER
Payer: MEDICARE

## 2019-08-06 DIAGNOSIS — M47.812 CERVICAL SPONDYLOSIS WITHOUT MYELOPATHY: ICD-10-CM

## 2019-08-06 PROCEDURE — 97014 ELECTRIC STIMULATION THERAPY: CPT

## 2019-08-06 PROCEDURE — 97110 THERAPEUTIC EXERCISES: CPT

## 2019-08-06 NOTE — OP THERAPY DAILY TREATMENT
Outpatient Physical Therapy  DAILY TREATMENT     [facility] [place]  [address], [address]  [facility] [address]  Phone:  [phone]  Fax:  [phone]    Date: 08/06/2019    Patient: Fabio Vergara  YOB: 1955  MRN: [id number]     Time Calculation  Start time: 1500  Stop time: 1545 Time Calculation (min): 45 minutes       Chief Complaint: Neck Problem and Post-Op Pain    Visit #: 8    SUBJECTIVE:  Patient reports increased R shoulder blade soreness. States that symptoms have been intermittent. Notes that overall hand strength is the same. Notes slightly more increased motion with digits 1-4.     OBJECTIVE:  Current objective measures:   Right Wrist/Hand    (2nd hand position)     Trial 1: 45          Therapeutic Exercises (CPT 38435):     1. UBE, x3min    2. Supine U flexion shoulder, x20    3. Active flexion 90deg, x20    4. Scap ret, x20    5.  all fingers L3, x20    6.  single fingers L1, x20    7. Extensor stretching, x20    8. Rows, decrease to x10    9.  / Finger strength training , object  x10min : chepe, pill, eraser, paper B    10. Supine protraction / retraction, x10    Therapeutic Treatments and Modalities:     1. E Stim Unattended (CPT 52218), IFC with CP x15min to mid trap 80-150hz     Time-based treatments/modalities:  Therapeutic exercise minutes (CPT 00180): 30 minutes       Pain rating before treatment: 0  Pain rating after treatment: 0    ASSESSMENT:   Response to treatment: Patient is responding well to cervical progression with no increase in symptoms. Patient will undergo a cataract surgery soon and will need to decrease therapy frequency.     PLAN/RECOMMENDATIONS:   Plan for treatment: therapy treatment to continue next visit.  Planned interventions for next visit: continue with current treatment.

## 2019-08-08 RX ORDER — LISINOPRIL 40 MG/1
40 TABLET ORAL
Qty: 100 TAB | Refills: 3 | Status: ON HOLD
Start: 2019-08-08 | End: 2020-01-08

## 2019-08-08 NOTE — TELEPHONE ENCOUNTER
Pt needs 100 day supply     Was the patient seen in the last year in this department? Yes    Does patient have an active prescription for medications requested? No     Received Request Via: Pharmacy

## 2019-08-12 RX ORDER — PRAVASTATIN SODIUM 40 MG
TABLET ORAL
Qty: 100 TAB | Refills: 3 | Status: SHIPPED | OUTPATIENT
Start: 2019-08-12 | End: 2020-09-16

## 2019-08-12 NOTE — TELEPHONE ENCOUNTER
Was the patient seen in the last year in this department? Yes LOV 05/07/19    Does patient have an active prescription for medications requested? No     Received Request Via: Pharmacy

## 2019-08-14 ENCOUNTER — PHYSICAL THERAPY (OUTPATIENT)
Dept: PHYSICAL THERAPY | Facility: REHABILITATION | Age: 64
End: 2019-08-14
Attending: NURSE PRACTITIONER
Payer: MEDICARE

## 2019-08-14 DIAGNOSIS — M47.812 CERVICAL SPONDYLOSIS WITHOUT MYELOPATHY: ICD-10-CM

## 2019-08-14 PROCEDURE — 97110 THERAPEUTIC EXERCISES: CPT

## 2019-08-14 NOTE — OP THERAPY DAILY TREATMENT
Outpatient Physical Therapy  DAILY TREATMENT     [facility] Outpatient Physical Therapy [place]  [address]  [facility] [address]  Phone:  [phone]  Fax:  [phone]    Date: 08/14/2019    Patient: Fabio Vergara  YOB: 1955  MRN: [id number]     Time Calculation  Start time: 1030  Stop time: 1100 Time Calculation (min): 30 minutes       Chief Complaint: Post-Op Pain and Neck Problem    Visit #: 9    SUBJECTIVE:  Patient reports that symptoms are improving. Notes that his soheila scapular pain has decreased.     OBJECTIVE:  Current objective measures:   Cervical Spine   Flexion: Active cervical flexion: 55deg.  Extension: Active cervical extension: 40deg.  Left lateral flexion: Active left cervical lateral flexion: 30deg.  Right lateral flexion: Active right cervical lateral flexion: 26deg.  Left rotation: Active left cervical rotation: 48deg.  Right rotation: Active right cervical rotation: 50deg.          Therapeutic Exercises (CPT 92846):     1. UBE, x3min    2. Supine U flexion shoulder, x20    3. Active flexion 90deg, x20    4. Scap ret, x20    5.  all fingers L3, x20    6.  single fingers L1, x20    7. Extensor stretching, x20    8. Rows, decrease to x10    9.  / Finger strength training , object  x10min : chepe, pill, eraser, paper B; golf ball ; towel     10. Supine protraction / retraction, x10    11. Sub occipital float, x10 each direction     12. Wall ball rolls, x20      Therapeutic Exercise Summary: Access Code: [email or web address]   As issues HEP     Program Notes    coins /small objcets     Exercises  · Supine Shoulder Flexion Extension AAROM with Dowel - 10 reps - 2 sets - 1x daily - 7x weekly  · Standing Shoulder Scaption - 10 reps - 2 sets - 1x daily - 7x weekly  · Seated Scapular Retraction - 10 reps - 2 sets - 1x daily - 7x weekly  · Standing Shoulder Row - 10 reps - 2 sets - 1x daily - 7x weekly  · Seated Gripping Towel - 10 reps - 3 sets - 1x daily  - 7x weekly  · Putty Squeezes - 10 reps - 3 sets - 1x daily - 7x weekly  · Finger Spreading - 10 reps - 3 sets - 1x daily - 7x weekly  · Quick Finger Spreading with Rubber Band - 10 reps - 3 sets - 1x daily - 7x weekly  · Seated Palmar Pinch with Putty - 10 reps - 3 sets - 1x daily - 7x weekly  · Supine Cervical Rotation AROM on Pillow - 10 reps - 2 sets - 1x daily - 7x weekly  · Supine Cervical Flexion Extension on Pillow - 10 reps - 2 sets - 1x daily - 7x weekly      Time-based treatments/modalities:  Therapeutic exercise minutes (CPT 96637): 30 minutes       Pain rating before treatment: 0  Pain rating after treatment: 0    ASSESSMENT:   Response to treatment: Patient is responding well to therapy with increased neck motion and improved function. Patient's MD will be contacted about possible OT referral.     PLAN/RECOMMENDATIONS:   Plan for treatment: therapy treatment to continue next visit.  Planned interventions for next visit: continue with current treatment.

## 2019-08-19 ENCOUNTER — PHYSICAL THERAPY (OUTPATIENT)
Dept: PHYSICAL THERAPY | Facility: REHABILITATION | Age: 64
End: 2019-08-19
Attending: NURSE PRACTITIONER
Payer: MEDICARE

## 2019-08-19 DIAGNOSIS — M54.12 CERVICAL RADICULOPATHY AT C7: ICD-10-CM

## 2019-08-19 DIAGNOSIS — R29.898 RIGHT HAND WEAKNESS: ICD-10-CM

## 2019-08-19 DIAGNOSIS — M47.812 CERVICAL SPONDYLOSIS WITHOUT MYELOPATHY: ICD-10-CM

## 2019-08-19 PROCEDURE — 97110 THERAPEUTIC EXERCISES: CPT

## 2019-08-19 NOTE — OP THERAPY PROGRESS SUMMARY
Outpatient Physical Therapy  PROGRESS SUMMARY NOTE      [facility] [place]  [address]  [place] [address]  Phone:  [phone]  Fax:  [phone]    Date of Visit: 08/19/2019    Patient: Fabio Vergara  YOB: 1955  MRN: [id number]     Referring Provider: PAN Ho  [address] [place] [address]   Referring Diagnosis Spondylosis without myelopathy or radiculopathy, cervical region [M47.812]     Visit Diagnoses     ICD-10-CM   1. Cervical spondylosis without myelopathy M47.812       Rehab Potential: fair    Physical Therapy Occurrence Codes    Date of onset of impairment:  6/8/19   Date physical therapy care plan established or reviewed:  6/26/19   Date physical therapy treatment started:  6/26/19          Cert Period: 8/19/19 to 9/23/19  Progress Report Period: 6/26/19 to 8/19/19    Functional Assessment Used  PT Functional Assessment Tool Used: NDI  PT Functional Assessment Score: 12       Objective Findings and Assessment:   Patient progression towards goals: Patient has been seen for 10 visits. Patient's cervical ROM and R UE strength have improved. R hand strength is still unchanged and deficits continue to remain. Patient may benefit from an OT consult to assist. Otherwise, plan to continue per post op protocol.     Objective findings and assessment details: Cervical Spine   Flexion: Active cervical flexion: 55deg.  Extension: Active cervical extension: 40deg.  Left lateral flexion: Active left cervical lateral flexion: 30deg.  Right lateral flexion: Active right cervical lateral flexion: 26deg.  Left rotation: Active left cervical rotation: 48deg.  Right rotation: Active right cervical rotation: 50deg.     R biceps 5/5      R: 43lbs  L: 88lbs    Goals:   Short Term Goals:   1) Patient's bicep strength R will improve by a half muscle grade to facilitate improved R UE use. MET  2) Patient's  strength will improve by  10lbs to facilitate improved R hand use. Not met  Short term goal time span:  2-4 weeks      Long Term Goals:    1) Patient's shoulder flexion will improve to allow for overhead activity >5min. Partially met   2) Patient's NDI will improve by 8 to demonstrate functional improvement   Long term goal time span:  6-8 weeks    Plan:   Planned therapy interventions:  E Stim Unattended (CPT 11031), Manual Therapy (CPT 62790), Neuromuscular Re-education (CPT 55492) and Therapeutic Exercise (CPT 38612)  Frequency: 1-2x week.  Duration in weeks:  5  Plan details:  Continue to progress with current POC        Referring provider co-signature:  I have reviewed this plan of care and my co-signature certifies the need for services.    Physician Signature: ________________________________ Date: ______________

## 2019-08-19 NOTE — PROGRESS NOTES
1. Cervical radiculopathy at C7  2. Right hand weakness  Patient being seen by PT, recommends referral to occupational therapy for right hand weakness.  - REFERRAL TO OCCUPATIONAL THERAPY Reason for Therapy: Eval/Treat/Report

## 2019-08-19 NOTE — OP THERAPY DAILY TREATMENT
Outpatient Physical Therapy  DAILY TREATMENT     [facility] [place]  [address]  [facility] [address]  Phone:  [phone]  Fax:  [phone]    Date: 08/19/2019    Patient: Fabio Vergara  YOB: 1955  MRN: [id number]     Time Calculation  Start time: 0730  Stop time: 0800 Time Calculation (min): 30 minutes       Chief Complaint: Neck Problem; Post-Op Pain; and Hand Problem    Visit #: 10    SUBJECTIVE:  Patient reports that overall he is doing well. Notes increased ROM and improved neck symptoms. States that his R hand is still suffering deficits.     OBJECTIVE:  Current objective measures:   Cervical Spine   Flexion: Active cervical flexion: 55deg.  Extension: Active cervical extension: 40deg.  Left lateral flexion: Active left cervical lateral flexion: 30deg.  Right lateral flexion: Active right cervical lateral flexion: 26deg.  Left rotation: Active left cervical rotation: 48deg.  Right rotation: Active right cervical rotation: 50deg.    R biceps: 5/5      R: 43lbs  L: 88lbs  PT Functional Assessment Tool Used: NDI  PT Functional Assessment Score: 12       Therapeutic Exercises (CPT 64532):     1. UBE, x3min    2. Supine U flexion shoulder, x20    3. FLasher, x20    4. Scap ret, x20    5.  all fingers L3, x20    6.  single fingers L1, x20    7. Extensor stretching, x20    8. Rows, decrease to x10    9.  / Finger strength training , object  x10min : chepe, pill, eraser, paper B; golf ball ; towel     10. Supine protraction / retraction, x10    11. Sub occipital float, x10 each direction     12. Wall ball rolls, x20      Therapeutic Exercise Summary: Access Code: [email or web address]   As issues HEP     Program Notes    coins /small objcets     Exercises  · Supine Shoulder Flexion Extension AAROM with Dowel - 10 reps - 2 sets - 1x daily - 7x weekly  · Standing Shoulder Scaption - 10 reps - 2 sets - 1x daily - 7x weekly  · Seated Scapular  Retraction - 10 reps - 2 sets - 1x daily - 7x weekly  · Standing Shoulder Row - 10 reps - 2 sets - 1x daily - 7x weekly  · Seated Gripping Towel - 10 reps - 3 sets - 1x daily - 7x weekly  · Putty Squeezes - 10 reps - 3 sets - 1x daily - 7x weekly  · Finger Spreading - 10 reps - 3 sets - 1x daily - 7x weekly  · Quick Finger Spreading with Rubber Band - 10 reps - 3 sets - 1x daily - 7x weekly  · Seated Palmar Pinch with Putty - 10 reps - 3 sets - 1x daily - 7x weekly  · Supine Cervical Rotation AROM on Pillow - 10 reps - 2 sets - 1x daily - 7x weekly  · Supine Cervical Flexion Extension on Pillow - 10 reps - 2 sets - 1x daily - 7x weekly      Time-based treatments/modalities:  Therapeutic exercise minutes (CPT 30287): 30 minutes       Pain rating before treatment: 0  Pain rating after treatment: 0    ASSESSMENT:   Response to treatment: Patient is responding well to therapy however, hand strength is relatively unchanged. Patient may benefit from an OT consult.     PLAN/RECOMMENDATIONS:   Plan for treatment: therapy treatment to continue next visit.  Planned interventions for next visit: continue with current treatment.

## 2019-08-20 ENCOUNTER — OFFICE VISIT (OUTPATIENT)
Dept: MEDICAL GROUP | Facility: PHYSICIAN GROUP | Age: 64
End: 2019-08-20
Payer: MEDICARE

## 2019-08-20 VITALS
DIASTOLIC BLOOD PRESSURE: 80 MMHG | HEIGHT: 70 IN | OXYGEN SATURATION: 97 % | WEIGHT: 175 LBS | TEMPERATURE: 98 F | SYSTOLIC BLOOD PRESSURE: 140 MMHG | HEART RATE: 90 BPM | BODY MASS INDEX: 25.05 KG/M2

## 2019-08-20 DIAGNOSIS — E78.5 DYSLIPIDEMIA: ICD-10-CM

## 2019-08-20 DIAGNOSIS — M54.12 CERVICAL RADICULOPATHY AT C7: ICD-10-CM

## 2019-08-20 DIAGNOSIS — R80.9 TYPE 2 DIABETES MELLITUS WITH MICROALBUMINURIA, WITHOUT LONG-TERM CURRENT USE OF INSULIN (HCC): ICD-10-CM

## 2019-08-20 DIAGNOSIS — R80.9 MICROALBUMINURIA DUE TO TYPE 2 DIABETES MELLITUS (HCC): ICD-10-CM

## 2019-08-20 DIAGNOSIS — E11.29 TYPE 2 DIABETES MELLITUS WITH MICROALBUMINURIA, WITHOUT LONG-TERM CURRENT USE OF INSULIN (HCC): ICD-10-CM

## 2019-08-20 DIAGNOSIS — E11.29 MICROALBUMINURIA DUE TO TYPE 2 DIABETES MELLITUS (HCC): ICD-10-CM

## 2019-08-20 DIAGNOSIS — I10 ESSENTIAL HYPERTENSION: ICD-10-CM

## 2019-08-20 DIAGNOSIS — Z11.59 NEED FOR HEPATITIS C SCREENING TEST: ICD-10-CM

## 2019-08-20 PROCEDURE — 99214 OFFICE O/P EST MOD 30 MIN: CPT | Performed by: NURSE PRACTITIONER

## 2019-08-20 NOTE — LETTER
[facility]  TANISHA Siddiqui  [address]  [place] [address]  Fax: [phone]   Authorization for Release/Disclosure of   Protected Health Information   Name: FABIO VERGARA : 1955 SSN: [id number]   Address: [address] [place] [address] [place] [address] Phone:    [phone] (home)    I authorize the entity listed below to release/disclose the PHI below to:   [facility]/TANISHA Siddiqui and TANISHA Siddiqui   Provider or Entity Name:     Address   City, State, Zip   Phone:      Fax:     Reason for request: continuity of care   Information to be released:    [  ] LAST COLONOSCOPY,  including any PATH REPORT and follow-up  [  ] LAST FIT/COLOGUARD RESULT [  ] LAST DEXA  [  ] LAST MAMMOGRAM  [  ] LAST PAP  [  ] LAST LABS [  ] RETINA EXAM REPORT  [  ] IMMUNIZATION RECORDS  [  ] Release all info      [  ] Check here and initial the line next to each item to release ALL health information INCLUDING  _____ Care and treatment for drug and / or alcohol abuse  _____ HIV testing, infection status, or AIDS  _____ Genetic Testing    DATES OF SERVICE OR TIME PERIOD TO BE DISCLOSED: _____________  I understand and acknowledge that:  * This Authorization may be revoked at any time by you in writing, except if your health information has already been used or disclosed.  * Your health information that will be used or disclosed as a result of you signing this authorization could be re-disclosed by the recipient. If this occurs, your re-disclosed health information may no longer be protected by State or Federal laws.  * You may refuse to sign this Authorization. Your refusal will not affect your ability to obtain treatment.  * This Authorization becomes effective upon signing and will  on (date) __________.      If no date is indicated, this Authorization will  one (1) year from the signature date.    Name: Fabio Vergara    Signature:   Date:     2019       PLEASE FAX  REQUESTED RECORDS BACK TO: [phone]

## 2019-08-21 DIAGNOSIS — I10 ESSENTIAL HYPERTENSION: ICD-10-CM

## 2019-08-21 RX ORDER — METOPROLOL SUCCINATE 25 MG/1
50 TABLET, EXTENDED RELEASE ORAL
Qty: 200 TAB | Refills: 3 | Status: SHIPPED | OUTPATIENT
Start: 2019-08-21 | End: 2020-09-18 | Stop reason: SDUPTHER

## 2019-08-21 NOTE — TELEPHONE ENCOUNTER
Requested Prescriptions     Pending Prescriptions Disp Refills   • metoprolol SR (TOPROL XL) 25 MG TABLET SR 24  Tab 3     Sig: Take 2 Tabs by mouth every day.       JOSÉ MIGUEL Siddiqui.

## 2019-08-21 NOTE — TELEPHONE ENCOUNTER
Needs 100 day supply for insurance       Was the patient seen in the last year in this department? Yes    Does patient have an active prescription for medications requested? No     Received Request Via: Pharmacy

## 2019-08-26 DIAGNOSIS — I10 ESSENTIAL HYPERTENSION: ICD-10-CM

## 2019-08-26 RX ORDER — HYDROCHLOROTHIAZIDE 25 MG/1
TABLET ORAL
Qty: 100 TAB | Refills: 2 | Status: ON HOLD
Start: 2019-08-26 | End: 2020-01-08

## 2019-08-26 NOTE — ASSESSMENT & PLAN NOTE
"Chronic, stable. Currently taking pravastatin 40 mg/day as directed.  Denies side effects of the medication--no myalgias or abdominal pain.  Reports diet is \"good\".  He is not exercising regularly.  He is not taking ASA daily.  He denies dizziness, claudication, or chest pain.   1/12/2016 09:09 1/13/2017 09:17 2/7/2018 08:51   Cholesterol,Tot 198 179 149   Triglycerides 115 70 63   HDL 75 81 72    (H) 84 64     "

## 2019-08-26 NOTE — ASSESSMENT & PLAN NOTE
This is a chronic problem for the patient that is ongoing and slightly improved. The patient continues to take lisinopril 40 mg/day.  Repeat testing due May 2020.   11/6/2017 08:47 2/7/2018 08:50 5/22/2019 10:17   Micro Alb Creat Ratio 53 (H) 70 (H) 65 (H)   Creatinine, Urine 63.70 101.60 56.60   Microalbumin, Urine Random 3.4 7.1 3.7   Urobilinogen, Urine   0.2
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
## 2019-08-26 NOTE — ASSESSMENT & PLAN NOTE
Chronic, stable.  Currently taking hydrochlorothiazide 25 mg/day, lisinopril 40 mg/day, metoprolol SR 50 mg every day as directed.   He is not taking baby aspirin daily.   He is monitoring BP at home.   Denies symptoms low BP: light-headed, tunnel-vision, unusual fatigue, dizziness.  Denies symptoms high BP: pounding headache, visual changes, palpitations, flushed face.   Denies chest pain, shortness of breath, dyspnea on exertion.   Denies medicine side effects: unusual fatigue, slow heartbeat, foot/leg swelling, cough.  Vitals 5/7/2019 6/10/2019 6/17/2019 8/20/2019   SYSTOLIC 118 165 140 140   DIASTOLIC 70 89 90 80   PULSE 80 65 84 90

## 2019-08-26 NOTE — ASSESSMENT & PLAN NOTE
Patient had surgery in June 2019 with Dr. Bennett, neurosurgery.  The patient continues to report that he is doing well, minimal pain.  The patient is participating in physical therapy.  Continues to have right hand weakness, recently referred for occupational therapy for this reason.

## 2019-08-26 NOTE — TELEPHONE ENCOUNTER
Was the patient seen in the last year in this department? Yes LOV 8/20/19    Does patient have an active prescription for medications requested? No     Received Request Via: Pharmacy

## 2019-08-26 NOTE — ASSESSMENT & PLAN NOTE
This is a chronic problem for the patient that is controlled. The patient's most recent lab work was completed on 5/22/2019 with a hemoglobin A1c 6.0%.  Current medications:  Metformin 1000 mg BID  ACEI/ARB: /80 today.  Has been normotensive. Denies any chest pain, sob, leg swelling. Is currently on Lisinopril 40 mg/day, hydrochlorothiazide 25 mg/day, metoprolol SR 50 mg/day.  Aspirin: Does not take. No stomach or stool issues.  Statin: LDL 64, last checked 2/7/2018.  Is on pravastatin 40 mg/day.  No myalgias  Tobacco: former smoker, quit 2013  Last dilated retinal eye exam: last exam 5/7/2019.  No vision changes.  Micro albumin: last checked 5/7/2018.  No urinary symptoms.  A1C goal: <7.0%  Denies any polyuria, polydipsia, polyphagia, blurred or cloudy vision, rash or thrush, or numbness or tingling of feet.

## 2019-08-27 ENCOUNTER — PHYSICAL THERAPY (OUTPATIENT)
Dept: PHYSICAL THERAPY | Facility: REHABILITATION | Age: 64
End: 2019-08-27
Attending: NURSE PRACTITIONER
Payer: MEDICARE

## 2019-08-27 DIAGNOSIS — M47.812 CERVICAL SPONDYLOSIS WITHOUT MYELOPATHY: ICD-10-CM

## 2019-08-27 PROCEDURE — 97110 THERAPEUTIC EXERCISES: CPT

## 2019-08-27 NOTE — OP THERAPY DAILY TREATMENT
Outpatient Physical Therapy  DAILY TREATMENT     [facility] Outpatient Physical Therapy [address], [address]  [facility] [address]  Phone:  [phone]  Fax:  [phone]    Date: 08/27/2019    Patient: Fabio Vergara  YOB: 1955  MRN: [id number]     Time Calculation  Start time: 0830  Stop time: 0900 Time Calculation (min): 30 minutes       Chief Complaint: Neck Problem and Post-Op Pain    Visit #: 11    SUBJECTIVE:  Patient no neck problems states his hand is still weak. Notes that the shoulder blade pain is gone.     OBJECTIVE:  Current objective measures:   R : 46lbs          Therapeutic Exercises (CPT 44065):     1. UBE, x3min    2. Supine U flexion shoulder, x20    3. FLasher, x20, L3    4. Scap ret, x20    5.  all fingers L3, x20    6.  single fingers L1, x20    7. Extensor stretching, x20    8. Rows, decrease to x10    9.  / Finger strength training , object  x10min : chepe, pill, eraser, paper B; golf ball ; towel     10. Supine protraction / retraction, x10    11. Sub occipital float, x10 each direction     12. Wall ball rolls, x20    13. Chin tuck, x5      Time-based treatments/modalities:  Therapeutic exercise minutes (CPT 86064): 30 minutes       Pain rating before treatment: 0  Pain rating after treatment: 0    ASSESSMENT:   Response to treatment: Patient responded well to therapy with no pain and an increase in exercise tolerance.     PLAN/RECOMMENDATIONS:   Plan for treatment: therapy treatment to continue next visit.  Planned interventions for next visit: continue with current treatment.

## 2019-08-27 NOTE — TELEPHONE ENCOUNTER
Requested Prescriptions     Pending Prescriptions Disp Refills   • hydroCHLOROthiazide (HYDRODIURIL) 25 MG Tab [Pharmacy Med Name: HYDROCHLOROT 25 MG  TAB ACCO] 100 Tab 2     Sig: TAKE ONE TABLET BY MOUTH EVERY DAY       JOSÉ MIGUEL Siddiqui.

## 2019-08-28 ENCOUNTER — OCCUPATIONAL THERAPY (OUTPATIENT)
Dept: OCCUPATIONAL THERAPY | Facility: REHABILITATION | Age: 64
End: 2019-08-28
Attending: NURSE PRACTITIONER
Payer: MEDICARE

## 2019-08-28 DIAGNOSIS — R29.898 RIGHT HAND WEAKNESS: ICD-10-CM

## 2019-08-28 DIAGNOSIS — M54.12 CERVICAL RADICULOPATHY AT C7: ICD-10-CM

## 2019-08-28 PROCEDURE — 97166 OT EVAL MOD COMPLEX 45 MIN: CPT

## 2019-08-28 PROCEDURE — 97110 THERAPEUTIC EXERCISES: CPT

## 2019-08-28 SDOH — ECONOMIC STABILITY: GENERAL: QUALITY OF LIFE: EXCELLENT

## 2019-08-28 ASSESSMENT — ENCOUNTER SYMPTOMS: PAIN SCALE: 0
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
## 2019-09-04 ENCOUNTER — OCCUPATIONAL THERAPY (OUTPATIENT)
Dept: OCCUPATIONAL THERAPY | Facility: REHABILITATION | Age: 64
End: 2019-09-04
Attending: NURSE PRACTITIONER
Payer: MEDICARE

## 2019-09-04 ENCOUNTER — PHYSICAL THERAPY (OUTPATIENT)
Dept: PHYSICAL THERAPY | Facility: REHABILITATION | Age: 64
End: 2019-09-04
Attending: NURSE PRACTITIONER
Payer: MEDICARE

## 2019-09-04 DIAGNOSIS — M47.812 CERVICAL SPONDYLOSIS WITHOUT MYELOPATHY: ICD-10-CM

## 2019-09-04 DIAGNOSIS — R29.898 RIGHT HAND WEAKNESS: ICD-10-CM

## 2019-09-04 PROCEDURE — 97110 THERAPEUTIC EXERCISES: CPT

## 2019-09-04 NOTE — OP THERAPY DAILY TREATMENT
Outpatient Physical Therapy  DAILY TREATMENT     [facility] Outpatient Physical Therapy [place]  [address], [address]  [facility] [address]  Phone:  [phone]  Fax:  [phone]    Date: 09/04/2019    Patient: Fabio Vergara  YOB: 1955  MRN: [id number]     Time Calculation  Start time: 0730  Stop time: 0800 Time Calculation (min): 30 minutes       Chief Complaint: Neck Problem    Visit #: 12    SUBJECTIVE:  Patient reports that his neck is doing well. No problems. States he has started OT for his hand    OBJECTIVE:  Current objective measures:    52lbs          Therapeutic Exercises (CPT 12143):     1. Occipital float, x20 nods/rot/ext    2. Rows, x20    3. Pull backs, x20    4. Scap dep, x20    5. Scap ret/dep, x20    6. Scap pro/ret, x20    7. Deltoid walks, x20    8. [place], x20      Time-based treatments/modalities:  Therapeutic exercise minutes (CPT 86524): 30 minutes       Pain rating before treatment: 0  Pain rating after treatment: 0    ASSESSMENT:   Response to treatment: Patient has responded well to therapy with an overall increase in fatigue with no increase in pain. Patient has tolerated a progressive load in exercise with no ill effect.     PLAN/RECOMMENDATIONS:   Plan for treatment: therapy treatment to continue next visit.  Planned interventions for next visit: continue with current treatment.

## 2019-09-04 NOTE — OP THERAPY DAILY TREATMENT
"  Outpatient Occupational Therapy  DAILY TREATMENT     [facility] Occupational [address]  [address]  [place] [address]  Phone:  [phone]  Fax:  [phone]    Date: 09/04/2019    Patient: Fabio Vergara  YOB: 1955  MRN: [id number]     Time Calculation  Start time: 1500  Stop time: 1530 Time Calculation (min): 30 minutes       Chief Complaint: Hand Weakness    Visit #: 2    SUBJECTIVE:  \"I feel like I'm able to reach my pinky with my thumb better.\" \"Gabo [PT] told me to do something similar until I saw you.\"    OBJECTIVE:  Current objective measures:    R Hand  (2nd hand position)     Trial 1: 34    Pt not able to oppose thumb to digit 4 or 5.        Therapeutic Exercises (CPT 51069):     1. Opposition of thumb to digit 4-5, 10 x each, HEP    2. Theraputty: squeeze putty x10 pinch, breaking off pieces of putty, pulling 10 coins out of putty, 2x/day; alternating digits involved, HEP      Time-based treatments/modalities:  Therapeutic exercise minutes (CPT 15975): 30 minutes        ASSESSMENT:   Response to treatment: Pt had calm affect and actively participated in session. Pt improved R  strength by 4 lbs. Pt has made slight improvement in thumb opposition and claw hand positions from stretches in HEP. Pt suggested to continue those stretches in addition to HEP prescribed today.    PLAN/RECOMMENDATIONS:   Plan for treatment: therapy treatment to continue next visit.  Planned interventions for next visit: hot/cold pack therapy (CPT 55285), manual therapy (CPT 09119), neuromuscular re-education (CPT 31219), paraffin bath (CPT 10731), therapeutic activities (CPT 20386) and therapeutic exercise (CPT 62275)       "

## 2019-09-06 ENCOUNTER — HOSPITAL ENCOUNTER (OUTPATIENT)
Dept: LAB | Facility: MEDICAL CENTER | Age: 64
End: 2019-09-06
Attending: NURSE PRACTITIONER
Payer: MEDICARE

## 2019-09-06 DIAGNOSIS — R80.9 TYPE 2 DIABETES MELLITUS WITH MICROALBUMINURIA, WITHOUT LONG-TERM CURRENT USE OF INSULIN (HCC): ICD-10-CM

## 2019-09-06 DIAGNOSIS — E11.29 TYPE 2 DIABETES MELLITUS WITH MICROALBUMINURIA, WITHOUT LONG-TERM CURRENT USE OF INSULIN (HCC): ICD-10-CM

## 2019-09-06 DIAGNOSIS — Z11.59 NEED FOR HEPATITIS C SCREENING TEST: ICD-10-CM

## 2019-09-06 LAB
CHOLEST SERPL-MCNC: 139 MG/DL (ref 100–199)
CREAT UR-MCNC: 48 MG/DL
EST. AVERAGE GLUCOSE BLD GHB EST-MCNC: 117 MG/DL
FASTING STATUS PATIENT QL REPORTED: NORMAL
HBA1C MFR BLD: 5.7 % (ref 0–5.6)
HCV AB SER QL: NEGATIVE
HDLC SERPL-MCNC: 91 MG/DL
LDLC SERPL CALC-MCNC: 37 MG/DL
MICROALBUMIN UR-MCNC: 2.3 MG/DL
MICROALBUMIN/CREAT UR: 48 MG/G (ref 0–30)
TRIGL SERPL-MCNC: 53 MG/DL (ref 0–149)

## 2019-09-06 PROCEDURE — 83036 HEMOGLOBIN GLYCOSYLATED A1C: CPT

## 2019-09-06 PROCEDURE — 82570 ASSAY OF URINE CREATININE: CPT

## 2019-09-06 PROCEDURE — 86803 HEPATITIS C AB TEST: CPT

## 2019-09-06 PROCEDURE — 82043 UR ALBUMIN QUANTITATIVE: CPT

## 2019-09-06 PROCEDURE — 36415 COLL VENOUS BLD VENIPUNCTURE: CPT

## 2019-09-06 PROCEDURE — 80061 LIPID PANEL: CPT

## 2019-09-12 ENCOUNTER — PHYSICAL THERAPY (OUTPATIENT)
Dept: PHYSICAL THERAPY | Facility: REHABILITATION | Age: 64
End: 2019-09-12
Attending: NURSE PRACTITIONER
Payer: MEDICARE

## 2019-09-12 DIAGNOSIS — M47.812 CERVICAL SPONDYLOSIS WITHOUT MYELOPATHY: ICD-10-CM

## 2019-09-12 PROCEDURE — 97110 THERAPEUTIC EXERCISES: CPT

## 2019-09-12 NOTE — OP THERAPY DAILY TREATMENT
Outpatient Physical Therapy  DAILY TREATMENT     [facility] Outpatient Physical Therapy [place]  [address], [address]  [facility] [address]  Phone:  [phone]  Fax:  [phone]    Date: 09/12/2019    Patient: Fabio Vergara  YOB: 1955  MRN: [id number]     Time Calculation  Start time: 0900  Stop time: 0930 Time Calculation (min): 30 minutes       Chief Complaint: Post-Op Pain and Neck Problem    Visit #: 13    SUBJECTIVE:  Patient reports that symptoms are improving. Notes that he has had only two head aches since last time. States overall doing well.     OBJECTIVE:  Current objective measures:   Cervical Spine   Flexion: Active cervical flexion: 60deg.  Extension: Active cervical extension: 44deg.  Left lateral flexion: Active left cervical lateral flexion: 40deg.  Right lateral flexion: Active right cervical lateral flexion: 34deg.  Left rotation: Active left cervical rotation: 48deg.  Right rotation: Active right cervical rotation: 48deg          Therapeutic Exercises (CPT 84694):     1. Occipital float, x20 nods/rot/ext    2. Rows, x15    3. Pull backs, x15    4. Scap dep, x15    5. Nerve glide 1/2, x3    6. Scap pro/ret supine dowel, x15    7. Deltoid walks, x15    8. [place], x15    9. UT stretch, [id number]      Time-based treatments/modalities:  Therapeutic exercise minutes (CPT 38754): 30 minutes       Pain rating before treatment: 0  Pain rating after treatment: 0    ASSESSMENT:   Response to treatment: Patient responded well to therapy with an overall improvement in symptoms    PLAN/RECOMMENDATIONS:   Plan for treatment: therapy treatment to continue next visit.  Planned interventions for next visit: continue with current treatment.

## 2019-09-13 ENCOUNTER — OCCUPATIONAL THERAPY (OUTPATIENT)
Dept: OCCUPATIONAL THERAPY | Facility: REHABILITATION | Age: 64
End: 2019-09-13
Attending: NURSE PRACTITIONER
Payer: MEDICARE

## 2019-09-13 DIAGNOSIS — R29.898 RIGHT HAND WEAKNESS: ICD-10-CM

## 2019-09-13 DIAGNOSIS — M54.12 CERVICAL RADICULOPATHY AT C7: ICD-10-CM

## 2019-09-13 PROCEDURE — 97110 THERAPEUTIC EXERCISES: CPT

## 2019-09-13 NOTE — OP THERAPY DAILY TREATMENT
"  Outpatient Occupational Therapy  DAILY TREATMENT     [facility] Occupational Therapy [address]  [address]  [place] [address]  Phone:  [phone]  Fax:  [phone]    Date: 2019    Patient: Fabio Vergara  YOB: 1955  MRN: [id number]     Time Calculation  Start time: 1120  Stop time: 1150 Time Calculation (min): 30 minutes       Chief Complaint: Hand Problem    Visit #: 3    SUBJECTIVE:    \"My pinky still feels a bit numb occasionally, but its not during any specific time of the day or activity.\"      OBJECTIVE:  Current objective measures:        R Hand  (lb) :   Trial 1: 42  Trial 2: 40  Trial 3: 39  Av      Therapeutic Exercises (CPT 69801):     1. Digit extension resistance exercises (rubber band), x 10 reps; 2x/day, HEP    2. Thumb abduction resistance exercises (rubber band), x 10 reps; 2x/day, HEP    4. Thumb opposition resistance exercises (rubber band), x 10 reps; 2x/day, HEP    5. AAROM pinky abduction exercises, x10 reps; 2x/day, HEP    6. AROM pinky adduction exercises, x10 reps; 2x/day, HEP    7. Velcro Hand Exercise Board ( strength and wrist extension flexion), x4      Time-based treatments/modalities:  Therapeutic exercise minutes (CPT 39663): 30 minutes        Pain rating before treatment: 0  Pain rating after treatment: 0    Pt arrived 20 minutes late this date. He stated that he called and left a voicemail 5 minutes before his appointment saying he'd be late, but voicemail was not received. Pt was apologetic. Pt still able to get 30 min session this date.     ASSESSMENT:   Response to treatment: Pt had calm affect. Pt understood additions to his HEP AEB teach back during second set. Pt could tolerate exercises with no increase in pain. Pt has had a 10 lb increase in avg  strength since evaluation.    PLAN/RECOMMENDATIONS:   Plan for treatment: therapy treatment to continue next visit with focus on thenar and hypothenar strengthening " and ROM.  Planned interventions for next visit: continue with current treatment

## 2019-09-13 NOTE — OP THERAPY DAILY TREATMENT
"  Outpatient Occupational Therapy  DAILY TREATMENT     [facility] Occupational Therapy [address].  [address]  [place] [address]  Phone:  [phone]  Fax:  [phone]    Date: 2019    Patient: Fabio Vergara  YOB: 1955  MRN: [id number]     Time Calculation  Start time: 0930  Stop time: 1000 Time Calculation (min): 30 minutes       Chief Complaint: No chief complaint on file.    Visit #: 4    SUBJECTIVE:  \"My pinky is able to move on its own some now.\"    OBJECTIVE:      R Hand  (lb) :   Trial 1: 35  Trial 2: 35  Trial 3: 32  Av    L Hand  80 lbs    OTR applied heat pad to pt's palmar hand for 10 minutes to L hand prior to exercises.      Therapeutic Exercises (CPT 35250):     1. Velcro Hand board Pinch on thinner velcro, x4    2. Velcro Hand Board:  Strength and Wrist Flex/Ext, x4    3. Small Finger Abduction AROM (no resistance), x10    4. Small Finger Abduction (with small rubber band resistnace), x10      Time-based treatments/modalities:  Therapeutic exercise minutes (CPT 68243): 30 minutes         Pain: 0 before and after    ASSESSMENT:   Response to treatment: Pt had calm affect. Pt is making improvements in SF and thumb ROM/Strength. Pt's strength did not improve this date, however, prognosis is still good for improvement.     PLAN/RECOMMENDATIONS:   Plan for treatment: therapy treatment to continue next visit.  Planned interventions for next visit: continue with current treatment       "

## 2019-09-17 ENCOUNTER — PHYSICAL THERAPY (OUTPATIENT)
Dept: PHYSICAL THERAPY | Facility: REHABILITATION | Age: 64
End: 2019-09-17
Attending: NURSE PRACTITIONER
Payer: MEDICARE

## 2019-09-17 DIAGNOSIS — M47.812 CERVICAL SPONDYLOSIS WITHOUT MYELOPATHY: ICD-10-CM

## 2019-09-17 PROCEDURE — 97110 THERAPEUTIC EXERCISES: CPT

## 2019-09-17 NOTE — OP THERAPY DAILY TREATMENT
Outpatient Physical Therapy  DAILY TREATMENT     [facility] Outpatient Physical Therapy [place]  [address], [address]  [facility] [address]  Phone:  [phone]  Fax:  [phone]    Date: 09/17/2019    Patient: Fabio Vergara  YOB: 1955  MRN: [id number]     Time Calculation  Start time: 0930  Stop time: 1000 Time Calculation (min): 30 minutes       Chief Complaint: Post-Op Pain and Neck Problem    Visit #: 14    SUBJECTIVE:  Patient reports for the most part his neck is doing great. Notes the occasional stiffness but overall he notes no limitations.     OBJECTIVE:  Current objective measures:   Cervical Spine   Flexion: Active cervical flexion: 60deg.  Extension: Active cervical extension: 54deg.  Left lateral flexion: Active left cervical lateral flexion: 40deg.  Right lateral flexion: Active right cervical lateral flexion: 34deg.  Left rotation: Active left cervical rotation: 48deg.  Right rotation: Active right cervical rotation: 50deg              Therapeutic Exercises (CPT 09640):     1. Occipital float, x20 nods/rot/ext    2. Rows, x15    3. Pull backs, x15    4. Scap dep, x15    5. Nerve glide 1/2, x3    6. Scap pro/ret supine dowel, x15    7. Deltoid AROM, x15    8. [place], x15    9. UT stretch, [id number]      Time-based treatments/modalities:  Therapeutic exercise minutes (CPT 22370): 30 minutes       Pain rating before treatment: 0  Pain rating after treatment: 0    ASSESSMENT:   Response to treatment: Patient has responded well to therapy with an overall decrease in pain and increase in ROM. Patient will now trial a HEP for 3 weeks. Plan to discharge if no follow up within 3 weeks.     PLAN/RECOMMENDATIONS:   Plan for treatment: hold for HEP trial.  Planned interventions for next visit: HEP trial.

## 2019-09-18 ENCOUNTER — OCCUPATIONAL THERAPY (OUTPATIENT)
Dept: OCCUPATIONAL THERAPY | Facility: REHABILITATION | Age: 64
End: 2019-09-18
Attending: NURSE PRACTITIONER
Payer: MEDICARE

## 2019-09-18 DIAGNOSIS — M54.12 CERVICAL RADICULOPATHY AT C7: ICD-10-CM

## 2019-09-18 DIAGNOSIS — R29.898 RIGHT HAND WEAKNESS: ICD-10-CM

## 2019-09-18 PROCEDURE — 97110 THERAPEUTIC EXERCISES: CPT

## 2019-09-20 ENCOUNTER — OCCUPATIONAL THERAPY (OUTPATIENT)
Dept: OCCUPATIONAL THERAPY | Facility: REHABILITATION | Age: 64
End: 2019-09-20
Attending: NURSE PRACTITIONER
Payer: MEDICARE

## 2019-09-20 DIAGNOSIS — M54.12 CERVICAL RADICULOPATHY AT C7: ICD-10-CM

## 2019-09-20 DIAGNOSIS — R29.898 RIGHT HAND WEAKNESS: ICD-10-CM

## 2019-09-20 PROCEDURE — 97110 THERAPEUTIC EXERCISES: CPT

## 2019-09-20 NOTE — OP THERAPY DAILY TREATMENT
"  Outpatient Occupational Therapy  DAILY TREATMENT     [facility] Occupational Therapy [address]  [address]  [place] [address]  Phone:  [phone]  Fax:  [phone]    Date: 2019    Patient: Fabio Vergara  YOB: 1955  MRN: [id number]     Time Calculation  Start time: 1100  Stop time: 1130 Time Calculation (min): 30 minutes       Chief Complaint: Hand Problem    Visit #: 5    SUBJECTIVE:  \"I can now grab a coffee cup and hold it without it dropping.\"    \"I have carpal tunnel too. They did a nerve test. I get the symptoms mostly when I'm driving.\"    \"I won't address the dupuytrens until January or later.\"    OBJECTIVE:  Current objective measures:          R Hand  (lb) :   Trial 1: 42  Trial 2: 39  Trial 3: 40  Av       Therapeutic Exercises (CPT 55947):     1. Abduction/Adduction while heating hand with heat pad, 20 x each hand    2. Velcro board pinch (larger velcro), 10 x    3. Velcro Board wrist extension/hand  (larger velcro, hand closer to large cylinder), 10 x    4. Finger opposition , 15 x, HEP    Therapeutic Exercise Summary:  Pt's hand was fatigued and required several rest breaks throughout session. ROM in opposition decreases some after exercises.      Pain ratin    Time-based treatments/modalities:  Therapeutic exercise minutes (CPT 37247): 30 minutes        ASSESSMENT:   Response to treatment: Pt had calm affect this date. Pt admittedly did not do his exercises, however, still showed progress both in SF abduction, opposition, and . Pt is making good progress towards his goals. Pt encouraged to do HEP as part of daily routine each day.    PLAN/RECOMMENDATIONS:   Plan for treatment: therapy treatment to continue next visit.  Planned interventions for next visit: continue with current treatment       "

## 2019-09-25 ENCOUNTER — OCCUPATIONAL THERAPY (OUTPATIENT)
Dept: OCCUPATIONAL THERAPY | Facility: REHABILITATION | Age: 64
End: 2019-09-25
Attending: NURSE PRACTITIONER
Payer: MEDICARE

## 2019-09-25 DIAGNOSIS — M54.12 CERVICAL RADICULOPATHY AT C7: ICD-10-CM

## 2019-09-25 DIAGNOSIS — R29.898 RIGHT HAND WEAKNESS: ICD-10-CM

## 2019-09-25 PROCEDURE — 97110 THERAPEUTIC EXERCISES: CPT

## 2019-09-25 NOTE — OP THERAPY DAILY TREATMENT
"  Outpatient Occupational Therapy  DAILY TREATMENT     [facility] Occupational Therapy [address]  [address]  [place] [address]  Phone:  [phone]  Fax:  [phone]    Date: 2019    Patient: Fabio Vergara  YOB: 1955  MRN: [id number]     Time Calculation  Start time: 1100  Stop time: 1130 Time Calculation (min): 30 minutes       Chief Complaint: Hand Problem    Visit #: 6    SUBJECTIVE:    \"My knuckles felt tight this week.\"    \"I still have numbness and tingly feeling on the tip of my finger, but I don't have any pain with it. My whole hand used to be numb so this is an improvement.\"    OBJECTIVE:    Current objective measures:     R Hand  (lb) :   Trial 1: 40  Trial 2: 42  Trial 3: 40  Av    R 3-pt pinch (lb) :   Trial 1: 7 lb  Trial 2: 8  Trial 3: 8  Av.5    Pt arrived to appointment 10 minutes early for application of moist heat pack. Heat was applied for 15 minutes (10 prior to session and 5 in session while discussing progress, etc.) Pt performed digit extension and abd/add exercises while heat was applied with 2 short breaks.           Therapeutic Exercises (CPT 25737):     1. Tendon Gliding Exercises , 5 reps/3 sets a day, HEP    2. Rubber band Extension Exercises (now including pinky), 10 x, HEP    3. /.Wrist Flex/Ext onVelcro Board (Large velcro strip), 5 close on handle; 5 far away on handle     4. Pinch on Velcro Board (Large Velcro), 10 total    Therapeutic Exercise Summary:  Pt was compensating for /pinch on velcro board by using his shoulder muscles. With adjustments, pt was able to isolate the exercise to /pinch/wrist muscles as intended.      Time-based treatments/modalities:  Therapeutic exercise minutes (CPT 99366): 30 minutes        Pain: 0     ASSESSMENT:   Response to treatment: Pt had calm affect this date. Pt's R  strength has improved since eval, but has not improved since last sesion. Pt's R 3-point pinch strength " has remained the same since eval . Pt admitted this session that he has not been doing putty exercises due to inconvenience of the putty. Talked about how the putty would be best, but that squeezing a stress ball would be better than no /pinch exercises. Pt's tendon gliding HEP has increased intrinsic minus ROM, however, pt is still not able to get a full claw hand. Pt's opposition to each finger has also improved as he is able to get his thumb to his pinky. Pinky and Thumb abduction have improved as well. Pt tends to compensate for poor pinch/ and needs cues to use good form during velcro exercises to isolate the intended muscles.    PLAN/RECOMMENDATIONS:   Plan for treatment: therapy treatment to continue next visit.  Planned interventions for next visit: continue with current treatment

## 2019-09-27 ENCOUNTER — OCCUPATIONAL THERAPY (OUTPATIENT)
Dept: OCCUPATIONAL THERAPY | Facility: REHABILITATION | Age: 64
End: 2019-09-27
Attending: NURSE PRACTITIONER
Payer: MEDICARE

## 2019-09-27 DIAGNOSIS — R29.898 RIGHT HAND WEAKNESS: ICD-10-CM

## 2019-09-27 DIAGNOSIS — M54.12 CERVICAL RADICULOPATHY AT C7: ICD-10-CM

## 2019-09-27 PROCEDURE — 97110 THERAPEUTIC EXERCISES: CPT

## 2019-09-27 NOTE — OP THERAPY DAILY TREATMENT
"  Outpatient Occupational Therapy  DAILY TREATMENT     [facility] Occupational Therapy [address]  [address]  [place] [address]  Phone:  [phone]  Fax:  [phone]    Date: 2019    Patient: Fabio Vergara  YOB: 1955  MRN: [id number]     Time Calculation  Start time: 1100  Stop time: 1130 Time Calculation (min): 30 minutes       Chief Complaint: Hand Problem    Visit #: 7    SUBJECTIVE:  \"I can hold my coffee can now.\" \"I can squeeze the bottle in the shower too.\"    OBJECTIVE:  Current objective measures: *     R Hand  (lb) :   Trial 1: 38  Trial 2: 39  Trial 3: 37   Av    Pain: 0/10    Pt came in to session 15 minutes early for moist heat prior to session while doing finger abduction, adduction, and extension exercises.      Therapeutic Exercises (CPT 74832):     1. Velcro Board Wrist Flex/Ext, 4 reps, Pt had much more difficulty this week due to isolating just wrist/hand muscles as opposed to using shoulder muscles.    2. Crumpling Paper and Uncrumpling Paper, 2x, For purpose of  and ROM. HEP    3. MCP and PIP Joint PROM, x10 each finger      Time-based treatments/modalities:  Therapeutic exercise minutes (CPT 12408): 30 minutes          ASSESSMENT:   Response to treatment: Pt had calm affect this date. Pt is having increases in occupational performance AEB being able to hold his coffee and being able to clean the shower with less assist from L hand. Pt stated she has been doing the theraputty exercises twice a day. Pt did have increased AROM of several fingers. Pt is especially limited with R MF MCP joint. Pt's hands were fatigued after this session.    PLAN/RECOMMENDATIONS:   Plan for treatment: therapy treatment to continue next visit.  Planned interventions for next visit: continue with current treatment       "

## 2019-10-01 ENCOUNTER — OCCUPATIONAL THERAPY (OUTPATIENT)
Dept: OCCUPATIONAL THERAPY | Facility: REHABILITATION | Age: 64
End: 2019-10-01
Attending: NURSE PRACTITIONER
Payer: MEDICARE

## 2019-10-01 DIAGNOSIS — M54.12 CERVICAL RADICULOPATHY AT C7: ICD-10-CM

## 2019-10-01 DIAGNOSIS — R29.898 RIGHT HAND WEAKNESS: ICD-10-CM

## 2019-10-01 PROCEDURE — 97110 THERAPEUTIC EXERCISES: CPT

## 2019-10-01 PROCEDURE — 97530 THERAPEUTIC ACTIVITIES: CPT

## 2019-10-01 NOTE — OP THERAPY DAILY TREATMENT
"  Outpatient Occupational Therapy  DAILY TREATMENT     [facility] Occupational Therapy [address]  [address]  [place] [address]  Phone:  [phone]  Fax:  [phone]    Date: 10/01/2019    Patient: Fabio Vergara  YOB: 1955  MRN: [id number]     Time Calculation  Start time: 1100  Stop time: 1130 Time Calculation (min): 30 minutes       Chief Complaint: Hand Problem    Visit #: 8    SUBJECTIVE:  \"I'm able to squeeze this spray in the shower now.\"    OBJECTIVE:  Current objective measures:     R Hand  (lb) :   Trial 1: 41  Trial 2: 41  Trial 3: 41  Av          Therapeutic Exercises (CPT 25506):     1. Digit Flex/Ext with MCP and PIP mobs, Each Digit, 5x each direction    2. Crumpling/Uncrompling Paper, 3x    3. Tendon Glide Exercises, 2 full rotation of the 5 movements, HEP    Therapeutic Treatments and Modalities:    1. Therapeutic Activities (CPT 55931), Unfolding socks and hanging/unhanging them with varying resistance clothespins , 10 different clothespins    Time-based treatments/modalities:  Therapeutic exercise minutes (CPT 74218): 15 minutes  Therapeutic activity minutes (CPT 75631): 15 minutes          ASSESSMENT:   Response to treatment: Pt had calm affect this date. Pt made 3 lb avg  strength increase this date. 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 Pt radial side of hand has good mobility. Pt has more stiffness on ulnar side. Pt's pinky movement continues to improve. Pt to continue to address weakness and stiffness.      PLAN/RECOMMENDATIONS:   Plan for treatment: therapy treatment to continue next visit.  Planned interventions for next visit: continue with current treatment       "

## 2019-10-03 ENCOUNTER — OCCUPATIONAL THERAPY (OUTPATIENT)
Dept: OCCUPATIONAL THERAPY | Facility: REHABILITATION | Age: 64
End: 2019-10-03
Attending: NURSE PRACTITIONER
Payer: MEDICARE

## 2019-10-03 DIAGNOSIS — M54.12 CERVICAL RADICULOPATHY AT C7: ICD-10-CM

## 2019-10-03 DIAGNOSIS — R29.898 RIGHT HAND WEAKNESS: ICD-10-CM

## 2019-10-03 PROCEDURE — 97530 THERAPEUTIC ACTIVITIES: CPT

## 2019-10-03 PROCEDURE — 97110 THERAPEUTIC EXERCISES: CPT

## 2019-10-03 NOTE — OP THERAPY DAILY TREATMENT
"  Outpatient Occupational Therapy  DAILY TREATMENT     [facility] Occupational [address]  [address]  [place] [address]  Phone:  [phone]  Fax:  [phone]    Date: 10/03/2019    Patient: Fabio Vergara  YOB: 1955  MRN: [id number]     Time Calculation  Start time: 1100  Stop time: 1130 Time Calculation (min): 30 minutes       Chief Complaint: Hand Problem    Visit #: 9    SUBJECTIVE:    \"I'll keep doing the exercises even after we are done.\"    OBJECTIVE:  Current objective measures:     R Hand  (lb) :   Trial 1: 40  Trial 2: 38  Trial 3: 40  Av    Key Pinch: 7, 8, 10     3 pt Pinch: 7, 8, 9        Therapeutic Exercises (CPT 74020):     1. LF MCP Flex/Ext PROM, 10x/ each direction    2. LF PIP Flex/Ext PROM, 10x/each direction    3. LF DIP Flex/Ext PROM,  5x/each direction    4. Velcro Board- LF and Thumb strengthening, 2x each , Pt identified this exercises as \"hard\"    Therapeutic Treatments and Modalities:    1. Therapeutic Activities (CPT 10889), Moist Heat Pad applied during Education on Paraffin Bath     Therapeutic Treatments and Modalities Summary: OTR/L had intended on doing paraffin treatment with pt, however, pt had small wound on L hand thus not able to do paraffin bath. Pt has some familiarity with paraffin bath and states he owns one because of his wife's old wrist injury. Pt able to teach back paraffin bath technique. Paraffin will be trialed next week if pt's hand is healed.    Time-based treatments/modalities:  Therapeutic exercise minutes (CPT 42774): 15 minutes  Therapeutic activity minutes (CPT 11019): 15 minutes      ASSESSMENT:   Response to treatment: Pt had positive affect. Pt continues to have ROM improvements. Pt is able to describe activities he can now do due to increased , however, pt's hands seem too fatigued at start of session due to labor he does at home. Pt stated he will try to give his hands some rest before next " session. Pt feels comfortable with his HEP and intends to continue it once discharged. Next session is his final appointment for this referral. Pt also has CTS which he may get another referral for.    PLAN/RECOMMENDATIONS:   Plan for treatment: therapy treatment to continue next visit.  Planned interventions for next visit: continue with current treatment

## 2019-10-08 ENCOUNTER — PATIENT OUTREACH (OUTPATIENT)
Dept: HEALTH INFORMATION MANAGEMENT | Facility: OTHER | Age: 64
End: 2019-10-08

## 2019-10-08 NOTE — PROGRESS NOTES
1. HealthConnect Verified: yes    2. Verify PCP: yes    3. Review and add  to Care Team: yes    4. WebIZ Checked & Epic Updated:No    5. Reviewed/Updated the following with patient:       •   Communication Preference Obtained? YES  • MyChart Activation:No       •   E-Mail Address Obtained? NO patient does not have one        •   Appointment Day and Time Preferences? YES       •   Preferred Pharmacy? YES       •   Preferred Lab? YES

## 2019-10-10 ENCOUNTER — OCCUPATIONAL THERAPY (OUTPATIENT)
Dept: OCCUPATIONAL THERAPY | Facility: REHABILITATION | Age: 64
End: 2019-10-10
Attending: NURSE PRACTITIONER
Payer: MEDICARE

## 2019-10-10 DIAGNOSIS — M54.12 CERVICAL RADICULOPATHY AT C7: ICD-10-CM

## 2019-10-10 DIAGNOSIS — R29.898 RIGHT HAND WEAKNESS: ICD-10-CM

## 2019-10-10 PROCEDURE — 97530 THERAPEUTIC ACTIVITIES: CPT

## 2019-10-10 PROCEDURE — 97110 THERAPEUTIC EXERCISES: CPT

## 2019-10-10 NOTE — OP THERAPY DISCHARGE SUMMARY
Outpatient Occupational Therapy  DISCHARGE SUMMARY NOTE    [facility] Occupational [address]  [address]  [place] [address]  Phone:  [phone]  Fax:  [phone]    Date of Visit: 10/10/2019    Patient: Fabio Vergara  YOB: 1955  MRN: [id number]     Referring Provider: TANISHA [place] [address]   Referring Diagnosis: Radiculopathy, cervical region [M54.12];Other symptoms and signs involving the musculoskeletal system [R29.898]     Occupational Therapy Occurrence Codes    Date of onset of impairment:  6/8/19   Date of occupational therapy care plan established or reviewed:  8/28/19   Date of occupational therapy treatment started:  8/28/19          Functional Assessment Used      Your patient is being discharged from Occupational Therapy with the following comments:   · Goals partially met    Pt will independently perform his ADLs, IADLs, and recreational activities with minimal use of compensatory strategies Goal Met  Pt will increase his right  strength >= 5 lbs for opening jars and containers Pt has had increase, however, he does not consistently have 5 lbs increase. Goal partially met.  Pt will increase his right lateral and 3-pt pinch strength >= 3 lbs to manipulate home maintenance tools Pt increased pinch strength by 2 lbs. Goal partially met.  Pt will be independent HEP for stretching, strengthening and sensory re-education Goal Met.  Pt will score <= 15% Disability on the Quick Dash Goal Met      Comments:  Pt has had increase in occupational performance and satisfaction since starting treatment. Pt reports new activities he can participate in each week.     Limitations Remaining:  Pt continues to have  and pinch strength below the norm for his age.     Recommendations:  Pt to continue HEP for increased hand ROM and strength for increased occupational performance. OTR explained to pt that he is welcome to return  with a referral if he notices decline in occupational performance.     Estefany Carbajal MS,OTR/L    Date: 10/10/2019

## 2019-10-10 NOTE — OP THERAPY DAILY TREATMENT
"  Outpatient Occupational Therapy  DAILY TREATMENT     [facility] Occupational [address]  [address]  [place] [address]  Phone:  [phone]  Fax:  [phone]    Date: 10/10/2019    Patient: Fabio Vergara  YOB: 1955  MRN: [id number]     Time Calculation    Start time: 1100  Stop time: 1130 Time Calculation (min): 30 minutes       Chief Complaint: No chief complaint on file.    Visit #: 10    SUBJECTIVE:    \"Last year at this time, I couldn't do nearly as much as I am doing now.\"    OBJECTIVE:  Current objective measures:       R Hand  (lb) :   Trial 1: 41  Trial 2: 40  Trial 3: 40  Av    Key Pinch (lb) :   Trial 1: 7  Trial 2: 9  Trial 3: 10  Av    3 pt pinch (lb) :   Trial 1: 9  Trial 2: 10  Trial 3: 9  Av    OT Functional Assessment Tool Used: QuickDA  OT Functional Assessment Score: 11.6     Therapeutic Exercises (CPT 90597):     1. 5 hand exercises for hand stiffness, 3 sets      Time-based treatments/modalities:  Therapeutic exercise minutes (CPT 95492): 15 minutes  Therapeutic activity minutes (CPT 60650): 15 minutes        Pain Ratin     ASSESSMENT:   Response to treatment: Pt has had increased occupational performance and satisfaction. Each week pt has talked about another activity he is proud to do with his increased ROM and strength. Pt is independent in HEP which he will continue. OTR explained to pt that he is welcome to come back with a referral from his physician if he notices decline in occupational performance.    PLAN/RECOMMENDATIONS:   Plan for treatment: Pt to discharge at this time due to reaching maximum benefit at this time. Pt to continue HEP on to continue to increase  strength and hand ROM.     "

## 2019-11-05 ENCOUNTER — OFFICE VISIT (OUTPATIENT)
Dept: URGENT CARE | Facility: PHYSICIAN GROUP | Age: 64
End: 2019-11-05
Payer: MEDICARE

## 2019-11-05 VITALS
DIASTOLIC BLOOD PRESSURE: 78 MMHG | HEART RATE: 73 BPM | WEIGHT: 179 LBS | RESPIRATION RATE: 16 BRPM | HEIGHT: 70 IN | OXYGEN SATURATION: 97 % | SYSTOLIC BLOOD PRESSURE: 124 MMHG | BODY MASS INDEX: 25.62 KG/M2 | TEMPERATURE: 98.3 F

## 2019-11-05 DIAGNOSIS — S61.211A LACERATION OF LEFT INDEX FINGER WITHOUT FOREIGN BODY WITHOUT DAMAGE TO NAIL, INITIAL ENCOUNTER: ICD-10-CM

## 2019-11-05 PROCEDURE — 12001 RPR S/N/AX/GEN/TRNK 2.5CM/<: CPT | Mod: F1 | Performed by: NURSE PRACTITIONER

## 2019-11-05 ASSESSMENT — ENCOUNTER SYMPTOMS
CHILLS: 0
FEVER: 0
ROS SKIN COMMENTS: LACERATION LEFT INDEX FINGER
WHEEZING: 0
SHORTNESS OF BREATH: 0
PALPITATIONS: 0

## 2019-11-06 NOTE — PROGRESS NOTES
"Subjective:   Fabio Vergara is a 64 y.o. male who presents for Laceration (L index finger x1hr)        Laceration    The incident occurred 1 to 3 hours ago. The laceration is located on the left hand. Size: 1.5cm in length. Injury mechanism: States was using hedge cutter and accidentally got cut on the blade. Denies numbness or tingling. The pain is moderate. The pain has been constant since onset. He reports no foreign bodies present. His tetanus status is UTD.   Last Tetanus 2017  Patient already taking Keflex for dental infection.    Accompanied by wife in office.    Review of Systems   Constitutional: Negative for chills and fever.   Respiratory: Negative for shortness of breath and wheezing.    Cardiovascular: Negative for chest pain and palpitations.   Skin:        Laceration left index finger     Patient's PMH, SocHx, SurgHx, FamHx, Drug allergies and medications reviewed.     Objective:   /78 (BP Location: Right arm, Patient Position: Sitting, BP Cuff Size: Adult)   Pulse 73   Temp 36.8 °C (98.3 °F) (Temporal)   Resp 16   Ht 1.778 m (5' 10\")   Wt 81.2 kg (179 lb)   SpO2 97%   BMI 25.68 kg/m²   Physical Exam  Constitutional:       General: He is not in acute distress.     Appearance: He is well-developed. He is not diaphoretic.   HENT:      Head: Normocephalic.      Right Ear: Hearing normal.      Left Ear: Hearing normal.      Nose: Nose normal.   Eyes:      General: Lids are normal.      Conjunctiva/sclera: Conjunctivae normal.      Pupils: Pupils are equal, round, and reactive to light.   Neck:      Musculoskeletal: Normal range of motion.   Cardiovascular:      Rate and Rhythm: Normal rate and regular rhythm.      Heart sounds: Normal heart sounds.   Pulmonary:      Effort: Pulmonary effort is normal. No respiratory distress.      Breath sounds: Normal breath sounds. No decreased breath sounds or wheezing.   Skin:     General: Skin is warm.      Capillary Refill: Capillary refill takes " less than 2 seconds.      Findings: Laceration present. No rash.      Comments: 1.5 cm laceration with minimal bleeding to the palmar surface to the DIP of the left index finger.   Neurological:      Mental Status: He is alert.   Psychiatric:         Speech: Speech normal.         Behavior: Behavior normal.         Thought Content: Thought content normal.         Judgment: Judgment normal.           Assessment/Plan:   Assessment    1. Laceration of left index finger without foreign body without damage to nail, initial encounter    Procedure: Laceration Repair  -Risks including bleeding, nerve damage, infection, and poor cosmetic outcome discussed. Benefits and alternatives discussed.   -Clean technique with sterile instruments   -Closed with Dermabond with good wound approximation  -Dressing placed  -Patient tolerated well    Continue Keflex as prior    Differential diagnosis, natural history, supportive care, and indications for immediate follow-up discussed.     **Please note that all invasive procedures during this visit were performed by myself and/or the Medical Assistant under the supervision of the PA or MD in office**

## 2019-12-05 ENCOUNTER — HOSPITAL ENCOUNTER (OUTPATIENT)
Dept: RADIOLOGY | Facility: MEDICAL CENTER | Age: 64
End: 2019-12-05
Attending: NURSE PRACTITIONER
Payer: MEDICARE

## 2019-12-05 DIAGNOSIS — M47.22 CERVICAL SPONDYLOSIS WITH RADICULOPATHY: ICD-10-CM

## 2019-12-05 PROCEDURE — 72141 MRI NECK SPINE W/O DYE: CPT

## 2019-12-05 PROCEDURE — 72050 X-RAY EXAM NECK SPINE 4/5VWS: CPT

## 2019-12-10 ENCOUNTER — TELEPHONE (OUTPATIENT)
Dept: PHYSICAL THERAPY | Facility: REHABILITATION | Age: 64
End: 2019-12-10

## 2019-12-10 NOTE — OP THERAPY DISCHARGE SUMMARY
Outpatient Physical Therapy  DISCHARGE SUMMARY NOTE      [facility]  [address], [address]  [place] [facility] [address]  Phone:  [phone]  Fax:  [phone]    Date of Visit: 12/10/2019    Patient: Fabio Vergara  YOB: 1955  MRN: [id number]     Referring Provider: PAN Ho   Referring Diagnosis Spondylosis without myelopathy or radiculopathy, cervical region     Physical Therapy Occurrence Codes    Date of onset of impairment:  6/8/19   Date physical therapy care plan established or reviewed:  6/26/19   Date physical therapy treatment started:  6/26/19            Your patient is being discharged from Physical Therapy with the following comments:   · Goals partially met    Comments:  Admin discharge secondary to lapse in POC     Limitations Remaining:  Admin discharge secondary to lapse in POC    Recommendations:  Admin discharge secondary to lapse in POC    Nasim Henderson, PT, DPT    Date: 12/10/2019

## 2020-01-05 ENCOUNTER — HOSPITAL ENCOUNTER (INPATIENT)
Facility: MEDICAL CENTER | Age: 65
LOS: 4 days | DRG: 378 | End: 2020-01-09
Attending: EMERGENCY MEDICINE | Admitting: INTERNAL MEDICINE
Payer: MEDICARE

## 2020-01-05 ENCOUNTER — APPOINTMENT (OUTPATIENT)
Dept: RADIOLOGY | Facility: MEDICAL CENTER | Age: 65
DRG: 378 | End: 2020-01-05
Attending: EMERGENCY MEDICINE
Payer: MEDICARE

## 2020-01-05 DIAGNOSIS — K92.1 MELENA: ICD-10-CM

## 2020-01-05 DIAGNOSIS — K92.2 UPPER GI BLEED: ICD-10-CM

## 2020-01-05 PROBLEM — D64.9 ANEMIA: Status: ACTIVE | Noted: 2020-01-05

## 2020-01-05 PROBLEM — R00.0 TACHYCARDIA: Status: ACTIVE | Noted: 2020-01-05

## 2020-01-05 LAB
ABO + RH BLD: NORMAL
ABO GROUP BLD: NORMAL
ALBUMIN SERPL BCP-MCNC: 3.4 G/DL (ref 3.2–4.9)
ALBUMIN/GLOB SERPL: 1.5 G/DL
ALP SERPL-CCNC: 28 U/L (ref 30–99)
ALT SERPL-CCNC: 10 U/L (ref 2–50)
ANION GAP SERPL CALC-SCNC: 8 MMOL/L (ref 0–11.9)
APTT PPP: 26.4 SEC (ref 24.7–36)
AST SERPL-CCNC: 9 U/L (ref 12–45)
BARCODED ABORH UBTYP: 600
BARCODED ABORH UBTYP: 9500
BARCODED ABORH UBTYP: 9500
BARCODED PRD CODE UBPRD: NORMAL
BARCODED UNIT NUM UBUNT: NORMAL
BASOPHILS # BLD AUTO: 0.3 % (ref 0–1.8)
BASOPHILS # BLD: 0.03 K/UL (ref 0–0.12)
BILIRUB SERPL-MCNC: 0.6 MG/DL (ref 0.1–1.5)
BLD GP AB SCN SERPL QL: NORMAL
BUN SERPL-MCNC: 44 MG/DL (ref 8–22)
CALCIUM SERPL-MCNC: 8.1 MG/DL (ref 8.5–10.5)
CHLORIDE SERPL-SCNC: 99 MMOL/L (ref 96–112)
CO2 SERPL-SCNC: 24 MMOL/L (ref 20–33)
COMPONENT R 8504R: NORMAL
CREAT SERPL-MCNC: 0.7 MG/DL (ref 0.5–1.4)
EKG IMPRESSION: NORMAL
EOSINOPHIL # BLD AUTO: 0.02 K/UL (ref 0–0.51)
EOSINOPHIL NFR BLD: 0.2 % (ref 0–6.9)
ERYTHROCYTE [DISTWIDTH] IN BLOOD BY AUTOMATED COUNT: 49.3 FL (ref 35.9–50)
GLOBULIN SER CALC-MCNC: 2.2 G/DL (ref 1.9–3.5)
GLUCOSE BLD-MCNC: 129 MG/DL (ref 65–99)
GLUCOSE SERPL-MCNC: 154 MG/DL (ref 65–99)
HCT VFR BLD AUTO: 23.9 % (ref 42–52)
HGB BLD-MCNC: 8.5 G/DL (ref 14–18)
IMM GRANULOCYTES # BLD AUTO: 0.03 K/UL (ref 0–0.11)
IMM GRANULOCYTES NFR BLD AUTO: 0.3 % (ref 0–0.9)
INR PPP: 1.1 (ref 0.87–1.13)
LIPASE SERPL-CCNC: 8 U/L (ref 11–82)
LYMPHOCYTES # BLD AUTO: 2.8 K/UL (ref 1–4.8)
LYMPHOCYTES NFR BLD: 28.4 % (ref 22–41)
MAGNESIUM SERPL-MCNC: 1.5 MG/DL (ref 1.5–2.5)
MCH RBC QN AUTO: 35.4 PG (ref 27–33)
MCHC RBC AUTO-ENTMCNC: 35.6 G/DL (ref 33.7–35.3)
MCV RBC AUTO: 99.6 FL (ref 81.4–97.8)
MONOCYTES # BLD AUTO: 0.89 K/UL (ref 0–0.85)
MONOCYTES NFR BLD AUTO: 9 % (ref 0–13.4)
NEUTROPHILS # BLD AUTO: 6.08 K/UL (ref 1.82–7.42)
NEUTROPHILS NFR BLD: 61.8 % (ref 44–72)
NRBC # BLD AUTO: 0 K/UL
NRBC BLD-RTO: 0 /100 WBC
PLATELET # BLD AUTO: 223 K/UL (ref 164–446)
PMV BLD AUTO: 10.2 FL (ref 9–12.9)
POTASSIUM SERPL-SCNC: 3.6 MMOL/L (ref 3.6–5.5)
PRODUCT TYPE UPROD: NORMAL
PROT SERPL-MCNC: 5.6 G/DL (ref 6–8.2)
PROTHROMBIN TIME: 14.4 SEC (ref 12–14.6)
RBC # BLD AUTO: 2.4 M/UL (ref 4.7–6.1)
RH BLD: NORMAL
SODIUM SERPL-SCNC: 131 MMOL/L (ref 135–145)
UNIT STATUS USTAT: NORMAL
WBC # BLD AUTO: 9.9 K/UL (ref 4.8–10.8)

## 2020-01-05 PROCEDURE — 99285 EMERGENCY DEPT VISIT HI MDM: CPT

## 2020-01-05 PROCEDURE — 700111 HCHG RX REV CODE 636 W/ 250 OVERRIDE (IP): Performed by: INTERNAL MEDICINE

## 2020-01-05 PROCEDURE — 99223 1ST HOSP IP/OBS HIGH 75: CPT | Performed by: INTERNAL MEDICINE

## 2020-01-05 PROCEDURE — 700102 HCHG RX REV CODE 250 W/ 637 OVERRIDE(OP): Performed by: INTERNAL MEDICINE

## 2020-01-05 PROCEDURE — 96375 TX/PRO/DX INJ NEW DRUG ADDON: CPT

## 2020-01-05 PROCEDURE — 700102 HCHG RX REV CODE 250 W/ 637 OVERRIDE(OP): Performed by: FAMILY MEDICINE

## 2020-01-05 PROCEDURE — 85025 COMPLETE CBC W/AUTO DIFF WBC: CPT

## 2020-01-05 PROCEDURE — 93005 ELECTROCARDIOGRAM TRACING: CPT | Performed by: EMERGENCY MEDICINE

## 2020-01-05 PROCEDURE — 85610 PROTHROMBIN TIME: CPT

## 2020-01-05 PROCEDURE — 86901 BLOOD TYPING SEROLOGIC RH(D): CPT

## 2020-01-05 PROCEDURE — 80053 COMPREHEN METABOLIC PANEL: CPT

## 2020-01-05 PROCEDURE — 71045 X-RAY EXAM CHEST 1 VIEW: CPT

## 2020-01-05 PROCEDURE — 83690 ASSAY OF LIPASE: CPT

## 2020-01-05 PROCEDURE — 96374 THER/PROPH/DIAG INJ IV PUSH: CPT

## 2020-01-05 PROCEDURE — A9270 NON-COVERED ITEM OR SERVICE: HCPCS | Performed by: FAMILY MEDICINE

## 2020-01-05 PROCEDURE — 85730 THROMBOPLASTIN TIME PARTIAL: CPT

## 2020-01-05 PROCEDURE — 86900 BLOOD TYPING SEROLOGIC ABO: CPT

## 2020-01-05 PROCEDURE — 82962 GLUCOSE BLOOD TEST: CPT | Mod: 91

## 2020-01-05 PROCEDURE — 700101 HCHG RX REV CODE 250: Performed by: INTERNAL MEDICINE

## 2020-01-05 PROCEDURE — A9270 NON-COVERED ITEM OR SERVICE: HCPCS | Performed by: INTERNAL MEDICINE

## 2020-01-05 PROCEDURE — 700105 HCHG RX REV CODE 258: Performed by: INTERNAL MEDICINE

## 2020-01-05 PROCEDURE — 770020 HCHG ROOM/CARE - TELE (206)

## 2020-01-05 PROCEDURE — 83735 ASSAY OF MAGNESIUM: CPT

## 2020-01-05 PROCEDURE — 36415 COLL VENOUS BLD VENIPUNCTURE: CPT

## 2020-01-05 PROCEDURE — 86850 RBC ANTIBODY SCREEN: CPT

## 2020-01-05 RX ORDER — ONDANSETRON 2 MG/ML
4 INJECTION INTRAMUSCULAR; INTRAVENOUS EVERY 4 HOURS PRN
Status: DISCONTINUED | OUTPATIENT
Start: 2020-01-05 | End: 2020-01-09 | Stop reason: HOSPADM

## 2020-01-05 RX ORDER — LORAZEPAM 0.5 MG/1
0.5 TABLET ORAL ONCE
Status: COMPLETED | OUTPATIENT
Start: 2020-01-05 | End: 2020-01-05

## 2020-01-05 RX ORDER — AMOXICILLIN 250 MG
2 CAPSULE ORAL 2 TIMES DAILY
Status: DISCONTINUED | OUTPATIENT
Start: 2020-01-05 | End: 2020-01-09 | Stop reason: HOSPADM

## 2020-01-05 RX ORDER — THIAMINE MONONITRATE (VIT B1) 100 MG
100 TABLET ORAL DAILY
Status: COMPLETED | OUTPATIENT
Start: 2020-01-05 | End: 2020-01-07

## 2020-01-05 RX ORDER — MORPHINE SULFATE 4 MG/ML
4 INJECTION, SOLUTION INTRAMUSCULAR; INTRAVENOUS
Status: DISCONTINUED | OUTPATIENT
Start: 2020-01-05 | End: 2020-01-09 | Stop reason: HOSPADM

## 2020-01-05 RX ORDER — ACETAMINOPHEN 325 MG/1
650 TABLET ORAL EVERY 6 HOURS PRN
Status: DISCONTINUED | OUTPATIENT
Start: 2020-01-05 | End: 2020-01-09 | Stop reason: HOSPADM

## 2020-01-05 RX ORDER — POLYETHYLENE GLYCOL 3350 17 G/17G
1 POWDER, FOR SOLUTION ORAL
Status: DISCONTINUED | OUTPATIENT
Start: 2020-01-05 | End: 2020-01-09 | Stop reason: HOSPADM

## 2020-01-05 RX ORDER — BISACODYL 10 MG
10 SUPPOSITORY, RECTAL RECTAL
Status: DISCONTINUED | OUTPATIENT
Start: 2020-01-05 | End: 2020-01-09 | Stop reason: HOSPADM

## 2020-01-05 RX ORDER — SODIUM CHLORIDE 9 MG/ML
1000 INJECTION, SOLUTION INTRAVENOUS ONCE
Status: COMPLETED | OUTPATIENT
Start: 2020-01-05 | End: 2020-01-05

## 2020-01-05 RX ORDER — OXYCODONE HYDROCHLORIDE 10 MG/1
10 TABLET ORAL
Status: DISCONTINUED | OUTPATIENT
Start: 2020-01-05 | End: 2020-01-09 | Stop reason: HOSPADM

## 2020-01-05 RX ORDER — OXYCODONE HYDROCHLORIDE 5 MG/1
5 TABLET ORAL
Status: DISCONTINUED | OUTPATIENT
Start: 2020-01-05 | End: 2020-01-09 | Stop reason: HOSPADM

## 2020-01-05 RX ORDER — PRAVASTATIN SODIUM 20 MG
40 TABLET ORAL
Status: DISCONTINUED | OUTPATIENT
Start: 2020-01-05 | End: 2020-01-09 | Stop reason: HOSPADM

## 2020-01-05 RX ORDER — LORAZEPAM 2 MG/ML
0.5 INJECTION INTRAMUSCULAR ONCE
Status: COMPLETED | OUTPATIENT
Start: 2020-01-05 | End: 2020-01-05

## 2020-01-05 RX ORDER — METOPROLOL TARTRATE 1 MG/ML
2.5 INJECTION, SOLUTION INTRAVENOUS ONCE
Status: COMPLETED | OUTPATIENT
Start: 2020-01-05 | End: 2020-01-05

## 2020-01-05 RX ORDER — ONDANSETRON 4 MG/1
4 TABLET, ORALLY DISINTEGRATING ORAL EVERY 4 HOURS PRN
Status: DISCONTINUED | OUTPATIENT
Start: 2020-01-05 | End: 2020-01-09 | Stop reason: HOSPADM

## 2020-01-05 RX ORDER — SODIUM CHLORIDE AND POTASSIUM CHLORIDE 150; 900 MG/100ML; MG/100ML
INJECTION, SOLUTION INTRAVENOUS CONTINUOUS
Status: DISCONTINUED | OUTPATIENT
Start: 2020-01-05 | End: 2020-01-06

## 2020-01-05 RX ORDER — METOCLOPRAMIDE HYDROCHLORIDE 5 MG/ML
10 INJECTION INTRAMUSCULAR; INTRAVENOUS ONCE
Status: COMPLETED | OUTPATIENT
Start: 2020-01-05 | End: 2020-01-05

## 2020-01-05 RX ORDER — METOPROLOL SUCCINATE 50 MG/1
50 TABLET, EXTENDED RELEASE ORAL
Status: DISCONTINUED | OUTPATIENT
Start: 2020-01-05 | End: 2020-01-09 | Stop reason: HOSPADM

## 2020-01-05 RX ADMIN — METOPROLOL SUCCINATE 50 MG: 50 TABLET, EXTENDED RELEASE ORAL at 16:31

## 2020-01-05 RX ADMIN — METOCLOPRAMIDE 10 MG: 5 INJECTION, SOLUTION INTRAMUSCULAR; INTRAVENOUS at 16:27

## 2020-01-05 RX ADMIN — SODIUM CHLORIDE 1000 ML: 9 INJECTION, SOLUTION INTRAVENOUS at 16:04

## 2020-01-05 RX ADMIN — PRAVASTATIN SODIUM 40 MG: 20 TABLET ORAL at 17:57

## 2020-01-05 RX ADMIN — Medication 100 MG: at 16:32

## 2020-01-05 RX ADMIN — POTASSIUM CHLORIDE AND SODIUM CHLORIDE: 900; 150 INJECTION, SOLUTION INTRAVENOUS at 18:09

## 2020-01-05 RX ADMIN — LORAZEPAM 0.5 MG: 0.5 TABLET ORAL at 22:41

## 2020-01-05 RX ADMIN — METOPROLOL TARTRATE 2.5 MG: 5 INJECTION, SOLUTION INTRAVENOUS at 16:33

## 2020-01-05 RX ADMIN — LORAZEPAM 0.5 MG: 2 INJECTION INTRAMUSCULAR; INTRAVENOUS at 16:29

## 2020-01-05 ASSESSMENT — ENCOUNTER SYMPTOMS
NEUROLOGICAL NEGATIVE: 1
FOCAL WEAKNESS: 0
BRUISES/BLEEDS EASILY: 0
WEIGHT LOSS: 0
DIARRHEA: 1
RESPIRATORY NEGATIVE: 1
MUSCULOSKELETAL NEGATIVE: 1
DIZZINESS: 1
SPUTUM PRODUCTION: 0
EYES NEGATIVE: 1
CHILLS: 0
BLOOD IN STOOL: 0
HEMOPTYSIS: 0
NECK PAIN: 0
HEADACHES: 0
POLYDIPSIA: 0
VOMITING: 1
PALPITATIONS: 0
ABDOMINAL PAIN: 1
ORTHOPNEA: 0
NERVOUS/ANXIOUS: 0
COUGH: 0
BACK PAIN: 0
BLURRED VISION: 0
NAUSEA: 1
FLANK PAIN: 0
HEARTBURN: 0
PSYCHIATRIC NEGATIVE: 1
PHOTOPHOBIA: 0
CARDIOVASCULAR NEGATIVE: 1
SPEECH CHANGE: 0
DOUBLE VISION: 0
FEVER: 0
CONSTIPATION: 0
TREMORS: 0
DIARRHEA: 0
VOMITING: 0
HALLUCINATIONS: 0

## 2020-01-05 ASSESSMENT — LIFESTYLE VARIABLES: SUBSTANCE_ABUSE: 0
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
## 2020-01-05 NOTE — ASSESSMENT & PLAN NOTE
This is blood loss anemia.  Hemoglobin 6.4 this morning s/p transfusion  Will give her another unit  Monitor h/h q6h and monitor closely  1/8: I ordered IV iron dextran today.

## 2020-01-05 NOTE — ASSESSMENT & PLAN NOTE
Will hold metformin  We will start sliding scale  Blood sugar is fairly controlled  Post recent A1c 5.7 (4 months ago)
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
## 2020-01-05 NOTE — ASSESSMENT & PLAN NOTE
monitor carefully on telemetry and h.h q6extkb  -GI consulted  EGD impressions:   1. 5mm ulcer in the antrum treated with 3cc of 1:10,000 Epinephrine injection, Goldprobe cautery and placement of 3 hemoclips.      Recommendations:   1. Continue IV protonix drip  2. Continue monitor H/H  3. Transfuse to HgB of at least 7 but given patient's history of possible CAD, would potentially aim for 8  4. Clear liquid diet in 4 hours if no significant change.  5. Carafate.  6. If significant recurrence of bleeding, may need repeat EGD.    1/7: Continue to monitor H&H periodically.  Continue PPI drip as patient is high risk for rebleed.  1/8: H&H has been stable.  Switch to oral PPI.  No further melena.  Advance diet as tolerated.

## 2020-01-05 NOTE — ASSESSMENT & PLAN NOTE
EKG showed sinus tachycardia with diffuse ischemia.  Patient denies chest pain  Likely secondary to blood loss  Plan to continue IV fluids, trend hemoglobin.  We will plan for RBC transfusion if hemoglobin drops below 8.0

## 2020-01-05 NOTE — ED TRIAGE NOTES
BIB [facility] to R1 w/ c/o dizziness & black stools x 6 episodes.  Pt tachycardic on arrival.  Denies pain.  IV established pta, blood drawn & sent.  NS 1L infusing. ERP to the bedside.
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
## 2020-01-06 ENCOUNTER — ANESTHESIA (OUTPATIENT)
Dept: SURGERY | Facility: MEDICAL CENTER | Age: 65
DRG: 378 | End: 2020-01-06
Payer: MEDICARE

## 2020-01-06 ENCOUNTER — ANESTHESIA EVENT (OUTPATIENT)
Dept: SURGERY | Facility: MEDICAL CENTER | Age: 65
DRG: 378 | End: 2020-01-06
Payer: MEDICARE

## 2020-01-06 LAB
ALBUMIN SERPL BCP-MCNC: 3 G/DL (ref 3.2–4.9)
ALBUMIN/GLOB SERPL: 1.4 G/DL
ALP SERPL-CCNC: 23 U/L (ref 30–99)
ALT SERPL-CCNC: 8 U/L (ref 2–50)
ANION GAP SERPL CALC-SCNC: 8 MMOL/L (ref 0–11.9)
AST SERPL-CCNC: 8 U/L (ref 12–45)
BASOPHILS # BLD AUTO: 0.5 % (ref 0–1.8)
BASOPHILS # BLD AUTO: 0.6 % (ref 0–1.8)
BASOPHILS # BLD: 0.04 K/UL (ref 0–0.12)
BASOPHILS # BLD: 0.05 K/UL (ref 0–0.12)
BILIRUB SERPL-MCNC: 0.4 MG/DL (ref 0.1–1.5)
BUN SERPL-MCNC: 38 MG/DL (ref 8–22)
CALCIUM SERPL-MCNC: 7.8 MG/DL (ref 8.5–10.5)
CHLORIDE SERPL-SCNC: 104 MMOL/L (ref 96–112)
CO2 SERPL-SCNC: 24 MMOL/L (ref 20–33)
CREAT SERPL-MCNC: 0.67 MG/DL (ref 0.5–1.4)
EOSINOPHIL # BLD AUTO: 0.01 K/UL (ref 0–0.51)
EOSINOPHIL # BLD AUTO: 0.02 K/UL (ref 0–0.51)
EOSINOPHIL NFR BLD: 0.1 % (ref 0–6.9)
EOSINOPHIL NFR BLD: 0.2 % (ref 0–6.9)
ERYTHROCYTE [DISTWIDTH] IN BLOOD BY AUTOMATED COUNT: 51.6 FL (ref 35.9–50)
ERYTHROCYTE [DISTWIDTH] IN BLOOD BY AUTOMATED COUNT: 63.7 FL (ref 35.9–50)
GLOBULIN SER CALC-MCNC: 2.1 G/DL (ref 1.9–3.5)
GLUCOSE BLD-MCNC: 132 MG/DL (ref 65–99)
GLUCOSE BLD-MCNC: 132 MG/DL (ref 65–99)
GLUCOSE BLD-MCNC: 135 MG/DL (ref 65–99)
GLUCOSE BLD-MCNC: 147 MG/DL (ref 65–99)
GLUCOSE BLD-MCNC: 157 MG/DL (ref 65–99)
GLUCOSE BLD-MCNC: 163 MG/DL (ref 65–99)
GLUCOSE SERPL-MCNC: 150 MG/DL (ref 65–99)
HCT VFR BLD AUTO: 18.9 % (ref 42–52)
HCT VFR BLD AUTO: 19 % (ref 42–52)
HGB BLD-MCNC: 6.4 G/DL (ref 14–18)
HGB BLD-MCNC: 6.6 G/DL (ref 14–18)
HGB BLD-MCNC: 7.3 G/DL (ref 14–18)
IMM GRANULOCYTES # BLD AUTO: 0.02 K/UL (ref 0–0.11)
IMM GRANULOCYTES # BLD AUTO: 0.02 K/UL (ref 0–0.11)
IMM GRANULOCYTES NFR BLD AUTO: 0.2 % (ref 0–0.9)
IMM GRANULOCYTES NFR BLD AUTO: 0.2 % (ref 0–0.9)
LYMPHOCYTES # BLD AUTO: 2.76 K/UL (ref 1–4.8)
LYMPHOCYTES # BLD AUTO: 2.99 K/UL (ref 1–4.8)
LYMPHOCYTES NFR BLD: 33.1 % (ref 22–41)
LYMPHOCYTES NFR BLD: 34.7 % (ref 22–41)
MCH RBC QN AUTO: 32.3 PG (ref 27–33)
MCH RBC QN AUTO: 34.5 PG (ref 27–33)
MCHC RBC AUTO-ENTMCNC: 33.9 G/DL (ref 33.7–35.3)
MCHC RBC AUTO-ENTMCNC: 34.1 G/DL (ref 33.7–35.3)
MCV RBC AUTO: 101.1 FL (ref 81.4–97.8)
MCV RBC AUTO: 95.5 FL (ref 81.4–97.8)
MONOCYTES # BLD AUTO: 0.74 K/UL (ref 0–0.85)
MONOCYTES # BLD AUTO: 0.81 K/UL (ref 0–0.85)
MONOCYTES NFR BLD AUTO: 8.6 % (ref 0–13.4)
MONOCYTES NFR BLD AUTO: 9.7 % (ref 0–13.4)
NEUTROPHILS # BLD AUTO: 4.67 K/UL (ref 1.82–7.42)
NEUTROPHILS # BLD AUTO: 4.81 K/UL (ref 1.82–7.42)
NEUTROPHILS NFR BLD: 55.9 % (ref 44–72)
NEUTROPHILS NFR BLD: 56.2 % (ref 44–72)
NRBC # BLD AUTO: 0 K/UL
NRBC # BLD AUTO: 0 K/UL
NRBC BLD-RTO: 0 /100 WBC
NRBC BLD-RTO: 0 /100 WBC
PLATELET # BLD AUTO: 149 K/UL (ref 164–446)
PLATELET # BLD AUTO: 169 K/UL (ref 164–446)
PMV BLD AUTO: 10.2 FL (ref 9–12.9)
PMV BLD AUTO: 10.6 FL (ref 9–12.9)
POTASSIUM SERPL-SCNC: 3.6 MMOL/L (ref 3.6–5.5)
PROT SERPL-MCNC: 5.1 G/DL (ref 6–8.2)
RBC # BLD AUTO: 1.77 M/UL (ref 4.7–6.1)
RBC # BLD AUTO: 1.98 M/UL (ref 4.7–6.1)
SODIUM SERPL-SCNC: 136 MMOL/L (ref 135–145)
WBC # BLD AUTO: 8.3 K/UL (ref 4.8–10.8)
WBC # BLD AUTO: 8.6 K/UL (ref 4.8–10.8)

## 2020-01-06 PROCEDURE — 500066 HCHG BITE BLOCK, ECT: Performed by: INTERNAL MEDICINE

## 2020-01-06 PROCEDURE — 85025 COMPLETE CBC W/AUTO DIFF WBC: CPT

## 2020-01-06 PROCEDURE — 700102 HCHG RX REV CODE 250 W/ 637 OVERRIDE(OP): Performed by: INTERNAL MEDICINE

## 2020-01-06 PROCEDURE — 700105 HCHG RX REV CODE 258: Performed by: INTERNAL MEDICINE

## 2020-01-06 PROCEDURE — 160035 HCHG PACU - 1ST 60 MINS PHASE I: Performed by: INTERNAL MEDICINE

## 2020-01-06 PROCEDURE — 770020 HCHG ROOM/CARE - TELE (206)

## 2020-01-06 PROCEDURE — 700101 HCHG RX REV CODE 250: Performed by: INTERNAL MEDICINE

## 2020-01-06 PROCEDURE — C9113 INJ PANTOPRAZOLE SODIUM, VIA: HCPCS | Performed by: INTERNAL MEDICINE

## 2020-01-06 PROCEDURE — 30233N1 TRANSFUSION OF NONAUTOLOGOUS RED BLOOD CELLS INTO PERIPHERAL VEIN, PERCUTANEOUS APPROACH: ICD-10-PCS | Performed by: INTERNAL MEDICINE

## 2020-01-06 PROCEDURE — 82962 GLUCOSE BLOOD TEST: CPT

## 2020-01-06 PROCEDURE — 36415 COLL VENOUS BLD VENIPUNCTURE: CPT

## 2020-01-06 PROCEDURE — 160009 HCHG ANES TIME/MIN: Performed by: INTERNAL MEDICINE

## 2020-01-06 PROCEDURE — P9016 RBC LEUKOCYTES REDUCED: HCPCS | Mod: 91

## 2020-01-06 PROCEDURE — 503369 HCHG GOLDPROBE, 7 FR: Performed by: INTERNAL MEDICINE

## 2020-01-06 PROCEDURE — 160002 HCHG RECOVERY MINUTES (STAT): Performed by: INTERNAL MEDICINE

## 2020-01-06 PROCEDURE — 700105 HCHG RX REV CODE 258

## 2020-01-06 PROCEDURE — 700105 HCHG RX REV CODE 258: Performed by: ANESTHESIOLOGY

## 2020-01-06 PROCEDURE — 0W3P8ZZ CONTROL BLEEDING IN GASTROINTESTINAL TRACT, VIA NATURAL OR ARTIFICIAL OPENING ENDOSCOPIC: ICD-10-PCS | Performed by: INTERNAL MEDICINE

## 2020-01-06 PROCEDURE — A9270 NON-COVERED ITEM OR SERVICE: HCPCS | Performed by: INTERNAL MEDICINE

## 2020-01-06 PROCEDURE — 36430 TRANSFUSION BLD/BLD COMPNT: CPT

## 2020-01-06 PROCEDURE — 700111 HCHG RX REV CODE 636 W/ 250 OVERRIDE (IP): Performed by: ANESTHESIOLOGY

## 2020-01-06 PROCEDURE — 80053 COMPREHEN METABOLIC PANEL: CPT

## 2020-01-06 PROCEDURE — 3E0G8GC INTRODUCTION OF OTHER THERAPEUTIC SUBSTANCE INTO UPPER GI, VIA NATURAL OR ARTIFICIAL OPENING ENDOSCOPIC: ICD-10-PCS | Performed by: INTERNAL MEDICINE

## 2020-01-06 PROCEDURE — 700111 HCHG RX REV CODE 636 W/ 250 OVERRIDE (IP): Performed by: INTERNAL MEDICINE

## 2020-01-06 PROCEDURE — 700101 HCHG RX REV CODE 250: Performed by: ANESTHESIOLOGY

## 2020-01-06 PROCEDURE — 160048 HCHG OR STATISTICAL LEVEL 1-5: Performed by: INTERNAL MEDICINE

## 2020-01-06 PROCEDURE — 160202 HCHG ENDO MINUTES - 1ST 30 MINS LEVEL 3: Performed by: INTERNAL MEDICINE

## 2020-01-06 PROCEDURE — 160207 HCHG ENDO MINUTES - EA ADDL 1 MIN LEVEL 3: Performed by: INTERNAL MEDICINE

## 2020-01-06 PROCEDURE — 94760 N-INVAS EAR/PLS OXIMETRY 1: CPT

## 2020-01-06 PROCEDURE — 99233 SBSQ HOSP IP/OBS HIGH 50: CPT | Performed by: HOSPITALIST

## 2020-01-06 PROCEDURE — 86923 COMPATIBILITY TEST ELECTRIC: CPT | Mod: 91

## 2020-01-06 PROCEDURE — 85018 HEMOGLOBIN: CPT

## 2020-01-06 RX ORDER — SODIUM CHLORIDE 9 MG/ML
INJECTION, SOLUTION INTRAVENOUS
Status: COMPLETED
Start: 2020-01-06 | End: 2020-01-06

## 2020-01-06 RX ORDER — MIDAZOLAM HYDROCHLORIDE 1 MG/ML
1 INJECTION INTRAMUSCULAR; INTRAVENOUS
Status: DISCONTINUED | OUTPATIENT
Start: 2020-01-06 | End: 2020-01-09 | Stop reason: HOSPADM

## 2020-01-06 RX ORDER — SODIUM CHLORIDE, SODIUM LACTATE, POTASSIUM CHLORIDE, CALCIUM CHLORIDE 600; 310; 30; 20 MG/100ML; MG/100ML; MG/100ML; MG/100ML
INJECTION, SOLUTION INTRAVENOUS
Status: DISCONTINUED | OUTPATIENT
Start: 2020-01-06 | End: 2020-01-06 | Stop reason: SURG

## 2020-01-06 RX ORDER — METOCLOPRAMIDE HYDROCHLORIDE 5 MG/ML
10 INJECTION INTRAMUSCULAR; INTRAVENOUS ONCE
Status: COMPLETED | OUTPATIENT
Start: 2020-01-06 | End: 2020-01-06

## 2020-01-06 RX ORDER — ROCURONIUM BROMIDE 10 MG/ML
INJECTION, SOLUTION INTRAVENOUS PRN
Status: DISCONTINUED | OUTPATIENT
Start: 2020-01-06 | End: 2020-01-06 | Stop reason: SURG

## 2020-01-06 RX ORDER — MAGNESIUM SULFATE HEPTAHYDRATE 40 MG/ML
2 INJECTION, SOLUTION INTRAVENOUS ONCE
Status: COMPLETED | OUTPATIENT
Start: 2020-01-06 | End: 2020-01-06

## 2020-01-06 RX ORDER — ESMOLOL HYDROCHLORIDE 10 MG/ML
INJECTION INTRAVENOUS PRN
Status: DISCONTINUED | OUTPATIENT
Start: 2020-01-06 | End: 2020-01-06 | Stop reason: SURG

## 2020-01-06 RX ORDER — LIDOCAINE HYDROCHLORIDE 20 MG/ML
INJECTION, SOLUTION EPIDURAL; INFILTRATION; INTRACAUDAL; PERINEURAL PRN
Status: DISCONTINUED | OUTPATIENT
Start: 2020-01-06 | End: 2020-01-06 | Stop reason: SURG

## 2020-01-06 RX ORDER — ONDANSETRON 2 MG/ML
4 INJECTION INTRAMUSCULAR; INTRAVENOUS
Status: DISCONTINUED | OUTPATIENT
Start: 2020-01-06 | End: 2020-01-09 | Stop reason: HOSPADM

## 2020-01-06 RX ORDER — HALOPERIDOL 5 MG/ML
1 INJECTION INTRAMUSCULAR
Status: DISCONTINUED | OUTPATIENT
Start: 2020-01-06 | End: 2020-01-09 | Stop reason: HOSPADM

## 2020-01-06 RX ORDER — SUCRALFATE 1 G/1
1 TABLET ORAL EVERY 6 HOURS
Status: DISCONTINUED | OUTPATIENT
Start: 2020-01-06 | End: 2020-01-09 | Stop reason: HOSPADM

## 2020-01-06 RX ORDER — SODIUM CHLORIDE, SODIUM LACTATE, POTASSIUM CHLORIDE, CALCIUM CHLORIDE 600; 310; 30; 20 MG/100ML; MG/100ML; MG/100ML; MG/100ML
INJECTION, SOLUTION INTRAVENOUS CONTINUOUS
Status: DISCONTINUED | OUTPATIENT
Start: 2020-01-06 | End: 2020-01-09 | Stop reason: HOSPADM

## 2020-01-06 RX ADMIN — Medication 100 MG: at 04:25

## 2020-01-06 RX ADMIN — SUGAMMADEX 200 MG: 100 INJECTION, SOLUTION INTRAVENOUS at 12:19

## 2020-01-06 RX ADMIN — ROCURONIUM BROMIDE 50 MG: 10 INJECTION, SOLUTION INTRAVENOUS at 11:57

## 2020-01-06 RX ADMIN — PROPOFOL 100 MG: 10 INJECTION, EMULSION INTRAVENOUS at 11:57

## 2020-01-06 RX ADMIN — POTASSIUM CHLORIDE AND SODIUM CHLORIDE: 900; 150 INJECTION, SOLUTION INTRAVENOUS at 04:15

## 2020-01-06 RX ADMIN — MAGNESIUM SULFATE IN WATER 2 G: 40 INJECTION, SOLUTION INTRAVENOUS at 04:14

## 2020-01-06 RX ADMIN — SUCRALFATE 1 G: 1 TABLET ORAL at 13:51

## 2020-01-06 RX ADMIN — PRAVASTATIN SODIUM 40 MG: 20 TABLET ORAL at 16:32

## 2020-01-06 RX ADMIN — PROPOFOL 25 MG: 10 INJECTION, EMULSION INTRAVENOUS at 12:04

## 2020-01-06 RX ADMIN — METOCLOPRAMIDE 10 MG: 5 INJECTION, SOLUTION INTRAMUSCULAR; INTRAVENOUS at 09:51

## 2020-01-06 RX ADMIN — SUCRALFATE 1 G: 1 TABLET ORAL at 23:26

## 2020-01-06 RX ADMIN — SODIUM CHLORIDE: 9 INJECTION, SOLUTION INTRAVENOUS at 02:15

## 2020-01-06 RX ADMIN — ACETAMINOPHEN 650 MG: 325 TABLET, FILM COATED ORAL at 04:25

## 2020-01-06 RX ADMIN — ACETAMINOPHEN 650 MG: 325 TABLET, FILM COATED ORAL at 16:33

## 2020-01-06 RX ADMIN — LIDOCAINE HYDROCHLORIDE 5 ML: 20 INJECTION, SOLUTION EPIDURAL; INFILTRATION; INTRACAUDAL at 11:56

## 2020-01-06 RX ADMIN — ESMOLOL HYDROCHLORIDE 50 MG: 100 INJECTION, SOLUTION INTRAVENOUS at 11:55

## 2020-01-06 RX ADMIN — ESMOLOL HYDROCHLORIDE 50 MG: 100 INJECTION, SOLUTION INTRAVENOUS at 12:04

## 2020-01-06 RX ADMIN — SODIUM CHLORIDE 8 MG/HR: 9 INJECTION, SOLUTION INTRAVENOUS at 14:14

## 2020-01-06 RX ADMIN — SODIUM CHLORIDE, POTASSIUM CHLORIDE, SODIUM LACTATE AND CALCIUM CHLORIDE: 600; 310; 30; 20 INJECTION, SOLUTION INTRAVENOUS at 14:13

## 2020-01-06 RX ADMIN — METOPROLOL SUCCINATE 50 MG: 50 TABLET, EXTENDED RELEASE ORAL at 16:33

## 2020-01-06 RX ADMIN — SUCRALFATE 1 G: 1 TABLET ORAL at 16:32

## 2020-01-06 RX ADMIN — ACETAMINOPHEN 650 MG: 325 TABLET, FILM COATED ORAL at 23:26

## 2020-01-06 RX ADMIN — SODIUM CHLORIDE, POTASSIUM CHLORIDE, SODIUM LACTATE AND CALCIUM CHLORIDE: 600; 310; 30; 20 INJECTION, SOLUTION INTRAVENOUS at 11:49

## 2020-01-06 ASSESSMENT — COPD QUESTIONNAIRES
DO YOU EVER COUGH UP ANY MUCUS OR PHLEGM?: NO/ONLY WITH OCCASIONAL COLDS OR INFECTIONS
COPD SCREENING SCORE: 4
DURING THE PAST 4 WEEKS HOW MUCH DID YOU FEEL SHORT OF BREATH: NONE/LITTLE OF THE TIME
HAVE YOU SMOKED AT LEAST 100 CIGARETTES IN YOUR ENTIRE LIFE: YES

## 2020-01-06 ASSESSMENT — ENCOUNTER SYMPTOMS
HEADACHES: 0
BACK PAIN: 0
ABDOMINAL PAIN: 1
MUSCULOSKELETAL NEGATIVE: 1
COUGH: 0
FEVER: 0
HEARTBURN: 0
DOUBLE VISION: 0
SINUS PAIN: 0
PALPITATIONS: 0
NECK PAIN: 0
BLOOD IN STOOL: 0
EYE PAIN: 0
SHORTNESS OF BREATH: 0
DIARRHEA: 0
TINGLING: 0
RESPIRATORY NEGATIVE: 1
NEUROLOGICAL NEGATIVE: 1
NERVOUS/ANXIOUS: 1
CONSTIPATION: 0
VOMITING: 1
EYES NEGATIVE: 1
CARDIOVASCULAR NEGATIVE: 1
SENSORY CHANGE: 0

## 2020-01-06 ASSESSMENT — PAIN SCALES - GENERAL: PAIN_LEVEL: 0

## 2020-01-06 ASSESSMENT — LIFESTYLE VARIABLES: EVER_SMOKED: YES

## 2020-01-06 NOTE — PROGRESS NOTES
This RN updated Dr. Gonzalez, pt's HG 6.4 post transfusion    this am, trended down from 6.6 prior to  Transfusion. Pt keep having hourly black tarry bms. Pt's HRremaines 120 and 150 with ambulation. Pt is scheduled for EGD, transport is on the way to pick pt up. Per Dr. Gonzalez, pt can receive another unit of blood either with EGD or when he is back on the floor. Will continue to monitor.

## 2020-01-06 NOTE — ANESTHESIA PROCEDURE NOTES
Airway  Date/Time: 1/6/2020 11:58 AM  Performed by: Donald Brody M.D.  Authorized by: Donald Brody M.D.     Location:  OR  Urgency:  Elective  Indications for Airway Management:  Anesthesia  Spontaneous Ventilation: absent    Sedation Level:  Deep  Preoxygenated: Yes    Patient Position:  Sniffing  Mask Difficulty Assessment:  0 - not attempted  Final Airway Type:  Endotracheal airway  Final Endotracheal Airway:  ETT  Cuffed: Yes    Technique Used for Successful ETT Placement:  Direct laryngoscopy  Devices/Methods Used in Placement:  Cricoid pressure  Insertion Site:  Oral  Blade Type:  Hardik  Laryngoscope Blade/Videolaryngoscope Blade Size:  4  ETT Size (mm):  7.5  Measured from:  Teeth  ETT to Teeth (cm):  23  Placement Verified by: auscultation and capnometry    Cormack-Lehane Classification:  Grade I - full view of glottis  Number of Attempts at Approach:  1

## 2020-01-06 NOTE — PROGRESS NOTES
2 RN Skin Check    2 RN skin check complete.   Devices in place: N/A.  Skin assessed under devices: N\A.  Confirmed pressure ulcers found on: N/A.  New potential pressure ulcers noted on N/A. Wound consult placed No.  The following interventions in place Pillows, patient turns self from side to side.   Patients skin is warm, dry, and intact.

## 2020-01-06 NOTE — PROCEDURES
Esophagogastroduodenoscopy  Date of Procedure: 1/6/20202  Attending Physician: Mayur Lara MD    Indications: Melena  Instrument: Olympus Flexible Endoscope  Sedation:   Anesthesiologist/Type of Anesthesia:  Anesthesiologist: Donald Brody M.D./General    Surgical Staff:  Circulator: Sharif Rodriguez R.N.  Endoscopy Technician: Katie Florez  Endoscopy Team: Joselin Saul R.N.    Pre-Anesthesia Assessment:  Prior to the procedure, a History and Physical was performed, and patient medications and allergies were reviewed. The patient’s tolerance of previous anesthesia was also reviewed. The risks and benefits of the procedure and the sedation options and risks were discussed with the patient including but not limited to infection, bleeding, aspiration, perforation, adverse medication reaction, missed diagnosis, and missed lesions. The patient verbalized understanding. All questions were answered, and informed consent was obtained.     After I obtained informed consent from the patient, the patient was placed in the left lateral position. Appropriate time-out protocol was followed: the correct patient, the correct procedure, and the correct equipment in the room were confirmed. Throughout the procedure, the patient’s blood pressure, pulse, and oxygen saturations were monitored continuously. The Olympus flexible gastroscope was gently passed through the incisoral orifice into the oral cavity and under direct visualization the esophagus was intubated. The endoscope was passed down the esophagus, through the stomach and into the 2nd portion of the duodenum. Retroflexion was performed at the gastroesophageal junction. Color, texture, mucosa and anatomy of esophagus, stomach, and duodenum were carefully examined with the scope.  After completion of the examination, the endoscope as removed. The patient tolerated the procedure well. There were no immediate postoperative complications.        Findings:    Esophagus: Normal esophagus GE junction at 40 cm with no active bleeding    Stomach: Significant amount of old acid hematin noted.  Extensive lavage was performed to suck out old blood.  Cardiac and fundus appeared normal.  Close examination trace active bleeding to a ulcer approximately 5 mm in size with visible vessel in the middle actively oozing blood.  Extensive lavage was performed to ensure no other ulceration or sites of bleeding.  None was seen.  Epinephrine 1 in 10,000 was injected with total of 3 cc to the ulcer area with good blanching of surrounding mucosa.  Utilizing gold probe cautery, heat tamponade was also applied to the visible vessel.  Finally 3 hemoclips were performed to the ulcer area to further decrease bleeding.  There was no active bleeding postprocedure.    Duodenum: appears normal first and second portion    Impressions:   1. 5mm ulcer in the antrum treated with 3cc of 1:10,000 Epinephrine injection, Goldprobe cautery and placement of 3 hemoclips.     Recommendations:   1. Continue IV protonix drip  2. Continue monitor H/H  3. Transfuse to HgB of at least 7 but given patient's history of possible CAD, would potentially aim for 8  4. Clear liquid diet in 4 hours if no significant change.  5. Carafate.  6. If significant recurrence of bleeding, may need repeat EGD.      This note was generated using voice recognition software which has a small chance of producing errors of grammar and possibly content. I have made every reasonable attempt to find and correct any obvious errors, but expect that some may not be found prior to finalization of this note

## 2020-01-06 NOTE — PROGRESS NOTES
Indication: Melena, anemia  Risks, benefits, and alternatives were discussed with consenting person(s). Consenting person(s) were given an opportunity to ask questions and discuss other options. Risks including but not limited to failed or incomplete EGD, ineffective therapy, perforation, infection, bleeding, missed lesion(s), missed diagnosis, cardiac and/or pulmonary event, aspiration, stroke, possible need for surgery, hospitalization possibly prolonged, discomfort, unsuccessful and/or incomplete procedure, possible need for repeat procedures and/or additional testings, damage to adjacent organs and/or vascular structures, medication reaction, disability, death, and other adverse events possibly life-threatening. Discussion was undertaken with Layman's terms. Consenting persons stated understanding and acceptance of these risks, and wished to proceed. Consent was given in clear state of mind.

## 2020-01-06 NOTE — PROGRESS NOTES
This RN spoke to Aissatou MACHUCA from [facility] and updated her on pt's HG 6.4. Per Aissatou MACHUCA, she will update Dr. Lara.

## 2020-01-06 NOTE — ED NOTES
Pt medicated as ordered.  Awaiting additional meds from pharmacy.  Awaiting Tele RN for transport.  Pt updated.  States no needs at this time.

## 2020-01-06 NOTE — ANESTHESIA QCDR
2019 [facility] Clinical Data Registry (for Quality Improvement)     Postoperative nausea/vomiting risk protocol (Adult = 18 yrs and Pediatric 3-17 yrs)- (430 and 463)  General inhalation anesthetic (NOT TIVA) with PONV risk factors: No  Provision of anti-emetic therapy with at least 2 different classes of agents: N/A  Patient DID NOT receive anti-emetic therapy and reason is documented in Medical Record: N/A    Multimodal Pain Management- (477)  Non-emergent surgery AND patient age >= 18: No  Use of Multimodal Pain Management, two or more drugs and/or interventions, NOT including systemic opioids:   Exception: Documented allergy to multiple classes of analgesics:     Smoking Abstinence (404)  Patient is current smoker (cigarette, pipe, e-cig, marijuanna): No  Elective Surgery:   Abstinence instructions provided prior to day of surgery:   Patient abstained from smoking on day of surgery:     Pre-Op Beta-Blocker in Isolated CABG (44)  Isolated CABG AND patient age >= 18: No  Beta-blocker admin within 24 hours of surgical incision:   Exception:of medical reason(s) for not administering beta blocker within 24 hours prior to surgical incision (e.g., not  indicated,other medical reason):     PACU assessment of acute postoperative pain prior to Anesthesia Care End- Applies to Patients Age = 18- (ABG7)  Initial PACU pain score is which of the following: < 7/10  Patient unable to report pain score: N/A    Post-anesthetic transfer of care checklist/protocol to PACU/ICU- (426 and 427)  Upon conclusion of case, patient transferred to which of the following locations: PACU/Non-ICU  Use of transfer checklist/protocol: Yes  Exclusion: Service Performed in Patient Hospital Room (and thus did not require transfer): N/A  Unplanned admission to ICU related to anesthesia service up through end of PACU care- (MD51)  Unplanned admission to ICU (not initially anticipated at anesthesia start time): No

## 2020-01-06 NOTE — PROGRESS NOTES
RN contacted by Lab w/ Hgb of 6.6. Hospitalist paged. Orders received to prepare and transfuse 1U PRBC.

## 2020-01-06 NOTE — OR NURSING
Patient A+Ox4. Denies pain or nausea.  VSS,  PRBC running as ordered. Patient having liquid dark stool x2 with pericare.  Plan to return to t719.  Patient states family not here to update

## 2020-01-06 NOTE — CARE PLAN
Problem: Bowel/Gastric:  Goal: Normal bowel function is maintained or improved  Outcome: NOT MET  Frequent loose tarry stools overnight.      Problem: Communication  Goal: The ability to communicate needs accurately and effectively will improve  Outcome: PROGRESSING AS EXPECTED  Patient white board updated. Patient updated on plan of care. All questions and concerns addressed.      Problem: Safety  Goal: Will remain free from injury  Outcome: PROGRESSING AS EXPECTED  Goal: Will remain free from falls  Outcome: PROGRESSING AS EXPECTED  Appropriate Fall precautions in place. Patient oriented to room and call light. Patient educated regarding safety and fall precautions

## 2020-01-06 NOTE — PROGRESS NOTES
[facility] Medicine Daily Progress Note    Date of Service  1/6/2020    Chief Complaint  64 y.o. male admitted 1/5/2020 with melena    Hospital Course    64 y.o. male with history of diabetes type 2 on metformin, CAD with heart attack in 2002, dyslipidemia, hypertension who presented 1/5/2020 with complaints of orthostatic dizziness, nausea, loose black melanotic stool multiple times (5-6 times today).  Symptoms started yesterday and has been getting worse.  He has been taking naproxen for chronic back pain on and off.  He denies any history of GI bleed.  He takes baby aspirin, but no anticoagulants.  He had colonoscopy about 10 years ago, reportedly was normal      Interval Problem Update  Seen and examined, patient with ongoing melena and drop in hgb  hgb this morning 6.4 after transfusion  GI consulted and plans for EGD today    Consultants/Specialty  GI    Code Status  full    Disposition  tbd    Review of Systems  Review of Systems   Constitutional: Positive for malaise/fatigue. Negative for fever.   HENT: Negative.  Negative for ear discharge, sinus pain and tinnitus.    Eyes: Negative.  Negative for double vision and pain.   Respiratory: Negative.  Negative for cough and shortness of breath.    Cardiovascular: Negative.  Negative for chest pain and palpitations.   Gastrointestinal: Positive for abdominal pain, melena and vomiting. Negative for blood in stool, constipation, diarrhea and heartburn.   Genitourinary: Negative.  Negative for dysuria, frequency and urgency.   Musculoskeletal: Negative.  Negative for back pain and neck pain.   Skin: Negative.  Negative for itching and rash.   Neurological: Negative.  Negative for tingling, sensory change and headaches.   Endo/Heme/Allergies: Negative.    Psychiatric/Behavioral: Negative for suicidal ideas. The patient is nervous/anxious.         Physical Exam  Temp:  [36.2 °C (97.1 °F)-37.2 °C (98.9 °F)] 36.2 °C (97.1 °F)  Pulse:  [] 104  Resp:  [12-22] 16  BP:  (109-153)/(60-84) 138/68  SpO2:  [93 %-100 %] 100 %    Physical Exam  Vitals signs and nursing note reviewed.   Constitutional:       General: He is not in acute distress.     Appearance: Normal appearance. He is ill-appearing.   HENT:      Head: Normocephalic and atraumatic.      Right Ear: There is no impacted cerumen.      Left Ear: There is no impacted cerumen.      Nose: Nose normal.      Mouth/Throat:      Mouth: Mucous membranes are moist.      Pharynx: No oropharyngeal exudate or posterior oropharyngeal erythema.   Eyes:      Extraocular Movements: Extraocular movements intact.      Pupils: Pupils are equal, round, and reactive to light.   Neck:      Musculoskeletal: Normal range of motion and neck supple. No neck rigidity.   Cardiovascular:      Rate and Rhythm: Regular rhythm. Tachycardia present.      Heart sounds: Murmur present. Systolic murmur present.   Pulmonary:      Effort: Pulmonary effort is normal. No respiratory distress.      Breath sounds: Normal breath sounds. No stridor. No wheezing.   Abdominal:      General: Abdomen is flat. There is no distension.      Tenderness: There is no tenderness.   Musculoskeletal: Normal range of motion.         General: No swelling or deformity.   Skin:     General: Skin is warm and dry.   Neurological:      General: No focal deficit present.      Mental Status: He is alert and oriented to person, place, and time. Mental status is at baseline.      Cranial Nerves: No cranial nerve deficit.      Motor: No weakness.   Psychiatric:         Mood and Affect: Mood normal.         Behavior: Behavior normal.         Thought Content: Thought content normal.         Fluids    Intake/Output Summary (Last 24 hours) at 1/6/2020 1336  Last data filed at 1/6/2020 1229  Gross per 24 hour   Intake 2130 ml   Output --   Net 2130 ml       Laboratory  Recent Labs     01/05/20  1418 01/06/20  0003 01/06/20  0831   WBC 9.9 8.6 8.3   RBC 2.40* 1.77* 1.98*   HEMOGLOBIN 8.5* 6.6* 6.4*    HEMATOCRIT 23.9* 19.0* 18.9*   MCV 99.6* 101.1* 95.5   MCH 35.4* 34.5* 32.3   MCHC 35.6* 34.1 33.9   RDW 49.3 51.6* 63.7*   PLATELETCT 223 169 149*   MPV 10.2 10.2 10.6     Recent Labs     01/05/20  1418 01/06/20  0003   SODIUM 131* 136   POTASSIUM 3.6 3.6   CHLORIDE 99 104   CO2 24 24   GLUCOSE 154* 150*   BUN 44* 38*   CREATININE 0.70 0.67   CALCIUM 8.1* 7.8*     Recent Labs     01/05/20  1418   APTT 26.4   INR 1.10               Imaging  DX-CHEST-PORTABLE (1 VIEW)   Final Result      No acute cardiac or pulmonary abnormalities are identified.           Assessment/Plan  GI bleed  Assessment & Plan  monitor carefully on telemetry and h.h [id number]  -GI consulted  EGD impressions:   1. 5mm ulcer in the antrum treated with 3cc of 1:10,000 Epinephrine injection, Goldprobe cautery and placement of 3 hemoclips.      Recommendations:   1. Continue IV protonix drip  2. Continue monitor H/H  3. Transfuse to HgB of at least 7 but given patient's history of possible CAD, would potentially aim for 8  4. Clear liquid diet in 4 hours if no significant change.  5. Carafate.  6. If significant recurrence of bleeding, may need repeat EGD.    Anemia  Assessment & Plan  This is blood loss anemia.  Hemoglobin 6.4 this morning s/p transfusion  Will give her another unit  Monitor h/h q6h and monitor closely    Tachycardia  Assessment & Plan  EKG showed sinus tachycardia with diffuse ischemia.  Patient denies chest pain  Likely secondary to blood loss  Plan to continue IV fluids, trend hemoglobin.  We will plan for RBC transfusion if hemoglobin drops below 8.0    Type 2 diabetes mellitus with microalbuminuria (HCC)- (present on admission)  Assessment & Plan  Will hold metformin  We will start sliding scale  Blood sugar is fairly controlled  Post recent A1c 5.7 (4 months ago)       VTE prophylaxis: scds

## 2020-01-06 NOTE — PROGRESS NOTES
Pt c/o anxiety. Contacted Hospitalist- orders received 0.5mg lorazepam PO  X 1. Order placed with readback verification.

## 2020-01-06 NOTE — PROGRESS NOTES
Pt transported back to floor with OR RN. Pt placed back on tele monitor. Post op vitals started. VSS.  Pt arrived with 1 unit of PRBS currently infusing. Pt had 2 large black loose BMs. Will continue to monitor.

## 2020-01-06 NOTE — PROGRESS NOTES
Patient transported to floor from ed. Patient A&Ox4. Plan of care discussed with patient. Patient oriented to floor and surroundings. Patient currently on room air. VSS. Patient is not expressing any complaints of pain at this time. Tele box placed. Monitor room notified. 2 rn skin check performed. Patient educated to call for assistance before ambulating. Patient education regarding fall risk. Call light within reach. Fall precautions in place.

## 2020-01-06 NOTE — ED NOTES
Pt sat up in bed to use the urinal, HR increased to 150, pt became diaphoretic & somewhat dizzy.  HOB back down, 1L NS infusing.  Admitting physician aware. Pt denies any other episodes of black stool. Reports feeling better once laying more flat.

## 2020-01-06 NOTE — ANESTHESIA PREPROCEDURE EVALUATION
CAD S/P remote MI    Relevant Problems   CARDIAC   (+) Essential hypertension         (+) Microalbuminuria due to type 2 diabetes mellitus (HCC)      ENDO   (+) Type 2 diabetes mellitus with microalbuminuria (HCC)      Other   (+) Anemia   (+) Cervical radiculopathy at C7   (+) GI bleed   (+) Pain, chronic   (+) Tachycardia       Physical Exam    Airway   Mallampati: II  TM distance: >3 FB  Neck ROM: full       Cardiovascular - normal exam  Rhythm: regular  Rate: normal  (-) murmur     Dental - normal exam         Pulmonary - normal exam  Breath sounds clear to auscultation     Abdominal    Neurological - normal exam                 Anesthesia Plan    ASA 3   ASA physical status 3 criteria: CAD/stents (> 3 months)    Plan - general       Airway plan will be ETT        Induction: intravenous    Postoperative Plan: Postoperative administration of opioids is intended.    Pertinent diagnostic labs and testing reviewed    Informed Consent:    Anesthetic plan and risks discussed with patient.

## 2020-01-06 NOTE — ANESTHESIA TIME REPORT
Anesthesia Start and Stop Event Times     Date Time Event    1/6/2020 1141 Ready for Procedure     1149 Anesthesia Start     1230 Anesthesia Stop        Responsible Staff  01/06/20    Name Role Begin End    Donald Brody M.D. Anesth 1149 1230        Preop Diagnosis (Free Text):  Pre-op Diagnosis     gi bleed        Preop Diagnosis (Codes):    Post op Diagnosis  Upper GI bleed      Premium Reason  Non-Premium    Comments:

## 2020-01-06 NOTE — PROGRESS NOTES
Bedside report received from BRIGETTE Bob. Assumed care of pt. Pt awake, laying in bed. A/Ox4, VSS. No concerns, complaints or distress. Pt is NPO fot EGD this am. Pt reporting having loose tarry stools hurly last night.  Pt educated to call before getting out of bed. POC reviewed and white board updated. Tele box on.  on the monitor. Call light in reach. Bed locked in lowest position with 2 upper bed rails up. Bed alarm on.

## 2020-01-06 NOTE — ANESTHESIA POSTPROCEDURE EVALUATION
Patient: Fabio Vergara    Procedure Summary     Date:  01/06/20 Room / Location:  [facility] 06 / SURGERY [facility]    Anesthesia Start:  1149 Anesthesia Stop:  1230    Procedure:  GASTROSCOPY (N/A Esophagus) Diagnosis:  (gastric ulcer)    Surgeon:  Mayur Lara M.D. Responsible Provider:  Donald Brody M.D.    Anesthesia Type:  general ASA Status:  3          Final Anesthesia Type: general  Last vitals  BP   Blood Pressure : 139/66    Temp   37 °C (98.6 °F)    Pulse   Pulse: (!) 133   Resp   20    SpO2   100 %      Anesthesia Post Evaluation    Patient location during evaluation: PACU  Patient participation: complete - patient participated  Level of consciousness: awake and alert  Pain score: 0    Airway patency: patent  Anesthetic complications: no  Cardiovascular status: hemodynamically stable  Respiratory status: acceptable  Hydration status: euvolemic    PONV: none           Nurse Pain Score: 0 (NPRS)

## 2020-01-07 LAB
ANION GAP SERPL CALC-SCNC: 7 MMOL/L (ref 0–11.9)
BUN SERPL-MCNC: 9 MG/DL (ref 8–22)
CALCIUM SERPL-MCNC: 7.7 MG/DL (ref 8.5–10.5)
CHLORIDE SERPL-SCNC: 107 MMOL/L (ref 96–112)
CO2 SERPL-SCNC: 25 MMOL/L (ref 20–33)
CREAT SERPL-MCNC: 0.68 MG/DL (ref 0.5–1.4)
ERYTHROCYTE [DISTWIDTH] IN BLOOD BY AUTOMATED COUNT: 67.4 FL (ref 35.9–50)
GLUCOSE BLD-MCNC: 122 MG/DL (ref 65–99)
GLUCOSE BLD-MCNC: 144 MG/DL (ref 65–99)
GLUCOSE BLD-MCNC: 145 MG/DL (ref 65–99)
GLUCOSE BLD-MCNC: 162 MG/DL (ref 65–99)
GLUCOSE SERPL-MCNC: 176 MG/DL (ref 65–99)
HCT VFR BLD AUTO: 20.4 % (ref 42–52)
HGB BLD-MCNC: 6.4 G/DL (ref 14–18)
HGB BLD-MCNC: 7 G/DL (ref 14–18)
HGB BLD-MCNC: 7.2 G/DL (ref 14–18)
HGB BLD-MCNC: 7.3 G/DL (ref 14–18)
HGB BLD-MCNC: 7.3 G/DL (ref 14–18)
MCH RBC QN AUTO: 31.5 PG (ref 27–33)
MCHC RBC AUTO-ENTMCNC: 34.3 G/DL (ref 33.7–35.3)
MCV RBC AUTO: 91.9 FL (ref 81.4–97.8)
PLATELET # BLD AUTO: 133 K/UL (ref 164–446)
PMV BLD AUTO: 10.1 FL (ref 9–12.9)
POTASSIUM SERPL-SCNC: 3.4 MMOL/L (ref 3.6–5.5)
RBC # BLD AUTO: 2.22 M/UL (ref 4.7–6.1)
SODIUM SERPL-SCNC: 139 MMOL/L (ref 135–145)
WBC # BLD AUTO: 8.8 K/UL (ref 4.8–10.8)

## 2020-01-07 PROCEDURE — 85027 COMPLETE CBC AUTOMATED: CPT

## 2020-01-07 PROCEDURE — C9113 INJ PANTOPRAZOLE SODIUM, VIA: HCPCS | Performed by: INTERNAL MEDICINE

## 2020-01-07 PROCEDURE — 82962 GLUCOSE BLOOD TEST: CPT | Mod: 91

## 2020-01-07 PROCEDURE — 700102 HCHG RX REV CODE 250 W/ 637 OVERRIDE(OP): Performed by: INTERNAL MEDICINE

## 2020-01-07 PROCEDURE — 86923 COMPATIBILITY TEST ELECTRIC: CPT

## 2020-01-07 PROCEDURE — 700102 HCHG RX REV CODE 250 W/ 637 OVERRIDE(OP): Performed by: HOSPITALIST

## 2020-01-07 PROCEDURE — 700105 HCHG RX REV CODE 258: Performed by: INTERNAL MEDICINE

## 2020-01-07 PROCEDURE — P9016 RBC LEUKOCYTES REDUCED: HCPCS

## 2020-01-07 PROCEDURE — 36415 COLL VENOUS BLD VENIPUNCTURE: CPT

## 2020-01-07 PROCEDURE — 80048 BASIC METABOLIC PNL TOTAL CA: CPT

## 2020-01-07 PROCEDURE — A9270 NON-COVERED ITEM OR SERVICE: HCPCS | Performed by: HOSPITALIST

## 2020-01-07 PROCEDURE — 36430 TRANSFUSION BLD/BLD COMPNT: CPT

## 2020-01-07 PROCEDURE — 700105 HCHG RX REV CODE 258

## 2020-01-07 PROCEDURE — 700105 HCHG RX REV CODE 258: Performed by: ANESTHESIOLOGY

## 2020-01-07 PROCEDURE — A9270 NON-COVERED ITEM OR SERVICE: HCPCS | Mod: JG | Performed by: FAMILY MEDICINE

## 2020-01-07 PROCEDURE — 770020 HCHG ROOM/CARE - TELE (206)

## 2020-01-07 PROCEDURE — 99233 SBSQ HOSP IP/OBS HIGH 50: CPT | Performed by: FAMILY MEDICINE

## 2020-01-07 PROCEDURE — 700102 HCHG RX REV CODE 250 W/ 637 OVERRIDE(OP): Mod: JG | Performed by: FAMILY MEDICINE

## 2020-01-07 PROCEDURE — A9270 NON-COVERED ITEM OR SERVICE: HCPCS | Performed by: INTERNAL MEDICINE

## 2020-01-07 PROCEDURE — 700111 HCHG RX REV CODE 636 W/ 250 OVERRIDE (IP): Performed by: INTERNAL MEDICINE

## 2020-01-07 PROCEDURE — 85018 HEMOGLOBIN: CPT

## 2020-01-07 RX ORDER — BACLOFEN 10 MG/1
10 TABLET ORAL 2 TIMES DAILY
Status: DISCONTINUED | OUTPATIENT
Start: 2020-01-07 | End: 2020-01-09 | Stop reason: HOSPADM

## 2020-01-07 RX ORDER — BACLOFEN 10 MG/1
10 TABLET ORAL 2 TIMES DAILY PRN
COMMUNITY
End: 2023-11-08

## 2020-01-07 RX ORDER — TRAZODONE HYDROCHLORIDE 50 MG/1
50 TABLET ORAL
Status: DISCONTINUED | OUTPATIENT
Start: 2020-01-07 | End: 2020-01-09 | Stop reason: HOSPADM

## 2020-01-07 RX ORDER — BACLOFEN 10 MG/1
10 TABLET ORAL 2 TIMES DAILY
Status: DISCONTINUED | OUTPATIENT
Start: 2020-01-07 | End: 2020-01-07

## 2020-01-07 RX ORDER — LORAZEPAM 0.5 MG/1
0.5 TABLET ORAL ONCE
Status: COMPLETED | OUTPATIENT
Start: 2020-01-07 | End: 2020-01-07

## 2020-01-07 RX ORDER — SODIUM CHLORIDE 9 MG/ML
INJECTION, SOLUTION INTRAVENOUS
Status: COMPLETED
Start: 2020-01-07 | End: 2020-01-07

## 2020-01-07 RX ADMIN — ACETAMINOPHEN 650 MG: 325 TABLET, FILM COATED ORAL at 10:24

## 2020-01-07 RX ADMIN — Medication 100 MG: at 05:01

## 2020-01-07 RX ADMIN — SUCRALFATE 1 G: 1 TABLET ORAL at 05:02

## 2020-01-07 RX ADMIN — SODIUM CHLORIDE: 9 INJECTION, SOLUTION INTRAVENOUS at 02:00

## 2020-01-07 RX ADMIN — SODIUM CHLORIDE, POTASSIUM CHLORIDE, SODIUM LACTATE AND CALCIUM CHLORIDE: 600; 310; 30; 20 INJECTION, SOLUTION INTRAVENOUS at 13:13

## 2020-01-07 RX ADMIN — SUCRALFATE 1 G: 1 TABLET ORAL at 16:51

## 2020-01-07 RX ADMIN — SODIUM CHLORIDE 8 MG/HR: 9 INJECTION, SOLUTION INTRAVENOUS at 00:37

## 2020-01-07 RX ADMIN — SUCRALFATE 1 G: 1 TABLET ORAL at 10:25

## 2020-01-07 RX ADMIN — SODIUM CHLORIDE 8 MG/HR: 9 INJECTION, SOLUTION INTRAVENOUS at 22:03

## 2020-01-07 RX ADMIN — TRAZODONE HYDROCHLORIDE 50 MG: 50 TABLET ORAL at 21:07

## 2020-01-07 RX ADMIN — PRAVASTATIN SODIUM 40 MG: 20 TABLET ORAL at 16:51

## 2020-01-07 RX ADMIN — SODIUM CHLORIDE 8 MG/HR: 9 INJECTION, SOLUTION INTRAVENOUS at 10:32

## 2020-01-07 RX ADMIN — ACETAMINOPHEN 650 MG: 325 TABLET, FILM COATED ORAL at 16:51

## 2020-01-07 RX ADMIN — LORAZEPAM 0.5 MG: 0.5 TABLET ORAL at 01:56

## 2020-01-07 RX ADMIN — INSULIN HUMAN 1 UNITS: 100 INJECTION, SOLUTION PARENTERAL at 11:48

## 2020-01-07 RX ADMIN — BACLOFEN 10 MG: 10 TABLET ORAL at 03:40

## 2020-01-07 RX ADMIN — BACLOFEN 10 MG: 10 TABLET ORAL at 16:51

## 2020-01-07 RX ADMIN — METOPROLOL SUCCINATE 50 MG: 50 TABLET, EXTENDED RELEASE ORAL at 16:55

## 2020-01-07 ASSESSMENT — LIFESTYLE VARIABLES
CONSUMPTION TOTAL: POSITIVE
ON A TYPICAL DAY WHEN YOU DRINK ALCOHOL HOW MANY DRINKS DO YOU HAVE: 4
TOTAL SCORE: 1
EVER_SMOKED: YES
DOES PATIENT WANT TO STOP DRINKING: YES
TOTAL SCORE: 1
EVER HAD A DRINK FIRST THING IN THE MORNING TO STEADY YOUR NERVES TO GET RID OF A HANGOVER: NO
TOTAL SCORE: 1
ALCOHOL_USE: YES
HAVE YOU EVER FELT YOU SHOULD CUT DOWN ON YOUR DRINKING: YES
AVERAGE NUMBER OF DAYS PER WEEK YOU HAVE A DRINK CONTAINING ALCOHOL: 5
HAVE PEOPLE ANNOYED YOU BY CRITICIZING YOUR DRINKING: NO
HOW MANY TIMES IN THE PAST YEAR HAVE YOU HAD 5 OR MORE DRINKS IN A DAY: 100
EVER FELT BAD OR GUILTY ABOUT YOUR DRINKING: NO

## 2020-01-07 ASSESSMENT — ENCOUNTER SYMPTOMS
PHOTOPHOBIA: 0
VOMITING: 0
COUGH: 0
HEMOPTYSIS: 0
DIARRHEA: 0
NEUROLOGICAL NEGATIVE: 1
CONSTITUTIONAL NEGATIVE: 1
TINGLING: 0
BLOOD IN STOOL: 0
CHILLS: 0
HEADACHES: 0
CARDIOVASCULAR NEGATIVE: 1
NECK PAIN: 0
NERVOUS/ANXIOUS: 1
EYES NEGATIVE: 1
HEARTBURN: 0
ORTHOPNEA: 0
RESPIRATORY NEGATIVE: 1
CONSTIPATION: 0
DOUBLE VISION: 0
EYE PAIN: 0
ABDOMINAL PAIN: 0
FEVER: 0
PALPITATIONS: 0
SHORTNESS OF BREATH: 0
PSYCHIATRIC NEGATIVE: 1
MUSCULOSKELETAL NEGATIVE: 1
TREMORS: 0

## 2020-01-07 ASSESSMENT — COGNITIVE AND FUNCTIONAL STATUS - GENERAL
SUGGESTED CMS G CODE MODIFIER MOBILITY: CH
MOBILITY SCORE: 24
MOBILITY SCORE: 24
SUGGESTED CMS G CODE MODIFIER MOBILITY: CH
SUGGESTED CMS G CODE MODIFIER DAILY ACTIVITY: CH
DAILY ACTIVITIY SCORE: 24

## 2020-01-07 ASSESSMENT — PATIENT HEALTH QUESTIONNAIRE - PHQ9
2. FEELING DOWN, DEPRESSED, IRRITABLE, OR HOPELESS: NOT AT ALL
1. LITTLE INTEREST OR PLEASURE IN DOING THINGS: NOT AT ALL
SUM OF ALL RESPONSES TO PHQ9 QUESTIONS 1 AND 2: 0

## 2020-01-07 NOTE — CARE PLAN
Problem: Knowledge Deficit  Goal: Knowledge of disease process/condition, treatment plan, diagnostic tests, and medications will improve  Outcome: PROGRESSING AS EXPECTED    Pt updated on POC, including medications, treatment and discharge planning. Questions answered as needed, pt verbalized understanding. Encouraged to voice further questions/concerns.   Problem: Psychosocial Needs:  Goal: Level of anxiety will decrease  Outcome: PROGRESSING AS EXPECTED   Pt encouraged to verbalized her feelings. Patient is helped to identify factors for stress and anxiety. Patient is taught to deep breathe and other forms of stress/anxiety management. Patient verbalizes understanding. Will continue to monitor.

## 2020-01-07 NOTE — PROGRESS NOTES
Gastroenterology Progress Note     Author: Mayur Lara   Date & Time Created: 1/7/2020 9:21 AM    Chief Complaint:  Melena     History of Present Illness:   Thank you for allow me to consult this patient.  This is a 64-year-old male with history of type 2 diabetes on metformin, coronary disease with heart attack in 2002, hyperlipidemia hypertension who presents January 5, 2020 for dizziness nausea as well as melenic stool 6 times today.  Patient does take naproxen intermittently on a weekly basis for chronic back pain.  Also occasionally taking aspirin every other day for cardiac prevention.  Patient does report also drinking alcohol approximately 3 drinks of beer per day.  Reports on feeling well yesterday with emesis after eating something bad at the buffet.  Subsequently this morning felt dizzy when ambulating and then had multiple melenic stool 6 episodes in total after having epigastric discomfort.  No gianfranco hematochezia.  Episode of melenic stool 11:00 in the morning.  Patient was noted to have hemoglobin 8.1/23.9 on arrival.  Last labs May 22, 2019 show hemoglobin 13.8 hematocrit 40.1.  Patient also has elevated BUN on admission.  Patient was tachycardic on admission to ER     Otherwise the patient is doing fine without complaints of fever/ chills/ weight loss/ appetite change/ dysphagia/ odynophagia/ heartburn/ nausea/ vomiting/ bloating/ constipation/ melena/ hematochezia or abdominal pain    Interval History:  1/6/2020:  5mm ulcer with visible vessel in the antrum treated with 3cc of 1:10,000 Epinephrine injection, Goldprobe cautery and placement of 3 hemoclips.   1/7/2020: Required 1 additional unit of pRBC last night. Reports less melena and less BM frequency. No pain.       Review of Systems:  Review of Systems   Constitutional: Negative.    HENT: Negative.    Eyes: Negative.    Respiratory: Negative.    Cardiovascular: Negative.    Gastrointestinal: Positive for melena.   Genitourinary: Negative.     Musculoskeletal: Negative.    Skin: Negative.    Endo/Heme/Allergies: Negative.    Psychiatric/Behavioral: Negative.        Physical Exam:  Physical Exam  Constitutional:       Appearance: Normal appearance.   HENT:      Head: Normocephalic and atraumatic.      Nose: Nose normal.      Mouth/Throat:      Mouth: Mucous membranes are dry.   Eyes:      Extraocular Movements: Extraocular movements intact.      Pupils: Pupils are equal, round, and reactive to light.   Neck:      Musculoskeletal: Normal range of motion and neck supple.   Cardiovascular:      Rate and Rhythm: Normal rate and regular rhythm.   Pulmonary:      Effort: Pulmonary effort is normal.      Breath sounds: Normal breath sounds.   Abdominal:      General: Abdomen is flat.      Palpations: Abdomen is soft.   Skin:     General: Skin is warm.   Neurological:      General: No focal deficit present.      Mental Status: He is alert.   Psychiatric:         Mood and Affect: Mood normal.         Labs:          Recent Labs     01/05/20 1418 01/06/20  0003   SODIUM 131* 136   POTASSIUM 3.6 3.6   CHLORIDE 99 104   CO2 24 24   BUN 44* 38*   CREATININE 0.70 0.67   MAGNESIUM 1.5  --    CALCIUM 8.1* 7.8*     Recent Labs     01/05/20 1418 01/06/20  0003   ALTSGPT 10 8   ASTSGOT 9* 8*   ALKPHOSPHAT 28* 23*   TBILIRUBIN 0.6 0.4   LIPASE 8*  --    GLUCOSE 154* 150*     Recent Labs     01/05/20 1418 01/06/20  0003 01/06/20  0831 01/06/20  1557 01/07/20  0014 01/07/20  0908   RBC 2.40* 1.77* 1.98*  --   --  2.22*   HEMOGLOBIN 8.5* 6.6* 6.4* 7.3* 6.4* 7.0*   HEMATOCRIT 23.9* 19.0* 18.9*  --   --  20.4*   PLATELETCT 223 169 149*  --   --  133*   PROTHROMBTM 14.4  --   --   --   --   --    APTT 26.4  --   --   --   --   --    INR 1.10  --   --   --   --   --      Recent Labs     01/05/20 1418 01/06/20  0003 01/06/20  0831 01/07/20  0908   WBC 9.9 8.6 8.3 8.8   NEUTSPOLYS 61.80 55.90 56.20  --    LYMPHOCYTES 28.40 34.70 33.10  --    MONOCYTES 9.00 8.60 9.70  --     EOSINOPHILS 0.20 0.10 0.20  --    BASOPHILS 0.30 0.50 0.60  --    ASTSGOT 9* 8*  --   --    ALTSGPT 10 8  --   --    ALKPHOSPHAT 28* 23*  --   --    TBILIRUBIN 0.6 0.4  --   --      Hemodynamics:  Temp (24hrs), Av.9 °C (98.5 °F), Min:36.2 °C (97.1 °F), Max:37.2 °C (99 °F)  Temperature: 37.2 °C (98.9 °F)  Pulse  Av.2  Min: 85  Max: 142   Blood Pressure : 134/80     Respiratory:    Respiration: 17, Pulse Oximetry: 99 %           Fluids:    Intake/Output Summary (Last 24 hours) at 2020 0921  Last data filed at 2020 0800  Gross per 24 hour   Intake 2816.83 ml   Output 1150 ml   Net 1666.83 ml     Weight: 71.7 kg (158 lb 1.1 oz)  GI/Nutrition:  Orders Placed This Encounter   Procedures   • Diet Order Clear Liquid     Standing Status:   Standing     Number of Occurrences:   1     Order Specific Question:   Diet:     Answer:   Clear Liquid [10]     Medical Decision Making, by Problem:  Active Hospital Problems    Diagnosis   • GI bleed [K92.2]   • Tachycardia [R00.0]   • Anemia [D64.9]   • Type 2 diabetes mellitus with microalbuminuria (HCC) [E11.29, R80.9]     Impressions:  1. Melena s/p EGD on 2020 with 5mm ulcer with visible vessel active bleeding treated with 3cc of 1:10,000 Epinephrine injection, Goldprobe cautery and placement of 3 hemoclips.   2. Sinus tachycardia  3. Anemia - required multiple transfusion  4. NSAID use  5. ETOH use  6. Elevated BUN    Plan:  1. Continue clear liquid diet no red  2. Continue PPI drip, patient high risk for rebleed  3. Serial H/H monitoring and transfusion  4. If significant decline, signs of rebleeding, consider CT Angiography and tagged RBC scan. May need repeat EGD for reassessment of bleeding.   5. No NSAIDS.   6. Await H. pylori    Quality-Core Measures

## 2020-01-07 NOTE — PROGRESS NOTES
Report received. Patient oriented x4. Pain level 0 out of 10. Denies SOB.  Fall risk interventions in place, bed in low position, and bed alarm on. protonix gtt infusing  Assessment completed.

## 2020-01-07 NOTE — PROGRESS NOTES
[facility] Medicine Daily Progress Note    Date of Service  1/7/2020    Chief Complaint  64 y.o. male admitted 1/5/2020 with melena    Hospital Course    64 y.o. male with history of diabetes type 2 on metformin, CAD with heart attack in 2002, dyslipidemia, hypertension who presented 1/5/2020 with complaints of orthostatic dizziness, nausea, loose black melanotic stool multiple times (5-6 times today).  Symptoms started yesterday and has been getting worse.  He has been taking naproxen for chronic back pain on and off.  He denies any history of GI bleed.  He takes baby aspirin, but no anticoagulants.  He had colonoscopy about 10 years ago, reportedly was normal      Interval Problem Update  Seen and examined, patient with ongoing melena and drop in hgb  hgb this morning 6.4 after transfusion  GI consulted and plans for EGD today  1/7: Still having melena noticed by staff.  No significant abdominal pain.  H&H has been stable so far.  Hemodynamically stable.  Consultants/Specialty  GI    Code Status  full    Disposition  tbd    Review of Systems  Review of Systems   Constitutional: Positive for malaise/fatigue. Negative for chills and fever.   HENT: Negative.  Negative for ear discharge, ear pain and tinnitus.    Eyes: Negative.  Negative for double vision, photophobia and pain.   Respiratory: Negative.  Negative for cough, hemoptysis and shortness of breath.    Cardiovascular: Negative.  Negative for chest pain, palpitations and orthopnea.   Gastrointestinal: Positive for melena. Negative for abdominal pain, blood in stool, constipation, diarrhea, heartburn and vomiting.   Genitourinary: Negative.  Negative for dysuria, frequency and urgency.   Musculoskeletal: Negative.  Negative for neck pain.   Skin: Negative.  Negative for itching and rash.   Neurological: Negative.  Negative for tingling, tremors and headaches.   Endo/Heme/Allergies: Negative.    Psychiatric/Behavioral: Negative for suicidal ideas. The patient is  nervous/anxious.         Physical Exam  Temp:  [36.7 °C (98.1 °F)-37.2 °C (99 °F)] 37.1 °C (98.8 °F)  Pulse:  [100-119] 100  Resp:  [17-20] 20  BP: (124-147)/(73-86) 140/86  SpO2:  [94 %-100 %] 98 %    Physical Exam  Vitals signs and nursing note reviewed.   Constitutional:       General: He is not in acute distress.     Appearance: Normal appearance. He is not ill-appearing.   HENT:      Head: Normocephalic and atraumatic.      Right Ear: There is no impacted cerumen.      Left Ear: There is no impacted cerumen.      Nose: Nose normal.      Mouth/Throat:      Mouth: Mucous membranes are moist.      Pharynx: No oropharyngeal exudate or posterior oropharyngeal erythema.   Eyes:      Extraocular Movements: Extraocular movements intact.      Pupils: Pupils are equal, round, and reactive to light.   Neck:      Musculoskeletal: Normal range of motion and neck supple. No neck rigidity.   Cardiovascular:      Rate and Rhythm: Regular rhythm. Tachycardia present.      Heart sounds: Murmur present. Systolic murmur present.   Pulmonary:      Effort: Pulmonary effort is normal. No respiratory distress.      Breath sounds: Normal breath sounds. No stridor. No wheezing or rhonchi.   Abdominal:      General: Abdomen is flat. There is no distension.      Tenderness: There is no tenderness.   Musculoskeletal: Normal range of motion.         General: No swelling, tenderness or deformity.   Skin:     General: Skin is warm and dry.   Neurological:      General: No focal deficit present.      Mental Status: He is alert and oriented to person, place, and time. Mental status is at baseline.      Cranial Nerves: No cranial nerve deficit.   Psychiatric:         Mood and Affect: Mood normal.         Behavior: Behavior normal.         Thought Content: Thought content normal.         Fluids    Intake/Output Summary (Last 24 hours) at 1/7/2020 1500  Last data filed at 1/7/2020 0800  Gross per 24 hour   Intake 1766.83 ml   Output 1500 ml   Net  266.83 ml       Laboratory  Recent Labs     01/06/20  0003 01/06/20  0831  01/07/20  0014 01/07/20  0908 01/07/20  1144   WBC 8.6 8.3  --   --  8.8  --    RBC 1.77* 1.98*  --   --  2.22*  --    HEMOGLOBIN 6.6* 6.4*   < > 6.4* 7.0* 7.3*   HEMATOCRIT 19.0* 18.9*  --   --  20.4*  --    .1* 95.5  --   --  91.9  --    MCH 34.5* 32.3  --   --  31.5  --    MCHC 34.1 33.9  --   --  34.3  --    RDW 51.6* 63.7*  --   --  67.4*  --    PLATELETCT 169 149*  --   --  133*  --    MPV 10.2 10.6  --   --  10.1  --     < > = values in this interval not displayed.     Recent Labs     01/05/20  1418 01/06/20  0003 01/07/20  0908   SODIUM 131* 136 139   POTASSIUM 3.6 3.6 3.4*   CHLORIDE 99 104 107   CO2 24 24 25   GLUCOSE 154* 150* 176*   BUN 44* 38* 9   CREATININE 0.70 0.67 0.68   CALCIUM 8.1* 7.8* 7.7*     Recent Labs     01/05/20  1418   APTT 26.4   INR 1.10               Imaging  DX-CHEST-PORTABLE (1 VIEW)   Final Result      No acute cardiac or pulmonary abnormalities are identified.           Assessment/Plan  GI bleed  Assessment & Plan  monitor carefully on telemetry and h.h [id number]  -GI consulted  EGD impressions:   1. 5mm ulcer in the antrum treated with 3cc of 1:10,000 Epinephrine injection, Goldprobe cautery and placement of 3 hemoclips.      Recommendations:   1. Continue IV protonix drip  2. Continue monitor H/H  3. Transfuse to HgB of at least 7 but given patient's history of possible CAD, would potentially aim for 8  4. Clear liquid diet in 4 hours if no significant change.  5. Carafate.  6. If significant recurrence of bleeding, may need repeat EGD.    1/7: Continue to monitor H&H periodically.  Continue PPI drip as patient is high risk for rebleed.    Anemia  Assessment & Plan  This is blood loss anemia.  Hemoglobin 6.4 this morning s/p transfusion  Will give her another unit  Monitor h/h q6h and monitor closely    Tachycardia  Assessment & Plan  EKG showed sinus tachycardia with diffuse ischemia.  Patient denies  chest pain  Likely secondary to blood loss  Plan to continue IV fluids, trend hemoglobin.  We will plan for RBC transfusion if hemoglobin drops below 8.0    Type 2 diabetes mellitus with microalbuminuria (HCC)- (present on admission)  Assessment & Plan  Will hold metformin  We will start sliding scale  Blood sugar is fairly controlled  Post recent A1c 5.7 (4 months ago)  Plan of care discussed with multidisciplinary team during rounds.    VTE prophylaxis: scds

## 2020-01-07 NOTE — PROGRESS NOTES
Bedside report received from BRIGETTE Lovett. Assumed care of pt. Pt awake, sitting up in in bed. A/Ox4, VSS. No concerns, complaints or distress. Pt educated to call before getting out of bed. POC reviewed and white board updated. Tele box on. on the monitor. Call light in reach. Bed locked in lowest position with 2 upper bed rails up. Bed alarm on.

## 2020-01-07 NOTE — PROGRESS NOTES
Patient complaining of leg spasms, states has them at times intermittently at home and takes baclofen as needed. md paged, orders received for baclofen

## 2020-01-07 NOTE — CARE PLAN
Problem: Pain Management  Goal: Pain level will decrease to patient's comfort goal  Note:   Medication provided for headache        Problem: Communication  Goal: The ability to communicate needs accurately and effectively will improve  Intervention: Educate patient and significant other/support system about the plan of care, procedures, treatments, medications and allow for questions  Flowsheets (Taken 1/7/2020 0053)  Pt & Family Have Been Educated on Methods Available to Report Concerns Related to Care, Treatment, Services, and Patient Safety Issues: Yes

## 2020-01-07 NOTE — DISCHARGE PLANNING
Patient is a 64 year old  man who lives with spouseYue in [place], [place]. 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     Medical care is with [facility]. Also see's Dr. Bennett for his neck, and Dr. Don for pain. No issues with ADLs or IADLs. No home O2. No issues with AOD or MH. No AD, full code, would like packet. Upon D/C patient's family will transport home.     PLAN:  No initial needs identified.     Care Transition Team Assessment    Information Source  Orientation : Oriented x 4  Information Given By: Patient  Who is responsible for making decisions for patient? : Patient    Readmission Evaluation  Is this a readmission?: No    Elopement Risk  Legal Hold: No  Ambulatory or Self Mobile in Wheelchair: Yes  Disoriented: No  Psychiatric Symptoms: None  History of Wandering: No  Elopement this Admit: No  Vocalizing Wanting to Leave: No  Displays Behaviors, Body Language Wanting to Leave: No-Not at Risk for Elopement  Elopement Risk: Not at Risk for Elopement    Interdisciplinary Discharge Planning  Patient or legal guardian wants to designate a caregiver (see row info): No    Discharge Preparedness  What is your plan after discharge?: Home with help  What are your discharge supports?: Spouse  Prior Functional Level: Ambulatory, Drives Self, Independent with Activities of Daily Living, Independent with Medication Management  Difficulity with ADLs: None  Difficulity with IADLs: None    Functional Assesment  Prior Functional Level: Ambulatory, Drives Self, Independent with Activities of Daily Living, Independent with Medication Management    Finances  Financial Barriers to Discharge: No  Prescription Coverage: Yes    Vision / Hearing Impairment  Vision Impairment : Yes              Domestic Abuse  Have you ever been the victim of abuse or violence?: No  Physical Abuse or Sexual Abuse: No  Verbal Abuse or Emotional Abuse: No  Possible Abuse Reported to:: Not  Applicable    Psychological Assessment  History of Substance Abuse: Marijuana  Date Last Used - Marijuana: Nov 2019  History of Psychiatric Problems: Yes(Bipolar I Disorder with medication)  Non-compliant with Treatment: No  Newly Diagnosed Illness: Yes    Discharge Risks or Barriers  Discharge risks or barriers?: No    Anticipated Discharge Information  Anticipated discharge disposition: Home  Discharge Address: [address] [place] [id number]  Discharge Contact Phone Number: [phone]

## 2020-01-08 PROBLEM — R00.0 TACHYCARDIA: Status: RESOLVED | Noted: 2020-01-05 | Resolved: 2020-01-08

## 2020-01-08 LAB
ERYTHROCYTE [DISTWIDTH] IN BLOOD BY AUTOMATED COUNT: 69 FL (ref 35.9–50)
GLUCOSE BLD-MCNC: 134 MG/DL (ref 65–99)
GLUCOSE BLD-MCNC: 141 MG/DL (ref 65–99)
HCT VFR BLD AUTO: 24.5 % (ref 42–52)
HGB BLD-MCNC: 7.1 G/DL (ref 14–18)
HGB BLD-MCNC: 7.9 G/DL (ref 14–18)
HGB BLD-MCNC: 8.3 G/DL (ref 14–18)
HGB RETIC QN AUTO: 38.9 PG/CELL (ref 29–35)
IMM RETICS NFR: 39.1 % (ref 9.3–17.4)
IRON SATN MFR SERPL: 4 % (ref 15–55)
IRON SERPL-MCNC: 16 UG/DL (ref 50–180)
MCH RBC QN AUTO: 32.7 PG (ref 27–33)
MCHC RBC AUTO-ENTMCNC: 33.9 G/DL (ref 33.7–35.3)
MCV RBC AUTO: 96.5 FL (ref 81.4–97.8)
PLATELET # BLD AUTO: 195 K/UL (ref 164–446)
PMV BLD AUTO: 10.8 FL (ref 9–12.9)
RBC # BLD AUTO: 2.54 M/UL (ref 4.7–6.1)
RETICS # AUTO: 0.11 M/UL (ref 0.04–0.06)
RETICS/RBC NFR: 3.5 % (ref 0.8–2.1)
TIBC SERPL-MCNC: 420 UG/DL (ref 250–450)
WBC # BLD AUTO: 8.3 K/UL (ref 4.8–10.8)

## 2020-01-08 PROCEDURE — 770020 HCHG ROOM/CARE - TELE (206)

## 2020-01-08 PROCEDURE — 83540 ASSAY OF IRON: CPT

## 2020-01-08 PROCEDURE — 700102 HCHG RX REV CODE 250 W/ 637 OVERRIDE(OP): Performed by: FAMILY MEDICINE

## 2020-01-08 PROCEDURE — 700102 HCHG RX REV CODE 250 W/ 637 OVERRIDE(OP): Mod: JG | Performed by: FAMILY MEDICINE

## 2020-01-08 PROCEDURE — A9270 NON-COVERED ITEM OR SERVICE: HCPCS | Performed by: HOSPITALIST

## 2020-01-08 PROCEDURE — 85018 HEMOGLOBIN: CPT

## 2020-01-08 PROCEDURE — 36415 COLL VENOUS BLD VENIPUNCTURE: CPT

## 2020-01-08 PROCEDURE — A9270 NON-COVERED ITEM OR SERVICE: HCPCS | Performed by: FAMILY MEDICINE

## 2020-01-08 PROCEDURE — 99232 SBSQ HOSP IP/OBS MODERATE 35: CPT | Performed by: FAMILY MEDICINE

## 2020-01-08 PROCEDURE — 85046 RETICYTE/HGB CONCENTRATE: CPT

## 2020-01-08 PROCEDURE — 85027 COMPLETE CBC AUTOMATED: CPT

## 2020-01-08 PROCEDURE — A9270 NON-COVERED ITEM OR SERVICE: HCPCS | Performed by: INTERNAL MEDICINE

## 2020-01-08 PROCEDURE — A9270 NON-COVERED ITEM OR SERVICE: HCPCS | Mod: JG | Performed by: FAMILY MEDICINE

## 2020-01-08 PROCEDURE — 83550 IRON BINDING TEST: CPT

## 2020-01-08 PROCEDURE — 700111 HCHG RX REV CODE 636 W/ 250 OVERRIDE (IP): Mod: JG | Performed by: FAMILY MEDICINE

## 2020-01-08 PROCEDURE — 82962 GLUCOSE BLOOD TEST: CPT

## 2020-01-08 PROCEDURE — 700102 HCHG RX REV CODE 250 W/ 637 OVERRIDE(OP): Performed by: INTERNAL MEDICINE

## 2020-01-08 PROCEDURE — 700105 HCHG RX REV CODE 258: Performed by: FAMILY MEDICINE

## 2020-01-08 PROCEDURE — 700102 HCHG RX REV CODE 250 W/ 637 OVERRIDE(OP): Performed by: HOSPITALIST

## 2020-01-08 RX ORDER — TRAZODONE HYDROCHLORIDE 50 MG/1
50 TABLET ORAL
Qty: 30 TAB | Refills: 3 | Status: SHIPPED | OUTPATIENT
Start: 2020-01-08 | End: 2020-06-04

## 2020-01-08 RX ORDER — DIPHENHYDRAMINE HCL 25 MG
25 TABLET ORAL ONCE
Status: COMPLETED | OUTPATIENT
Start: 2020-01-08 | End: 2020-01-08

## 2020-01-08 RX ORDER — ACETAMINOPHEN 325 MG/1
650 TABLET ORAL ONCE
Status: COMPLETED | OUTPATIENT
Start: 2020-01-08 | End: 2020-01-08

## 2020-01-08 RX ORDER — SUCRALFATE 1 G/1
1 TABLET ORAL EVERY 6 HOURS
Qty: 120 TAB | Refills: 3 | Status: SHIPPED
Start: 2020-01-08 | End: 2020-02-20

## 2020-01-08 RX ORDER — ONDANSETRON 4 MG/1
4 TABLET, ORALLY DISINTEGRATING ORAL EVERY 4 HOURS PRN
Qty: 10 TAB | Refills: 0 | Status: SHIPPED | OUTPATIENT
Start: 2020-01-08 | End: 2022-04-04

## 2020-01-08 RX ORDER — DIPHENHYDRAMINE HYDROCHLORIDE 50 MG/ML
25 INJECTION INTRAMUSCULAR; INTRAVENOUS ONCE
Status: COMPLETED | OUTPATIENT
Start: 2020-01-08 | End: 2020-01-08

## 2020-01-08 RX ADMIN — SUCRALFATE 1 G: 1 TABLET ORAL at 12:00

## 2020-01-08 RX ADMIN — ACETAMINOPHEN 650 MG: 325 TABLET, FILM COATED ORAL at 00:00

## 2020-01-08 RX ADMIN — SODIUM CHLORIDE 25 MG: 9 INJECTION, SOLUTION INTRAVENOUS at 17:30

## 2020-01-08 RX ADMIN — BACLOFEN 10 MG: 10 TABLET ORAL at 18:18

## 2020-01-08 RX ADMIN — SUCRALFATE 1 G: 1 TABLET ORAL at 00:00

## 2020-01-08 RX ADMIN — SUCRALFATE 1 G: 1 TABLET ORAL at 18:18

## 2020-01-08 RX ADMIN — SUCRALFATE 1 G: 1 TABLET ORAL at 05:43

## 2020-01-08 RX ADMIN — TRAZODONE HYDROCHLORIDE 50 MG: 50 TABLET ORAL at 20:06

## 2020-01-08 RX ADMIN — OXYCODONE HYDROCHLORIDE 10 MG: 10 TABLET ORAL at 16:47

## 2020-01-08 RX ADMIN — PRAVASTATIN SODIUM 40 MG: 20 TABLET ORAL at 18:18

## 2020-01-08 RX ADMIN — SUCRALFATE 1 G: 1 TABLET ORAL at 23:25

## 2020-01-08 RX ADMIN — BACLOFEN 10 MG: 10 TABLET ORAL at 05:43

## 2020-01-08 RX ADMIN — SENNOSIDES AND DOCUSATE SODIUM 2 TABLET: 8.6; 5 TABLET ORAL at 18:19

## 2020-01-08 RX ADMIN — METOPROLOL SUCCINATE 50 MG: 50 TABLET, EXTENDED RELEASE ORAL at 16:46

## 2020-01-08 RX ADMIN — DIPHENHYDRAMINE HYDROCHLORIDE 25 MG: 25 TABLET ORAL at 16:46

## 2020-01-08 RX ADMIN — OMEPRAZOLE 40 MG: KIT at 18:00

## 2020-01-08 RX ADMIN — SODIUM CHLORIDE 1975 MG: 9 INJECTION, SOLUTION INTRAVENOUS at 20:07

## 2020-01-08 RX ADMIN — ACETAMINOPHEN 650 MG: 325 TABLET, FILM COATED ORAL at 16:46

## 2020-01-08 ASSESSMENT — ENCOUNTER SYMPTOMS
PHOTOPHOBIA: 0
PSYCHIATRIC NEGATIVE: 1
DOUBLE VISION: 0
RESPIRATORY NEGATIVE: 1
SPUTUM PRODUCTION: 0
FEVER: 0
HEADACHES: 0
CONSTITUTIONAL NEGATIVE: 1
TINGLING: 0
ABDOMINAL PAIN: 0
VOMITING: 0
NERVOUS/ANXIOUS: 1
SENSORY CHANGE: 0
DIARRHEA: 0
MYALGIAS: 0
EYES NEGATIVE: 1
PALPITATIONS: 0
MUSCULOSKELETAL NEGATIVE: 1
HEARTBURN: 0
HEMOPTYSIS: 0
ORTHOPNEA: 0
COUGH: 0
NECK PAIN: 0
CARDIOVASCULAR NEGATIVE: 1
TREMORS: 0
CHILLS: 0
NAUSEA: 0

## 2020-01-08 NOTE — PROGRESS NOTES
Assumed care of patient A&O x4. Patient resting in bed with no signs of labored breathing, on room air. Tele monitor in place, cardiac rhythm being monitored. Call light within reach, bed in lowest position. Patient was updated with plan for the day. Will continue to monitor.

## 2020-01-08 NOTE — PROGRESS NOTES
Gastroenterology Progress Note     Author: Mayur Lara   Date & Time Created: 1/8/2020 10:14 AM    Chief Complaint:  Melena     History of Present Illness:   Thank you for allow me to consult this patient.  This is a 64-year-old male with history of type 2 diabetes on metformin, coronary disease with heart attack in 2002, hyperlipidemia hypertension who presents January 5, 2020 for dizziness nausea as well as melenic stool 6 times today.  Patient does take naproxen intermittently on a weekly basis for chronic back pain.  Also occasionally taking aspirin every other day for cardiac prevention.  Patient does report also drinking alcohol approximately 3 drinks of beer per day.  Reports on feeling well yesterday with emesis after eating something bad at the buffet.  Subsequently this morning felt dizzy when ambulating and then had multiple melenic stool 6 episodes in total after having epigastric discomfort.  No gianfranco hematochezia.  Episode of melenic stool 11:00 in the morning.  Patient was noted to have hemoglobin 8.1/23.9 on arrival.  Last labs May 22, 2019 show hemoglobin 13.8 hematocrit 40.1.  Patient also has elevated BUN on admission.  Patient was tachycardic on admission to ER     Otherwise the patient is doing fine without complaints of fever/ chills/ weight loss/ appetite change/ dysphagia/ odynophagia/ heartburn/ nausea/ vomiting/ bloating/ constipation/ melena/ hematochezia or abdominal pain    Interval History:  1/6/2020:  5mm ulcer with visible vessel in the antrum treated with 3cc of 1:10,000 Epinephrine injection, Goldprobe cautery and placement of 3 hemoclips.   1/7/2020: Required 1 additional unit of pRBC last night. Reports less melena and less BM frequency. No pain.  1/8/2020: No transfusion last night. Denies abdominal pain. Reports formed stool. Less melena.       Review of Systems:  Review of Systems   Constitutional: Negative.    HENT: Negative.    Eyes: Negative.    Respiratory: Negative.     Cardiovascular: Negative.    Gastrointestinal: Positive for melena.   Genitourinary: Negative.    Musculoskeletal: Negative.    Skin: Negative.    Endo/Heme/Allergies: Negative.    Psychiatric/Behavioral: Negative.        Physical Exam:  Physical Exam  Constitutional:       Appearance: Normal appearance.   HENT:      Head: Normocephalic and atraumatic.      Nose: Nose normal.      Mouth/Throat:      Mouth: Mucous membranes are dry.   Eyes:      Extraocular Movements: Extraocular movements intact.      Pupils: Pupils are equal, round, and reactive to light.   Neck:      Musculoskeletal: Normal range of motion and neck supple.   Cardiovascular:      Rate and Rhythm: Normal rate and regular rhythm.   Pulmonary:      Effort: Pulmonary effort is normal.      Breath sounds: Normal breath sounds.   Abdominal:      General: Abdomen is flat.      Palpations: Abdomen is soft.   Skin:     General: Skin is warm.   Neurological:      General: No focal deficit present.      Mental Status: He is alert.   Psychiatric:         Mood and Affect: Mood normal.         Labs:          Recent Labs     01/05/20 1418 01/06/20  0003 01/07/20  0908   SODIUM 131* 136 139   POTASSIUM 3.6 3.6 3.4*   CHLORIDE 99 104 107   CO2 24 24 25   BUN 44* 38* 9   CREATININE 0.70 0.67 0.68   MAGNESIUM 1.5  --   --    CALCIUM 8.1* 7.8* 7.7*     Recent Labs     01/05/20 1418 01/06/20  0003 01/07/20  0908   ALTSGPT 10 8  --    ASTSGOT 9* 8*  --    ALKPHOSPHAT 28* 23*  --    TBILIRUBIN 0.6 0.4  --    LIPASE 8*  --   --    GLUCOSE 154* 150* 176*     Recent Labs     01/05/20  1418 01/06/20  0003 01/06/20  0831  01/07/20  0908  01/07/20  1510 01/07/20  1943 01/08/20  0340   RBC 2.40* 1.77* 1.98*  --  2.22*  --   --   --   --    HEMOGLOBIN 8.5* 6.6* 6.4*   < > 7.0*   < > 7.2* 7.3* 7.1*   HEMATOCRIT 23.9* 19.0* 18.9*  --  20.4*  --   --   --   --    PLATELETCT 223 169 149*  --  133*  --   --   --   --    PROTHROMBTM 14.4  --   --   --   --   --   --   --   --     APTT 26.4  --   --   --   --   --   --   --   --    INR 1.10  --   --   --   --   --   --   --   --     < > = values in this interval not displayed.     Recent Labs     20  1418 20  0003 20  0831 20  0908   WBC 9.9 8.6 8.3 8.8   NEUTSPOLYS 61.80 55.90 56.20  --    LYMPHOCYTES 28.40 34.70 33.10  --    MONOCYTES 9.00 8.60 9.70  --    EOSINOPHILS 0.20 0.10 0.20  --    BASOPHILS 0.30 0.50 0.60  --    ASTSGOT 9* 8*  --   --    ALTSGPT 10 8  --   --    ALKPHOSPHAT 28* 23*  --   --    TBILIRUBIN 0.6 0.4  --   --      Hemodynamics:  Temp (24hrs), Av.1 °C (98.7 °F), Min:36.9 °C (98.5 °F), Max:37.3 °C (99.2 °F)  Temperature: 36.9 °C (98.5 °F)  Pulse  Av.9  Min: 64  Max: 142   Blood Pressure : 132/75     Respiratory:    Respiration: 18, Pulse Oximetry: 99 %           Fluids:    Intake/Output Summary (Last 24 hours) at 2020 0921  Last data filed at 2020 0800  Gross per 24 hour   Intake 2816.83 ml   Output 1150 ml   Net 1666.83 ml     Weight: 71.5 kg (157 lb 10.1 oz)  GI/Nutrition:  Orders Placed This Encounter   Procedures   • Diet Order Clear Liquids - No Red Foods     Standing Status:   Standing     Number of Occurrences:   1     Order Specific Question:   Diet:     Answer:   Clear Liquids - No Red Foods [12]     Medical Decision Making, by Problem:  Active Hospital Problems    Diagnosis   • GI bleed [K92.2]   • Tachycardia [R00.0]   • Anemia [D64.9]   • Type 2 diabetes mellitus with microalbuminuria (HCC) [E11.29, R80.9]     Impressions:  1. Melena s/p EGD on 2020 with 5mm ulcer with visible vessel active bleeding treated with 3cc of 1:10,000 Epinephrine injection, Goldprobe cautery and placement of 3 hemoclips.   2. Sinus tachycardia  3. Anemia - required multiple transfusion  4. NSAID use  5. ETOH use  6. Elevated BUN    Plan:  1. Advance diet to full liquid then as tolerated  2. Complete PPI drip then transition to PPI 40mg PO BID for 1 month then wean to once daily until  seen in clinic  3. No NSAIDS.   4. Await H. Pylori stool result, treat if positive  5. ETOH Cessation  6. If stable this afternoon, recheck CBC and if stable ok to discharge  7. Repeat check CBC by PCP in 1 week  8. Follow-up in clinic.  9. Consider IV infusion before discharge.    GI TO SIGN OFF / STAND BY - Thank you very much for allowing me to participate in the care of your patient.  Please feel free to contact me anytime at [phone]      Quality-Core Measures

## 2020-01-08 NOTE — CARE PLAN
Problem: Safety  Goal: Will remain free from injury  Outcome: PROGRESSING AS EXPECTED  Intervention: Educate patient and significant other/support system about adaptive mobility strategies and safe transfers  Note:   Pt mobility assessed at beginning of shift. Pt is standby assist. Fall precautions in place. Non-slip socks on. Bed in lowest locked position. Bed alarm on. Call light within reach. Pt educated to call for assistance and verbalizes understanding.      Problem: Knowledge Deficit  Goal: Knowledge of the prescribed therapeutic regimen will improve  Outcome: PROGRESSING AS EXPECTED  Intervention: Discuss information regarding therpeutic regimen and document in education  Note:   Pt educated about disease process. Reason why medications are taken. And informed about treatment plan.

## 2020-01-08 NOTE — PROGRESS NOTES
Received bedside report from RN, pt care assumed, VSS, pt assessment complete. Pt AAOx4, chronic c/o  pain at this time. No signs of acute distress noted at this time. POC discussed with pt and verbalizes no questions. Pt denies any additional needs at this time. Bed in lowest position, bed alarm on, pt educated on fall risk and verbalized understanding, call light within reach, hourly rounding initiated. In a sinus rhythm. No active bleeding noted or reported. Tolerating PO liquids without nausea or vomiting.

## 2020-01-09 VITALS
TEMPERATURE: 99.1 F | HEART RATE: 104 BPM | WEIGHT: 162.26 LBS | HEIGHT: 70 IN | OXYGEN SATURATION: 97 % | DIASTOLIC BLOOD PRESSURE: 80 MMHG | RESPIRATION RATE: 18 BRPM | SYSTOLIC BLOOD PRESSURE: 144 MMHG | BODY MASS INDEX: 23.23 KG/M2

## 2020-01-09 LAB
GLUCOSE BLD-MCNC: 127 MG/DL (ref 65–99)
GLUCOSE BLD-MCNC: 132 MG/DL (ref 65–99)
HGB BLD-MCNC: 7.1 G/DL (ref 14–18)
HGB BLD-MCNC: 7.6 G/DL (ref 14–18)

## 2020-01-09 PROCEDURE — A9270 NON-COVERED ITEM OR SERVICE: HCPCS | Performed by: INTERNAL MEDICINE

## 2020-01-09 PROCEDURE — 36415 COLL VENOUS BLD VENIPUNCTURE: CPT

## 2020-01-09 PROCEDURE — 99239 HOSP IP/OBS DSCHRG MGMT >30: CPT | Performed by: FAMILY MEDICINE

## 2020-01-09 PROCEDURE — 85018 HEMOGLOBIN: CPT

## 2020-01-09 PROCEDURE — 700102 HCHG RX REV CODE 250 W/ 637 OVERRIDE(OP): Performed by: INTERNAL MEDICINE

## 2020-01-09 PROCEDURE — 700102 HCHG RX REV CODE 250 W/ 637 OVERRIDE(OP): Performed by: FAMILY MEDICINE

## 2020-01-09 PROCEDURE — A9270 NON-COVERED ITEM OR SERVICE: HCPCS | Performed by: FAMILY MEDICINE

## 2020-01-09 PROCEDURE — A9270 NON-COVERED ITEM OR SERVICE: HCPCS | Mod: JG | Performed by: FAMILY MEDICINE

## 2020-01-09 PROCEDURE — 82962 GLUCOSE BLOOD TEST: CPT

## 2020-01-09 PROCEDURE — 700102 HCHG RX REV CODE 250 W/ 637 OVERRIDE(OP): Mod: JG | Performed by: FAMILY MEDICINE

## 2020-01-09 RX ADMIN — OMEPRAZOLE 40 MG: KIT at 09:01

## 2020-01-09 RX ADMIN — SENNOSIDES AND DOCUSATE SODIUM 2 TABLET: 8.6; 5 TABLET ORAL at 06:30

## 2020-01-09 RX ADMIN — BACLOFEN 10 MG: 10 TABLET ORAL at 06:30

## 2020-01-09 RX ADMIN — SUCRALFATE 1 G: 1 TABLET ORAL at 12:00

## 2020-01-09 RX ADMIN — SUCRALFATE 1 G: 1 TABLET ORAL at 06:30

## 2020-01-09 RX ADMIN — OXYCODONE HYDROCHLORIDE 10 MG: 10 TABLET ORAL at 09:06

## 2020-01-09 NOTE — PROGRESS NOTES
[facility] Medicine Daily Progress Note    Date of Service  1/8/2020    Chief Complaint  64 y.o. male admitted 1/5/2020 with melena    Hospital Course    64 y.o. male with history of diabetes type 2 on metformin, CAD with heart attack in 2002, dyslipidemia, hypertension who presented 1/5/2020 with complaints of orthostatic dizziness, nausea, loose black melanotic stool multiple times (5-6 times today).  Symptoms started yesterday and has been getting worse.  He has been taking naproxen for chronic back pain on and off.  He denies any history of GI bleed.  He takes baby aspirin, but no anticoagulants.  He had colonoscopy about 10 years ago, reportedly was normal      Interval Problem Update  Seen and examined, patient with ongoing melena and drop in hgb  hgb this morning 6.4 after transfusion  GI consulted and plans for EGD today  1/7: Still having melena noticed by staff.  No significant abdominal pain.  H&H has been stable so far.  Hemodynamically stable.  1/8: Resting in bed comfortably.  No further melena.  H&H has been stable.  Tolerating p.o. fairly.  Diet has been advanced.  Switched to p.o. PPI.  IV iron dextran today.  Discharge tomorrow.  Consultants/Specialty  GI    Code Status  full    Disposition  Discharge home tomorrow.    Review of Systems  Review of Systems   Constitutional: Positive for malaise/fatigue. Negative for chills and fever.   HENT: Negative for ear discharge, ear pain and tinnitus.    Eyes: Negative for double vision and photophobia.   Respiratory: Negative for cough, hemoptysis and sputum production.    Cardiovascular: Negative for chest pain, palpitations and orthopnea.   Gastrointestinal: Positive for melena. Negative for abdominal pain, diarrhea, heartburn, nausea and vomiting.   Genitourinary: Negative for dysuria, frequency and urgency.   Musculoskeletal: Negative.  Negative for myalgias and neck pain.   Skin: Negative for rash.   Neurological: Negative for tingling, tremors, sensory  change and headaches.   Endo/Heme/Allergies: Negative.    Psychiatric/Behavioral: Negative for suicidal ideas. The patient is nervous/anxious.         Physical Exam  Temp:  [36.9 °C (98.5 °F)-37.4 °C (99.4 °F)] 37.4 °C (99.4 °F)  Pulse:  [] 101  Resp:  [16-18] 18  BP: (117-147)/(67-83) 147/83  SpO2:  [95 %-100 %] 98 %    Physical Exam  Vitals signs and nursing note reviewed.   Constitutional:       General: He is not in acute distress.     Appearance: Normal appearance. He is not ill-appearing.   HENT:      Head: Normocephalic and atraumatic.      Right Ear: There is no impacted cerumen.      Left Ear: There is no impacted cerumen.      Nose: Nose normal.      Mouth/Throat:      Mouth: Mucous membranes are moist.      Pharynx: No oropharyngeal exudate or posterior oropharyngeal erythema.   Eyes:      Extraocular Movements: Extraocular movements intact.      Pupils: Pupils are equal, round, and reactive to light.   Neck:      Musculoskeletal: Normal range of motion and neck supple. No neck rigidity.   Cardiovascular:      Rate and Rhythm: Regular rhythm. Tachycardia present.      Heart sounds: Murmur present. Systolic murmur present.   Pulmonary:      Effort: Pulmonary effort is normal. No respiratory distress.      Breath sounds: Normal breath sounds. No stridor. No wheezing or rhonchi.   Abdominal:      General: Abdomen is flat. There is no distension.      Palpations: There is no mass.   Musculoskeletal: Normal range of motion.         General: No swelling or tenderness.   Skin:     General: Skin is warm and dry.      Coloration: Skin is pale.   Neurological:      General: No focal deficit present.      Mental Status: He is alert and oriented to person, place, and time. Mental status is at baseline.      Cranial Nerves: No cranial nerve deficit.   Psychiatric:         Mood and Affect: Mood normal.         Behavior: Behavior normal.         Thought Content: Thought content normal.          Fluids    Intake/Output Summary (Last 24 hours) at 1/8/2020 1809  Last data filed at 1/8/2020 1230  Gross per 24 hour   Intake 480 ml   Output 2100 ml   Net -1620 ml       Laboratory  Recent Labs     01/06/20  0831  01/07/20  0908  01/08/20  0340 01/08/20  1210 01/08/20  1633   WBC 8.3  --  8.8  --   --   --  8.3   RBC 1.98*  --  2.22*  --   --   --  2.54*   HEMOGLOBIN 6.4*   < > 7.0*   < > 7.1* 7.9* 8.3*   HEMATOCRIT 18.9*  --  20.4*  --   --   --  24.5*   MCV 95.5  --  91.9  --   --   --  96.5   MCH 32.3  --  31.5  --   --   --  32.7   MCHC 33.9  --  34.3  --   --   --  33.9   RDW 63.7*  --  67.4*  --   --   --  69.0*   PLATELETCT 149*  --  133*  --   --   --  195   MPV 10.6  --  10.1  --   --   --  10.8    < > = values in this interval not displayed.     Recent Labs     01/06/20  0003 01/07/20  0908   SODIUM 136 139   POTASSIUM 3.6 3.4*   CHLORIDE 104 107   CO2 24 25   GLUCOSE 150* 176*   BUN 38* 9   CREATININE 0.67 0.68   CALCIUM 7.8* 7.7*                   Imaging  DX-CHEST-PORTABLE (1 VIEW)   Final Result      No acute cardiac or pulmonary abnormalities are identified.           Assessment/Plan  GI bleed  Assessment & Plan  monitor carefully on telemetry and h.h [id number]  -GI consulted  EGD impressions:   1. 5mm ulcer in the antrum treated with 3cc of 1:10,000 Epinephrine injection, Goldprobe cautery and placement of 3 hemoclips.      Recommendations:   1. Continue IV protonix drip  2. Continue monitor H/H  3. Transfuse to HgB of at least 7 but given patient's history of possible CAD, would potentially aim for 8  4. Clear liquid diet in 4 hours if no significant change.  5. Carafate.  6. If significant recurrence of bleeding, may need repeat EGD.    1/7: Continue to monitor H&H periodically.  Continue PPI drip as patient is high risk for rebleed.  1/8: H&H has been stable.  Switch to oral PPI.  No further melena.  Advance diet as tolerated.    Anemia  Assessment & Plan  This is blood loss  anemia.  Hemoglobin 6.4 this morning s/p transfusion  Will give her another unit  Monitor h/h q6h and monitor closely  1/8: I ordered IV iron dextran today.    Type 2 diabetes mellitus with microalbuminuria (HCC)- (present on admission)  Assessment & Plan  Will hold metformin  We will start sliding scale  Blood sugar is fairly controlled  Post recent A1c 5.7 (4 months ago)  Plan of care discussed with multidisciplinary team during rounds.    VTE prophylaxis: scds

## 2020-01-09 NOTE — PROGRESS NOTES
Received bedside report from RN, pt care assumed, VSS, pt assessment complete. Pt AAOx4, chronic c/o 5/10 pain at this time. No signs of acute distress noted at this time. POC discussed with pt and verbalizes no questions. Pt denies any additional needs at this time. Bed in lowest position, bed alarm on, pt educated on fall risk and verbalized understanding, call light within reach, hourly rounding initiated. In a sinus rhythm. No active bleeding noted.

## 2020-01-09 NOTE — PROGRESS NOTES
Bedside report received from day nurse. Assumed care of patient. Pt. resting comfortably without any sign of distress. A&Ox4, VSS, no complaints at this time. POC reviewed and white board updated, Tele box on, Call light in reach, Bed locked in lowest position.

## 2020-01-09 NOTE — DISCHARGE INSTRUCTIONS
Discharge Instructions    Discharged to home by car with relative. Discharged via wheelchair, hospital escort: Yes.  Special equipment needed: Not Applicable    Be sure to schedule a follow-up appointment with your primary care doctor or any specialists as instructed.     Discharge Plan:   Influenza Vaccine Indication: Patient Refuses    I understand that a diet low in cholesterol, fat, and sodium is recommended for good health. Unless I have been given specific instructions below for another diet, I accept this instruction as my diet prescription.   Other diet:     Special Instructions: None    · Is patient discharged on Warfarin / Coumadin?   No     Depression / Suicide Risk    As you are discharged from this [facility] facility, it is important to learn how to keep safe from harming yourself.    Recognize the warning signs:  · Abrupt changes in personality, positive or negative- including increase in energy   · Giving away possessions  · Change in eating patterns- significant weight changes-  positive or negative  · Change in sleeping patterns- unable to sleep or sleeping all the time   · Unwillingness or inability to communicate  · Depression  · Unusual sadness, discouragement and loneliness  · Talk of wanting to die  · Neglect of personal appearance   · Rebelliousness- reckless behavior  · Withdrawal from people/activities they love  · Confusion- inability to concentrate     If you or a loved one observes any of these behaviors or has concerns about self-harm, here's what you can do:  · Talk about it- your feelings and reasons for harming yourself  · Remove any means that you might use to hurt yourself (examples: pills, rope, extension cords, firearm)  · Get professional help from the community (Mental Health, Substance Abuse, psychological counseling)  · Do not be alone:Call your Safe Contact- someone whom you trust who will be there for you.  · Call your local CRISIS HOTLINE 381-0126 or  356.898.6122  · Call your local Children's Mobile Crisis Response Team [place] (591) 131-9948 or www.[employer]  · Call the toll free National Suicide Prevention Hotlines   · National Suicide Prevention Lifeline 755-516-FDWE (8351)  · National Hope Line Network 800-SUICIDE (954-0274)    Gastrointestinal Bleeding  Introduction  Gastrointestinal bleeding is bleeding somewhere along the path food travels through the body (digestive tract). This path is anywhere between the mouth and the opening of the butt (anus). You may have blood in your poop (stools) or have black poop. If you throw up (vomit), there may be blood in it.  This condition can be mild, serious, or even life-threatening. If you have a lot of bleeding, you may need to stay in the hospital.  Follow these instructions at home:  · Take over-the-counter and prescription medicines only as told by your doctor.  · Eat foods that have a lot of fiber in them. These foods include whole grains, fruits, and vegetables. You can also try eating 1-3 prunes each day.  · Drink enough fluid to keep your pee (urine) clear or pale yellow.  · Keep all follow-up visits as told by your doctor. This is important.  Contact a doctor if:  · Your symptoms do not get better.  Get help right away if:  · Your bleeding gets worse.  · You feel dizzy or you pass out (faint).  · You feel weak.  · You have very bad cramps in your back or belly (abdomen).  · You pass large clumps of blood (clots) in your poop.  · Your symptoms are getting worse.  This information is not intended to replace advice given to you by your health care provider. Make sure you discuss any questions you have with your health care provider.  Document Released: 09/26/2009 Document Revised: 05/25/2017 Document Reviewed: 06/06/2016  © 2017 [employer]

## 2020-01-10 ENCOUNTER — PATIENT OUTREACH (OUTPATIENT)
Dept: HEALTH INFORMATION MANAGEMENT | Facility: OTHER | Age: 65
End: 2020-01-10

## 2020-01-10 NOTE — DISCHARGE SUMMARY
Discharge Summary    CHIEF COMPLAINT ON ADMISSION  Chief Complaint   Patient presents with   • Melena       Reason for Admission  EMS     Admission Date  1/5/2020    CODE STATUS  Prior    HPI & HOSPITAL COURSE   64 y.o. male with history of diabetes type 2 on metformin, CAD with heart attack in 2002, dyslipidemia, hypertension who presented 1/5/2020 with complaints of orthostatic dizziness, nausea, loose black melanotic stool multiple times (5-6 times today).  Symptoms started yesterday and has been getting worse.  He has been taking naproxen for chronic back pain on and off.  He denies any history of GI bleed.  He takes baby aspirin, but no anticoagulants.  He had colonoscopy about 10 years ago, reportedly was normal.  GI consulted.  Patient had EGD which showed 5 mm ulcer in the antrum that was treated with 3 cc of epinephrine and gold probe cautery and placement of 3 hemoclips.  Patient continued on IV Protonix drip.  H&H stabilized after transfusions.  His melena resolved and was having normal bowel movements.  His diet was advanced and patient was tolerating well.  PPI drip was discontinued and switched to oral PPI.  Was hemodynamically clinically stable.  H&H improved.  GI recommended p.o. PPI omeprazole 40 mg twice daily for at least a month along with Carafate.  Cleared for discharge from medical standpoint.    Therefore, he is discharged in fair and stable condition to home with close outpatient follow-up.    The patient met 2-midnight criteria for an inpatient stay at the time of discharge.    Discharge Date  1/9/2020    FOLLOW UP ITEMS POST DISCHARGE  Follow-up with PCP in 1 week  Follow-up with GI as per recommendations    DISCHARGE DIAGNOSES  Active Problems:    GI bleed POA: Unknown    Type 2 diabetes mellitus with microalbuminuria (HCC) POA: Yes    Anemia POA: Unknown  Resolved Problems:    Tachycardia POA: Unknown      FOLLOW UP  Future Appointments   Date Time Provider Department Center   1/15/2020  10:00 AM TANISHA Siddiqui   2/20/2020 10:00 AM TAINSHA Siddiqui     No follow-up provider specified.    MEDICATIONS ON DISCHARGE     Medication List      START taking these medications      Instructions   omeprazole 2 mg/mL Susp  Commonly known as:  FIRST-OMEPRAZOLE   Take 20 mL by mouth every 12 hours for 30 days.  Dose:  40 mg     ondansetron 4 MG Tbdp  Commonly known as:  ZOFRAN ODT   Take 1 Tab by mouth every four hours as needed for Nausea (give PO if IV route is unavailable.).  Dose:  4 mg     sucralfate 1 GM Tabs  Commonly known as:  CARAFATE   Take 1 Tab by mouth every 6 hours.  Dose:  1 g     traZODone 50 MG Tabs  Commonly known as:  DESYREL   Take 1 Tab by mouth every bedtime.  Dose:  50 mg        CONTINUE taking these medications      Instructions   baclofen 10 MG Tabs  Commonly known as:  LIORESAL   Take 10 mg by mouth 2 times a day as needed.  Dose:  10 mg     metformin 1000 MG tablet  Commonly known as:  GLUCOPHAGE   Doctor's comments:  Insurance [employer] requesting a 100 day Supply  Take 1 Tab by mouth 2 times a day, with meals.  Dose:  1,000 mg     metoprolol SR 25 MG Tb24  Commonly known as:  TOPROL XL   Take 2 Tabs by mouth every day.  Dose:  50 mg     pravastatin 40 MG tablet  Commonly known as:  PRAVACHOL   TAKE ONE TABLET BY MOUTH EVERY DAY        STOP taking these medications    aspirin EC 81 MG Tbec  Commonly known as:  ECOTRIN     hydroCHLOROthiazide 25 MG Tabs  Commonly known as:  HYDRODIURIL     lisinopril 40 MG tablet  Commonly known as:  PRINIVIL            Allergies  Allergies   Allergen Reactions   • Penicillin G    • Flexeril [Cyclobenzaprine]      Weakness, spacie   • Penicillins      Doesn't know the reaction  had this allergy when he was a child       DIET  No orders of the defined types were placed in this encounter.      ACTIVITY  As tolerated.  Weight bearing as tolerated    CONSULTATIONS  GI    PROCEDURES  EGD as mentioned  above    LABORATORY  Lab Results   Component Value Date    SODIUM 139 01/07/2020    POTASSIUM 3.4 (L) 01/07/2020    CHLORIDE 107 01/07/2020    CO2 25 01/07/2020    GLUCOSE 176 (H) 01/07/2020    BUN 9 01/07/2020    CREATININE 0.68 01/07/2020        Lab Results   Component Value Date    WBC 8.3 01/08/2020    HEMOGLOBIN 7.6 (L) 01/09/2020    HEMATOCRIT 24.5 (L) 01/08/2020    PLATELETCT 195 01/08/2020        Total time of the discharge process exceeds 41 minutes.

## 2020-01-14 NOTE — DOCUMENTATION QUERY
"                                                                         [facility]                                                                       Query Response Note      PATIENT:               JACQUELINE ROLDAN  ACCT #:                  [id number]  MRN:                     [id number]  :                      1955  ADMIT DATE:       2020 2:11 PM  DISCH DATE:        2020 2:27 PM  RESPONDING  PROVIDER #:        [id number]           QUERY TEXT:    Anemia due to blood loss is documented in the medical record.  Please specify the acuity of the blood loss.     NOTE:  If an appropriate response is not listed below, please respond with a new note.      The patient's Clinical Indicators include:  \"Blood loss anemia. Hgb 8.5\" is documented in the H&P. EGD Procedure Note on 20 noted a \"5mm ulcer in the antrum.\" H/H: 8.5/23.9 on admission with a decrease to 6.6/19 on 20.    Treatments include: RBC Transfusions, Protonix Gtt, LR/NS, EGD w/Cautery and Placement of Hemoclips.    Risk factors include: dx Gastric Ulcer w/Hemorrhage, dx Blood Loss Anemia, hx HTN, hx DM II, and hx Chronic Back Pain w/NSAID Use.  Options provided:   -- Acute blood loss anemia   -- Chronic blood loss   -- Unable to determine      Query created by: Faustino Donaldson on 2020 8:23 AM    RESPONSE TEXT:    Acute blood loss anemia          Electronically signed by:  KATHY REYES MD 2020 1:20 PM              "

## 2020-01-15 ENCOUNTER — HOSPITAL ENCOUNTER (OUTPATIENT)
Dept: LAB | Facility: MEDICAL CENTER | Age: 65
End: 2020-01-15
Attending: NURSE PRACTITIONER
Payer: MEDICARE

## 2020-01-15 ENCOUNTER — OFFICE VISIT (OUTPATIENT)
Dept: MEDICAL GROUP | Facility: PHYSICIAN GROUP | Age: 65
End: 2020-01-15
Payer: MEDICARE

## 2020-01-15 VITALS
DIASTOLIC BLOOD PRESSURE: 62 MMHG | TEMPERATURE: 98.6 F | HEART RATE: 66 BPM | HEIGHT: 70 IN | WEIGHT: 179 LBS | SYSTOLIC BLOOD PRESSURE: 120 MMHG | BODY MASS INDEX: 25.62 KG/M2 | OXYGEN SATURATION: 98 %

## 2020-01-15 DIAGNOSIS — K92.2 GASTROINTESTINAL HEMORRHAGE, UNSPECIFIED GASTROINTESTINAL HEMORRHAGE TYPE: ICD-10-CM

## 2020-01-15 DIAGNOSIS — D64.9 ANEMIA, UNSPECIFIED TYPE: ICD-10-CM

## 2020-01-15 DIAGNOSIS — Z09 HOSPITAL DISCHARGE FOLLOW-UP: ICD-10-CM

## 2020-01-15 LAB
ANION GAP SERPL CALC-SCNC: 9 MMOL/L (ref 0–11.9)
ANISOCYTOSIS BLD QL SMEAR: ABNORMAL
BASOPHILS # BLD AUTO: 0.4 % (ref 0–1.8)
BASOPHILS # BLD: 0.05 K/UL (ref 0–0.12)
BUN SERPL-MCNC: 6 MG/DL (ref 8–22)
CALCIUM SERPL-MCNC: 8.6 MG/DL (ref 8.5–10.5)
CHLORIDE SERPL-SCNC: 102 MMOL/L (ref 96–112)
CO2 SERPL-SCNC: 26 MMOL/L (ref 20–33)
COMMENT 1642: NORMAL
CREAT SERPL-MCNC: 0.77 MG/DL (ref 0.5–1.4)
EOSINOPHIL # BLD AUTO: 0.03 K/UL (ref 0–0.51)
EOSINOPHIL NFR BLD: 0.2 % (ref 0–6.9)
ERYTHROCYTE [DISTWIDTH] IN BLOOD BY AUTOMATED COUNT: 74.3 FL (ref 35.9–50)
GLUCOSE SERPL-MCNC: 121 MG/DL (ref 65–99)
HCT VFR BLD AUTO: 25.5 % (ref 42–52)
HGB BLD-MCNC: 8.1 G/DL (ref 14–18)
IMM GRANULOCYTES # BLD AUTO: 0.05 K/UL (ref 0–0.11)
IMM GRANULOCYTES NFR BLD AUTO: 0.4 % (ref 0–0.9)
IRON SATN MFR SERPL: 21 % (ref 15–55)
IRON SERPL-MCNC: 75 UG/DL (ref 50–180)
LYMPHOCYTES # BLD AUTO: 1.76 K/UL (ref 1–4.8)
LYMPHOCYTES NFR BLD: 14.3 % (ref 22–41)
MACROCYTES BLD QL SMEAR: ABNORMAL
MCH RBC QN AUTO: 32.7 PG (ref 27–33)
MCHC RBC AUTO-ENTMCNC: 31.8 G/DL (ref 33.7–35.3)
MCV RBC AUTO: 102.8 FL (ref 81.4–97.8)
MONOCYTES # BLD AUTO: 0.75 K/UL (ref 0–0.85)
MONOCYTES NFR BLD AUTO: 6.1 % (ref 0–13.4)
MORPHOLOGY BLD-IMP: NORMAL
NEUTROPHILS # BLD AUTO: 9.64 K/UL (ref 1.82–7.42)
NEUTROPHILS NFR BLD: 78.6 % (ref 44–72)
NRBC # BLD AUTO: 0.04 K/UL
NRBC BLD-RTO: 0.3 /100 WBC
OVALOCYTES BLD QL SMEAR: NORMAL
PLATELET # BLD AUTO: 466 K/UL (ref 164–446)
PLATELET BLD QL SMEAR: NORMAL
PMV BLD AUTO: 10.2 FL (ref 9–12.9)
POIKILOCYTOSIS BLD QL SMEAR: NORMAL
POTASSIUM SERPL-SCNC: 4.4 MMOL/L (ref 3.6–5.5)
RBC # BLD AUTO: 2.48 M/UL (ref 4.7–6.1)
RBC BLD AUTO: PRESENT
SODIUM SERPL-SCNC: 137 MMOL/L (ref 135–145)
TIBC SERPL-MCNC: 356 UG/DL (ref 250–450)
WBC # BLD AUTO: 12.3 K/UL (ref 4.8–10.8)

## 2020-01-15 PROCEDURE — 99214 OFFICE O/P EST MOD 30 MIN: CPT | Performed by: NURSE PRACTITIONER

## 2020-01-15 PROCEDURE — 83550 IRON BINDING TEST: CPT

## 2020-01-15 PROCEDURE — 85025 COMPLETE CBC W/AUTO DIFF WBC: CPT

## 2020-01-15 PROCEDURE — 36415 COLL VENOUS BLD VENIPUNCTURE: CPT

## 2020-01-15 PROCEDURE — 82728 ASSAY OF FERRITIN: CPT

## 2020-01-15 PROCEDURE — 83540 ASSAY OF IRON: CPT

## 2020-01-15 PROCEDURE — 80048 BASIC METABOLIC PNL TOTAL CA: CPT

## 2020-01-15 RX ORDER — GABAPENTIN 300 MG/1
CAPSULE ORAL
COMMUNITY
Start: 2020-01-09 | End: 2022-04-04

## 2020-01-15 RX ORDER — OMEPRAZOLE 40 MG/1
40 CAPSULE, DELAYED RELEASE ORAL 2 TIMES DAILY
COMMUNITY
Start: 2020-01-09 | End: 2020-02-20

## 2020-01-15 RX ORDER — TRAMADOL HYDROCHLORIDE 50 MG/1
TABLET ORAL
COMMUNITY
Start: 2020-01-09 | End: 2022-02-22

## 2020-01-15 ASSESSMENT — PATIENT HEALTH QUESTIONNAIRE - PHQ9: CLINICAL INTERPRETATION OF PHQ2 SCORE: 0

## 2020-01-15 NOTE — ASSESSMENT & PLAN NOTE
This is a new problem to the examiner.  Patient received an iron infusion while hospitalized.  Is not taking oral iron supplementation after discharge.   1/8/2020 16:33   Iron 16 (L)   Total Iron Binding 420   % Saturation 4 (L)

## 2020-01-15 NOTE — ASSESSMENT & PLAN NOTE
The patient was admitted to the hospital on 1/5/2020 and discharged on 1/9/2020.  The patient was experiencing orthostatic dizziness, nausea, loose black melanotic stools 5-6 times the day of admission.  The symptoms started the day before admission and had worsened.  He had been taking naproxen for chronic back pain.  The patient does not have any history of GI bleed.  Is not on anticoagulants other than a daily baby aspirin.  Last colonoscopy was 10 years prior and was normal.  The patient was seen by GI while in the hospital, had an EGD which showed a 5 mm ulcer in the antrum that was treated with 3 cc of epinephrine and gold probe cautery and placement of 3 hemoclips.  The patient continued with IV Protonix drip.  Received 3 PRBC transfusion and H&H stabilized.  Melena resolved and the patient was having normal bowel movements prior to discharge.  Patient tolerated diet as it was advanced to regular.  The patient was prescribed oral PPI, omeprazole 40 mg twice daily for at least 1 month with Carafate.  The patient was discharged with an improved H&H, hemodynamically stable, and advised to follow-up with PCP.   1/8/2020 16:33 1/9/2020 03:19 1/9/2020 11:36   WBC 8.3     RBC 2.54 (L)     Hemoglobin 8.3 (L) 7.1 (L) 7.6 (L)   Hematocrit 24.5 (L)     MCV 96.5     MCH 32.7     MCHC 33.9     RDW 69.0 (H)     Platelet Count 195     MPV 10.8        1/5/2020 14:18 1/6/2020 00:03 1/6/2020 08:31   WBC 9.9 8.6 8.3   RBC 2.40 (L) 1.77 (L) 1.98 (L)   Hemoglobin 8.5 (L) 6.6 (L) 6.4 (L)   Hematocrit 23.9 (L) 19.0 (L) 18.9 (L)   MCV 99.6 (H) 101.1 (H) 95.5   MCH 35.4 (H) 34.5 (H) 32.3   MCHC 35.6 (H) 34.1 33.9   RDW 49.3 51.6 (H) 63.7 (H)   Platelet Count 223 169 149 (L)   MPV 10.2 10.2 10.6

## 2020-01-15 NOTE — ASSESSMENT & PLAN NOTE
New problem to examiner.  Patient was diagnosed with GI bleed on recent admission to the hospital.  Patient's H&H was monitored closely, reports he received PRBC infusion as well as iron infusion.  GI was consulted while inpatient who completed an EGD.  EGD found 5 mm ulcer in the antrum that was treated with 3 cc of 1:25246 epinephrine injection, gold probe cautery, and placement of 3 hemoclips.  After EGD, the patient continued with an IV Protonix drip, serial H&H's, transfusion for hemoglobin 7-8, clear liquid diet, Carafate.  The patient remained stable and was changed from IV PPI to oral PPI.  Patient advance diet as tolerated, is tolerating a regular diet.  Has had no further melena.  Patient does continue to take Carafate 4 times per day, omeprazole 40 mg twice daily.  Reports that he had a mild stomachache this morning, but it has resolved.  Also reports slight shortness of breath, that also feels better now.  The patient is scheduled with GI consultants on 1/31/2020.

## 2020-01-15 NOTE — PROGRESS NOTES
Subjective:     Chief Complaint   Patient presents with   • Hospital Follow-up     upper ulcer bleed     Fabio Vergara is a 64 y.o. male who presents for Hospital Follow-up.    Discharge Date: 1/9/20200    HPI:   Hospital discharge follow-up  The patient was admitted to the hospital on 1/5/2020 and discharged on 1/9/2020.  The patient was experiencing orthostatic dizziness, nausea, loose black melanotic stools 5-6 times the day of admission.  The symptoms started the day before admission and had worsened.  He had been taking naproxen for chronic back pain.  The patient does not have any history of GI bleed.  Is not on anticoagulants other than a daily baby aspirin.  Last colonoscopy was 10 years prior and was normal.  The patient was seen by GI while in the hospital, had an EGD which showed a 5 mm ulcer in the antrum that was treated with 3 cc of epinephrine and gold probe cautery and placement of 3 hemoclips.  The patient continued with IV Protonix drip.  Received 3 PRBC transfusion and H&H stabilized.  Melena resolved and the patient was having normal bowel movements prior to discharge.  Patient tolerated diet as it was advanced to regular.  The patient was prescribed oral PPI, omeprazole 40 mg twice daily for at least 1 month with Carafate.  The patient was discharged with an improved H&H, hemodynamically stable, and advised to follow-up with PCP.   1/8/2020 16:33 1/9/2020 03:19 1/9/2020 11:36   WBC 8.3     RBC 2.54 (L)     Hemoglobin 8.3 (L) 7.1 (L) 7.6 (L)   Hematocrit 24.5 (L)     MCV 96.5     MCH 32.7     MCHC 33.9     RDW 69.0 (H)     Platelet Count 195     MPV 10.8        1/5/2020 14:18 1/6/2020 00:03 1/6/2020 08:31   WBC 9.9 8.6 8.3   RBC 2.40 (L) 1.77 (L) 1.98 (L)   Hemoglobin 8.5 (L) 6.6 (L) 6.4 (L)   Hematocrit 23.9 (L) 19.0 (L) 18.9 (L)   MCV 99.6 (H) 101.1 (H) 95.5   MCH 35.4 (H) 34.5 (H) 32.3   MCHC 35.6 (H) 34.1 33.9   RDW 49.3 51.6 (H) 63.7 (H)   Platelet Count 223 169 149 (L)   MPV 10.2 10.2  10.6       Anemia  This is a new problem to the examiner.  Patient received an iron infusion while hospitalized.  Is not taking oral iron supplementation after discharge.   1/8/2020 16:33   Iron 16 (L)   Total Iron Binding 420   % Saturation 4 (L)       GI bleed  New problem to examiner.  Patient was diagnosed with GI bleed on recent admission to the hospital.  Patient's H&H was monitored closely, reports he received PRBC infusion as well as iron infusion.  GI was consulted while inpatient who completed an EGD.  EGD found 5 mm ulcer in the antrum that was treated with 3 cc of 1:18013 epinephrine injection, gold probe cautery, and placement of 3 hemoclips.  After EGD, the patient continued with an IV Protonix drip, serial H&H's, transfusion for hemoglobin 7-8, clear liquid diet, Carafate.  The patient remained stable and was changed from IV PPI to oral PPI.  Patient advance diet as tolerated, is tolerating a regular diet.  Has had no further melena.  Patient does continue to take Carafate 4 times per day, omeprazole 40 mg twice daily.  Reports that he had a mild stomachache this morning, but it has resolved.  Also reports slight shortness of breath, that also feels better now.  The patient is scheduled with GI consultants on 1/31/2020.    Current medicines (including reconciliation performed today)  Current Outpatient Medications   Medication Sig Dispense Refill   • tramadol (ULTRAM) 50 MG Tab      • gabapentin (NEURONTIN) 300 MG Cap      • omeprazole (PRILOSEC) 40 MG delayed-release capsule Take 40 mg by mouth 2 Times a Day.     • ondansetron (ZOFRAN ODT) 4 MG TABLET DISPERSIBLE Take 1 Tab by mouth every four hours as needed for Nausea (give PO if IV route is unavailable.). 10 Tab 0   • sucralfate (CARAFATE) 1 GM Tab Take 1 Tab by mouth every 6 hours. 120 Tab 3   • traZODone (DESYREL) 50 MG Tab Take 1 Tab by mouth every bedtime. 30 Tab 3   • baclofen (LIORESAL) 10 MG Tab Take 10 mg by mouth 2 times a day as needed.      • metoprolol SR (TOPROL XL) 25 MG TABLET SR 24 HR Take 2 Tabs by mouth every day. 200 Tab 3   • pravastatin (PRAVACHOL) 40 MG tablet TAKE ONE TABLET BY MOUTH EVERY  Tab 3   • metformin (GLUCOPHAGE) 1000 MG tablet Take 1 Tab by mouth 2 times a day, with meals. 200 Tab 3     No current facility-administered medications for this visit.      Allergies:   Penicillin g; Flexeril [cyclobenzaprine]; and Penicillins    Social History:  Social History     Socioeconomic History   • Marital status:      Spouse name: Not on file   • Number of children: Not on file   • Years of education: Not on file   • Highest education level: Not on file   Occupational History   • Not on file   Social Needs   • Financial resource strain: Not on file   • Food insecurity:     Worry: Not on file     Inability: Not on file   • Transportation needs:     Medical: Not on file     Non-medical: Not on file   Tobacco Use   • Smoking status: Former Smoker     Packs/day: 1.00     Years: 35.00     Pack years: 35.00     Types: Cigarettes     Last attempt to quit: 2013     Years since quittin.5   • Smokeless tobacco: Never Used   Substance and Sexual Activity   • Alcohol use: Yes     Alcohol/week: 3.6 - 7.2 oz     Types: 6 - 12 Cans of beer per week     Comment: states typically 1-3 a day   • Drug use: No   • Sexual activity: Yes     Partners: Female     Comment:  - Yue    Lifestyle   • Physical activity:     Days per week: Not on file     Minutes per session: Not on file   • Stress: Not on file   Relationships   • Social connections:     Talks on phone: Not on file     Gets together: Not on file     Attends Buddhist service: Not on file     Active member of club or organization: Not on file     Attends meetings of clubs or organizations: Not on file     Relationship status: Not on file   • Intimate partner violence:     Fear of current or ex partner: Not on file     Emotionally abused: Not on file     Physically abused:  "Not on file     Forced sexual activity: Not on file   Other Topics Concern   • Not on file   Social History Narrative   • Not on file     ROS:   Constitutional: Positive for fatigue.  Negative for fever, chills, unexpected weight change, night sweats, body aches, sleep issues.   HEENT:  Negative for headaches, vision changes, hearing changes, ear pain, tinnitus, ear discharge, rhinorrhea, sinus congestion, sneezing, sore throat, and neck pain.    Respiratory: Positive for intermittent shortness of breath.  Negative for cough, sputum production, hemoptysis, chest congestion, dyspnea, wheezing, and crackles.    Cardiovascular:  Negative for chest pain, palpitations, QUIÑONES, paroxsymal nocturnal dyspnea, orthopnea, and bilateral lower extremity edema.   Gastrointestinal: Positive for intermittent upset stomach.  Negative for heartburn, nausea, vomiting, abdominal pain, hematochezia, melena, diarrhea, constipation, and greasy/foul-smelling stools.   Musculoskeletal:  Negative for myalgias, back pain, and joint pain.   Skin:  Negative for rash, sores, lumps, itching, cyanotic skin color change.   Neurological:  Negative for dizziness, tingling, tremors, focal sensory deficit, focal weakness and headaches.   Psychiatric/Behavioral: Negative for depression, suicidal/homicidal ideation and memory loss.     Objective:     /62 (BP Location: Left arm, Patient Position: Sitting, BP Cuff Size: Adult)   Pulse 66   Temp 37 °C (98.6 °F) (Temporal)   Ht 1.778 m (5' 10\")   Wt 81.2 kg (179 lb)   SpO2 98%   Body mass index is 25.68 kg/m².    Physical Exam:  General:  Normal appearing. No distress.  HEENT:  Normocephalic. Eyes conjunctiva clear lids without ptosis, pupils equal and reactive to light accommodation, ears normal shape and contour.  Neck:  Supple without JVD or bruit.  Pulmonary:  Clear to ausculation.  Normal effort. No rales, ronchi, or wheezing.  Cardiovascular:  Regular rate and rhythm without murmur. Carotid " and radial pulses are intact and equal bilaterally.  Abdomen:  Soft, nontender, nondistended. Normal bowel sounds.   Neurologic:  Grossly nonfocal.  Skin:  Warm and dry.  No obvious lesions.  Musculoskeletal:  Normal gait. No extremity cyanosis, clubbing, or edema.  Psych:  Normal mood and affect. Alert and oriented x3. Judgment and insight is normal.    Assessment and Plan:   1. Hospital discharge follow-up  2. Gastrointestinal hemorrhage, unspecified gastrointestinal hemorrhage type  3. Anemia, unspecified type  Continue omeprazole 40 mg twice daily and Carafate 4 times daily.  Plan to complete the following labs.  Patient is scheduled with GI consultants on 1/31/2020.  Denies any further bloody stools or diarrhea.  Understands to return to emergency room if symptoms recur.  Will return to be seen by me in 1 month for close follow-up.  - CBC WITH DIFFERENTIAL; Future  - FERRITIN; Future  - IRON/TOTAL IRON BIND; Future  - Basic Metabolic Panel; Future    Patient was seen for at least 25 minutes total face-to-face time with greater than 50% of that time spent on counseling and care coordination.    I have reviewed all hospital records including lab results, progress notes, and admission and discharge summaries. I have discussed with patient possible factors that contributed to hospitalization and how we can prevent patient from ending up in the hospital again. Educated patient on s/sx to observe for and what action to take when these occur. Reviewed current medications and educated on importance of compliance with these medications all appropriate follow-up visits are scheduled.     - Chart and discharge summary were reviewed.   - Hospitalization and results reviewed with patient.   - Medications reviewed including instructions regarding high risk medications, dosing and side effects.  - Recommended Services: No services needed at this time  - Advance directive/POLST on file?  No     Follow-up:Return in about 5  weeks (around 2/20/2020) for Follow-up, As needed.

## 2020-01-16 DIAGNOSIS — D64.9 ANEMIA, UNSPECIFIED TYPE: ICD-10-CM

## 2020-01-16 DIAGNOSIS — K92.2 GASTROINTESTINAL HEMORRHAGE, UNSPECIFIED GASTROINTESTINAL HEMORRHAGE TYPE: ICD-10-CM

## 2020-01-16 PROBLEM — Z09 HOSPITAL DISCHARGE FOLLOW-UP: Status: RESOLVED | Noted: 2020-01-15 | Resolved: 2020-01-16

## 2020-01-16 LAB — FERRITIN SERPL-MCNC: 682.8 NG/ML (ref 22–322)

## 2020-01-16 NOTE — PROGRESS NOTES
1. Anemia, unspecified type  - Basic Metabolic Panel; Future  - CBC WITH DIFFERENTIAL; Future  - FERRITIN; Future  - IRON/TOTAL IRON BIND; Future    2. Gastrointestinal hemorrhage, unspecified gastrointestinal hemorrhage type  - Basic Metabolic Panel; Future  - CBC WITH DIFFERENTIAL; Future  - FERRITIN; Future  - IRON/TOTAL IRON BIND; Future    To update in one month prior to follow up.

## 2020-01-20 PROBLEM — Z09 HOSPITAL DISCHARGE FOLLOW-UP: Status: RESOLVED | Noted: 2020-01-15 | Resolved: 2020-01-20

## 2020-02-05 ENCOUNTER — HOSPITAL ENCOUNTER (OUTPATIENT)
Dept: LAB | Facility: MEDICAL CENTER | Age: 65
End: 2020-02-05
Attending: NURSE PRACTITIONER
Payer: MEDICARE

## 2020-02-05 LAB
ANISOCYTOSIS BLD QL SMEAR: ABNORMAL
BASOPHILS # BLD AUTO: 0.5 % (ref 0–1.8)
BASOPHILS # BLD: 0.05 K/UL (ref 0–0.12)
COMMENT 1642: NORMAL
EOSINOPHIL # BLD AUTO: 0.12 K/UL (ref 0–0.51)
EOSINOPHIL NFR BLD: 1.2 % (ref 0–6.9)
ERYTHROCYTE [DISTWIDTH] IN BLOOD BY AUTOMATED COUNT: 65.5 FL (ref 35.9–50)
HCT VFR BLD AUTO: 38.3 % (ref 42–52)
HGB BLD-MCNC: 12.6 G/DL (ref 14–18)
HYPOCHROMIA BLD QL SMEAR: ABNORMAL
IMM GRANULOCYTES # BLD AUTO: 0.02 K/UL (ref 0–0.11)
IMM GRANULOCYTES NFR BLD AUTO: 0.2 % (ref 0–0.9)
LYMPHOCYTES # BLD AUTO: 1.83 K/UL (ref 1–4.8)
LYMPHOCYTES NFR BLD: 17.9 % (ref 22–41)
MACROCYTES BLD QL SMEAR: ABNORMAL
MCH RBC QN AUTO: 33.6 PG (ref 27–33)
MCHC RBC AUTO-ENTMCNC: 32.9 G/DL (ref 33.7–35.3)
MCV RBC AUTO: 102.1 FL (ref 81.4–97.8)
MONOCYTES # BLD AUTO: 0.83 K/UL (ref 0–0.85)
MONOCYTES NFR BLD AUTO: 8.1 % (ref 0–13.4)
MORPHOLOGY BLD-IMP: NORMAL
NEUTROPHILS # BLD AUTO: 7.37 K/UL (ref 1.82–7.42)
NEUTROPHILS NFR BLD: 72.1 % (ref 44–72)
NRBC # BLD AUTO: 0 K/UL
NRBC BLD-RTO: 0 /100 WBC
PLATELET # BLD AUTO: 317 K/UL (ref 164–446)
PLATELET BLD QL SMEAR: NORMAL
PMV BLD AUTO: 10.4 FL (ref 9–12.9)
POIKILOCYTOSIS BLD QL SMEAR: NORMAL
RBC # BLD AUTO: 3.75 M/UL (ref 4.7–6.1)
RBC BLD AUTO: PRESENT
TARGETS BLD QL SMEAR: NORMAL
WBC # BLD AUTO: 10.2 K/UL (ref 4.8–10.8)

## 2020-02-05 PROCEDURE — 85025 COMPLETE CBC W/AUTO DIFF WBC: CPT

## 2020-02-05 PROCEDURE — 36415 COLL VENOUS BLD VENIPUNCTURE: CPT

## 2020-02-10 ENCOUNTER — PATIENT OUTREACH (OUTPATIENT)
Dept: HEALTH INFORMATION MANAGEMENT | Facility: OTHER | Age: 65
End: 2020-02-10

## 2020-02-13 ENCOUNTER — HOSPITAL ENCOUNTER (OUTPATIENT)
Dept: LAB | Facility: MEDICAL CENTER | Age: 65
End: 2020-02-13
Attending: NURSE PRACTITIONER
Payer: MEDICARE

## 2020-02-13 DIAGNOSIS — D64.9 ANEMIA, UNSPECIFIED TYPE: ICD-10-CM

## 2020-02-13 DIAGNOSIS — K92.2 GASTROINTESTINAL HEMORRHAGE, UNSPECIFIED GASTROINTESTINAL HEMORRHAGE TYPE: ICD-10-CM

## 2020-02-13 LAB
25(OH)D3 SERPL-MCNC: 19 NG/ML (ref 30–100)
ALBUMIN SERPL BCP-MCNC: 4.4 G/DL (ref 3.2–4.9)
ALBUMIN/GLOB SERPL: 1.6 G/DL
ALP SERPL-CCNC: 52 U/L (ref 30–99)
ALT SERPL-CCNC: 13 U/L (ref 2–50)
ANION GAP SERPL CALC-SCNC: 7 MMOL/L (ref 0–11.9)
ANION GAP SERPL CALC-SCNC: 8 MMOL/L (ref 0–11.9)
AST SERPL-CCNC: 19 U/L (ref 12–45)
BASOPHILS # BLD AUTO: 1 % (ref 0–1.8)
BASOPHILS # BLD: 0.06 K/UL (ref 0–0.12)
BILIRUB SERPL-MCNC: 0.3 MG/DL (ref 0.1–1.5)
BUN SERPL-MCNC: 7 MG/DL (ref 8–22)
BUN SERPL-MCNC: 7 MG/DL (ref 8–22)
CALCIUM SERPL-MCNC: 9.2 MG/DL (ref 8.5–10.5)
CALCIUM SERPL-MCNC: 9.3 MG/DL (ref 8.5–10.5)
CHLORIDE SERPL-SCNC: 102 MMOL/L (ref 96–112)
CHLORIDE SERPL-SCNC: 102 MMOL/L (ref 96–112)
CO2 SERPL-SCNC: 28 MMOL/L (ref 20–33)
CO2 SERPL-SCNC: 29 MMOL/L (ref 20–33)
CORTIS SERPL-MCNC: 9.8 UG/DL (ref 0–23)
CREAT SERPL-MCNC: 0.8 MG/DL (ref 0.5–1.4)
CREAT SERPL-MCNC: 0.84 MG/DL (ref 0.5–1.4)
CRP SERPL HS-MCNC: 1.6 MG/L (ref 0–7.5)
EOSINOPHIL # BLD AUTO: 0.19 K/UL (ref 0–0.51)
EOSINOPHIL NFR BLD: 3.1 % (ref 0–6.9)
ERYTHROCYTE [DISTWIDTH] IN BLOOD BY AUTOMATED COUNT: 59.6 FL (ref 35.9–50)
ERYTHROCYTE [SEDIMENTATION RATE] IN BLOOD BY WESTERGREN METHOD: 8 MM/HOUR (ref 0–20)
FASTING STATUS PATIENT QL REPORTED: NORMAL
FASTING STATUS PATIENT QL REPORTED: NORMAL
FERRITIN SERPL-MCNC: 180.6 NG/ML (ref 22–322)
GLOBULIN SER CALC-MCNC: 2.8 G/DL (ref 1.9–3.5)
GLUCOSE SERPL-MCNC: 102 MG/DL (ref 65–99)
GLUCOSE SERPL-MCNC: 104 MG/DL (ref 65–99)
HCT VFR BLD AUTO: 38.5 % (ref 42–52)
HGB BLD-MCNC: 12.5 G/DL (ref 14–18)
IMM GRANULOCYTES # BLD AUTO: 0.01 K/UL (ref 0–0.11)
IMM GRANULOCYTES NFR BLD AUTO: 0.2 % (ref 0–0.9)
IRON SATN MFR SERPL: 22 % (ref 15–55)
IRON SERPL-MCNC: 83 UG/DL (ref 50–180)
LYMPHOCYTES # BLD AUTO: 2.47 K/UL (ref 1–4.8)
LYMPHOCYTES NFR BLD: 40.9 % (ref 22–41)
MCH RBC QN AUTO: 32.7 PG (ref 27–33)
MCHC RBC AUTO-ENTMCNC: 32.5 G/DL (ref 33.7–35.3)
MCV RBC AUTO: 100.8 FL (ref 81.4–97.8)
MONOCYTES # BLD AUTO: 0.58 K/UL (ref 0–0.85)
MONOCYTES NFR BLD AUTO: 9.6 % (ref 0–13.4)
NEUTROPHILS # BLD AUTO: 2.73 K/UL (ref 1.82–7.42)
NEUTROPHILS NFR BLD: 45.2 % (ref 44–72)
NRBC # BLD AUTO: 0 K/UL
NRBC BLD-RTO: 0 /100 WBC
PLATELET # BLD AUTO: 290 K/UL (ref 164–446)
PMV BLD AUTO: 10.4 FL (ref 9–12.9)
POTASSIUM SERPL-SCNC: 4.7 MMOL/L (ref 3.6–5.5)
POTASSIUM SERPL-SCNC: 4.8 MMOL/L (ref 3.6–5.5)
PROT SERPL-MCNC: 7.2 G/DL (ref 6–8.2)
RBC # BLD AUTO: 3.82 M/UL (ref 4.7–6.1)
RHEUMATOID FACT SER IA-ACNC: <10 IU/ML (ref 0–14)
SODIUM SERPL-SCNC: 138 MMOL/L (ref 135–145)
SODIUM SERPL-SCNC: 138 MMOL/L (ref 135–145)
TESTOST SERPL-MCNC: 317 NG/DL (ref 175–781)
TIBC SERPL-MCNC: 371 UG/DL (ref 250–450)
TSH SERPL DL<=0.005 MIU/L-ACNC: 1.02 UIU/ML (ref 0.38–5.33)
WBC # BLD AUTO: 6 K/UL (ref 4.8–10.8)

## 2020-02-13 PROCEDURE — 84140 ASSAY OF PREGNENOLONE: CPT

## 2020-02-13 PROCEDURE — 86038 ANTINUCLEAR ANTIBODIES: CPT

## 2020-02-13 PROCEDURE — 85025 COMPLETE CBC W/AUTO DIFF WBC: CPT

## 2020-02-13 PROCEDURE — 84443 ASSAY THYROID STIM HORMONE: CPT

## 2020-02-13 PROCEDURE — 80053 COMPREHEN METABOLIC PANEL: CPT

## 2020-02-13 PROCEDURE — 86431 RHEUMATOID FACTOR QUANT: CPT

## 2020-02-13 PROCEDURE — 80048 BASIC METABOLIC PNL TOTAL CA: CPT

## 2020-02-13 PROCEDURE — 82533 TOTAL CORTISOL: CPT

## 2020-02-13 PROCEDURE — 86141 C-REACTIVE PROTEIN HS: CPT

## 2020-02-13 PROCEDURE — 85652 RBC SED RATE AUTOMATED: CPT

## 2020-02-13 PROCEDURE — 84403 ASSAY OF TOTAL TESTOSTERONE: CPT

## 2020-02-13 PROCEDURE — 82626 DEHYDROEPIANDROSTERONE: CPT

## 2020-02-13 PROCEDURE — 36415 COLL VENOUS BLD VENIPUNCTURE: CPT

## 2020-02-13 PROCEDURE — 82306 VITAMIN D 25 HYDROXY: CPT

## 2020-02-13 PROCEDURE — 83540 ASSAY OF IRON: CPT

## 2020-02-13 PROCEDURE — 82728 ASSAY OF FERRITIN: CPT

## 2020-02-13 PROCEDURE — 83550 IRON BINDING TEST: CPT

## 2020-02-14 LAB — NUCLEAR IGG SER QL IA: NORMAL

## 2020-02-16 LAB — DHEA SERPL-MCNC: 1.81 NG/ML (ref 0.63–4.7)

## 2020-02-18 LAB — PREG SERPL-MCNC: 46 NG/DL (ref 23–173)

## 2020-02-20 ENCOUNTER — OFFICE VISIT (OUTPATIENT)
Dept: MEDICAL GROUP | Facility: PHYSICIAN GROUP | Age: 65
End: 2020-02-20
Payer: MEDICARE

## 2020-02-20 VITALS
BODY MASS INDEX: 26.05 KG/M2 | HEART RATE: 76 BPM | WEIGHT: 182 LBS | DIASTOLIC BLOOD PRESSURE: 82 MMHG | HEIGHT: 70 IN | OXYGEN SATURATION: 98 % | SYSTOLIC BLOOD PRESSURE: 144 MMHG | TEMPERATURE: 97.6 F

## 2020-02-20 DIAGNOSIS — J84.10 CALCIFIED GRANULOMA OF LUNG (HCC): ICD-10-CM

## 2020-02-20 DIAGNOSIS — E11.29 TYPE 2 DIABETES MELLITUS WITH MICROALBUMINURIA, WITHOUT LONG-TERM CURRENT USE OF INSULIN (HCC): ICD-10-CM

## 2020-02-20 DIAGNOSIS — M79.602 LEFT ARM PAIN: ICD-10-CM

## 2020-02-20 DIAGNOSIS — R80.9 TYPE 2 DIABETES MELLITUS WITH MICROALBUMINURIA, WITHOUT LONG-TERM CURRENT USE OF INSULIN (HCC): ICD-10-CM

## 2020-02-20 DIAGNOSIS — F11.20 UNCOMPLICATED OPIOID DEPENDENCE (HCC): ICD-10-CM

## 2020-02-20 DIAGNOSIS — B35.1 ONYCHOMYCOSIS: ICD-10-CM

## 2020-02-20 DIAGNOSIS — G89.4 CHRONIC PAIN SYNDROME: ICD-10-CM

## 2020-02-20 DIAGNOSIS — E55.9 VITAMIN D DEFICIENCY: ICD-10-CM

## 2020-02-20 PROCEDURE — 99214 OFFICE O/P EST MOD 30 MIN: CPT | Performed by: NURSE PRACTITIONER

## 2020-02-20 PROCEDURE — 8041 PR SCP AHA: Performed by: NURSE PRACTITIONER

## 2020-02-20 NOTE — ASSESSMENT & PLAN NOTE
New to this examiner.  Acute and ongoing.  States that when he went to the hospital he had to get an IV placed and the area that it was placed at is still hurting.  He states it felt like it was the biggest needle they had.  When he moves his arm a certain way it is sore, he is unable to completely extend it.  He denies any swelling, redness or heat from the area.  He is hoping that over time the pain will go away on its own.

## 2020-02-20 NOTE — ASSESSMENT & PLAN NOTE
New to this examiner.  Most recent labs showed vitamin D level at 19.  Currently not taking any vitamin D supplements.
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
## 2020-02-20 NOTE — ASSESSMENT & PLAN NOTE
Chronic and ongoing.  He continues to follow up with pain management, Dr. Turcios.  Currently taking tramadol 50 mg as needed, states he usually takes 1 a day there are days he does have to take 2.

## 2020-02-20 NOTE — ASSESSMENT & PLAN NOTE
"Chronic and ongoing.  Currently seeing pain management, Dr. Turcios.  Uses tramadol as needed usually once or twice a day.  Continues to take gabapentin.  States that when he bends over he notices it becoming swore more easily.  When it is really bad \"he gimps around\".  "

## 2020-02-20 NOTE — ASSESSMENT & PLAN NOTE
New to this examiner.  Chronic and ongoing.  Denies using any over-the-counter medication to help with this.  He states he has been told that apple cider vinegar may help.

## 2020-02-20 NOTE — ASSESSMENT & PLAN NOTE
Chronic and stable.  Continues to take metformin 1000 mg twice daily with meals.  Denies any episodes of hypoglycemia.

## 2020-02-21 NOTE — PROGRESS NOTES
A 64-year-old male was an emergent admission to [facility] from 1/5/2020 to 1/9/2020 to treat Gastrointestinal hemorrhage, unspecified. IHD did not visit the patient bedside. The patient was discharged Home. The patient was not under clinical case management.     The patient was ordered to start/continue to take the following medications: Trazodone (Desyrel), Sucralfate (Carafate), Ondansetron (Zofran), and Omeprazole. The patient successfully filled all medications.     The patient was ordered to follow-up with Specialist and PCP. The patient had the following appointments:     1) 1/15/2020 @ 10:00 Charley [facility] general/family practice - CONFIRMED AS KEPT     2) 1/31/2020 @ 12:30 [facility] - CONFIRMED AS KEPT  The patient has future appointments scheduled for:     1) 2/6/2020 @ 10:45 [facility] gastroenterology - SCHEDULED     2) 2/20/2020 @ 10:00 Charley Harvey general/family practice - SCHEDULED     3) 3/31/2020 @ 9:45 [facility] gastroenterology - SCHEDULED    IHD did not visit the patient in-person post-discharge. IHD identified that the patient had Healthcare Access barriers. Made Appointment for In-Network Provider.    IHD followed the patient for a total of 30 days and Patient  was receptive to services. Patient successfully recovered or avoided further complications.

## 2020-03-19 ENCOUNTER — OFFICE VISIT (OUTPATIENT)
Dept: MEDICAL GROUP | Facility: PHYSICIAN GROUP | Age: 65
End: 2020-03-19
Payer: MEDICARE

## 2020-03-19 VITALS
BODY MASS INDEX: 26.05 KG/M2 | HEIGHT: 70 IN | DIASTOLIC BLOOD PRESSURE: 78 MMHG | HEART RATE: 98 BPM | RESPIRATION RATE: 14 BRPM | OXYGEN SATURATION: 98 % | TEMPERATURE: 97.3 F | WEIGHT: 182 LBS | SYSTOLIC BLOOD PRESSURE: 148 MMHG

## 2020-03-19 DIAGNOSIS — F43.21 GRIEF: ICD-10-CM

## 2020-03-19 PROCEDURE — 99214 OFFICE O/P EST MOD 30 MIN: CPT | Performed by: NURSE PRACTITIONER

## 2020-03-19 RX ORDER — LORAZEPAM 1 MG/1
.5-1 TABLET ORAL EVERY 4 HOURS PRN
Qty: 60 TAB | Refills: 0 | Status: SHIPPED
Start: 2020-03-19 | End: 2020-04-02

## 2020-03-19 ASSESSMENT — FIBROSIS 4 INDEX: FIB4 SCORE: 1.2

## 2020-03-20 NOTE — ASSESSMENT & PLAN NOTE
New problem to examiner.  Patient reports that he is experiencing extreme grief related to the likely passing of his wife.  Reports that his wife fell out of bed this weekend and may have hit her head.  States that she is on a blood thinner due to a cardiac valve replacement, which contributed to a significant subdural hematoma.  The patient's wife was taken to the emergency room on Sunday night and has undergone 2 craniotomies due to subdural hematoma and swelling.  The patients wife is currently critically ill on a ventilator in the trauma ICU at [facility].  Due to current visiting limitations placed on the hospital due to the coronavirus pandemic, he is only being limited to seeing her for a short amount of time daily.  Reports that he will be meeting with the care team tomorrow to discuss the prognosis of his wife, and he is very realistic that she is not going to recover from this, she will not be coming home, and he is likely going to be a  in the coming days.  Reports that he has experienced loss of loved ones in the past, but he has never experienced such overwhelming grief in his life.  Reports that he has a lot of support with family and friends and they are very frequently checking in on him and offering assistance.  He is requesting a short-term, as needed medication to help with his anxiety, grief, and distress.
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
## 2020-04-15 DIAGNOSIS — F41.9 ANXIETY: ICD-10-CM

## 2020-04-15 NOTE — TELEPHONE ENCOUNTER
Received request via: Pharmacy    Was the patient seen in the last year in this department? Yes LOV 03/19/2020    Does the patient have an active prescription (recently filled or refills available) for medication(s) requested? No        JACQUELINE ROLDAN     Age: 64   demographics   Data as of: 04/15/2020         NARCOTIC 420    SEDATIVE 330    STIMULANT 000    NARxSCORES can range from 000 to 999. The first two digits represent the composite percentile risk based on an overall analysis of prescription drug use. The third digit represents the number of active prescriptions. The distribution of scores in the population is such that approximately 75% fall below 200, 95% fall below 500 and 99% fall below 650. The information on this report is not warranted as accurate or complete. This report is based on the search criteria supplied and the data entered by the dispensing pharmacy. For more information about any prescription, please contact the dispensing pharmacy or the prescriber. NARxSCORES and Reports are intended to aid, not replace medical decision making. None of the information presented should be used as sole justification for providing or refusing to provide medications.   [id number]  Rx Graph    Grayed out drugs could not be included in score calculations.  [x]  Narcotic  [x]  Buprenorphine  [x]  Sedative  [x]  Stimulant  [x]  Other    All PrescribersPrescribers6 - Emily Reardonn5 - Charley Harvey4 - Leonidas Hickey3 - Zuri Young-2 - Ash Solis1 Arjun Go [id number]  Morphine MgEq (MME)  [id number]  Buprenorphine mg  [id number]  Lorazepam MgEq (LME)  [id number]  *Per CDC guidance, the MME conversion factors prescribed or provided as part of the medication-assisted treatment for opioid use disorder should not be used to benchmark against dosage thresholds meant for opioids prescribed for pain. Buprenorphine  products have no agreed upon morphine equivalency, and as partial opioid agonists, are not expected to be associated with overdose risk in the same dose-dependent manner as doses for full agonist opioids. MME = morphine milligram equivalents. LME = Lorazepam milligram equivalents. MG = dose in milligrams.   Data Analysis                                                                                                             Summary  Total Prescriptions:    22    Total Prescribers:    6    Total Pharmacies:    1    Narcotics* (excluding Buprenorphine)  Current Qty:   0   Current MME/day:   0.00   30 Day Avg MME/day:   12.67   Sedatives*  Current Qty:   0   Current LME/day:   0.00   30 Day Avg LME/day:   2.00   Buprenorphine*  Current Qty:   0   Current mg/day:   0.00   30 Day Avg mg/day:   0.00   *Highlighted drugs could not be included in score calculations   Prescriptions  Total Prescriptions: 22    Total Private Pay: 0    Fill Date ID   Written Drug Qty Days Prescriber Rx # Pharmacy Refill   Daily Dose* Pymt Type      03/19/2020  1   03/19/2020  Lorazepam 1 MG Tablet  60.00 20 Kr Luis E   [id number]   Nick ([id number])   0  3.00 LME  Comm [facility]   03/07/2020  1   02/05/2020  Tramadol Hcl 50 MG Tablet  112.00 28 Be Bridger   [id number]   Nick ([id number])   1  20.00 MME  Comm [facility]   02/05/2020  1   02/05/2020  Tramadol Hcl 50 MG Tablet  112.00 28 Be Bridger   [id number]   Nick ([id number])   0  20.00 MME  Comm [facility]   01/09/2020  1   11/04/2019  Tramadol Hcl 50 MG Tablet  112.00 28 Be Bridger   [id number]   Nick ([id number])   2  20.00 MME  Comm [facility]   12/02/2019  1   11/04/2019  Tramadol Hcl 50 MG Tablet  112.00 28 Be Bridger   [id number]   Nick ([id number])   1  20.00 MME  Comm [facility]   11/04/2019  1   11/04/2019  Oxycodone-Acetaminophen 5-325  12.00 3 Au    [id number]   Nick ([id number])   0  30.00 MME  Comm [facility]   11/04/2019  1   11/04/2019  Tramadol Hcl 50 MG Tablet  112.00 28 Be Bridger   [id number]   Nick ([id number])   0  20.00 MME  Comm [facility]   08/26/2019  1    02/28/2019  Tramadol Hcl 50 MG Tablet  112.00 28 Be Bridger   [id number]   Nick ([id number])   1  20.00 MME  [facility]   [place]   07/25/2019  1   02/28/2019  Tramadol Hcl 50 MG Tablet  112.00 28 Be Bridger   [id number]   Nick ([id number])   0  20.00 MME  [facility]   [place]   06/26/2019  1   04/24/2019  Tramadol Hcl 50 MG Tablet  112.00 28 Be Bridger   [id number]   Nick ([id number])   2  20.00 MME  [facility]   [place]   06/10/2019  1   06/09/2019  Hydrocodone-Acetamin  MG  56.00 7 Ch Can   [id number]   Nick ([id number])   0  80.00 MME  [facility]   [place]   05/30/2019  1   04/24/2019  Tramadol Hcl 50 MG Tablet  112.00 28 Be Bridger   [id number]   Nick ([id number])   1  20.00 MME  [facility]   [place]   04/25/2019  1   04/24/2019  Tramadol Hcl 50 MG Tablet  112.00 28 Be Bridger   [id number]   Nick ([id number])   0  20.00 MME  [facility]   [place]   03/27/2019  1   01/03/2019  Tramadol Hcl 50 MG Tablet  112.00 28 Be Bridger   [id number]   Nick ([id number])   2  20.00 MME  [facility]   [place]   02/17/2019  1   01/03/2019  Tramadol Hcl 50 MG Tablet  112.00 28 Be Bridger   [id number]   Nick ([id number])   1  20.00 MME  [facility]   [place]   01/13/2019  1   01/03/2019  Tramadol Hcl 50 MG Tablet  112.00 28 Be Bridger   [id number]   Nick ([id number])   0  20.00 MME  [facility]   [place]   12/10/2018  1   11/08/2018  Tramadol Hcl 50 MG Tablet  112.00 28 Be Bridger   [id number]   Nick ([id number])   1  20.00 MME  [facility]   [place]   11/08/2018  1   11/08/2018  Tramadol Hcl 50 MG Tablet  112.00 28 Be Bridger   [id number]   Nick ([id number])   0  20.00 MME  [facility]   [place]   09/12/2018  1   05/08/2018  Tramadol Hcl 50 MG Tablet  112.00 28 Ke Pit   [id number]   Nick ([id number])   2  20.00 MME  [facility]   [place]   08/08/2018  1   08/08/2018  Tramadol Hcl 50 MG Tablet  112.00 28 Ka Mesha   [id number]   Nick ([id number])   0  20.00 MME  [facility]   [place]   06/09/2018  1   05/08/2018  Tramadol Hcl 50 MG Tablet  112.00 28 Ke [address]   [id number]   Nick ([id number])   1  20.00 MME  [facility]   [place]   05/08/2018  1   05/08/2018  Tramadol Hcl 50 MG Tablet  112.00 28 Ke Pit   [id number]   Nick ([id number])   0  20.00 MME  [facility]   [place]   *Per CDC guidance, the MME conversion factors prescribed  or provided as part of the medication-assisted treatment for opioid use disorder should not be used to benchmark against dosage thresholds meant for opioids prescribed for pain. Buprenorphine products have no agreed upon morphine equivalency, and as partial opioid agonists, are not expected to be associated with overdose risk in the same dose-dependent manner as doses for full agonist opioids. MME = morphine milligram equivalents. LME = Lorazepam milligram equivalents. MG = dose in milligrams.   Providers  Total Providers: 6   Name Address City State Zipcode Phone   Abbi Dumont [address]   [place] [address]    Ash Don [address]   [place] [address]    Zuri Foster, Miteshn [address]   [place] [address]    Leonidas Hickey [address]   [place] [address]    Charley Harvey [address] [facility] [place] [address]    Emily Rincon [address]   [place] [address]    Pharmacies  Total Pharmacies: 1   Name Address City State Zipcode Phone   [id number] [phone] [facility] [place] [address] [phone]   This Graph is Interactive  Patient Demographics  Score History  Drug Details  Prescriber Details

## 2020-04-16 RX ORDER — LORAZEPAM 1 MG/1
.5-1 TABLET ORAL 2 TIMES DAILY PRN
Qty: 60 TAB | Refills: 0 | Status: SHIPPED
Start: 2020-04-16 | End: 2020-05-16

## 2020-04-16 NOTE — TELEPHONE ENCOUNTER
Requested Prescriptions     Pending Prescriptions Disp Refills   • LORazepam (ATIVAN) 1 MG Tab [Pharmacy Med Name: LORAZEPAM 1 MG      TAB ACTA] 60 Tab 0     Sig: Take 0.5-1 Tabs by mouth 2 times a day as needed for Anxiety for up to 30 days.       TANISHA Siddiqui    Obtained and reviewed patient utilization report from [facility] pharmacy database on 4/16/2020 prior to writing prescription for controlled substance II, III or IV per [place] bill . Based on assessment of the report, the prescription ativan is medically necessary.

## 2020-05-09 ENCOUNTER — HOSPITAL ENCOUNTER (OUTPATIENT)
Facility: MEDICAL CENTER | Age: 65
End: 2020-05-09
Attending: NEUROLOGICAL SURGERY | Admitting: NEUROLOGICAL SURGERY
Payer: MEDICARE

## 2020-05-18 ENCOUNTER — APPOINTMENT (OUTPATIENT)
Dept: ADMISSIONS | Facility: MEDICAL CENTER | Age: 65
End: 2020-05-18
Payer: MEDICARE

## 2020-06-02 ENCOUNTER — TELEPHONE (OUTPATIENT)
Dept: MEDICAL GROUP | Facility: PHYSICIAN GROUP | Age: 65
End: 2020-06-02

## 2020-06-02 NOTE — TELEPHONE ENCOUNTER
ESTABLISHED PATIENT PRE-VISIT PLANNING     Patient was NOT contacted to complete PVP.    1.  Reviewed notes from the last few office visits within the medical group: Yes,  lov   03/19/20  2.  If any orders were placed at last visit or intended to be done for this visit (i.e. 6 mos follow-up), do we have Results/Consult Notes?        •  Labs - Labs were not ordered at last office visit.        •  Imaging - Imaging was not ordered at last office visit.       •  Referrals - No referrals were ordered at last office visit.    3. Is this appointment scheduled as a Hospital Follow-Up? No    4.  Immunizations were updated in Topicmarks using WebIZ?: Yes       •  Web Iz Recommendations: TD, VARICELLA (Chicken Pox)  and SHINGRIX (Shingles)    5.  Patient is due for the following Health Maintenance Topics:   Health Maintenance Due   Topic Date Due   • COLONOSCOPY  1955   • IMM ZOSTER VACCINES (1 of 2) 05/15/2003   • A1C SCREENING  03/06/2020   • LUNG CANCER SCREENING  03/20/2020   • RETINAL SCREENING  05/07/2020     6. Orders for overdue Health Maintenance topics pended in Pre-Charting? NO    7.  AHA (MDX) form printed for Provider? No, already completed,  Pt has no alerts    8.  Patient was NOT informed to arrive 15 min prior to their scheduled appointment and bring in their medication bottles.

## 2020-06-04 ENCOUNTER — OFFICE VISIT (OUTPATIENT)
Dept: MEDICAL GROUP | Facility: PHYSICIAN GROUP | Age: 65
End: 2020-06-04
Payer: MEDICARE

## 2020-06-04 VITALS
DIASTOLIC BLOOD PRESSURE: 90 MMHG | WEIGHT: 175 LBS | TEMPERATURE: 98.5 F | HEIGHT: 70 IN | RESPIRATION RATE: 14 BRPM | HEART RATE: 81 BPM | OXYGEN SATURATION: 98 % | BODY MASS INDEX: 25.05 KG/M2 | SYSTOLIC BLOOD PRESSURE: 140 MMHG

## 2020-06-04 DIAGNOSIS — F32.9 REACTIVE DEPRESSION: ICD-10-CM

## 2020-06-04 DIAGNOSIS — Z12.11 SCREENING FOR COLORECTAL CANCER: ICD-10-CM

## 2020-06-04 DIAGNOSIS — Z23 NEED FOR VACCINATION: ICD-10-CM

## 2020-06-04 DIAGNOSIS — Z12.12 SCREENING FOR COLORECTAL CANCER: ICD-10-CM

## 2020-06-04 DIAGNOSIS — Z12.2 ENCOUNTER FOR SCREENING FOR MALIGNANT NEOPLASM OF RESPIRATORY ORGANS: ICD-10-CM

## 2020-06-04 DIAGNOSIS — Z87.891 PERSONAL HISTORY OF NICOTINE DEPENDENCE: ICD-10-CM

## 2020-06-04 DIAGNOSIS — F43.21 GRIEF: ICD-10-CM

## 2020-06-04 PROCEDURE — 99214 OFFICE O/P EST MOD 30 MIN: CPT | Performed by: NURSE PRACTITIONER

## 2020-06-04 RX ORDER — FLUOXETINE 10 MG/1
10 CAPSULE ORAL DAILY
Qty: 90 CAP | Refills: 0 | Status: SHIPPED
Start: 2020-06-04 | End: 2020-06-24

## 2020-06-04 RX ORDER — LORAZEPAM 1 MG/1
1 TABLET ORAL EVERY 4 HOURS PRN
COMMUNITY
End: 2021-10-06

## 2020-06-04 ASSESSMENT — PATIENT HEALTH QUESTIONNAIRE - PHQ9
9. THOUGHTS THAT YOU WOULD BE BETTER OFF DEAD, OR OF HURTING YOURSELF: NOT AT ALL
5. POOR APPETITE OR OVEREATING: NOT AT ALL
3. TROUBLE FALLING OR STAYING ASLEEP OR SLEEPING TOO MUCH: MORE THAN HALF THE DAYS
SUM OF ALL RESPONSES TO PHQ QUESTIONS 1-9: 10
SUM OF ALL RESPONSES TO PHQ9 QUESTIONS 1 AND 2: 4
7. TROUBLE CONCENTRATING ON THINGS, SUCH AS READING THE NEWSPAPER OR WATCHING TELEVISION: SEVERAL DAYS
6. FEELING BAD ABOUT YOURSELF - OR THAT YOU ARE A FAILURE OR HAVE LET YOURSELF OR YOUR FAMILY DOWN: NOT AL ALL
4. FEELING TIRED OR HAVING LITTLE ENERGY: NEARLY EVERY DAY
2. FEELING DOWN, DEPRESSED, IRRITABLE, OR HOPELESS: MORE THAN HALF THE DAYS
1. LITTLE INTEREST OR PLEASURE IN DOING THINGS: MORE THAN HALF THE DAYS
8. MOVING OR SPEAKING SO SLOWLY THAT OTHER PEOPLE COULD HAVE NOTICED. OR THE OPPOSITE, BEING SO FIGETY OR RESTLESS THAT YOU HAVE BEEN MOVING AROUND A LOT MORE THAN USUAL: NOT AT ALL

## 2020-06-04 ASSESSMENT — FIBROSIS 4 INDEX: FIB4 SCORE: 1.22

## 2020-06-04 NOTE — ASSESSMENT & PLAN NOTE
New problem to examiner.  The patient was seen on 3/19/2020 due to extreme grief related to the likely passing of his wife after she had a seizure, fell out of bed hit her head, and had a significant subdural hematoma.  The patient has been in acute care and then transferred to rehab therapy over the last 3 months.  They were prepping for her to come home, but she was going to be requiring significant care.  The patient was scheduled to have surgery with neurosurgery, but has postponed the surgery because he needs to be physically able to take care of his wife when she comes home.  The patient reports that his wife recently experienced another seizure while at rehab and was transferred back to acute care.  While admitted, she was in surgery to have her bone flap replaced after it was removed in March due to her subdural hematoma, before the surgery commenced she went into cardiac arrest which required CPR and defibrillation.  She has since had a pacemaker placed and has been transferred out of the ICU.  It is planned that she will return to rehab prior to returning home.  Patient reports that he has been visiting her daily for the allowed 3 hours and she is now more awake and interacting, but he is experiencing continued grief, anxiety, depression relating to the long recovery time expected for his wife.  When he was seen in March 2020 after the initial admit for his wife, he was prescribed Lorazepam to be used as needed as his anxiety was extreme because he was expecting to have to take his wife off of life support.  He does have remaining medication of lorazepam and uses it only as needed.  Reports that he is interested in starting a daily medication to help manage his symptoms. The patient denies any suicidal ideations or thoughts of harming himself or others.   Depression Screen (PHQ-2/PHQ-9) 1/7/2020 1/15/2020 6/4/2020   PHQ-2 Total Score 0 - 4   PHQ-2 Total Score - - -   PHQ-2 Total Score - 0 -   PHQ-9 Total  Score - - 10     Interpretation of PHQ-9 Total Score   Score Severity   1-4 No Depression   5-9 Mild Depression   10-14 Moderate Depression
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
## 2020-06-11 ENCOUNTER — TELEPHONE (OUTPATIENT)
Dept: URGENT CARE | Facility: PHYSICIAN GROUP | Age: 65
End: 2020-06-11

## 2020-06-11 NOTE — TELEPHONE ENCOUNTER
Can you please call the patient and let him know that side effects from the medication generally improve after about 2 weeks on the medication and he is only been taking it for about 1 week.  Any other medication that I would change to would likely cause the same symptoms.

## 2020-06-11 NOTE — TELEPHONE ENCOUNTER
Pt says his Prozac is causing issues and he can't take it any longer. He wants a different medication that will help him that doesn't make him sick and feeling off.

## 2020-06-16 DIAGNOSIS — E11.29 TYPE 2 DIABETES MELLITUS WITH MICROALBUMINURIA, WITHOUT LONG-TERM CURRENT USE OF INSULIN (HCC): ICD-10-CM

## 2020-06-16 DIAGNOSIS — R80.9 TYPE 2 DIABETES MELLITUS WITH MICROALBUMINURIA, WITHOUT LONG-TERM CURRENT USE OF INSULIN (HCC): ICD-10-CM

## 2020-06-23 NOTE — PROGRESS NOTES
1. HealthConnect Verified: yes    2. Verify PCP: yes    3. Review and add  to Care Team: yes    4. WebIZ Checked & Epic Updated: No WebIZ record  WebIZ Recommendations: SHINGRIX (Shingles)  Is patient due for Tdap? NO  Is patient due for Shingles? YES. Patient was not notified of copay/out of pocket cost.    5. Reviewed/Updated the following with patient:       •   Communication Preference Obtained? YES  • MyChart Activation: declined       •   E-Mail Address Obtained? NO       •   Appointment Day and Time Preferences? YES       •   Preferred Pharmacy? YES       •   Preferred Lab? YES    6. Care Gap Scheduling (Attempt to Schedule EACH Overdue Care Gap!)

## 2020-06-24 ENCOUNTER — OFFICE VISIT (OUTPATIENT)
Dept: MEDICAL GROUP | Facility: PHYSICIAN GROUP | Age: 65
End: 2020-06-24
Payer: MEDICARE

## 2020-06-24 ENCOUNTER — TELEPHONE (OUTPATIENT)
Dept: MEDICAL GROUP | Facility: PHYSICIAN GROUP | Age: 65
End: 2020-06-24

## 2020-06-24 VITALS
HEART RATE: 77 BPM | HEIGHT: 70 IN | DIASTOLIC BLOOD PRESSURE: 94 MMHG | RESPIRATION RATE: 16 BRPM | TEMPERATURE: 97.1 F | BODY MASS INDEX: 25.05 KG/M2 | SYSTOLIC BLOOD PRESSURE: 172 MMHG | OXYGEN SATURATION: 96 % | WEIGHT: 175 LBS

## 2020-06-24 DIAGNOSIS — I10 ESSENTIAL HYPERTENSION: ICD-10-CM

## 2020-06-24 DIAGNOSIS — R80.9 TYPE 2 DIABETES MELLITUS WITH MICROALBUMINURIA, WITHOUT LONG-TERM CURRENT USE OF INSULIN (HCC): ICD-10-CM

## 2020-06-24 DIAGNOSIS — E11.29 TYPE 2 DIABETES MELLITUS WITH MICROALBUMINURIA, WITHOUT LONG-TERM CURRENT USE OF INSULIN (HCC): ICD-10-CM

## 2020-06-24 DIAGNOSIS — F32.9 REACTIVE DEPRESSION: ICD-10-CM

## 2020-06-24 PROBLEM — K92.2 GI BLEED: Status: RESOLVED | Noted: 2020-01-05 | Resolved: 2020-06-24

## 2020-06-24 LAB
HBA1C MFR BLD: 5.1 % (ref 0–5.6)
INT CON NEG: NORMAL
INT CON POS: NORMAL

## 2020-06-24 PROCEDURE — 83036 HEMOGLOBIN GLYCOSYLATED A1C: CPT | Performed by: NURSE PRACTITIONER

## 2020-06-24 PROCEDURE — 99214 OFFICE O/P EST MOD 30 MIN: CPT | Performed by: NURSE PRACTITIONER

## 2020-06-24 RX ORDER — LISINOPRIL 40 MG/1
40 TABLET ORAL DAILY
Qty: 100 TAB | Refills: 0 | Status: SHIPPED | OUTPATIENT
Start: 2020-06-24 | End: 2020-11-30

## 2020-06-24 RX ORDER — TOBRAMYCIN AND DEXAMETHASONE 3; 1 MG/ML; MG/ML
SUSPENSION/ DROPS OPHTHALMIC
COMMUNITY
Start: 2020-06-15 | End: 2020-06-24

## 2020-06-24 ASSESSMENT — FIBROSIS 4 INDEX: FIB4 SCORE: 1.22

## 2020-06-24 NOTE — TELEPHONE ENCOUNTER
Phone Number Called:Fabio Vergara       Call outcome: Spoke to patient regarding message below.    Message: Patient will cut his pill in half.

## 2020-06-24 NOTE — RESULT ENCOUNTER NOTE
Please call the patient and let him know that his A1c was normal at 5.1. Lets have him decrease his metformin dose to 500 mg twice daily from 1000 mg twice daily. He can cut his current tabs in half, or I can send a new prescription if he would like.    Thank you,    JOSÉ MIGUEL Siddiqui.

## 2020-06-24 NOTE — ASSESSMENT & PLAN NOTE
Chronic, ongoing.  Patient follows up today after trying to start Prozac 1 month ago.  States that he could not handle the side effects and stopped the medication before 2 weeks of taking.  Patient's wife continues to face complications related to subdural hematoma in March 2020.  She is in rehab and is showing some improvement.  Patient reports anxiety related to the unknown, he does not know when she is coming home, he does not know how much help she will need, he does not know when he will be able to schedule his neck surgery, etc.  Reports that he is feeling better as far as depression anxiety since his last appointment as he is wife has shown improvement.  He is not interested in restarting Prozac or trying another medication.  States he will notify me if symptoms become uncontrolled again. The patient denies any suicidal ideations or thoughts of harming himself or others.

## 2020-06-24 NOTE — ASSESSMENT & PLAN NOTE
Chronic, uncontrolled.  Patient was previously prescribed hydrochlorothiazide 25 mg/day and lisinopril 40 mg/day.  These medications were discontinued after hospitalization for GI bleed.  He does continue to take metoprolol SR 25 mg/day.  He does not check blood pressures at home. The patient denies chest pain, shortness of breath, headaches, dizziness, blurry vision, or dyspnea on exertion.  Vitals 2/20/2020 3/19/2020 6/4/2020 6/24/2020   SYSTOLIC 144 148 140 172   DIASTOLIC 82 78 90 98   PULSE 76 98 81 77
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
## 2020-09-16 DIAGNOSIS — E78.5 DYSLIPIDEMIA: ICD-10-CM

## 2020-09-16 RX ORDER — PRAVASTATIN SODIUM 40 MG
40 TABLET ORAL
Qty: 100 TAB | Refills: 2 | Status: SHIPPED | OUTPATIENT
Start: 2020-09-16 | End: 2021-07-08

## 2020-09-16 NOTE — TELEPHONE ENCOUNTER
Received request via: Pharmacy    Was the patient seen in the last year in this department? Yes LOV 06/24/2020    Does the patient have an active prescription (recently filled or refills available) for medication(s) requested? No

## 2020-09-16 NOTE — TELEPHONE ENCOUNTER
Requested Prescriptions     Pending Prescriptions Disp Refills   • pravastatin (PRAVACHOL) 40 MG tablet [Pharmacy Med Name: PRAVASTATIN 40 MG   TAB [id number]] 100 Tab 2     Sig: Take 1 Tab by mouth every bedtime.       JOSÉ MIGUEL Siddiqui.

## 2020-09-18 DIAGNOSIS — I10 ESSENTIAL HYPERTENSION: ICD-10-CM

## 2020-09-18 NOTE — ASSESSMENT & PLAN NOTE
Chronic problem currently managed with lisinopril.  Worsening as of 2/2019. Followed by PCP with next microalbumin urine testing to be completed for monitoring in February.  
Chronic problem that has worsened with weakness of right hand, therefore not controlled.  Currently followed by pain management doing EMG next Monday, and referral has been placed to Dr. Bennett neurosurgeon as well.   
Chronic problem that is multifactorial we assume, but not on treatment as treatment did not help. Followed by PCP and nerve conduction study planned. Monitor.   
Chronic problem that is well controlled with metformin.  A1c due today for monitoring.  Followed by PCP  
Chronic problem that is well controlled with metoprolol, hydrochlorothiazide, and lisinopril.  Followed by PCP monitoring blood pressure every visit.  Stable.  
Chronic problem that is well controlled with pravastatin with last lipid panel completed 2/2018 for monitoring.  Followed by PCP and lipids due in February 2019 again for monitoring  
Chronic problem that worsened this year but is without pain therefore stable. Was followed by PCP, but referred patient to hand specialist this summer. He never followed through with this. Information given to him again today to call to follow up on this.   
Chronic problem with use of tramadol and gabapentin for pain control.  Following pain management Dr. Turcios.  [place].  Monitor  
Patient has history of tobacco abuse and is part of the lung cancer screening program with last CT performed for monitoring September 2018.  Followed by PCP and due again in September 2019  
182.88

## 2020-09-19 RX ORDER — METOPROLOL SUCCINATE 25 MG/1
TABLET, EXTENDED RELEASE ORAL
Qty: 200 TAB | Refills: 3 | Status: SHIPPED | OUTPATIENT
Start: 2020-09-19 | End: 2021-06-28 | Stop reason: SDUPTHER

## 2020-10-01 ENCOUNTER — NON-PROVIDER VISIT (OUTPATIENT)
Dept: MEDICAL GROUP | Facility: PHYSICIAN GROUP | Age: 65
End: 2020-10-01
Payer: MEDICARE

## 2020-10-01 DIAGNOSIS — Z23 NEED FOR VACCINATION: ICD-10-CM

## 2020-10-01 PROCEDURE — 90662 IIV NO PRSV INCREASED AG IM: CPT | Performed by: NURSE PRACTITIONER

## 2020-10-01 PROCEDURE — 99999 PR NO CHARGE: CPT | Performed by: FAMILY MEDICINE

## 2020-10-01 PROCEDURE — G0008 ADMIN INFLUENZA VIRUS VAC: HCPCS | Performed by: NURSE PRACTITIONER

## 2020-10-01 NOTE — NON-PROVIDER
"Fabio Vergara is a 65 y.o. male here for a non-provider visit for:   FLU    Reason for immunization: Annual Flu Vaccine  Immunization records indicate need for vaccine: Yes, confirmed with Epic  Minimum interval has been met for this vaccine: Yes  ABN completed: Not Indicated    Order and dose verified by: GUS  VIS Dated  8/15/19 was given to patient: Yes  All IAC Questionnaire questions were answered \"No.\"    Patient tolerated injection and no adverse effects were observed or reported: Yes    Pt scheduled for next dose in series: No  "

## 2020-11-25 DIAGNOSIS — I10 ESSENTIAL HYPERTENSION: ICD-10-CM

## 2020-11-30 RX ORDER — LISINOPRIL 40 MG/1
TABLET ORAL
Qty: 100 TAB | Refills: 0 | Status: SHIPPED | OUTPATIENT
Start: 2020-11-30 | End: 2021-03-08

## 2020-11-30 RX ORDER — FLUOXETINE 10 MG/1
CAPSULE ORAL
Qty: 90 CAP | Refills: 0 | Status: SHIPPED | OUTPATIENT
Start: 2020-11-30 | End: 2021-03-08

## 2020-11-30 NOTE — TELEPHONE ENCOUNTER
Received request via: Pharmacy    Was the patient seen in the last year in this department? Yes    Does the patient have an active prescription (recently filled or refills available) for medication(s) requested? I do not see fluoxetine on current med list

## 2020-11-30 NOTE — TELEPHONE ENCOUNTER
Requested Prescriptions     Pending Prescriptions Disp Refills   • FLUoxetine (PROZAC) 10 MG Cap [Pharmacy Med Name: FLUOXETINE 10 MG    CAP AURO] 90 Cap 0     Sig: TAKE ONE CAPSULE BY MOUTH EVERY DAY   • lisinopril (PRINIVIL) 40 MG tablet [Pharmacy Med Name: LISINOPRIL 40 MG    TAB SOLC] 100 Tab 0     Sig: TAKE ONE TABLET BY MOUTH EVERY DAY     Era Ramsay M.D.

## 2021-01-11 DIAGNOSIS — E11.29 TYPE 2 DIABETES MELLITUS WITH MICROALBUMINURIA, WITHOUT LONG-TERM CURRENT USE OF INSULIN (HCC): ICD-10-CM

## 2021-01-11 DIAGNOSIS — R80.9 TYPE 2 DIABETES MELLITUS WITH MICROALBUMINURIA, WITHOUT LONG-TERM CURRENT USE OF INSULIN (HCC): ICD-10-CM

## 2021-01-12 NOTE — TELEPHONE ENCOUNTER
Requested Prescriptions     Pending Prescriptions Disp Refills   • metFORMIN (GLUCOPHAGE) 500 MG Tab 200 Tab 2     Sig: Take 1 Tab by mouth 2 times a day, with meals.       JOSÉ MIGUEL Siddiqui.

## 2021-03-03 DIAGNOSIS — Z23 NEED FOR VACCINATION: ICD-10-CM

## 2021-03-08 DIAGNOSIS — F32.9 REACTIVE DEPRESSION: ICD-10-CM

## 2021-03-08 DIAGNOSIS — I10 ESSENTIAL HYPERTENSION: ICD-10-CM

## 2021-03-08 RX ORDER — FLUOXETINE 10 MG/1
CAPSULE ORAL
Qty: 90 CAPSULE | Refills: 0 | Status: SHIPPED
Start: 2021-03-08 | End: 2021-06-28

## 2021-03-08 RX ORDER — LISINOPRIL 40 MG/1
TABLET ORAL
Qty: 100 TABLET | Refills: 0 | Status: SHIPPED | OUTPATIENT
Start: 2021-03-08 | End: 2021-07-29 | Stop reason: SDUPTHER

## 2021-03-08 NOTE — TELEPHONE ENCOUNTER
Received request via: Pharmacy    Was the patient seen in the last year in this department? Yes  LOV 6/24/2020    Does the patient have an active prescription (recently filled or refills available) for medication(s) requested? No

## 2021-03-09 NOTE — TELEPHONE ENCOUNTER
Requested Prescriptions     Pending Prescriptions Disp Refills   • lisinopril (PRINIVIL) 40 MG tablet [Pharmacy Med Name: LISINOPRIL 40 MG    TAB SOLC] 100 tablet 0     Sig: TAKE ONE TABLET BY MOUTH EVERY DAY   • FLUoxetine (PROZAC) 10 MG Cap [Pharmacy Med Name: FLUOXETINE 10 MG    CAP AURO] 90 capsule 0     Sig: TAKE ONE CAPSULE BY MOUTH EVERY DAY       JOSÉ MIGUEL Siddiqui.

## 2021-04-10 ENCOUNTER — IMMUNIZATION (OUTPATIENT)
Dept: FAMILY PLANNING/WOMEN'S HEALTH CLINIC | Facility: IMMUNIZATION CENTER | Age: 66
End: 2021-04-10
Payer: MEDICARE

## 2021-04-10 DIAGNOSIS — Z23 ENCOUNTER FOR VACCINATION: Primary | ICD-10-CM

## 2021-04-10 PROCEDURE — 0012A MODERNA SARS-COV-2 VACCINE: CPT

## 2021-04-10 PROCEDURE — 91301 MODERNA SARS-COV-2 VACCINE: CPT

## 2021-05-20 ENCOUNTER — PATIENT OUTREACH (OUTPATIENT)
Dept: HEALTH INFORMATION MANAGEMENT | Facility: OTHER | Age: 66
End: 2021-05-20

## 2021-05-20 NOTE — PROGRESS NOTES
Outcome: Left Message     Please transfer to Patient Outreach Team at [phone] when patient returns call.     [facility] Verified: yes     Attempt # 1

## 2021-06-28 ENCOUNTER — OFFICE VISIT (OUTPATIENT)
Dept: MEDICAL GROUP | Facility: PHYSICIAN GROUP | Age: 66
End: 2021-06-28
Payer: MEDICARE

## 2021-06-28 VITALS
HEART RATE: 76 BPM | TEMPERATURE: 97.7 F | DIASTOLIC BLOOD PRESSURE: 84 MMHG | HEIGHT: 70 IN | SYSTOLIC BLOOD PRESSURE: 154 MMHG | BODY MASS INDEX: 25.91 KG/M2 | OXYGEN SATURATION: 96 % | WEIGHT: 181 LBS

## 2021-06-28 DIAGNOSIS — F43.21 GRIEF: ICD-10-CM

## 2021-06-28 DIAGNOSIS — J84.10 CALCIFIED GRANULOMA OF LUNG (HCC): ICD-10-CM

## 2021-06-28 DIAGNOSIS — Z23 NEED FOR VACCINATION: ICD-10-CM

## 2021-06-28 DIAGNOSIS — R80.9 MICROALBUMINURIA DUE TO TYPE 2 DIABETES MELLITUS (HCC): ICD-10-CM

## 2021-06-28 DIAGNOSIS — E78.5 DYSLIPIDEMIA: ICD-10-CM

## 2021-06-28 DIAGNOSIS — Z00.00 ROUTINE HEALTH MAINTENANCE: ICD-10-CM

## 2021-06-28 DIAGNOSIS — E11.29 TYPE 2 DIABETES MELLITUS WITH MICROALBUMINURIA, WITHOUT LONG-TERM CURRENT USE OF INSULIN (HCC): ICD-10-CM

## 2021-06-28 DIAGNOSIS — E55.9 VITAMIN D DEFICIENCY: ICD-10-CM

## 2021-06-28 DIAGNOSIS — M54.12 CERVICAL RADICULOPATHY AT C7: ICD-10-CM

## 2021-06-28 DIAGNOSIS — R80.9 TYPE 2 DIABETES MELLITUS WITH MICROALBUMINURIA, WITHOUT LONG-TERM CURRENT USE OF INSULIN (HCC): ICD-10-CM

## 2021-06-28 DIAGNOSIS — F11.20 UNCOMPLICATED OPIOID DEPENDENCE (HCC): ICD-10-CM

## 2021-06-28 DIAGNOSIS — F32.9 REACTIVE DEPRESSION: ICD-10-CM

## 2021-06-28 DIAGNOSIS — E11.29 MICROALBUMINURIA DUE TO TYPE 2 DIABETES MELLITUS (HCC): ICD-10-CM

## 2021-06-28 DIAGNOSIS — I77.810 AORTIC ECTASIA, THORACIC (HCC): ICD-10-CM

## 2021-06-28 DIAGNOSIS — Z00.00 MEDICARE ANNUAL WELLNESS VISIT, SUBSEQUENT: ICD-10-CM

## 2021-06-28 DIAGNOSIS — I10 ESSENTIAL HYPERTENSION: ICD-10-CM

## 2021-06-28 DIAGNOSIS — Z87.891 PERSONAL HISTORY OF NICOTINE DEPENDENCE: ICD-10-CM

## 2021-06-28 DIAGNOSIS — D64.9 ANEMIA, UNSPECIFIED TYPE: ICD-10-CM

## 2021-06-28 PROCEDURE — 90471 IMMUNIZATION ADMIN: CPT | Performed by: NURSE PRACTITIONER

## 2021-06-28 PROCEDURE — 90750 HZV VACC RECOMBINANT IM: CPT | Performed by: NURSE PRACTITIONER

## 2021-06-28 PROCEDURE — G0439 PPPS, SUBSEQ VISIT: HCPCS | Performed by: NURSE PRACTITIONER

## 2021-06-28 RX ORDER — METOPROLOL SUCCINATE 100 MG/1
100 TABLET, EXTENDED RELEASE ORAL DAILY
Qty: 90 TABLET | Refills: 0 | Status: SHIPPED | OUTPATIENT
Start: 2021-06-28 | End: 2021-09-27

## 2021-06-28 ASSESSMENT — FIBROSIS 4 INDEX: FIB4 SCORE: 1.24

## 2021-06-28 ASSESSMENT — PATIENT HEALTH QUESTIONNAIRE - PHQ9
9. THOUGHTS THAT YOU WOULD BE BETTER OFF DEAD, OR OF HURTING YOURSELF: NOT AT ALL
3. TROUBLE FALLING OR STAYING ASLEEP OR SLEEPING TOO MUCH: NOT AT ALL
4. FEELING TIRED OR HAVING LITTLE ENERGY: NOT AT ALL
5. POOR APPETITE OR OVEREATING: NOT AT ALL
8. MOVING OR SPEAKING SO SLOWLY THAT OTHER PEOPLE COULD HAVE NOTICED. OR THE OPPOSITE, BEING SO FIGETY OR RESTLESS THAT YOU HAVE BEEN MOVING AROUND A LOT MORE THAN USUAL: NOT AT ALL
SUM OF ALL RESPONSES TO PHQ QUESTIONS 1-9: 0
2. FEELING DOWN, DEPRESSED, IRRITABLE, OR HOPELESS: NOT AT ALL
1. LITTLE INTEREST OR PLEASURE IN DOING THINGS: NOT AT ALL
7. TROUBLE CONCENTRATING ON THINGS, SUCH AS READING THE NEWSPAPER OR WATCHING TELEVISION: NOT AT ALL
SUM OF ALL RESPONSES TO PHQ9 QUESTIONS 1 AND 2: 0
CLINICAL INTERPRETATION OF PHQ2 SCORE: 0
6. FEELING BAD ABOUT YOURSELF - OR THAT YOU ARE A FAILURE OR HAVE LET YOURSELF OR YOUR FAMILY DOWN: NOT AL ALL

## 2021-06-28 ASSESSMENT — ACTIVITIES OF DAILY LIVING (ADL): BATHING_REQUIRES_ASSISTANCE: 0

## 2021-06-28 ASSESSMENT — ENCOUNTER SYMPTOMS: GENERAL WELL-BEING: GOOD

## 2021-06-28 NOTE — ASSESSMENT & PLAN NOTE
"Chronic, improving. Is prescribed fluoxetine 10 mg/day. Has been slowly weaning off of the medication for \"quite a while\". States that his goal is to wean off of the medication since his wife is now at home and getting better. The patient denies any suicidal ideations or thoughts of harming himself or others.   "

## 2021-06-28 NOTE — ASSESSMENT & PLAN NOTE
This is a new problem to the examiner. Chronic, ongoing. Mild on chest CT in March 2019. Continues pravastatin 40 mg/day. History of GI bleed with anticoagulants.

## 2021-06-28 NOTE — ASSESSMENT & PLAN NOTE
Chronic, ongoing. Continues metformin 500 mg/BID. Reports that diet is okay. Is active in the home. Due for updated labs. Continues to follow with optometry, is up to date.

## 2021-06-28 NOTE — ASSESSMENT & PLAN NOTE
Chronic, ongoing and stable. Due for annual lung cancer screening. Denies shortness of breath, cough, difficulty breathing.

## 2021-06-28 NOTE — ASSESSMENT & PLAN NOTE
Chronic, ongoing. Continues vitamin d 2000 units daily. Does not take calcium. Due for updated labs.
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
## 2021-06-28 NOTE — LETTER
[facility]  TANISHA Siddiqui  [address]  [place] [address]  Fax: [phone]   Authorization for Release/Disclosure of   Protected Health Information   Name: FABIO ROLDAN : 1955 SSN: [id number]   Address: [address] [place] [address] [place] [address] Phone:    [phone] (home) [phone] (work)   I authorize the entity listed below to release/disclose the PHI below to:   [facility]/LUCERO SiddiquiRCOOPER and TANISHA Siddiqui   Provider or Entity Name:  Dr. Esa Calvillo   Address   City, [facility], [facility]   Phone:      Fax:     Reason for request: continuity of care   Information to be released:    [  ] LAST COLONOSCOPY,  including any PATH REPORT and follow-up  [  ] LAST FIT/COLOGUARD RESULT [  ] LAST DEXA  [  ] LAST MAMMOGRAM  [  ] LAST PAP  [  ] LAST LABS [X] RETINA EXAM REPORT  [  ] IMMUNIZATION RECORDS  [  ] Release all info      [  ] Check here and initial the line next to each item to release ALL health information INCLUDING  _____ Care and treatment for drug and / or alcohol abuse  _____ HIV testing, infection status, or AIDS  _____ Genetic Testing    DATES OF SERVICE OR TIME PERIOD TO BE DISCLOSED: _____________  I understand and acknowledge that:  * This Authorization may be revoked at any time by you in writing, except if your health information has already been used or disclosed.  * Your health information that will be used or disclosed as a result of you signing this authorization could be re-disclosed by the recipient. If this occurs, your re-disclosed health information may no longer be protected by State or Federal laws.  * You may refuse to sign this Authorization. Your refusal will not affect your ability to obtain treatment.  * This Authorization becomes effective upon signing and will  on (date) __________.      If no date is indicated, this Authorization will  one (1) year from the signature date.    Name: Fabio Roldan    Signature:    Date:     6/28/2021       PLEASE FAX REQUESTED RECORDS BACK TO: [phone]

## 2021-06-28 NOTE — ASSESSMENT & PLAN NOTE
Chronic, ongoing. Continues to follow with neurosurgery, Dr. Bennett. It is recommended for the patient to have surgery due to stenosis. Surgery was held off due to wife's health. Continues to follow with pain management.

## 2021-06-28 NOTE — ASSESSMENT & PLAN NOTE
Chronic, ongoing. Continues metformin 500 mg/BID. Reports that diet is okay. Is active in the home. Due for updated labs. Continues to follow with optometry, retinal screening is up to date.

## 2021-06-28 NOTE — ASSESSMENT & PLAN NOTE
Chronic, ongoing. Continues pravastatin 40 mg/daily, denies side effects of the medication. Due for updated labs.

## 2021-06-28 NOTE — ASSESSMENT & PLAN NOTE
Chronic, uncontrolled. Continues lisinopril 40 mg/day and metoprolol SR 50 mg/day. Does not monitor blood pressure at home. BP has been elevated in doctors offices. The patient denies chest pain, shortness of breath, headaches, dizziness, blurry vision, or dyspnea on exertion.

## 2021-07-08 DIAGNOSIS — E78.5 DYSLIPIDEMIA: ICD-10-CM

## 2021-07-08 RX ORDER — PRAVASTATIN SODIUM 40 MG
TABLET ORAL
Qty: 100 TABLET | Refills: 0 | Status: SHIPPED | OUTPATIENT
Start: 2021-07-08 | End: 2021-10-14

## 2021-07-08 NOTE — TELEPHONE ENCOUNTER
Received request via: Pharmacy    Was the patient seen in the last year in this department? Yes 6/28/21    Does the patient have an active prescription (recently filled or refills available) for medication(s) requested? No

## 2021-07-09 NOTE — TELEPHONE ENCOUNTER
Requested Prescriptions     Pending Prescriptions Disp Refills   • pravastatin (PRAVACHOL) 40 MG tablet [Pharmacy Med Name: PRAVASTATIN 40 MG   TAB DR.R] 100 tablet 0     Sig: TAKE ONE TABLET BY MOUTH AT BEDTIME       JOSÉ MIGUEL Siddiqui.

## 2021-07-21 ENCOUNTER — HOSPITAL ENCOUNTER (OUTPATIENT)
Dept: RADIOLOGY | Facility: MEDICAL CENTER | Age: 66
End: 2021-07-21
Attending: NURSE PRACTITIONER
Payer: MEDICARE

## 2021-07-21 DIAGNOSIS — J84.10 CALCIFIED GRANULOMA OF LUNG (HCC): ICD-10-CM

## 2021-07-21 DIAGNOSIS — Z00.00 MEDICARE ANNUAL WELLNESS VISIT, SUBSEQUENT: ICD-10-CM

## 2021-07-21 DIAGNOSIS — Z87.891 PERSONAL HISTORY OF NICOTINE DEPENDENCE: ICD-10-CM

## 2021-07-21 DIAGNOSIS — Z00.00 ROUTINE HEALTH MAINTENANCE: ICD-10-CM

## 2021-07-21 PROCEDURE — 71271 CT THORAX LUNG CANCER SCR C-: CPT

## 2021-07-22 ENCOUNTER — TELEPHONE (OUTPATIENT)
Dept: MEDICAL GROUP | Facility: PHYSICIAN GROUP | Age: 66
End: 2021-07-22

## 2021-07-22 NOTE — TELEPHONE ENCOUNTER
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  We will continue to monitor with annual low-dose CT scan for lung cancer screening.    Thank you,    TANISHA Siddiqui

## 2021-07-22 NOTE — TELEPHONE ENCOUNTER
Phone Number Called:[phone] (home) [phone] (work)       Call outcome: Spoke to patient regarding message below.    Message: Gave patient results and Fabio had questions about old granulomatosis disease, and atherosclerosis disease with coronary artery calcifications, I let Fabio know he has a scheduled appointment with Charley Harvey 7/29/21 where he will be able to discuss these results in more detail.

## 2021-07-22 NOTE — RESULT ENCOUNTER NOTE
Please call the patient and let him know that his lung cancer screening CT showed no noncalcified pulmonary nodules, old granulomatosis disease, and atherosclerosis disease with coronary artery calcifications.  We will continue to monitor with annual low-dose CT scan for lung cancer screening.    Thank you,    JOSÉ MIGUEL Siddiqui.

## 2021-07-27 ENCOUNTER — HOSPITAL ENCOUNTER (OUTPATIENT)
Dept: LAB | Facility: MEDICAL CENTER | Age: 66
End: 2021-07-27
Attending: NURSE PRACTITIONER
Payer: MEDICARE

## 2021-07-27 DIAGNOSIS — E11.29 MICROALBUMINURIA DUE TO TYPE 2 DIABETES MELLITUS (HCC): ICD-10-CM

## 2021-07-27 DIAGNOSIS — Z00.00 ROUTINE HEALTH MAINTENANCE: ICD-10-CM

## 2021-07-27 DIAGNOSIS — I10 ESSENTIAL HYPERTENSION: ICD-10-CM

## 2021-07-27 DIAGNOSIS — F32.9 REACTIVE DEPRESSION: ICD-10-CM

## 2021-07-27 DIAGNOSIS — R80.9 MICROALBUMINURIA DUE TO TYPE 2 DIABETES MELLITUS (HCC): ICD-10-CM

## 2021-07-27 DIAGNOSIS — E11.29 TYPE 2 DIABETES MELLITUS WITH MICROALBUMINURIA, WITHOUT LONG-TERM CURRENT USE OF INSULIN (HCC): ICD-10-CM

## 2021-07-27 DIAGNOSIS — E78.5 DYSLIPIDEMIA: ICD-10-CM

## 2021-07-27 DIAGNOSIS — E55.9 VITAMIN D DEFICIENCY: ICD-10-CM

## 2021-07-27 DIAGNOSIS — Z00.00 MEDICARE ANNUAL WELLNESS VISIT, SUBSEQUENT: ICD-10-CM

## 2021-07-27 DIAGNOSIS — D64.9 ANEMIA, UNSPECIFIED TYPE: ICD-10-CM

## 2021-07-27 DIAGNOSIS — R80.9 TYPE 2 DIABETES MELLITUS WITH MICROALBUMINURIA, WITHOUT LONG-TERM CURRENT USE OF INSULIN (HCC): ICD-10-CM

## 2021-07-27 LAB
25(OH)D3 SERPL-MCNC: 52 NG/ML (ref 30–100)
ALBUMIN SERPL BCP-MCNC: 4 G/DL (ref 3.2–4.9)
ALBUMIN/GLOB SERPL: 1.4 G/DL
ALP SERPL-CCNC: 61 U/L (ref 30–99)
ALT SERPL-CCNC: 16 U/L (ref 2–50)
ANION GAP SERPL CALC-SCNC: 12 MMOL/L (ref 7–16)
AST SERPL-CCNC: 23 U/L (ref 12–45)
BASOPHILS # BLD AUTO: 1.3 % (ref 0–1.8)
BASOPHILS # BLD: 0.09 K/UL (ref 0–0.12)
BILIRUB SERPL-MCNC: 0.5 MG/DL (ref 0.1–1.5)
BUN SERPL-MCNC: 7 MG/DL (ref 8–22)
CALCIUM SERPL-MCNC: 9 MG/DL (ref 8.5–10.5)
CHLORIDE SERPL-SCNC: 105 MMOL/L (ref 96–112)
CHOLEST SERPL-MCNC: 156 MG/DL (ref 100–199)
CO2 SERPL-SCNC: 25 MMOL/L (ref 20–33)
CREAT SERPL-MCNC: 0.76 MG/DL (ref 0.5–1.4)
CREAT UR-MCNC: 92.29 MG/DL
EOSINOPHIL # BLD AUTO: 0.46 K/UL (ref 0–0.51)
EOSINOPHIL NFR BLD: 6.8 % (ref 0–6.9)
ERYTHROCYTE [DISTWIDTH] IN BLOOD BY AUTOMATED COUNT: 53.5 FL (ref 35.9–50)
EST. AVERAGE GLUCOSE BLD GHB EST-MCNC: 114 MG/DL
FASTING STATUS PATIENT QL REPORTED: NORMAL
GLOBULIN SER CALC-MCNC: 2.8 G/DL (ref 1.9–3.5)
GLUCOSE SERPL-MCNC: 104 MG/DL (ref 65–99)
HBA1C MFR BLD: 5.6 % (ref 4–5.6)
HCT VFR BLD AUTO: 39.4 % (ref 42–52)
HDLC SERPL-MCNC: 86 MG/DL
HGB BLD-MCNC: 13.2 G/DL (ref 14–18)
IMM GRANULOCYTES # BLD AUTO: 0.02 K/UL (ref 0–0.11)
IMM GRANULOCYTES NFR BLD AUTO: 0.3 % (ref 0–0.9)
LDLC SERPL CALC-MCNC: 55 MG/DL
LYMPHOCYTES # BLD AUTO: 2.47 K/UL (ref 1–4.8)
LYMPHOCYTES NFR BLD: 36.3 % (ref 22–41)
MCH RBC QN AUTO: 34.7 PG (ref 27–33)
MCHC RBC AUTO-ENTMCNC: 33.5 G/DL (ref 33.7–35.3)
MCV RBC AUTO: 103.7 FL (ref 81.4–97.8)
MICROALBUMIN UR-MCNC: 6 MG/DL
MICROALBUMIN/CREAT UR: 65 MG/G (ref 0–30)
MONOCYTES # BLD AUTO: 0.87 K/UL (ref 0–0.85)
MONOCYTES NFR BLD AUTO: 12.8 % (ref 0–13.4)
NEUTROPHILS # BLD AUTO: 2.9 K/UL (ref 1.82–7.42)
NEUTROPHILS NFR BLD: 42.5 % (ref 44–72)
NRBC # BLD AUTO: 0 K/UL
NRBC BLD-RTO: 0 /100 WBC
PLATELET # BLD AUTO: 256 K/UL (ref 164–446)
PMV BLD AUTO: 11.4 FL (ref 9–12.9)
POTASSIUM SERPL-SCNC: 4.2 MMOL/L (ref 3.6–5.5)
PROT SERPL-MCNC: 6.8 G/DL (ref 6–8.2)
RBC # BLD AUTO: 3.8 M/UL (ref 4.7–6.1)
SODIUM SERPL-SCNC: 142 MMOL/L (ref 135–145)
TRIGL SERPL-MCNC: 73 MG/DL (ref 0–149)
TSH SERPL DL<=0.005 MIU/L-ACNC: 1.52 UIU/ML (ref 0.38–5.33)
WBC # BLD AUTO: 6.8 K/UL (ref 4.8–10.8)

## 2021-07-27 PROCEDURE — 80061 LIPID PANEL: CPT

## 2021-07-27 PROCEDURE — 82043 UR ALBUMIN QUANTITATIVE: CPT

## 2021-07-27 PROCEDURE — 84443 ASSAY THYROID STIM HORMONE: CPT

## 2021-07-27 PROCEDURE — 82306 VITAMIN D 25 HYDROXY: CPT

## 2021-07-27 PROCEDURE — 85025 COMPLETE CBC W/AUTO DIFF WBC: CPT

## 2021-07-27 PROCEDURE — 36415 COLL VENOUS BLD VENIPUNCTURE: CPT

## 2021-07-27 PROCEDURE — 82570 ASSAY OF URINE CREATININE: CPT

## 2021-07-27 PROCEDURE — 80053 COMPREHEN METABOLIC PANEL: CPT

## 2021-07-27 PROCEDURE — 83036 HEMOGLOBIN GLYCOSYLATED A1C: CPT

## 2021-07-29 ENCOUNTER — OFFICE VISIT (OUTPATIENT)
Dept: MEDICAL GROUP | Facility: PHYSICIAN GROUP | Age: 66
End: 2021-07-29
Payer: MEDICARE

## 2021-07-29 VITALS
DIASTOLIC BLOOD PRESSURE: 80 MMHG | OXYGEN SATURATION: 96 % | BODY MASS INDEX: 26.34 KG/M2 | HEART RATE: 66 BPM | WEIGHT: 184 LBS | SYSTOLIC BLOOD PRESSURE: 158 MMHG | TEMPERATURE: 96.5 F | HEIGHT: 70 IN

## 2021-07-29 DIAGNOSIS — I10 ESSENTIAL HYPERTENSION: ICD-10-CM

## 2021-07-29 DIAGNOSIS — E78.5 DYSLIPIDEMIA: ICD-10-CM

## 2021-07-29 DIAGNOSIS — E11.29 TYPE 2 DIABETES MELLITUS WITH MICROALBUMINURIA, WITHOUT LONG-TERM CURRENT USE OF INSULIN (HCC): ICD-10-CM

## 2021-07-29 DIAGNOSIS — D64.9 ANEMIA, UNSPECIFIED TYPE: ICD-10-CM

## 2021-07-29 DIAGNOSIS — R80.9 TYPE 2 DIABETES MELLITUS WITH MICROALBUMINURIA, WITHOUT LONG-TERM CURRENT USE OF INSULIN (HCC): ICD-10-CM

## 2021-07-29 DIAGNOSIS — E11.29 MICROALBUMINURIA DUE TO TYPE 2 DIABETES MELLITUS (HCC): ICD-10-CM

## 2021-07-29 DIAGNOSIS — R80.9 MICROALBUMINURIA DUE TO TYPE 2 DIABETES MELLITUS (HCC): ICD-10-CM

## 2021-07-29 DIAGNOSIS — E55.9 VITAMIN D DEFICIENCY: ICD-10-CM

## 2021-07-29 PROCEDURE — 99214 OFFICE O/P EST MOD 30 MIN: CPT | Performed by: NURSE PRACTITIONER

## 2021-07-29 RX ORDER — LISINOPRIL 40 MG/1
40 TABLET ORAL 2 TIMES DAILY
Qty: 180 TABLET | Refills: 0 | Status: SHIPPED | OUTPATIENT
Start: 2021-07-29 | End: 2021-11-29

## 2021-07-29 ASSESSMENT — FIBROSIS 4 INDEX: FIB4 SCORE: 1.53

## 2021-07-29 NOTE — ASSESSMENT & PLAN NOTE
Chronic, ongoing and stable. Continues pravastatin 40 mg/day, denies side effects of the medication. Due for annual labs in July 2022.
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
## 2021-07-29 NOTE — ASSESSMENT & PLAN NOTE
Chronic, ongoing. Continues metformin 500 mg/BID. A1C 5.6% recently. Continues lisinopril 40 mg/day.

## 2021-07-29 NOTE — ASSESSMENT & PLAN NOTE
Chronic, ongoing and improving. No longer taking iron supplements. Denies hematuria or visible blood in stools. History GI bleed.

## 2021-07-29 NOTE — ASSESSMENT & PLAN NOTE
Chronic, uncontrolled. Follows up after one month after increasing metoprolol SR from 50 mg/day to 100 mg/day and continuing lisinopril 40 mg/day. Has not been monitoring blood pressure at home. The patient denies chest pain, shortness of breath, headaches, dizziness, blurry vision, or dyspnea on exertion.

## 2021-07-29 NOTE — ASSESSMENT & PLAN NOTE
Chronic, ongoing. Continues metformin 500 mg/BID. Reports that diet is okay. Is active in the home. Continues to follow with optometry, retinal screening is up to date.

## 2021-08-31 ENCOUNTER — APPOINTMENT (RX ONLY)
Dept: URBAN - METROPOLITAN AREA CLINIC 4 | Facility: CLINIC | Age: 66
Setting detail: DERMATOLOGY
End: 2021-08-31

## 2021-08-31 DIAGNOSIS — L28.1 PRURIGO NODULARIS: ICD-10-CM

## 2021-08-31 DIAGNOSIS — L82.1 OTHER SEBORRHEIC KERATOSIS: ICD-10-CM

## 2021-08-31 DIAGNOSIS — D18.0 HEMANGIOMA: ICD-10-CM

## 2021-08-31 DIAGNOSIS — L81.4 OTHER MELANIN HYPERPIGMENTATION: ICD-10-CM

## 2021-08-31 DIAGNOSIS — D22 MELANOCYTIC NEVI: ICD-10-CM

## 2021-08-31 PROBLEM — D18.01 HEMANGIOMA OF SKIN AND SUBCUTANEOUS TISSUE: Status: ACTIVE | Noted: 2021-08-31

## 2021-08-31 PROBLEM — D22.5 MELANOCYTIC NEVI OF TRUNK: Status: ACTIVE | Noted: 2021-08-31

## 2021-08-31 PROCEDURE — ? COUNSELING

## 2021-08-31 PROCEDURE — 99213 OFFICE O/P EST LOW 20 MIN: CPT

## 2021-08-31 ASSESSMENT — LOCATION ZONE DERM
LOCATION ZONE: NECK
LOCATION ZONE: EAR
LOCATION ZONE: SCALP
LOCATION ZONE: TRUNK

## 2021-08-31 ASSESSMENT — LOCATION DETAILED DESCRIPTION DERM
LOCATION DETAILED: RIGHT INFERIOR MEDIAL UPPER BACK
LOCATION DETAILED: RIGHT SUPERIOR HELIX
LOCATION DETAILED: RIGHT CENTRAL FRONTAL SCALP
LOCATION DETAILED: RIGHT POSTERIOR NECK
LOCATION DETAILED: INFERIOR THORACIC SPINE
LOCATION DETAILED: RIGHT MEDIAL UPPER BACK

## 2021-08-31 ASSESSMENT — LOCATION SIMPLE DESCRIPTION DERM
LOCATION SIMPLE: UPPER BACK
LOCATION SIMPLE: POSTERIOR NECK
LOCATION SIMPLE: RIGHT UPPER BACK
LOCATION SIMPLE: RIGHT SCALP
LOCATION SIMPLE: RIGHT EAR

## 2021-09-24 DIAGNOSIS — I10 ESSENTIAL HYPERTENSION: ICD-10-CM

## 2021-09-27 RX ORDER — METOPROLOL SUCCINATE 100 MG/1
TABLET, EXTENDED RELEASE ORAL
Qty: 90 TABLET | Refills: 3 | Status: SHIPPED | OUTPATIENT
Start: 2021-09-27 | End: 2022-09-22 | Stop reason: SDUPTHER

## 2021-09-27 NOTE — TELEPHONE ENCOUNTER
Requested Prescriptions     Pending Prescriptions Disp Refills   • metoprolol SR (TOPROL XL) 100 MG TABLET SR 24 HR [Pharmacy Med Name: METOPROL  MG TAB NORT] 90 Tablet 3     Sig: TAKE ONE TABLET BY MOUTH EVERY DAY       JOSÉ MIGUEL Siddiqui.

## 2021-10-05 ENCOUNTER — HOSPITAL ENCOUNTER (OUTPATIENT)
Dept: LAB | Facility: MEDICAL CENTER | Age: 66
End: 2021-10-05
Attending: NURSE PRACTITIONER
Payer: MEDICARE

## 2021-10-05 DIAGNOSIS — D64.9 ANEMIA, UNSPECIFIED TYPE: ICD-10-CM

## 2021-10-05 LAB
BASOPHILS # BLD AUTO: 1.1 % (ref 0–1.8)
BASOPHILS # BLD: 0.1 K/UL (ref 0–0.12)
EOSINOPHIL # BLD AUTO: 0.52 K/UL (ref 0–0.51)
EOSINOPHIL NFR BLD: 5.5 % (ref 0–6.9)
ERYTHROCYTE [DISTWIDTH] IN BLOOD BY AUTOMATED COUNT: 52.8 FL (ref 35.9–50)
FERRITIN SERPL-MCNC: 149 NG/ML (ref 22–322)
HCT VFR BLD AUTO: 39.3 % (ref 42–52)
HGB BLD-MCNC: 13.1 G/DL (ref 14–18)
IMM GRANULOCYTES # BLD AUTO: 0.02 K/UL (ref 0–0.11)
IMM GRANULOCYTES NFR BLD AUTO: 0.2 % (ref 0–0.9)
IRON SATN MFR SERPL: 23 % (ref 15–55)
IRON SERPL-MCNC: 74 UG/DL (ref 50–180)
LYMPHOCYTES # BLD AUTO: 3.9 K/UL (ref 1–4.8)
LYMPHOCYTES NFR BLD: 41.2 % (ref 22–41)
MCH RBC QN AUTO: 34.7 PG (ref 27–33)
MCHC RBC AUTO-ENTMCNC: 33.3 G/DL (ref 33.7–35.3)
MCV RBC AUTO: 104 FL (ref 81.4–97.8)
MONOCYTES # BLD AUTO: 1.17 K/UL (ref 0–0.85)
MONOCYTES NFR BLD AUTO: 12.4 % (ref 0–13.4)
NEUTROPHILS # BLD AUTO: 3.76 K/UL (ref 1.82–7.42)
NEUTROPHILS NFR BLD: 39.6 % (ref 44–72)
NRBC # BLD AUTO: 0 K/UL
NRBC BLD-RTO: 0 /100 WBC
PLATELET # BLD AUTO: 265 K/UL (ref 164–446)
PMV BLD AUTO: 10.8 FL (ref 9–12.9)
RBC # BLD AUTO: 3.78 M/UL (ref 4.7–6.1)
TIBC SERPL-MCNC: 325 UG/DL (ref 250–450)
UIBC SERPL-MCNC: 251 UG/DL (ref 110–370)
WBC # BLD AUTO: 9.5 K/UL (ref 4.8–10.8)

## 2021-10-05 PROCEDURE — 83540 ASSAY OF IRON: CPT

## 2021-10-05 PROCEDURE — 82728 ASSAY OF FERRITIN: CPT

## 2021-10-05 PROCEDURE — 85025 COMPLETE CBC W/AUTO DIFF WBC: CPT

## 2021-10-05 PROCEDURE — 36415 COLL VENOUS BLD VENIPUNCTURE: CPT

## 2021-10-05 PROCEDURE — 83550 IRON BINDING TEST: CPT

## 2021-10-06 ENCOUNTER — OFFICE VISIT (OUTPATIENT)
Dept: MEDICAL GROUP | Facility: PHYSICIAN GROUP | Age: 66
End: 2021-10-06
Payer: MEDICARE

## 2021-10-06 VITALS
OXYGEN SATURATION: 94 % | HEIGHT: 70 IN | BODY MASS INDEX: 26.48 KG/M2 | WEIGHT: 185 LBS | SYSTOLIC BLOOD PRESSURE: 150 MMHG | TEMPERATURE: 98.4 F | HEART RATE: 94 BPM | DIASTOLIC BLOOD PRESSURE: 78 MMHG

## 2021-10-06 DIAGNOSIS — R80.9 TYPE 2 DIABETES MELLITUS WITH MICROALBUMINURIA, WITHOUT LONG-TERM CURRENT USE OF INSULIN (HCC): ICD-10-CM

## 2021-10-06 DIAGNOSIS — Z00.00 ROUTINE HEALTH MAINTENANCE: ICD-10-CM

## 2021-10-06 DIAGNOSIS — R80.9 MICROALBUMINURIA DUE TO TYPE 2 DIABETES MELLITUS (HCC): ICD-10-CM

## 2021-10-06 DIAGNOSIS — Z23 NEED FOR VACCINATION: ICD-10-CM

## 2021-10-06 DIAGNOSIS — E11.29 MICROALBUMINURIA DUE TO TYPE 2 DIABETES MELLITUS (HCC): ICD-10-CM

## 2021-10-06 DIAGNOSIS — E11.29 TYPE 2 DIABETES MELLITUS WITH MICROALBUMINURIA, WITHOUT LONG-TERM CURRENT USE OF INSULIN (HCC): ICD-10-CM

## 2021-10-06 DIAGNOSIS — D64.9 ANEMIA, UNSPECIFIED TYPE: ICD-10-CM

## 2021-10-06 DIAGNOSIS — I10 ESSENTIAL HYPERTENSION: ICD-10-CM

## 2021-10-06 DIAGNOSIS — E78.5 DYSLIPIDEMIA: ICD-10-CM

## 2021-10-06 DIAGNOSIS — E55.9 VITAMIN D DEFICIENCY: ICD-10-CM

## 2021-10-06 PROCEDURE — 90750 HZV VACC RECOMBINANT IM: CPT | Performed by: NURSE PRACTITIONER

## 2021-10-06 PROCEDURE — 99213 OFFICE O/P EST LOW 20 MIN: CPT | Mod: 25 | Performed by: NURSE PRACTITIONER

## 2021-10-06 PROCEDURE — 90662 IIV NO PRSV INCREASED AG IM: CPT | Performed by: NURSE PRACTITIONER

## 2021-10-06 PROCEDURE — G0008 ADMIN INFLUENZA VIRUS VAC: HCPCS | Performed by: NURSE PRACTITIONER

## 2021-10-06 PROCEDURE — 90472 IMMUNIZATION ADMIN EACH ADD: CPT | Performed by: NURSE PRACTITIONER

## 2021-10-06 ASSESSMENT — FIBROSIS 4 INDEX: FIB4 SCORE: 1.48

## 2021-10-06 NOTE — ASSESSMENT & PLAN NOTE
Chronic, ongoing. Continues metformin 500 mg twice daily, denies side effects of the medication.  Reports that his diet continues to be okay and he stays active around the house.  Continues to follow regularly with optometry, retinal screening up-to-date.

## 2021-10-06 NOTE — ASSESSMENT & PLAN NOTE
Chronic, ongoing.  Continues lisinopril 40 mg twice daily, pravastatin 40 mg daily, metformin 500 mg twice daily.  Due for updated annual labs in July 2022.
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
## 2021-10-06 NOTE — ASSESSMENT & PLAN NOTE
Chronic, ongoing.  Use metoprolol  mg daily and lisinopril 40 mg twice daily.  Does not monitor blood pressure at home. The patient denies chest pain, shortness of breath, headaches, dizziness, blurry vision, or dyspnea on exertion.

## 2021-10-06 NOTE — ASSESSMENT & PLAN NOTE
Chronic, ongoing and stable. Patient received an iron infusion while hospitalized in early 2020, did not take iron or vitamin C supplementation since discharge. Denies hematuria or visible blood in his stools. Denies abdominal pain, states that he has not had the stomach ache that he experienced when he had a GI bleed. Denies fatigue or cold sensitivity. 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   1/15/2020 10:52 2/5/2020 10:45 2/13/2020 06:52 7/27/2021 07:31 10/5/2021 06:53   WBC 12.3 (H) 10.2 6.0 6.8 9.5   RBC 2.48 (L) 3.75 (L) 3.82 (L) 3.80 (L) 3.78 (L)   Hemoglobin 8.1 (L) 12.6 (L) 12.5 (L) 13.2 (L) 13.1 (L)   Hematocrit 25.5 (L) 38.3 (L) 38.5 (L) 39.4 (L) 39.3 (L)   .8 (H) 102.1 (H) 100.8 (H) 103.7 (H) 104.0 (H)   MCH 32.7 33.6 (H) 32.7 34.7 (H) 34.7 (H)   MCHC 31.8 (L) 32.9 (L) 32.5 (L) 33.5 (L) 33.3 (L)   RDW 74.3 (H) 65.5 (H) 59.6 (H) 53.5 (H) 52.8 (H)   PlateletCount 466 (H) 317 290 256 265   MPV 10.2 10.4 10.4 11.4 10.8

## 2021-10-06 NOTE — ASSESSMENT & PLAN NOTE
Chronic, ongoing.  Continues pravastatin 40 mg daily.  Unable to take aspirin due to history of GI bleed.  Due for annual labs in July 2022.

## 2021-10-14 DIAGNOSIS — E78.5 DYSLIPIDEMIA: ICD-10-CM

## 2021-10-14 RX ORDER — PRAVASTATIN SODIUM 40 MG
TABLET ORAL
Qty: 100 TABLET | Refills: 3 | Status: SHIPPED | OUTPATIENT
Start: 2021-10-14 | End: 2022-09-29 | Stop reason: SDUPTHER

## 2021-10-14 NOTE — TELEPHONE ENCOUNTER
Requested Prescriptions     Pending Prescriptions Disp Refills   • pravastatin (PRAVACHOL) 40 MG tablet [Pharmacy Med Name: PRAVASTATIN 40 MG   TAB ENE] 100 Tablet 3     Sig: TAKE ONE TABLET BY MOUTH EVERY NIGHT AT BEDTIME       JOSÉ MIGUEL Siddiqui.

## 2021-11-01 ENCOUNTER — TELEPHONE (OUTPATIENT)
Dept: MEDICAL GROUP | Facility: PHYSICIAN GROUP | Age: 66
End: 2021-11-01

## 2021-11-01 DIAGNOSIS — G56.00 CARPAL TUNNEL SYNDROME, UNSPECIFIED LATERALITY: ICD-10-CM

## 2021-11-01 NOTE — TELEPHONE ENCOUNTER
1. Caller Name:Fabio Vergara                           Call Back Number: [phone] (home) [phone] (work)        How would the patient prefer to be contacted with a response: Phone call do NOT leave a detailed message    2. SPECIFIC Action To Be Taken: Referral pending, please sign.    3. Diagnosis/Clinical Reason for Request: Carpel tunnel syndrome    4. Specialty & Provider Name/Lab/Imaging Location: [facility]    5. Is appointment scheduled for requested order/referral: no    Patient was informed they will receive a return phone call from the office ONLY if there are any questions before processing their request. Advised to call back if they haven't received a call from the referral department in 5 days.

## 2021-11-16 DIAGNOSIS — E11.29 TYPE 2 DIABETES MELLITUS WITH MICROALBUMINURIA, WITHOUT LONG-TERM CURRENT USE OF INSULIN (HCC): ICD-10-CM

## 2021-11-16 DIAGNOSIS — R80.9 TYPE 2 DIABETES MELLITUS WITH MICROALBUMINURIA, WITHOUT LONG-TERM CURRENT USE OF INSULIN (HCC): ICD-10-CM

## 2021-11-17 NOTE — TELEPHONE ENCOUNTER
Requested Prescriptions     Pending Prescriptions Disp Refills   • metFORMIN (GLUCOPHAGE) 500 MG Tab [Pharmacy Med Name: METFORMIN 500 MG    TAB GRAN] 200 Tablet 0     Sig: TAKE ONE TABLET BY MOUTH TWICE DAILY WITH MEALS       JOSÉ MIGUEL Siddiqui.

## 2021-11-29 DIAGNOSIS — I10 ESSENTIAL HYPERTENSION: ICD-10-CM

## 2021-11-29 RX ORDER — LISINOPRIL 40 MG/1
TABLET ORAL
Qty: 180 TABLET | Refills: 0 | Status: SHIPPED | OUTPATIENT
Start: 2021-11-29 | End: 2022-03-03 | Stop reason: SDUPTHER

## 2021-11-29 NOTE — TELEPHONE ENCOUNTER
Requested Prescriptions     Signed Prescriptions Disp Refills   • lisinopril (PRINIVIL) 40 MG tablet 180 Tablet 0     Sig: TAKE ONE TABLET BY MOUTH TWICE DAILY     Authorizing Provider: DEVORA BRANHAM A.P.R.N.

## 2022-02-02 ENCOUNTER — OFFICE VISIT (OUTPATIENT)
Dept: MEDICAL GROUP | Facility: PHYSICIAN GROUP | Age: 67
End: 2022-02-02
Payer: MEDICARE

## 2022-02-02 VITALS
TEMPERATURE: 97.8 F | SYSTOLIC BLOOD PRESSURE: 166 MMHG | WEIGHT: 185 LBS | BODY MASS INDEX: 26.48 KG/M2 | HEART RATE: 96 BPM | HEIGHT: 70 IN | DIASTOLIC BLOOD PRESSURE: 88 MMHG | OXYGEN SATURATION: 96 %

## 2022-02-02 DIAGNOSIS — R80.9 MICROALBUMINURIA DUE TO TYPE 2 DIABETES MELLITUS (HCC): ICD-10-CM

## 2022-02-02 DIAGNOSIS — I10 ESSENTIAL HYPERTENSION: ICD-10-CM

## 2022-02-02 DIAGNOSIS — I77.810 AORTIC ECTASIA, THORACIC (HCC): ICD-10-CM

## 2022-02-02 DIAGNOSIS — R06.7 SNEEZING: ICD-10-CM

## 2022-02-02 DIAGNOSIS — E11.29 TYPE 2 DIABETES MELLITUS WITH MICROALBUMINURIA, WITHOUT LONG-TERM CURRENT USE OF INSULIN (HCC): ICD-10-CM

## 2022-02-02 DIAGNOSIS — E11.29 MICROALBUMINURIA DUE TO TYPE 2 DIABETES MELLITUS (HCC): ICD-10-CM

## 2022-02-02 DIAGNOSIS — K59.1 FUNCTIONAL DIARRHEA: ICD-10-CM

## 2022-02-02 DIAGNOSIS — J84.10 CALCIFIED GRANULOMA OF LUNG (HCC): ICD-10-CM

## 2022-02-02 DIAGNOSIS — R80.9 TYPE 2 DIABETES MELLITUS WITH MICROALBUMINURIA, WITHOUT LONG-TERM CURRENT USE OF INSULIN (HCC): ICD-10-CM

## 2022-02-02 PROCEDURE — 99214 OFFICE O/P EST MOD 30 MIN: CPT | Performed by: NURSE PRACTITIONER

## 2022-02-02 RX ORDER — CETIRIZINE HYDROCHLORIDE 10 MG/1
10 TABLET ORAL DAILY
Qty: 90 TABLET | Refills: 1 | Status: SHIPPED | OUTPATIENT
Start: 2022-02-02 | End: 2022-07-25

## 2022-02-02 RX ORDER — FLUTICASONE PROPIONATE 50 MCG
1 SPRAY, SUSPENSION (ML) NASAL DAILY
Qty: 16 G | Refills: 1 | Status: SHIPPED
Start: 2022-02-02 | End: 2022-06-28

## 2022-02-02 RX ORDER — METFORMIN HYDROCHLORIDE 500 MG/1
500 TABLET, EXTENDED RELEASE ORAL 2 TIMES DAILY
Qty: 180 TABLET | Refills: 3 | Status: SHIPPED
Start: 2022-02-02 | End: 2022-07-25

## 2022-02-02 ASSESSMENT — FIBROSIS 4 INDEX: FIB4 SCORE: 1.48

## 2022-02-02 ASSESSMENT — PATIENT HEALTH QUESTIONNAIRE - PHQ9: CLINICAL INTERPRETATION OF PHQ2 SCORE: 0
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
## 2022-02-16 PROBLEM — G56.21 CUBITAL TUNNEL SYNDROME, RIGHT: Status: ACTIVE | Noted: 2022-02-16

## 2022-02-22 ENCOUNTER — APPOINTMENT (OUTPATIENT)
Dept: MEDICAL GROUP | Facility: PHYSICIAN GROUP | Age: 67
End: 2022-02-22
Payer: MEDICARE

## 2022-03-03 ENCOUNTER — OFFICE VISIT (OUTPATIENT)
Dept: MEDICAL GROUP | Facility: PHYSICIAN GROUP | Age: 67
End: 2022-03-03
Payer: MEDICARE

## 2022-03-03 VITALS
BODY MASS INDEX: 28.49 KG/M2 | TEMPERATURE: 98.2 F | RESPIRATION RATE: 16 BRPM | WEIGHT: 199 LBS | DIASTOLIC BLOOD PRESSURE: 76 MMHG | OXYGEN SATURATION: 96 % | SYSTOLIC BLOOD PRESSURE: 156 MMHG | HEIGHT: 70 IN | HEART RATE: 93 BPM

## 2022-03-03 DIAGNOSIS — K59.1 FUNCTIONAL DIARRHEA: ICD-10-CM

## 2022-03-03 DIAGNOSIS — I10 ESSENTIAL HYPERTENSION: ICD-10-CM

## 2022-03-03 DIAGNOSIS — B35.1 ONYCHOMYCOSIS: ICD-10-CM

## 2022-03-03 DIAGNOSIS — E11.29 TYPE 2 DIABETES MELLITUS WITH MICROALBUMINURIA, WITHOUT LONG-TERM CURRENT USE OF INSULIN (HCC): ICD-10-CM

## 2022-03-03 DIAGNOSIS — R80.9 TYPE 2 DIABETES MELLITUS WITH MICROALBUMINURIA, WITHOUT LONG-TERM CURRENT USE OF INSULIN (HCC): ICD-10-CM

## 2022-03-03 PROCEDURE — 99214 OFFICE O/P EST MOD 30 MIN: CPT | Performed by: NURSE PRACTITIONER

## 2022-03-03 RX ORDER — HYDROCHLOROTHIAZIDE 25 MG/1
25 TABLET ORAL EVERY MORNING
Qty: 100 TABLET | Refills: 0 | Status: SHIPPED
Start: 2022-03-03 | End: 2022-04-04

## 2022-03-03 RX ORDER — LISINOPRIL 40 MG/1
40 TABLET ORAL 2 TIMES DAILY
Qty: 200 TABLET | Refills: 3 | Status: SHIPPED | OUTPATIENT
Start: 2022-03-03 | End: 2023-03-23

## 2022-03-03 ASSESSMENT — FIBROSIS 4 INDEX: FIB4 SCORE: 1.48

## 2022-03-03 NOTE — ASSESSMENT & PLAN NOTE
Chronic, ongoing.  Was continuing to follow with podiatry, Dr. Trammell, who recently retired.  Requesting new referral to podiatry for ongoing treatment of onychomycosis.
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
## 2022-03-03 NOTE — ASSESSMENT & PLAN NOTE
Chronic, ongoing.  Reports improvement in diarrhea after changing Metformin to extended release 500 twice daily.

## 2022-03-03 NOTE — ASSESSMENT & PLAN NOTE
Chronic, uncontrolled.  Continues lisinopril 40 mg twice daily metoprolol  mg/day.  He is monitoring his blood pressure at home and his readings range 135-180/.  He was seen at the [facility] and his systolic was >200.  Manual blood pressure by provider 156/76, using patient's machine 163/92.  Patient was previously on hydrochlorothiazide, medication was discontinued in January 2020 after hospitalization for GI bleed. The patient denies chest pain, shortness of breath, headaches, dizziness, blurry vision, or dyspnea on exertion.

## 2022-03-23 ENCOUNTER — HOSPITAL ENCOUNTER (OUTPATIENT)
Facility: MEDICAL CENTER | Age: 67
End: 2022-03-23
Attending: ANESTHESIOLOGY
Payer: MEDICARE

## 2022-03-23 LAB
COVID ORDER STATUS COVID19: NORMAL
SARS-COV-2 RNA RESP QL NAA+PROBE: NOTDETECTED
SPECIMEN SOURCE: NORMAL

## 2022-03-23 PROCEDURE — U0005 INFEC AGEN DETEC AMPLI PROBE: HCPCS

## 2022-03-23 PROCEDURE — U0003 INFECTIOUS AGENT DETECTION BY NUCLEIC ACID (DNA OR RNA); SEVERE ACUTE RESPIRATORY SYNDROME CORONAVIRUS 2 (SARS-COV-2) (CORONAVIRUS DISEASE [COVID-19]), AMPLIFIED PROBE TECHNIQUE, MAKING USE OF HIGH THROUGHPUT TECHNOLOGIES AS DESCRIBED BY CMS-2020-01-R: HCPCS

## 2022-03-29 PROBLEM — Z47.89 ORTHOPEDIC AFTERCARE: Status: ACTIVE | Noted: 2022-03-29

## 2022-03-30 ENCOUNTER — TELEPHONE (OUTPATIENT)
Dept: HEALTH INFORMATION MANAGEMENT | Facility: OTHER | Age: 67
End: 2022-03-30
Payer: MEDICARE

## 2022-04-04 ENCOUNTER — OFFICE VISIT (OUTPATIENT)
Dept: MEDICAL GROUP | Facility: PHYSICIAN GROUP | Age: 67
End: 2022-04-04
Payer: MEDICARE

## 2022-04-04 VITALS
SYSTOLIC BLOOD PRESSURE: 150 MMHG | TEMPERATURE: 97.6 F | HEIGHT: 70 IN | HEART RATE: 85 BPM | OXYGEN SATURATION: 96 % | BODY MASS INDEX: 28.35 KG/M2 | WEIGHT: 198 LBS | DIASTOLIC BLOOD PRESSURE: 88 MMHG

## 2022-04-04 DIAGNOSIS — I10 ESSENTIAL HYPERTENSION: ICD-10-CM

## 2022-04-04 PROCEDURE — 99213 OFFICE O/P EST LOW 20 MIN: CPT | Performed by: NURSE PRACTITIONER

## 2022-04-04 RX ORDER — CHLORTHALIDONE 25 MG/1
25 TABLET ORAL DAILY
Qty: 90 TABLET | Refills: 0 | Status: SHIPPED | OUTPATIENT
Start: 2022-04-04 | End: 2022-05-18 | Stop reason: SDUPTHER

## 2022-04-04 RX ORDER — KETOCONAZOLE 20 MG/G
CREAM TOPICAL
COMMUNITY
Start: 2022-03-22 | End: 2022-05-18 | Stop reason: SINTOL

## 2022-04-04 ASSESSMENT — FIBROSIS 4 INDEX: FIB4 SCORE: 1.48

## 2022-04-04 NOTE — ASSESSMENT & PLAN NOTE
Chronic, ongoing. Blood pressure on arrival 160/88, recheck at the end of appointment 150/88. Continues lisinopril 40 mg twice daily, metoprolol  mg daily, and follows up today to review the addition of hydrochlorothiazide 25 mg once daily one month ago. Home readings range 140-150/70-80, HR's 70-80's. Patient has been having more back pain and recently had carpal tunnel surgery, he is wondering if pain could make the blood pressure higher.
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
## 2022-04-18 NOTE — ASSESSMENT & PLAN NOTE
Age: 63 years  Asymptomatic of any signs of lung cancer  No previous history of lung cancer  35 pack year smoking history- quit 5 years ago 2013  No chest CT within 12 months   
Ongoing chronic problem worsened over this past few weeks. Generally is controlled on lisinopril 40 mg and metoprolol 25 mg daily. He does have bp monitor at home, but is not monitoring it.   He went to spine doctor twice and both times was 152, and 168 systolic. He has been in some slightly worse pain this past few weeks, otherwise no changes that could have triggered this.     Caffeine: 3 c/day  NSAID: naproxen occasionally   Sleep: good   
well-groomed/no distress

## 2022-05-11 ENCOUNTER — TELEPHONE (OUTPATIENT)
Dept: MEDICAL GROUP | Facility: PHYSICIAN GROUP | Age: 67
End: 2022-05-11
Payer: MEDICARE

## 2022-05-11 NOTE — TELEPHONE ENCOUNTER
Future Appointments       Provider Department [place]    5/18/2022 9:00 AM [facility]    7/25/2022 9:30 AM [facility]        ESTABLISHED PATIENT PRE-VISIT PLANNING     Patient was NOT contacted to complete PVP.     Note: Patient will not be contacted if there is no indication to call.     1.  Reviewed notes from the last few office visits within the medical group: Yes, LOV 04/04/2022    2.  If any orders were placed at last visit or intended to be done for this visit (i.e. 6 mos follow-up), do we have Results/Consult Notes?         •  Labs - Labs were not ordered at last office visit.  Note: If patient appointment is for lab review and patient did not complete labs, check with provider if OK to reschedule patient until labs completed.       •  Imaging - Imaging was not ordered at last office visit.       •  Referrals - No referrals were ordered at last office visit.    3. Is this appointment scheduled as a Hospital Follow-Up? No    4.  Immunizations were updated in Epic using Reconcile Outside Information activity? Yes    5.  Patient is due for the following Health Maintenance Topics:   Health Maintenance Due   Topic Date Due   • RETINAL SCREENING  04/13/2022     6.  AHA (Pulse8) form printed for Provider? N/A

## 2022-05-18 ENCOUNTER — OFFICE VISIT (OUTPATIENT)
Dept: MEDICAL GROUP | Facility: PHYSICIAN GROUP | Age: 67
End: 2022-05-18
Payer: MEDICARE

## 2022-05-18 VITALS
BODY MASS INDEX: 28.35 KG/M2 | HEIGHT: 70 IN | DIASTOLIC BLOOD PRESSURE: 62 MMHG | OXYGEN SATURATION: 96 % | SYSTOLIC BLOOD PRESSURE: 130 MMHG | HEART RATE: 75 BPM | TEMPERATURE: 98.2 F | WEIGHT: 198 LBS

## 2022-05-18 DIAGNOSIS — E11.29 TYPE 2 DIABETES MELLITUS WITH MICROALBUMINURIA, WITHOUT LONG-TERM CURRENT USE OF INSULIN (HCC): ICD-10-CM

## 2022-05-18 DIAGNOSIS — R21 RASH OF FOOT: ICD-10-CM

## 2022-05-18 DIAGNOSIS — R80.9 TYPE 2 DIABETES MELLITUS WITH MICROALBUMINURIA, WITHOUT LONG-TERM CURRENT USE OF INSULIN (HCC): ICD-10-CM

## 2022-05-18 DIAGNOSIS — I10 ESSENTIAL HYPERTENSION: ICD-10-CM

## 2022-05-18 PROCEDURE — 99214 OFFICE O/P EST MOD 30 MIN: CPT | Performed by: NURSE PRACTITIONER

## 2022-05-18 RX ORDER — CHLORTHALIDONE 25 MG/1
25 TABLET ORAL DAILY
Qty: 100 TABLET | Refills: 3 | Status: SHIPPED | OUTPATIENT
Start: 2022-05-18 | End: 2022-09-17

## 2022-05-18 ASSESSMENT — FIBROSIS 4 INDEX: FIB4 SCORE: 1.5
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
## 2022-05-18 NOTE — ASSESSMENT & PLAN NOTE
New to examiner.  Patient was seen by podiatry and treated for nail fungus and athlete's foot with topical ketoconazole 2% cream.  States that he had been using the cream on bilateral feet for 2-3 weeks before developing of bilateral foot rash 3 weeks ago.  He thinks that the rash may be due to the ketoconazole, he stopped using the ketoconazole 1 week ago and has been applying over-the-counter hydrocortisone cream, which has helped a little.  States that he does not have any pain or itching associated with the rash, just a sensation of tightness.  He does not have follow-up with podiatry scheduled.

## 2022-05-18 NOTE — LETTER
Critical access hospital  [facility]  [address]  [place] [address]  Fax: [phone]   Authorization for Release/Disclosure of   Protected Health Information   Name: FABIO ROLDAN : 1955 SSN: [id number]   Address: [address] [place] [address] [place] [address] Phone:    [phone] (home) [phone] (work)   I authorize the entity listed below to release/disclose the PHI below to:   Critical access hospital/[facility] and [facility]   Provider or Entity Name:  Dr. Calvillo   Address   City, [facility]   Phone:      Fax:     Reason for request: continuity of care   Information to be released:    [  ] LAST COLONOSCOPY,  including any PATH REPORT and follow-up  [  ] LAST FIT/COLOGUARD RESULT [  ] LAST DEXA  [  ] LAST MAMMOGRAM  [  ] LAST PAP  [  ] LAST LABS [X] RETINA EXAM REPORT  [  ] IMMUNIZATION RECORDS  [  ] Release all info      [  ] Check here and initial the line next to each item to release ALL health information INCLUDING  _____ Care and treatment for drug and / or alcohol abuse  _____ HIV testing, infection status, or AIDS  _____ Genetic Testing    DATES OF SERVICE OR TIME PERIOD TO BE DISCLOSED: _____________  I understand and acknowledge that:  * This Authorization may be revoked at any time by you in writing, except if your health information has already been used or disclosed.  * Your health information that will be used or disclosed as a result of you signing this authorization could be re-disclosed by the recipient. If this occurs, your re-disclosed health information may no longer be protected by State or Federal laws.  * You may refuse to sign this Authorization. Your refusal will not affect your ability to obtain treatment.  * This Authorization becomes effective upon signing and will  on (date) __________.      If no date is indicated, this Authorization will  one (1) year from the signature date.    Name: Fabio Roldan    Signature:   Date:      5/18/2022       PLEASE FAX REQUESTED RECORDS BACK TO: [phone]

## 2022-05-18 NOTE — ASSESSMENT & PLAN NOTE
Follows up today on high blood pressure and medications, last seen 4/4/2022.  Continues metoprolol  mg daily and lisinopril 40 mg twice daily.  Hydrochlorothiazide was discontinued and he was started on chlorthalidone 25 mg once daily.  Reports that he has not had any side effects from the medication.  He is monitoring blood pressure at home and they are ranging 104-130/60-80. The patient denies chest pain, shortness of breath, headaches, dizziness, blurry vision, or dyspnea on exertion.

## 2022-05-18 NOTE — ASSESSMENT & PLAN NOTE
Continues metformin  mg twice daily, denies side effects of the medication, did experience diarrhea with non-ER form.  Due for monofilament exam.  Due for annual labs in July 2022.

## 2022-06-28 ENCOUNTER — OFFICE VISIT (OUTPATIENT)
Dept: MEDICAL GROUP | Facility: PHYSICIAN GROUP | Age: 67
End: 2022-06-28
Payer: MEDICARE

## 2022-06-28 VITALS
DIASTOLIC BLOOD PRESSURE: 66 MMHG | OXYGEN SATURATION: 98 % | BODY MASS INDEX: 27.63 KG/M2 | SYSTOLIC BLOOD PRESSURE: 118 MMHG | HEIGHT: 70 IN | RESPIRATION RATE: 18 BRPM | WEIGHT: 193 LBS | HEART RATE: 87 BPM | TEMPERATURE: 98.1 F

## 2022-06-28 DIAGNOSIS — J34.89 SINUS PAIN: ICD-10-CM

## 2022-06-28 DIAGNOSIS — R09.81 SINUS CONGESTION: ICD-10-CM

## 2022-06-28 LAB
EXTERNAL QUALITY CONTROL: NORMAL
SARS-COV+SARS-COV-2 AG RESP QL IA.RAPID: NEGATIVE

## 2022-06-28 PROCEDURE — 87426 SARSCOV CORONAVIRUS AG IA: CPT | Performed by: NURSE PRACTITIONER

## 2022-06-28 PROCEDURE — 99214 OFFICE O/P EST MOD 30 MIN: CPT | Mod: CS | Performed by: NURSE PRACTITIONER

## 2022-06-28 ASSESSMENT — FIBROSIS 4 INDEX: FIB4 SCORE: 1.5

## 2022-06-28 NOTE — ASSESSMENT & PLAN NOTE
"New to examiner.  Patient reports progressively worsening sinus congestion that has been really bad over the last 4-5 days.  States that it is \"worse than just congestion\".  He is not having any epistaxis, but he is getting giant scabs out of his nose.  He is reporting a hard time breathing through his nose and sometimes \"no air moves through\".  He is having some right ear pain and right eye pain.  It feels like the hard scabs that he is getting out of his nose are backed up into his sinuses.  He has an occasional cough due to postnasal drainage, sometimes phlegm is clear, other times it is a thicker/milky color.  He was using fluticasone nasal spray, stopped using that and is using an over-the-counter nasal decongestant.  Denies fevers, chills, sore throat, headache.  Requesting referral to ENT.  "
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
## 2022-07-18 ENCOUNTER — TELEPHONE (OUTPATIENT)
Dept: MEDICAL GROUP | Facility: PHYSICIAN GROUP | Age: 67
End: 2022-07-18
Payer: MEDICARE

## 2022-07-18 NOTE — TELEPHONE ENCOUNTER
Future Appointments       Provider Department Center    7/25/2022 9:30 AM TANISHA Siddiqui [facility]        ANNUAL WELLNESS VISIT PRE-VISIT PLANNING    1.  Reviewed notes from the last office visit: Yes, LOV 06/28/2022    2.  If any orders were ordered or intended to be done prior to visit (i.e. 6 mos follow-up), do we have results/consult notes or has patient scheduled?        •  Labs - Labs ordered, but not to be completed until 07/18/2022. Pt is planning on doing these on 07/19/2022  Note: If patient appointment is for lab review and patient did not complete labs, check with provider if OK to reschedule patient until labs completed.       •  Imaging - Imaging was not ordered at last office visit.       •  Referrals - Referral ordered, patient has NOT been seen. Pt has appointment with ENT in August.     3.  Immunizations were updated in Epic using Reconcile Outside Information activity? Yes       •  Is patient due for Tdap? NO       •  Is patient due for Shingrix? NO    4.  Patient is due for the following Health Maintenance Topics:   Health Maintenance Due   Topic Date Due   • ABDOMINAL AORTIC ANEURYSM (AAA) SCREEN  Never done   • COVID-19 Vaccine (4 - Booster for Moderna series) 04/01/2022   • RETINAL SCREENING  04/13/2022   • Annual Wellness Visit  06/29/2022   • LUNG CANCER SCREENING  07/21/2022   • FASTING LIPID PROFILE  07/27/2022   • URINE ACR / MICROALBUMIN  07/27/2022       - Patient already has appointment scheduled for Annual Wellness Visit (AWV).    5.  Reviewed/Updated the following with patient:       •   Preferred Pharmacy? Yes       •   Preferred Lab? Yes       •   Preferred Communication? Yes       •   Allergies? Yes       •   Medications? YES. Was Abstract Encounter opened and chart updated? YES       •   Social History? Yes       •   Family History (document living status of immediate family members and if + hx of  cancer, diabetes, hypertension, hyperlipidemia,  heart attack, stroke) Yes    6.  Care Team Updated:       •   DME Company (gait device, O2, CPAP, etc.): NO       •   Other Specialists (eye doctor, derm, GYN, cardiology, endo, etc): YES    7.  Patient was advised: “This is a free wellness visit. The provider will screen for medical conditions to help you stay healthy. If you have other concerns to address you may be asked to discuss these at a separate visit or there may be an additional fee.”     8.  AHA (Puls[employer]) form printed for Provider? N/A   Pt reminded of his check in time.

## 2022-07-18 NOTE — LETTER
[facility]  TANISHA Siddiqui  [address]  [place] [address]  Fax: [phone]   Authorization for Release/Disclosure of   Protected Health Information   Name: JACQUELINE ROLDAN : 1955 SSN: [id number]   Address: [address] [place] [address] [place] [address] Phone:    [phone] (home) [phone] (work)   I authorize the entity listed below to release/disclose the PHI below to:   [facility]/TANISHA Siddiqui and TANISHA Siddiqui   Provider or Entity Name: Karli [facility]/Dr. Ancelmo Calvillo     [facility]   Phone: [phone]      Fax:   [phone]     Reason for request: continuity of care   Information to be released:    [  ] LAST COLONOSCOPY,  including any PATH REPORT and follow-up  [  ] LAST FIT/COLOGUARD RESULT [  ] LAST DEXA  [  ] LAST MAMMOGRAM  [  ] LAST PAP  [  ] LAST LABS [XXX] RETINA EXAM REPORT  [  ] IMMUNIZATION RECORDS  [  ] Release all info      [  ] Check here and initial the line next to each item to release ALL health information INCLUDING  _____ Care and treatment for drug and / or alcohol abuse  _____ HIV testing, infection status, or AIDS  _____ Genetic Testing    DATES OF SERVICE OR TIME PERIOD TO BE DISCLOSED: _____________  I understand and acknowledge that:  * This Authorization may be revoked at any time by you in writing, except if your health information has already been used or disclosed.  * Your health information that will be used or disclosed as a result of you signing this authorization could be re-disclosed by the recipient. If this occurs, your re-disclosed health information may no longer be protected by State or Federal laws.  * You may refuse to sign this Authorization. Your refusal will not affect your ability to obtain treatment.  * This Authorization becomes effective upon signing and will  on (date) __________.      If no date is indicated, this Authorization will  one (1) year from the signature  date.    Name: Fabio Vergara    Signature:Continuity of Care   Date:     7/18/2022       PLEASE FAX REQUESTED RECORDS BACK TO: [phone]

## 2022-07-20 ENCOUNTER — HOSPITAL ENCOUNTER (OUTPATIENT)
Dept: LAB | Facility: MEDICAL CENTER | Age: 67
End: 2022-07-20
Attending: NURSE PRACTITIONER
Payer: MEDICARE

## 2022-07-20 DIAGNOSIS — Z00.00 ROUTINE HEALTH MAINTENANCE: ICD-10-CM

## 2022-07-20 DIAGNOSIS — I10 ESSENTIAL HYPERTENSION: ICD-10-CM

## 2022-07-20 DIAGNOSIS — E11.29 TYPE 2 DIABETES MELLITUS WITH MICROALBUMINURIA, WITHOUT LONG-TERM CURRENT USE OF INSULIN (HCC): ICD-10-CM

## 2022-07-20 DIAGNOSIS — R80.9 MICROALBUMINURIA DUE TO TYPE 2 DIABETES MELLITUS (HCC): ICD-10-CM

## 2022-07-20 DIAGNOSIS — E55.9 VITAMIN D DEFICIENCY: ICD-10-CM

## 2022-07-20 DIAGNOSIS — E78.5 DYSLIPIDEMIA: ICD-10-CM

## 2022-07-20 DIAGNOSIS — E11.29 MICROALBUMINURIA DUE TO TYPE 2 DIABETES MELLITUS (HCC): ICD-10-CM

## 2022-07-20 DIAGNOSIS — R80.9 TYPE 2 DIABETES MELLITUS WITH MICROALBUMINURIA, WITHOUT LONG-TERM CURRENT USE OF INSULIN (HCC): ICD-10-CM

## 2022-07-20 DIAGNOSIS — D64.9 ANEMIA, UNSPECIFIED TYPE: ICD-10-CM

## 2022-07-20 LAB
25(OH)D3 SERPL-MCNC: 45 NG/ML (ref 30–100)
ALBUMIN SERPL BCP-MCNC: 4.4 G/DL (ref 3.2–4.9)
ALBUMIN/GLOB SERPL: 1.7 G/DL
ALP SERPL-CCNC: 68 U/L (ref 30–99)
ALT SERPL-CCNC: 22 U/L (ref 2–50)
ANION GAP SERPL CALC-SCNC: 13 MMOL/L (ref 7–16)
AST SERPL-CCNC: 20 U/L (ref 12–45)
BASOPHILS # BLD AUTO: 0.9 % (ref 0–1.8)
BASOPHILS # BLD: 0.09 K/UL (ref 0–0.12)
BILIRUB SERPL-MCNC: 0.7 MG/DL (ref 0.1–1.5)
BUN SERPL-MCNC: 7 MG/DL (ref 8–22)
CALCIUM SERPL-MCNC: 9.4 MG/DL (ref 8.5–10.5)
CHLORIDE SERPL-SCNC: 92 MMOL/L (ref 96–112)
CHOLEST SERPL-MCNC: 168 MG/DL (ref 100–199)
CO2 SERPL-SCNC: 25 MMOL/L (ref 20–33)
CREAT SERPL-MCNC: 0.75 MG/DL (ref 0.5–1.4)
CREAT UR-MCNC: 109.31 MG/DL
EOSINOPHIL # BLD AUTO: 0.54 K/UL (ref 0–0.51)
EOSINOPHIL NFR BLD: 5.7 % (ref 0–6.9)
ERYTHROCYTE [DISTWIDTH] IN BLOOD BY AUTOMATED COUNT: 45.8 FL (ref 35.9–50)
EST. AVERAGE GLUCOSE BLD GHB EST-MCNC: 154 MG/DL
FASTING STATUS PATIENT QL REPORTED: NORMAL
FOLATE SERPL-MCNC: >40 NG/ML
GFR SERPLBLD CREATININE-BSD FMLA CKD-EPI: 98 ML/MIN/1.73 M 2
GLOBULIN SER CALC-MCNC: 2.6 G/DL (ref 1.9–3.5)
GLUCOSE SERPL-MCNC: 157 MG/DL (ref 65–99)
HBA1C MFR BLD: 7 % (ref 4–5.6)
HCT VFR BLD AUTO: 38 % (ref 42–52)
HDLC SERPL-MCNC: 80 MG/DL
HGB BLD-MCNC: 13.5 G/DL (ref 14–18)
IMM GRANULOCYTES # BLD AUTO: 0.03 K/UL (ref 0–0.11)
IMM GRANULOCYTES NFR BLD AUTO: 0.3 % (ref 0–0.9)
IRON SATN MFR SERPL: 45 % (ref 15–55)
IRON SERPL-MCNC: 168 UG/DL (ref 50–180)
LDLC SERPL CALC-MCNC: 68 MG/DL
LYMPHOCYTES # BLD AUTO: 2.14 K/UL (ref 1–4.8)
LYMPHOCYTES NFR BLD: 22.4 % (ref 22–41)
MCH RBC QN AUTO: 34.7 PG (ref 27–33)
MCHC RBC AUTO-ENTMCNC: 35.5 G/DL (ref 33.7–35.3)
MCV RBC AUTO: 97.7 FL (ref 81.4–97.8)
MICROALBUMIN UR-MCNC: 2.4 MG/DL
MICROALBUMIN/CREAT UR: 22 MG/G (ref 0–30)
MONOCYTES # BLD AUTO: 1.02 K/UL (ref 0–0.85)
MONOCYTES NFR BLD AUTO: 10.7 % (ref 0–13.4)
NEUTROPHILS # BLD AUTO: 5.72 K/UL (ref 1.82–7.42)
NEUTROPHILS NFR BLD: 60 % (ref 44–72)
NRBC # BLD AUTO: 0 K/UL
NRBC BLD-RTO: 0 /100 WBC
PLATELET # BLD AUTO: 298 K/UL (ref 164–446)
PMV BLD AUTO: 10.3 FL (ref 9–12.9)
POTASSIUM SERPL-SCNC: 3.9 MMOL/L (ref 3.6–5.5)
PROT SERPL-MCNC: 7 G/DL (ref 6–8.2)
RBC # BLD AUTO: 3.89 M/UL (ref 4.7–6.1)
SODIUM SERPL-SCNC: 130 MMOL/L (ref 135–145)
TIBC SERPL-MCNC: 371 UG/DL (ref 250–450)
TRIGL SERPL-MCNC: 101 MG/DL (ref 0–149)
TSH SERPL DL<=0.005 MIU/L-ACNC: 1.28 UIU/ML (ref 0.38–5.33)
UIBC SERPL-MCNC: 203 UG/DL (ref 110–370)
VIT B12 SERPL-MCNC: 201 PG/ML (ref 211–911)
WBC # BLD AUTO: 9.5 K/UL (ref 4.8–10.8)

## 2022-07-20 PROCEDURE — 83540 ASSAY OF IRON: CPT

## 2022-07-20 PROCEDURE — 82570 ASSAY OF URINE CREATININE: CPT

## 2022-07-20 PROCEDURE — 82746 ASSAY OF FOLIC ACID SERUM: CPT

## 2022-07-20 PROCEDURE — 80053 COMPREHEN METABOLIC PANEL: CPT

## 2022-07-20 PROCEDURE — 82043 UR ALBUMIN QUANTITATIVE: CPT

## 2022-07-20 PROCEDURE — 84443 ASSAY THYROID STIM HORMONE: CPT

## 2022-07-20 PROCEDURE — 85025 COMPLETE CBC W/AUTO DIFF WBC: CPT

## 2022-07-20 PROCEDURE — 83036 HEMOGLOBIN GLYCOSYLATED A1C: CPT

## 2022-07-20 PROCEDURE — 82607 VITAMIN B-12: CPT

## 2022-07-20 PROCEDURE — 82306 VITAMIN D 25 HYDROXY: CPT

## 2022-07-20 PROCEDURE — 80061 LIPID PANEL: CPT

## 2022-07-20 PROCEDURE — 36415 COLL VENOUS BLD VENIPUNCTURE: CPT

## 2022-07-20 PROCEDURE — 83550 IRON BINDING TEST: CPT

## 2022-07-25 ENCOUNTER — OFFICE VISIT (OUTPATIENT)
Dept: MEDICAL GROUP | Facility: PHYSICIAN GROUP | Age: 67
End: 2022-07-25
Payer: MEDICARE

## 2022-07-25 VITALS
WEIGHT: 190 LBS | DIASTOLIC BLOOD PRESSURE: 66 MMHG | BODY MASS INDEX: 27.2 KG/M2 | OXYGEN SATURATION: 97 % | HEART RATE: 81 BPM | SYSTOLIC BLOOD PRESSURE: 140 MMHG | HEIGHT: 70 IN | TEMPERATURE: 97.2 F

## 2022-07-25 DIAGNOSIS — Z87.891 PERSONAL HISTORY OF NICOTINE DEPENDENCE: ICD-10-CM

## 2022-07-25 DIAGNOSIS — F11.20 UNCOMPLICATED OPIOID DEPENDENCE (HCC): ICD-10-CM

## 2022-07-25 DIAGNOSIS — R80.9 TYPE 2 DIABETES MELLITUS WITH MICROALBUMINURIA, WITHOUT LONG-TERM CURRENT USE OF INSULIN (HCC): ICD-10-CM

## 2022-07-25 DIAGNOSIS — Z13.6 SCREENING FOR AAA (ABDOMINAL AORTIC ANEURYSM): ICD-10-CM

## 2022-07-25 DIAGNOSIS — E11.29 MICROALBUMINURIA DUE TO TYPE 2 DIABETES MELLITUS (HCC): ICD-10-CM

## 2022-07-25 DIAGNOSIS — E55.9 VITAMIN D DEFICIENCY: ICD-10-CM

## 2022-07-25 DIAGNOSIS — D51.3 OTHER DIETARY VITAMIN B12 DEFICIENCY ANEMIA: ICD-10-CM

## 2022-07-25 DIAGNOSIS — Z00.00 MEDICARE ANNUAL WELLNESS VISIT, SUBSEQUENT: ICD-10-CM

## 2022-07-25 DIAGNOSIS — J84.10 CALCIFIED GRANULOMA OF LUNG (HCC): ICD-10-CM

## 2022-07-25 DIAGNOSIS — I10 ESSENTIAL HYPERTENSION: ICD-10-CM

## 2022-07-25 DIAGNOSIS — E11.29 TYPE 2 DIABETES MELLITUS WITH MICROALBUMINURIA, WITHOUT LONG-TERM CURRENT USE OF INSULIN (HCC): ICD-10-CM

## 2022-07-25 DIAGNOSIS — R80.9 MICROALBUMINURIA DUE TO TYPE 2 DIABETES MELLITUS (HCC): ICD-10-CM

## 2022-07-25 DIAGNOSIS — E78.5 DYSLIPIDEMIA: ICD-10-CM

## 2022-07-25 DIAGNOSIS — I77.810 AORTIC ECTASIA, THORACIC (HCC): ICD-10-CM

## 2022-07-25 PROCEDURE — 96372 THER/PROPH/DIAG INJ SC/IM: CPT | Performed by: NURSE PRACTITIONER

## 2022-07-25 PROCEDURE — G0439 PPPS, SUBSEQ VISIT: HCPCS | Performed by: NURSE PRACTITIONER

## 2022-07-25 RX ORDER — METFORMIN HYDROCHLORIDE 750 MG/1
750 TABLET, EXTENDED RELEASE ORAL DAILY
Qty: 200 TABLET | Refills: 3 | Status: SHIPPED | OUTPATIENT
Start: 2022-07-25 | End: 2022-08-15 | Stop reason: SDUPTHER

## 2022-07-25 RX ORDER — CYANOCOBALAMIN 1000 UG/ML
1000 INJECTION, SOLUTION INTRAMUSCULAR; SUBCUTANEOUS
Status: DISCONTINUED | OUTPATIENT
Start: 2022-08-24 | End: 2023-07-17

## 2022-07-25 RX ORDER — CYANOCOBALAMIN 1000 UG/ML
1000 INJECTION, SOLUTION INTRAMUSCULAR; SUBCUTANEOUS ONCE
Status: COMPLETED | OUTPATIENT
Start: 2022-07-25 | End: 2022-07-25

## 2022-07-25 RX ADMIN — CYANOCOBALAMIN 1000 MCG: 1000 INJECTION, SOLUTION INTRAMUSCULAR; SUBCUTANEOUS at 10:02

## 2022-07-25 ASSESSMENT — FIBROSIS 4 INDEX: FIB4 SCORE: 0.99

## 2022-07-25 ASSESSMENT — ACTIVITIES OF DAILY LIVING (ADL): BATHING_REQUIRES_ASSISTANCE: 0

## 2022-07-25 ASSESSMENT — ENCOUNTER SYMPTOMS: GENERAL WELL-BEING: GOOD

## 2022-07-25 ASSESSMENT — PATIENT HEALTH QUESTIONNAIRE - PHQ9: CLINICAL INTERPRETATION OF PHQ2 SCORE: 0
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
## 2022-07-25 NOTE — ASSESSMENT & PLAN NOTE
Improved on recent labs.  Continues lisinopril 40 mg twice daily, pravastatin 40 mg once daily, and metformin  mg twice daily.  Due for annual labs in July 2023.

## 2022-07-25 NOTE — LETTER
[facility]  LUCERO SiddiquiRMORENITA.  [address]  [place] [address]  Fax: [phone]   Authorization for Release/Disclosure of   Protected Health Information   Name: FABIO ROLDAN : 1955 SSN: [id number]   Address: [address] [place] [address] [place] [address] Phone:    [phone] (home) [phone] (work)   I authorize the entity listed below to release/disclose the PHI below to:   [facility]/ANDREW SiddiquiP.RCOOPER and LUCERO SiddiquiRCOOPER   Provider or Entity Name[facility]  [facility]   Address   [facility]   Phone:      Fax:     Reason for request: continuity of care   Information to be released:    [  ] LAST COLONOSCOPY,  including any PATH REPORT and follow-up  [  ] LAST FIT/COLOGUARD RESULT [  ] LAST DEXA  [  ] LAST MAMMOGRAM  [  ] LAST PAP  [  ] LAST LABS [X] RETINA EXAM REPORT  [  ] IMMUNIZATION RECORDS  [  ] Release all info      [  ] Check here and initial the line next to each item to release ALL health information INCLUDING  _____ Care and treatment for drug and / or alcohol abuse  _____ HIV testing, infection status, or AIDS  _____ Genetic Testing    DATES OF SERVICE OR TIME PERIOD TO BE DISCLOSED: _____________  I understand and acknowledge that:  * This Authorization may be revoked at any time by you in writing, except if your health information has already been used or disclosed.  * Your health information that will be used or disclosed as a result of you signing this authorization could be re-disclosed by the recipient. If this occurs, your re-disclosed health information may no longer be protected by State or Federal laws.  * You may refuse to sign this Authorization. Your refusal will not affect your ability to obtain treatment.  * This Authorization becomes effective upon signing and will  on (date) __________.      If no date is indicated, this Authorization will  one (1) year from the signature date.    Name: Fabio ANTHONY  Jai    Signature:   Date:     7/25/2022       PLEASE FAX REQUESTED RECORDS BACK TO: [phone]

## 2022-07-25 NOTE — ASSESSMENT & PLAN NOTE
Continues metoprolol  mg daily, chlorthalidone 25 mg daily, lisinopril 40 mg twice daily.  Occasionally monitors blood pressure at home and reports systolic is usually in the 130s. The patient denies chest pain, shortness of breath, headaches, dizziness, blurry vision, or dyspnea on exertion.

## 2022-07-25 NOTE — ASSESSMENT & PLAN NOTE
Continues to follow with Dr. Turcios for pain management.  No longer taking tramadol.  Scheduled today for epidurals for back pain.

## 2022-07-25 NOTE — ASSESSMENT & PLAN NOTE
Continues to be a non-smoker since 2013.  Due for annual lung cancer screening.  Denies shortness of breath, dyspnea, dyspnea on exertion, cough.

## 2022-07-25 NOTE — ASSESSMENT & PLAN NOTE
Improving.  Continues metformin  mg twice daily, denies side effects of medication.  Had experienced diarrhea with non-ER form.  Reports that activity is limited by pain, but his diet is pretty good.  Has been seen this year by optometry and retinal screening is up-to-date.

## 2022-07-25 NOTE — ASSESSMENT & PLAN NOTE
Mild on chest CT in March 2019.  Continues pravastatin 40 mg daily, denies side effects of the medication.  History of GI bleed with anticoagulants.  Due for annual labs in July 2023.

## 2022-07-25 NOTE — ASSESSMENT & PLAN NOTE
Continues pravastatin 40 mg daily, denies side effects of the medication.  Unable to take anticoagulants due to history of GI bleed with aspirin therapy.  Due for annual labs in July 2022.
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
## 2022-07-28 RX ORDER — CETIRIZINE HYDROCHLORIDE 10 MG/1
TABLET ORAL
Qty: 90 TABLET | Refills: 3 | Status: SHIPPED | OUTPATIENT
Start: 2022-07-28 | End: 2023-03-20

## 2022-07-28 NOTE — TELEPHONE ENCOUNTER
Requested Prescriptions     Pending Prescriptions Disp Refills   • cetirizine (ZYRTEC) 10 MG Tab [Pharmacy Med Name: CETIRIZINE 10 MG    TAB PACK] 90 Tablet 3     Sig: TAKE ONE TABLET BY MOUTH EVERY DAY       JOSÉ MIGUEL Siddiqui.

## 2022-08-15 DIAGNOSIS — E11.29 TYPE 2 DIABETES MELLITUS WITH MICROALBUMINURIA, WITHOUT LONG-TERM CURRENT USE OF INSULIN (HCC): ICD-10-CM

## 2022-08-15 DIAGNOSIS — R80.9 TYPE 2 DIABETES MELLITUS WITH MICROALBUMINURIA, WITHOUT LONG-TERM CURRENT USE OF INSULIN (HCC): ICD-10-CM

## 2022-08-15 RX ORDER — METFORMIN HYDROCHLORIDE 750 MG/1
750 TABLET, EXTENDED RELEASE ORAL 2 TIMES DAILY
Qty: 200 TABLET | Refills: 3 | Status: SHIPPED | OUTPATIENT
Start: 2022-08-15 | End: 2022-09-22 | Stop reason: SDUPTHER

## 2022-08-15 NOTE — PROGRESS NOTES
Requested Prescriptions     Signed Prescriptions Disp Refills    metFORMIN ER (GLUCOPHAGE XR) 750 MG TABLET SR 24  Tablet 3     Sig: Take 1 Tablet by mouth 2 times a day.       JOSÉ MIGUEL Siddiqui.

## 2022-08-31 ENCOUNTER — HOSPITAL ENCOUNTER (OUTPATIENT)
Dept: RADIOLOGY | Facility: MEDICAL CENTER | Age: 67
End: 2022-08-31
Attending: NURSE PRACTITIONER
Payer: MEDICARE

## 2022-08-31 DIAGNOSIS — Z13.6 SCREENING FOR AAA (AORTIC ABDOMINAL ANEURYSM): ICD-10-CM

## 2022-08-31 DIAGNOSIS — Z87.891 PERSONAL HISTORY OF TOBACCO USE, PRESENTING HAZARDS TO HEALTH: ICD-10-CM

## 2022-08-31 DIAGNOSIS — Z87.891 PERSONAL HISTORY OF NICOTINE DEPENDENCE: ICD-10-CM

## 2022-08-31 DIAGNOSIS — J84.10 CALCIFIED GRANULOMA OF LUNG (HCC): ICD-10-CM

## 2022-08-31 PROCEDURE — 76775 US EXAM ABDO BACK WALL LIM: CPT

## 2022-08-31 PROCEDURE — 71271 CT THORAX LUNG CANCER SCR C-: CPT

## 2022-08-31 NOTE — RESULT ENCOUNTER NOTE
Please call the patient and let him know that there was no evidence of abdominal aortic aneurysm on the screening ultrasound.    Thank you,    JOSÉ MIGUEL Siddiqui.

## 2022-09-06 ENCOUNTER — NON-PROVIDER VISIT (OUTPATIENT)
Dept: MEDICAL GROUP | Facility: PHYSICIAN GROUP | Age: 67
End: 2022-09-06
Payer: MEDICARE

## 2022-09-06 RX ADMIN — CYANOCOBALAMIN 1000 MCG: 1000 INJECTION, SOLUTION INTRAMUSCULAR; SUBCUTANEOUS at 13:57

## 2022-09-06 NOTE — PROGRESS NOTES
Fabio Vergara is a 67 y.o. male here for a non-provider visit for B12 injection.    Reason for injection: b12 deficient   Order in MAR?: Yes  Patient supplied?:No  Minimum interval has been met for this injection (per MAR order): Yes    Patient tolerated injection and no adverse effects were observed or reported: Yes    # of Administrations remaining in MAR: n/a

## 2022-09-15 DIAGNOSIS — I10 ESSENTIAL HYPERTENSION: ICD-10-CM

## 2022-09-17 RX ORDER — CHLORTHALIDONE 25 MG/1
TABLET ORAL
Qty: 90 TABLET | Refills: 1 | Status: SHIPPED | OUTPATIENT
Start: 2022-09-17 | End: 2022-09-22 | Stop reason: SDUPTHER

## 2022-09-17 RX ORDER — HYDROCHLOROTHIAZIDE 25 MG/1
TABLET ORAL
Qty: 100 TABLET | Refills: 0 | OUTPATIENT
Start: 2022-09-17

## 2022-09-17 RX ORDER — FLUTICASONE PROPIONATE 50 MCG
SPRAY, SUSPENSION (ML) NASAL
Qty: 16 G | Refills: 3 | Status: SHIPPED | OUTPATIENT
Start: 2022-09-17 | End: 2023-07-20 | Stop reason: SDUPTHER

## 2022-09-17 NOTE — TELEPHONE ENCOUNTER
Requested Prescriptions     Pending Prescriptions Disp Refills   • chlorthalidone (HYGROTON) 25 MG Tab [Pharmacy Med Name: CHLORTHALIDONE 25MG TABLETS] 90 Tablet 1     Sig: TAKE ONE TABLET BY MOUTH EVERY DAY   • fluticasone (FLONASE) 50 MCG/ACT nasal spray [Pharmacy Med Name: FLUTICASONE 50MCG NASAL SP (120) RX] 16 g 3     Sig: USE ONE SPRAY IN EACH NOSTRIL EVERY DAY     Refused Prescriptions Disp Refills   • hydroCHLOROthiazide (HYDRODIURIL) 25 MG Tab [Pharmacy Med Name: HYDROCHLOROTHIAZIDE 25MG TABLETS] 100 Tablet 0     Sig: TAKE ONE TABLETS BY MOUTH EVERY MORNING       JOSÉ MIGUEL Siddiqui.

## 2022-09-20 NOTE — TELEPHONE ENCOUNTER
Mbr asked about referral [id number]. Advised mbr that the referral has been authorized since July, advised mbr to call Dr. Doty office to schedule an appointment. Mbr verbalized understanding.

## 2022-09-22 DIAGNOSIS — I10 ESSENTIAL HYPERTENSION: ICD-10-CM

## 2022-09-22 DIAGNOSIS — R80.9 TYPE 2 DIABETES MELLITUS WITH MICROALBUMINURIA, WITHOUT LONG-TERM CURRENT USE OF INSULIN (HCC): ICD-10-CM

## 2022-09-22 DIAGNOSIS — E11.29 TYPE 2 DIABETES MELLITUS WITH MICROALBUMINURIA, WITHOUT LONG-TERM CURRENT USE OF INSULIN (HCC): ICD-10-CM

## 2022-09-22 RX ORDER — CHLORTHALIDONE 25 MG/1
25 TABLET ORAL
Qty: 90 TABLET | Refills: 0 | Status: ON HOLD
Start: 2022-09-22 | End: 2023-06-16

## 2022-09-22 RX ORDER — METFORMIN HYDROCHLORIDE 750 MG/1
750 TABLET, EXTENDED RELEASE ORAL 2 TIMES DAILY
Qty: 200 TABLET | Refills: 0 | Status: SHIPPED | OUTPATIENT
Start: 2022-09-22 | End: 2023-01-15

## 2022-09-22 RX ORDER — METOPROLOL SUCCINATE 100 MG/1
100 TABLET, EXTENDED RELEASE ORAL
Qty: 90 TABLET | Refills: 0 | Status: SHIPPED | OUTPATIENT
Start: 2022-09-22 | End: 2022-12-22

## 2022-09-22 NOTE — TELEPHONE ENCOUNTER
Requested Prescriptions     Pending Prescriptions Disp Refills   • metoprolol SR (TOPROL XL) 100 MG TABLET SR 24 HR 90 Tablet 0     Sig: Take 1 Tablet by mouth every day.   • chlorthalidone (HYGROTON) 25 MG Tab 90 Tablet 0     Sig: Take 1 Tablet by mouth every day.   • metFORMIN ER (GLUCOPHAGE XR) 750 MG TABLET SR 24  Tablet 0     Sig: Take 1 Tablet by mouth 2 times a day.       Charley Harvey A.P.R.N.

## 2022-09-22 NOTE — TELEPHONE ENCOUNTER
Received request via: Patient    Was the patient seen in the last year in this department? Yes    Does the patient have an active prescription (recently filled or refills available) for medication(s) requested? No    Had trouble getting medications

## 2022-09-29 DIAGNOSIS — E78.5 DYSLIPIDEMIA: ICD-10-CM

## 2022-09-29 RX ORDER — PRAVASTATIN SODIUM 40 MG
40 TABLET ORAL
Qty: 100 TABLET | Refills: 3 | Status: SHIPPED | OUTPATIENT
Start: 2022-09-29 | End: 2023-11-08 | Stop reason: SDUPTHER

## 2022-09-30 NOTE — TELEPHONE ENCOUNTER
Requested Prescriptions     Pending Prescriptions Disp Refills   • pravastatin (PRAVACHOL) 40 MG tablet 100 Tablet 3     Sig: Take 1 Tablet by mouth at bedtime.       JOSÉ MIGUEL Siddiqui.

## 2022-10-04 ENCOUNTER — HOSPITAL ENCOUNTER (OUTPATIENT)
Dept: RADIOLOGY | Facility: MEDICAL CENTER | Age: 67
End: 2022-10-04
Attending: NURSE PRACTITIONER
Payer: MEDICARE

## 2022-10-04 ENCOUNTER — NON-PROVIDER VISIT (OUTPATIENT)
Dept: MEDICAL GROUP | Facility: PHYSICIAN GROUP | Age: 67
End: 2022-10-04
Payer: MEDICARE

## 2022-10-04 ENCOUNTER — OFFICE VISIT (OUTPATIENT)
Dept: MEDICAL GROUP | Facility: PHYSICIAN GROUP | Age: 67
End: 2022-10-04
Payer: MEDICARE

## 2022-10-04 VITALS
HEIGHT: 70 IN | DIASTOLIC BLOOD PRESSURE: 60 MMHG | WEIGHT: 182 LBS | HEART RATE: 71 BPM | TEMPERATURE: 97.5 F | BODY MASS INDEX: 26.05 KG/M2 | SYSTOLIC BLOOD PRESSURE: 146 MMHG | OXYGEN SATURATION: 96 %

## 2022-10-04 DIAGNOSIS — S22.31XA CLOSED FRACTURE OF ONE RIB OF RIGHT SIDE, INITIAL ENCOUNTER: ICD-10-CM

## 2022-10-04 DIAGNOSIS — Z23 NEED FOR VACCINATION: ICD-10-CM

## 2022-10-04 DIAGNOSIS — R07.81 RIB PAIN ON RIGHT SIDE: ICD-10-CM

## 2022-10-04 DIAGNOSIS — D51.3 OTHER DIETARY VITAMIN B12 DEFICIENCY ANEMIA: ICD-10-CM

## 2022-10-04 PROCEDURE — 71100 X-RAY EXAM RIBS UNI 2 VIEWS: CPT | Mod: RT

## 2022-10-04 PROCEDURE — 90662 IIV NO PRSV INCREASED AG IM: CPT | Performed by: NURSE PRACTITIONER

## 2022-10-04 PROCEDURE — 71046 X-RAY EXAM CHEST 2 VIEWS: CPT

## 2022-10-04 PROCEDURE — 99214 OFFICE O/P EST MOD 30 MIN: CPT | Mod: 25 | Performed by: NURSE PRACTITIONER

## 2022-10-04 PROCEDURE — G0008 ADMIN INFLUENZA VIRUS VAC: HCPCS | Performed by: NURSE PRACTITIONER

## 2022-10-04 PROCEDURE — 74018 RADEX ABDOMEN 1 VIEW: CPT

## 2022-10-04 RX ORDER — LIDOCAINE 4 G/G
PATCH TOPICAL
Qty: 30 PATCH | Refills: 1 | Status: SHIPPED | OUTPATIENT
Start: 2022-10-04 | End: 2023-05-01

## 2022-10-04 ASSESSMENT — FIBROSIS 4 INDEX: FIB4 SCORE: 0.99

## 2022-10-04 NOTE — ASSESSMENT & PLAN NOTE
New to examiner.  Patient reports that yesterday he was sitting on a board and fell down to the right hitting another board on his right sided ribs.  States that he is able to take deep breaths with only mild discomfort, but does have more pain with coughing.  Denies hemoptysis or abdominal pain.

## 2022-10-04 NOTE — RESULT ENCOUNTER NOTE
Please call the patient and review abdomen, chest, and rib xray's.    His right rib x-ray shows a nondisplaced right posterolateral 10th rib fracture.  There is no hemothorax or no pneumothorax (which means that his lungs are fully inflating).    His chest x-ray is unremarkable, which is good.    The abdomen x-ray shows nonobstructive bowel gas pattern with moderate colonic stool (constipation) it also notes the 10th rib fracture with a possible nondisplaced right 11th rib fracture.    I have sent the prescription for the lidocaine patches to his pharmacy.  Analgesia for isolated rib fracture includes nonsteroidal anti-inflammatory drugs and/or acetaminophen.  Encourage using a pillow or similar soft brace against the fracture site to reduce discomfort with coughing and deep breathing.  Recommend patient practice frequent deep breathing to prevent atelectasis and pneumonia.  Advised the patient to seek follow-up care or immediate medical evaluation for any concerning symptoms, such as shortness of breath, new or increasing pain, worsening cough, generalized weakness, pallor, or fever.  Follow-up in 6-8 weeks after injury to assess for persistent pain, abnormal lung auscultation, or hypoxia.  Repeat x-rays not indicated unless patient symptomatic  Most nonpathologic rib fractures heal within 6 weeks.  Advise patient that pain from rib fractures can be severe for several days following the initial injury.    Thank you,    JOSÉ MIGUEL Siddiqui.

## 2022-10-04 NOTE — PROGRESS NOTES
Fabio Vergara is a 67 y.o. male here for a non-provider visit for B-12 injection.    Reason for injection: Deficiency  Order in MAR?: Yes  Patient supplied?:No  Minimum interval has been met for this injection (per MAR order): Yes    Patient tolerated injection and no adverse effects were observed or reported: Yes    # of Administrations remaining in MAR: Multiple
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
## 2022-10-05 ENCOUNTER — TELEPHONE (OUTPATIENT)
Dept: HEALTH INFORMATION MANAGEMENT | Facility: OTHER | Age: 67
End: 2022-10-05
Payer: MEDICARE

## 2022-10-05 NOTE — TELEPHONE ENCOUNTER
Charley, the patient has requested a prescription for the 5% lidocaine patches.  The patient has not picked up the 4% patches as he is waiting to get the 5%.     I called the patient and reviewed Charley's message with the patient, specifically reviewing with him that he had a nondisplaced fracture of the 10th rib and a possible nondisplaced frcture of the 11th rib.  I told the patient that there was no pneumothorax or hemoththorax and explained the meaning of same.  We talked about splinting with a pillow and every hour doing deliberate deep breathing and coughing.   I reviewed concerning symptoms for which he should seek immediate care and to follow-up in six to eight weeks.  I told the patient that the fractures should heal in six weeks.  He was advised that he could have severe pain for several days.  We discussed the usee of NSAIDs and acetaminophen.

## 2022-10-12 ENCOUNTER — HOSPITAL ENCOUNTER (OUTPATIENT)
Dept: RADIOLOGY | Facility: MEDICAL CENTER | Age: 67
End: 2022-10-12
Attending: SPECIALIST
Payer: MEDICARE

## 2022-10-12 DIAGNOSIS — M51.36 DEGENERATION OF LUMBAR INTERVERTEBRAL DISC: ICD-10-CM

## 2022-10-12 DIAGNOSIS — M54.16 LUMBAR VERTEBRAL SYNDROME: ICD-10-CM

## 2022-10-12 DIAGNOSIS — M54.16 LUMBAR RADICULOPATHY: ICD-10-CM

## 2022-10-12 DIAGNOSIS — M48.061 SPINAL STENOSIS, LUMBAR REGION, WITHOUT NEUROGENIC CLAUDICATION: ICD-10-CM

## 2022-10-12 DIAGNOSIS — M47.816 LUMBAR SPONDYLOSIS: ICD-10-CM

## 2022-10-12 PROCEDURE — 72148 MRI LUMBAR SPINE W/O DYE: CPT

## 2022-11-09 ENCOUNTER — APPOINTMENT (RX ONLY)
Dept: URBAN - METROPOLITAN AREA CLINIC 4 | Facility: CLINIC | Age: 67
Setting detail: DERMATOLOGY
End: 2022-11-09

## 2022-11-09 DIAGNOSIS — L81.4 OTHER MELANIN HYPERPIGMENTATION: ICD-10-CM

## 2022-11-09 DIAGNOSIS — L30.9 DERMATITIS, UNSPECIFIED: ICD-10-CM

## 2022-11-09 DIAGNOSIS — L82.1 OTHER SEBORRHEIC KERATOSIS: ICD-10-CM

## 2022-11-09 DIAGNOSIS — Z85.828 PERSONAL HISTORY OF OTHER MALIGNANT NEOPLASM OF SKIN: ICD-10-CM

## 2022-11-09 DIAGNOSIS — D22 MELANOCYTIC NEVI: ICD-10-CM

## 2022-11-09 DIAGNOSIS — L57.0 ACTINIC KERATOSIS: ICD-10-CM

## 2022-11-09 PROBLEM — D22.5 MELANOCYTIC NEVI OF TRUNK: Status: ACTIVE | Noted: 2022-11-09

## 2022-11-09 PROCEDURE — 17003 DESTRUCT PREMALG LES 2-14: CPT

## 2022-11-09 PROCEDURE — 99213 OFFICE O/P EST LOW 20 MIN: CPT | Mod: 25

## 2022-11-09 PROCEDURE — ? PRESCRIPTION

## 2022-11-09 PROCEDURE — 17000 DESTRUCT PREMALG LESION: CPT

## 2022-11-09 PROCEDURE — ? LIQUID NITROGEN

## 2022-11-09 PROCEDURE — ? COUNSELING

## 2022-11-09 PROCEDURE — ? OBSERVATION

## 2022-11-09 RX ORDER — TRIAMCINOLONE ACETONIDE 1 MG/G
1 CREAM TOPICAL BID
Qty: 80 | Refills: 3 | Status: ERX

## 2022-11-09 ASSESSMENT — LOCATION SIMPLE DESCRIPTION DERM
LOCATION SIMPLE: UPPER BACK
LOCATION SIMPLE: RIGHT CHEEK
LOCATION SIMPLE: LEFT CHEEK
LOCATION SIMPLE: RIGHT FOREARM
LOCATION SIMPLE: POSTERIOR NECK
LOCATION SIMPLE: RIGHT UPPER ARM
LOCATION SIMPLE: LEFT FOREARM
LOCATION SIMPLE: LEFT UPPER BACK
LOCATION SIMPLE: RIGHT LOWER BACK
LOCATION SIMPLE: RIGHT UPPER BACK

## 2022-11-09 ASSESSMENT — LOCATION DETAILED DESCRIPTION DERM
LOCATION DETAILED: SUPERIOR THORACIC SPINE
LOCATION DETAILED: MID POSTERIOR NECK
LOCATION DETAILED: LEFT SUPERIOR MEDIAL MALAR CHEEK
LOCATION DETAILED: RIGHT MEDIAL UPPER BACK
LOCATION DETAILED: RIGHT SUPERIOR MEDIAL MIDBACK
LOCATION DETAILED: RIGHT SUPERIOR LATERAL MALAR CHEEK
LOCATION DETAILED: RIGHT VENTRAL LATERAL PROXIMAL FOREARM
LOCATION DETAILED: LEFT PROXIMAL DORSAL FOREARM
LOCATION DETAILED: LEFT MEDIAL UPPER BACK
LOCATION DETAILED: RIGHT PROXIMAL POSTERIOR UPPER ARM
LOCATION DETAILED: LEFT SUPERIOR POSTERIOR NECK

## 2022-11-09 ASSESSMENT — LOCATION ZONE DERM
LOCATION ZONE: TRUNK
LOCATION ZONE: ARM
LOCATION ZONE: FACE
LOCATION ZONE: NECK

## 2022-11-09 NOTE — PROCEDURE: LIQUID NITROGEN
Detail Level: Detailed
Render Post-Care Instructions In Note?: no
Number Of Freeze-Thaw Cycles: 1 freeze-thaw cycle
Show Applicator Variable?: Yes
Consent: The patient's consent was obtained including but not limited to risks of crusting, scabbing, blistering, scarring, darker or lighter pigmentary change, recurrence, incomplete removal and infection.
Post-Care Instructions: I reviewed with the patient in detail post-care instructions. Patient is to wear sunprotection, and avoid picking at any of the treated lesions. Pt may apply Vaseline to crusted or scabbing areas.
Duration Of Freeze Thaw-Cycle (Seconds): 5

## 2022-12-01 ENCOUNTER — DOCUMENTATION (OUTPATIENT)
Dept: HEALTH INFORMATION MANAGEMENT | Facility: OTHER | Age: 67
End: 2022-12-01
Payer: MEDICARE

## 2022-12-02 ENCOUNTER — HOSPITAL ENCOUNTER (OUTPATIENT)
Dept: RADIOLOGY | Facility: MEDICAL CENTER | Age: 67
End: 2022-12-02
Attending: STUDENT IN AN ORGANIZED HEALTH CARE EDUCATION/TRAINING PROGRAM
Payer: MEDICARE

## 2022-12-02 DIAGNOSIS — M54.12 BRACHIAL NEURITIS: ICD-10-CM

## 2022-12-02 DIAGNOSIS — M62.549 ATROPHY OF MUSCLE OF HAND, UNSPECIFIED LATERALITY: ICD-10-CM

## 2022-12-02 DIAGNOSIS — R29.898 DEFICIENCIES OF LIMBS: ICD-10-CM

## 2022-12-02 PROCEDURE — 72141 MRI NECK SPINE W/O DYE: CPT

## 2022-12-08 ENCOUNTER — NON-PROVIDER VISIT (OUTPATIENT)
Dept: MEDICAL GROUP | Facility: PHYSICIAN GROUP | Age: 67
End: 2022-12-08
Payer: MEDICARE

## 2022-12-08 RX ADMIN — CYANOCOBALAMIN 1000 MCG: 1000 INJECTION, SOLUTION INTRAMUSCULAR; SUBCUTANEOUS at 09:21

## 2022-12-08 NOTE — PROGRESS NOTES
Fabio Vergara is a 67 y.o. male here for a non-provider visit for b12 injection.    Reason for injection: b12 def  Order in MAR?: Yes  Patient supplied?:No  Minimum interval has been met for this injection (per MAR order): Yes    Patient tolerated injection and no adverse effects were observed or reported: Yes    # of Administrations remaining in MAR: 0

## 2022-12-21 ENCOUNTER — OFFICE VISIT (OUTPATIENT)
Dept: MEDICAL GROUP | Facility: PHYSICIAN GROUP | Age: 67
End: 2022-12-21
Payer: MEDICARE

## 2022-12-21 VITALS
HEART RATE: 91 BPM | HEIGHT: 70 IN | SYSTOLIC BLOOD PRESSURE: 100 MMHG | WEIGHT: 199 LBS | OXYGEN SATURATION: 97 % | TEMPERATURE: 97.9 F | BODY MASS INDEX: 28.49 KG/M2 | DIASTOLIC BLOOD PRESSURE: 70 MMHG | RESPIRATION RATE: 16 BRPM

## 2022-12-21 DIAGNOSIS — F41.9 ANXIETY: ICD-10-CM

## 2022-12-21 DIAGNOSIS — F51.02 ADJUSTMENT INSOMNIA: ICD-10-CM

## 2022-12-21 PROCEDURE — 99214 OFFICE O/P EST MOD 30 MIN: CPT | Performed by: NURSE PRACTITIONER

## 2022-12-21 RX ORDER — TRAZODONE HYDROCHLORIDE 50 MG/1
50 TABLET ORAL
Qty: 90 TABLET | Refills: 3 | Status: SHIPPED | OUTPATIENT
Start: 2022-12-21 | End: 2023-11-08 | Stop reason: SDUPTHER

## 2022-12-21 ASSESSMENT — FIBROSIS 4 INDEX: FIB4 SCORE: 0.99

## 2022-12-21 NOTE — ASSESSMENT & PLAN NOTE
Chronic, ongoing.  Had previously been prescribed fluoxetine 10 mg daily, he is slowly weaned off of the medication in June 2021 because his wife had come home and was doing better physically.  Reports that his anxiety has worsened over the last 2-3 months and is worse off at night.  Reports that he will lay down at night and wake up feeling that something is choking him.  He said an anxiety feeling only happens when he he is sleeping and it will wake him up suddenly and he feels like things are closing in.  When this occurs, he will turn on the light, watch TV until it passes, which takes about 30 minutes.  He has been more stressed with the declining health of his wife as well as the unexpected illness of his 12-year-old [place] Goss who defibrillators, and the dog will be able to have surgery to remove a tumor off of its liver.  Reports that the anxiety at night does not happen every night.  He was previously prescribed trazodone for insomnia, states that he took half of a pill last night and helped.

## 2022-12-22 DIAGNOSIS — I10 ESSENTIAL HYPERTENSION: ICD-10-CM

## 2022-12-22 RX ORDER — METOPROLOL SUCCINATE 100 MG/1
100 TABLET, EXTENDED RELEASE ORAL
Qty: 90 TABLET | Refills: 3 | Status: SHIPPED | OUTPATIENT
Start: 2022-12-22 | End: 2023-11-08 | Stop reason: SDUPTHER

## 2022-12-23 NOTE — TELEPHONE ENCOUNTER
Requested Prescriptions     Pending Prescriptions Disp Refills   • metoprolol SR (TOPROL XL) 100 MG TABLET SR 24 HR [Pharmacy Med Name: METOPROLOL SUCCINATE ER 100MG TB24] 90 Tablet 3     Sig: TAKE 1 TABLET BY MOUTH EVERY DAY       JOSÉ MIGUEL Siddiqui.

## 2022-12-29 NOTE — PROGRESS NOTES
CC: Diagnoses of Anxiety and Adjustment insomnia were pertinent to this visit.                                                                                                                                       HPI:   Fabio presents today with the following concerns:    Anxiety  Adjustment insomnia  Chronic, ongoing.  Had previously been prescribed fluoxetine 10 mg daily, he is slowly weaned off of the medication in June 2021 because his wife had come home and was doing better physically.  Reports that his anxiety has worsened over the last 2-3 months and is worse off at night.  Reports that he will lay down at night and wake up feeling that something is choking him.  He said an anxiety feeling only happens when he he is sleeping and it will wake him up suddenly and he feels like things are closing in.  When this occurs, he will turn on the light, watch TV until it passes, which takes about 30 minutes.  He has been more stressed with the declining health of his wife as well as the unexpected illness of his 12-year-old [place] Goss who defibrillators, and the dog will be able to have surgery to remove a tumor off of its liver.  Reports that the anxiety at night does not happen every night.  He was previously prescribed trazodone for insomnia, states that he took half of a pill last night and helped.    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[facility]) 05/10/2019    Onychomycosis 06/26/2018    Dupuytren's contracture of left hand 06/26/2018    Other male erectile dysfunction 02/08/2018    Microalbuminuria due to type 2 diabetes mellitus ([facility]) 07/20/2017    Cervical radiculopathy at C7 07/20/2017    Essential hypertension 11/10/2015    Dyslipidemia 11/10/2015    Type 2 diabetes mellitus with microalbuminuria, without long-term current use of insulin ([facility]) 11/10/2015    Pain, chronic 11/10/2015     Current Outpatient Medications   Medication Sig Dispense Refill    traZODone (DESYREL) 50 MG Tab Take 1 Tablet by mouth at bedtime. 90 Tablet 3    Lidocaine 4 % Patch Apply 1 patch to cover the most painful area, leave on for 12 hours/day and off for 12 hours/day before applying another patch 30 Patch 1    pravastatin (PRAVACHOL) 40 MG tablet Take 1 Tablet by mouth at bedtime. 100 Tablet 3    chlorthalidone (HYGROTON) 25 MG Tab Take 1 Tablet by mouth every day. 90 Tablet 0    metFORMIN ER (GLUCOPHAGE XR) 750 MG TABLET SR 24 HR Take 1 Tablet by mouth 2 times a day. 200 Tablet 0    fluticasone (FLONASE) 50 MCG/ACT nasal spray USE ONE SPRAY IN EACH NOSTRIL EVERY DAY 16 g 3    cetirizine (ZYRTEC) 10 MG Tab TAKE ONE TABLET BY MOUTH EVERY DAY 90 Tablet 3    lisinopril (PRINIVIL) 40 MG tablet Take 1 Tablet by mouth 2 times a day. 200 Tablet 3    baclofen (LIORESAL) 10 MG Tab Take 10 mg by mouth 2 times a day as needed.      metoprolol SR (TOPROL XL) 100 MG TABLET SR 24 HR TAKE 1 TABLET BY MOUTH EVERY DAY 90 Tablet 3     Current Facility-Administered Medications   Medication Dose Route Frequency Provider Last Rate Last Admin    cyanocobalamin (VITAMIN B-12) injection 1,000 mcg  1,000 mcg Intramuscular Q30 DAYS Cahrley Harvey, ALakeshiaP.R.N.   1,000 mcg at 12/08/22 0921     Allergies as of 12/21/2022 - Reviewed 12/21/2022   Allergen Reaction Noted    Penicillin g  07/01/2014    Flexeril [cyclobenzaprine]  10/07/2014    Penicillins  04/20/2009      ROS:  See HPI    /70  "(BP Location: Left arm, Patient Position: Sitting, BP Cuff Size: Adult)   Pulse 91   Temp 36.6 °C (97.9 °F) (Temporal)   Resp 16   Ht 1.778 m (5' 10\")   Wt 90.3 kg (199 lb)   SpO2 97%   BMI 28.55 kg/m²     Physical Exam:  General: Normal appearing. No distress.  HEENT: Normocephalic. Eyes conjunctiva clear lids without ptosis, pupils equal and reactive to light accommodation, ears normal shape and contour, canals are clear bilaterally, tympanic membranes are benign, nasal mucosa benign, oropharynx is without erythema, edema or exudates. Sinuses (frontal and maxillary) nontender to palpation.  Neck: Supple without JVD or bruit. Thyroid is not enlarged.  Pulmonary: Clear to ausculation.  Normal effort. No rales, rhonchi, or wheezing.  Cardiovascular: Regular rate and rhythm without murmur. Carotid and radial pulses are intact and equal bilaterally.  Abdomen: Soft, nontender, nondistended. Normal bowel sounds. Liver and spleen are not palpable.  Neurologic: Grossly nonfocal.  Lymph: No cervical, supraclavicular or axillary lymph nodes are palpable.  Skin: Warm and dry.  No obvious lesions.  Musculoskeletal: Normal gait. No extremity cyanosis, clubbing, or edema.  Psych:  Normal mood and affect. Alert and oriented x3. Judgment and insight is normal.     Assessment and Plan.   67 y.o. male with the following issues:  1. Anxiety  2. Adjustment insomnia  Chronic, uncontrolled. Plan to resume trazodone 50 mg/night, refill sent to pharmacy. Due for updated last prior to follow up on 2/1/23 (previously ordered). Declines behavioral health at this time. Encourage patient to monitor for GERD symptoms, track symptoms and possible triggers for night time anxiety.  Tips for Sleep:   Goal: Obtain a minimum of 7-8hours of continuous, uninterrupted, restful sleep per night.   Tips for Sleep Hygiene:  - Go to bed and wake up at consistent times whether work/school day or not.   - Keep room dark, quiet, and comfortable.  " Increase exposure to sunlight during awake times and avoid bright lights (especially anything with a backlight) at least the last 1-2 hours before going to sleep.   - Don't nap.   - Avoid stimulant or caffeine use more than 4 hours after wake time.   - Use bedroom only for sleep or sex, no TV or phones before bed.    - traZODone (DESYREL) 50 MG Tab; Take 1 Tablet by mouth at bedtime.  Dispense: 90 Tablet; Refill: 3     Return in about 6 weeks (around 2/1/2023) for Follow up Labs.     Please note that this dictation was created using voice recognition software. I have worked with consultants from the vendor as well as technical experts from [facility] [employer] to optimize the interface. I have made every reasonable attempt to correct obvious errors, but I expect that there are errors of grammar and possibly content that I did not discover before finalizing the note.

## 2022-12-29 NOTE — ASSESSMENT & PLAN NOTE
Chronic, ongoing.  Had previously been prescribed fluoxetine 10 mg daily, he is slowly weaned off of the medication in June 2021 because his wife had come home and was doing better physically.  Reports that his anxiety has worsened over the last 2-3 months and is worse off at night.  Reports that he will lay down at night and wake up feeling that something is choking him.  He said an anxiety feeling only happens when he he is sleeping and it will wake him up suddenly and he feels like things are closing in.  When this occurs, he will turn on the light, watch TV until it passes, which takes about 30 minutes.  He has been more stressed with the declining health of his wife as well as the unexpected illness of his 12-year-old [place] Goss who defibrillators, and the dog will be able to have surgery to remove a tumor off of its liver.  Reports that the anxiety at night does not happen every night.  He was previously prescribed trazodone for insomnia, states that he took half of a pill last night and helped.

## 2023-01-11 DIAGNOSIS — E11.29 TYPE 2 DIABETES MELLITUS WITH MICROALBUMINURIA, WITHOUT LONG-TERM CURRENT USE OF INSULIN (HCC): ICD-10-CM

## 2023-01-11 DIAGNOSIS — R80.9 TYPE 2 DIABETES MELLITUS WITH MICROALBUMINURIA, WITHOUT LONG-TERM CURRENT USE OF INSULIN (HCC): ICD-10-CM

## 2023-01-12 NOTE — TELEPHONE ENCOUNTER
Received request via: Pharmacy    Was the patient seen in the last year in this department? Yes    Does the patient have an active prescription (recently filled or refills available) for medication(s) requested? No    Does the patient have assisted Plus and need 100 day supply (blood pressure, diabetes and cholesterol meds only)? Yes, quantity updated to 100 days

## 2023-01-15 RX ORDER — METFORMIN HYDROCHLORIDE 750 MG/1
750 TABLET, EXTENDED RELEASE ORAL 2 TIMES DAILY
Qty: 200 TABLET | Refills: 0 | Status: SHIPPED | OUTPATIENT
Start: 2023-01-15 | End: 2023-04-27

## 2023-01-16 NOTE — TELEPHONE ENCOUNTER
Requested Prescriptions     Pending Prescriptions Disp Refills   • metFORMIN ER (GLUCOPHAGE XR) 750 MG TABLET SR 24 HR [Pharmacy Med Name: METFORMIN HCL ER 750MG TB24] 200 Tablet 0     Sig: TAKE 1 TABLET BY MOUTH 2 TIMES A DAY       JOSÉ MIGUEL Siddiqui.

## 2023-01-26 ENCOUNTER — TELEPHONE (OUTPATIENT)
Dept: MEDICAL GROUP | Facility: PHYSICIAN GROUP | Age: 68
End: 2023-01-26
Payer: MEDICARE

## 2023-01-26 NOTE — TELEPHONE ENCOUNTER
Future Appointments         Provider Department Center    2/1/2023 10:00 AM (Arrive by 9:45 AM) TANISHA Siddiqui [facility] [place]          ESTABLISHED PATIENT PRE-VISIT PLANNING     Patient was contacted to complete PVP.     Note: Patient will not be contacted if there is no indication to call.     1.  Reviewed notes from the last few office visits within the medical group: Yes, LOV 12/21/2022    2.  If any orders were placed at last visit or intended to be done for this visit (i.e. 6 mos follow-up), do we have Results/Consult Notes?           Labs - Labs ordered, NOT completed. Patient advised to complete prior to next appointment.  Note: If patient appointment is for lab review and patient did not complete labs, check with provider if OK to reschedule patient until labs completed.         Imaging - Imaging was not ordered at last office visit.         Referrals - No referrals were ordered at last office visit.    3. Is this appointment scheduled as a Hospital Follow-Up? No    4.  Immunizations were updated in Epic using Reconcile Outside Information activity? Yes    5.  Patient is due for the following Health Maintenance Topics:   Health Maintenance Due   Topic Date Due    COVID-19 Vaccine (4 - Booster for Moderna series) 01/26/2022    A1C SCREENING  01/20/2023     6.  AHA (Pulse8) form printed for Provider? N/A  Patient was reminded to complete labs and of his check in time.

## 2023-01-27 ENCOUNTER — HOSPITAL ENCOUNTER (OUTPATIENT)
Dept: LAB | Facility: MEDICAL CENTER | Age: 68
End: 2023-01-27
Attending: NURSE PRACTITIONER
Payer: MEDICARE

## 2023-01-27 DIAGNOSIS — E11.29 TYPE 2 DIABETES MELLITUS WITH MICROALBUMINURIA, WITHOUT LONG-TERM CURRENT USE OF INSULIN (HCC): ICD-10-CM

## 2023-01-27 DIAGNOSIS — R80.9 TYPE 2 DIABETES MELLITUS WITH MICROALBUMINURIA, WITHOUT LONG-TERM CURRENT USE OF INSULIN (HCC): ICD-10-CM

## 2023-01-27 DIAGNOSIS — D51.3 OTHER DIETARY VITAMIN B12 DEFICIENCY ANEMIA: ICD-10-CM

## 2023-01-27 LAB
BASOPHILS # BLD AUTO: 0.7 % (ref 0–1.8)
BASOPHILS # BLD: 0.07 K/UL (ref 0–0.12)
EOSINOPHIL # BLD AUTO: 0.14 K/UL (ref 0–0.51)
EOSINOPHIL NFR BLD: 1.5 % (ref 0–6.9)
ERYTHROCYTE [DISTWIDTH] IN BLOOD BY AUTOMATED COUNT: 45.1 FL (ref 35.9–50)
EST. AVERAGE GLUCOSE BLD GHB EST-MCNC: 189 MG/DL
HBA1C MFR BLD: 8.2 % (ref 4–5.6)
HCT VFR BLD AUTO: 41 % (ref 42–52)
HGB BLD-MCNC: 14.3 G/DL (ref 14–18)
IMM GRANULOCYTES # BLD AUTO: 0.04 K/UL (ref 0–0.11)
IMM GRANULOCYTES NFR BLD AUTO: 0.4 % (ref 0–0.9)
LYMPHOCYTES # BLD AUTO: 2.1 K/UL (ref 1–4.8)
LYMPHOCYTES NFR BLD: 22.4 % (ref 22–41)
MCH RBC QN AUTO: 33.9 PG (ref 27–33)
MCHC RBC AUTO-ENTMCNC: 34.9 G/DL (ref 33.7–35.3)
MCV RBC AUTO: 97.2 FL (ref 81.4–97.8)
MONOCYTES # BLD AUTO: 0.88 K/UL (ref 0–0.85)
MONOCYTES NFR BLD AUTO: 9.4 % (ref 0–13.4)
NEUTROPHILS # BLD AUTO: 6.13 K/UL (ref 1.82–7.42)
NEUTROPHILS NFR BLD: 65.6 % (ref 44–72)
NRBC # BLD AUTO: 0 K/UL
NRBC BLD-RTO: 0 /100 WBC
PLATELET # BLD AUTO: 342 K/UL (ref 164–446)
PMV BLD AUTO: 10.6 FL (ref 9–12.9)
RBC # BLD AUTO: 4.22 M/UL (ref 4.7–6.1)
VIT B12 SERPL-MCNC: 2597 PG/ML (ref 211–911)
WBC # BLD AUTO: 9.4 K/UL (ref 4.8–10.8)

## 2023-01-27 PROCEDURE — 85025 COMPLETE CBC W/AUTO DIFF WBC: CPT

## 2023-01-27 PROCEDURE — 83036 HEMOGLOBIN GLYCOSYLATED A1C: CPT

## 2023-01-27 PROCEDURE — 82607 VITAMIN B-12: CPT

## 2023-01-27 PROCEDURE — 36415 COLL VENOUS BLD VENIPUNCTURE: CPT

## 2023-02-01 ENCOUNTER — OFFICE VISIT (OUTPATIENT)
Dept: MEDICAL GROUP | Facility: PHYSICIAN GROUP | Age: 68
End: 2023-02-01
Payer: MEDICARE

## 2023-02-01 VITALS
DIASTOLIC BLOOD PRESSURE: 76 MMHG | BODY MASS INDEX: 28.77 KG/M2 | TEMPERATURE: 97.9 F | HEIGHT: 70 IN | WEIGHT: 201 LBS | HEART RATE: 84 BPM | OXYGEN SATURATION: 97 % | SYSTOLIC BLOOD PRESSURE: 130 MMHG | RESPIRATION RATE: 16 BRPM

## 2023-02-01 DIAGNOSIS — E11.29 TYPE 2 DIABETES MELLITUS WITH MICROALBUMINURIA, WITHOUT LONG-TERM CURRENT USE OF INSULIN (HCC): ICD-10-CM

## 2023-02-01 DIAGNOSIS — I77.810 AORTIC ECTASIA, THORACIC (HCC): ICD-10-CM

## 2023-02-01 DIAGNOSIS — E11.29 MICROALBUMINURIA DUE TO TYPE 2 DIABETES MELLITUS (HCC): ICD-10-CM

## 2023-02-01 DIAGNOSIS — F11.20 UNCOMPLICATED OPIOID DEPENDENCE (HCC): ICD-10-CM

## 2023-02-01 DIAGNOSIS — R80.9 MICROALBUMINURIA DUE TO TYPE 2 DIABETES MELLITUS (HCC): ICD-10-CM

## 2023-02-01 DIAGNOSIS — J84.10 CALCIFIED GRANULOMA OF LUNG (HCC): ICD-10-CM

## 2023-02-01 DIAGNOSIS — F32.9 REACTIVE DEPRESSION: ICD-10-CM

## 2023-02-01 DIAGNOSIS — R80.9 TYPE 2 DIABETES MELLITUS WITH MICROALBUMINURIA, WITHOUT LONG-TERM CURRENT USE OF INSULIN (HCC): ICD-10-CM

## 2023-02-01 PROCEDURE — 99214 OFFICE O/P EST MOD 30 MIN: CPT | Performed by: NURSE PRACTITIONER

## 2023-02-01 RX ORDER — EMPAGLIFLOZIN 25 MG/1
25 TABLET, FILM COATED ORAL DAILY
Qty: 90 TABLET | Refills: 3 | Status: SHIPPED | OUTPATIENT
Start: 2023-02-01 | End: 2023-05-01 | Stop reason: SDUPTHER

## 2023-02-01 RX ADMIN — CYANOCOBALAMIN 1000 MCG: 1000 INJECTION, SOLUTION INTRAMUSCULAR; SUBCUTANEOUS at 11:13

## 2023-02-01 ASSESSMENT — PATIENT HEALTH QUESTIONNAIRE - PHQ9
9. THOUGHTS THAT YOU WOULD BE BETTER OFF DEAD, OR OF HURTING YOURSELF: NOT AT ALL
SUM OF ALL RESPONSES TO PHQ9 QUESTIONS 1 AND 2: 0
SUM OF ALL RESPONSES TO PHQ QUESTIONS 1-9: 0
8. MOVING OR SPEAKING SO SLOWLY THAT OTHER PEOPLE COULD HAVE NOTICED. OR THE OPPOSITE, BEING SO FIGETY OR RESTLESS THAT YOU HAVE BEEN MOVING AROUND A LOT MORE THAN USUAL: NOT AT ALL
2. FEELING DOWN, DEPRESSED, IRRITABLE, OR HOPELESS: NOT AT ALL
5. POOR APPETITE OR OVEREATING: NOT AT ALL
6. FEELING BAD ABOUT YOURSELF - OR THAT YOU ARE A FAILURE OR HAVE LET YOURSELF OR YOUR FAMILY DOWN: NOT AL ALL
3. TROUBLE FALLING OR STAYING ASLEEP OR SLEEPING TOO MUCH: NOT AT ALL
1. LITTLE INTEREST OR PLEASURE IN DOING THINGS: NOT AT ALL
4. FEELING TIRED OR HAVING LITTLE ENERGY: NOT AT ALL
7. TROUBLE CONCENTRATING ON THINGS, SUCH AS READING THE NEWSPAPER OR WATCHING TELEVISION: NOT AT ALL

## 2023-02-01 ASSESSMENT — FIBROSIS 4 INDEX: FIB4 SCORE: 0.86

## 2023-02-01 NOTE — ASSESSMENT & PLAN NOTE
Chronic, worsening.  Patient reports that he has been less active recently.  He is eating Pasta every once in a while and some salads.  Continues metformin  mg twice daily.

## 2023-02-01 NOTE — ASSESSMENT & PLAN NOTE
"Chronic, ongoing.  No longer taking fluoxetine 10 mg daily.  Continues trazodone 50 mg every bedtime which helps with his sleep which also helps with his mood.  He does feel that at sometimes he has anxiety and night and feels like \"something is closing in\".  He does notice some irritation during the day, feels that there is not enough time in the day.  "

## 2023-02-09 PROBLEM — F11.20 UNCOMPLICATED OPIOID DEPENDENCE (HCC): Status: RESOLVED | Noted: 2020-02-20 | Resolved: 2023-02-09

## 2023-02-09 NOTE — ASSESSMENT & PLAN NOTE
Resolved.  Continues to follow with Dr. Turcios for pain management.  No longer taking tramadol or opioids.

## 2023-02-09 NOTE — ASSESSMENT & PLAN NOTE
Chronic, ongoing.  Continues to be a non-smoker since 2013. Due for annual lung cancer screening CT in August 2023.

## 2023-02-09 NOTE — ASSESSMENT & PLAN NOTE
Chronic, ongoing.  Noted on chest CT in March 2019.  Continues pravastatin 40 mg daily, does not take anticoagulant or aspirin due to history of GI bleed.

## 2023-02-09 NOTE — PROGRESS NOTES
"CC: Diagnoses of Type 2 diabetes mellitus with microalbuminuria, without long-term current use of insulin (HCC), Reactive depression, Aortic ectasia, thoracic (HCC), Calcified granuloma of lung (HCC), Microalbuminuria due to type 2 diabetes mellitus (HCC), and Uncomplicated opioid dependence (HCC) were pertinent to this visit.                                                                                                                                       HPI:   Fabio presents today with the following concerns:    Type 2 diabetes mellitus with microalbuminuria, without long-term current use of insulin (HCC)  Chronic, worsening.  Patient reports that he has been less active recently.  He is eating Pasta every once in a while and some salads.  Continues metformin  mg twice daily.    Reactive depression  Chronic, ongoing.  No longer taking fluoxetine 10 mg daily.  Continues trazodone 50 mg every bedtime which helps with his sleep which also helps with his mood.  He does feel that at sometimes he has anxiety and night and feels like \"something is closing in\".  He does notice some irritation during the day, feels that there is not enough time in the day.    Aortic ectasia, thoracic (HCC)  Chronic, ongoing.  Noted on chest CT in March 2019.  Continues pravastatin 40 mg daily, does not take anticoagulant or aspirin due to history of GI bleed.    Calcified granuloma of lung (HCC)  Chronic, ongoing.  Continues to be a non-smoker since 2013. Due for annual lung cancer screening CT in August 2023.    Microalbuminuria due to type 2 diabetes mellitus (CMS-HCC)  Chronic, worsening.  Patient reports that he has been less active recently.  He is eating Pasta every once in a while and some salads.  Continues metformin  mg twice daily and lisinopril 40 mg twice daily..    Uncomplicated opioid dependence (HCC)  Resolved.  Continues to follow with Dr. Turcios for pain management.  No longer taking tramadol or " opioids.    Patient Active Problem List    Diagnosis Date Noted    Anxiety 12/21/2022    Adjustment insomnia 12/21/2022    Rib pain on right side 10/04/2022    Sinus congestion 06/28/2022    Rash of foot 05/18/2022    Orthopedic aftercare 03/29/2022    Cubital tunnel syndrome, right 02/16/2022    Sneezing 02/02/2022    Functional diarrhea 02/02/2022    Aortic ectasia, thoracic ([facility]) 06/28/2021    Reactive depression 06/04/2020    Left arm pain 02/20/2020    Vitamin D deficiency 02/20/2020    Other dietary vitamin B12 deficiency anemia 01/05/2020    Calcified granuloma of lung ([facility]) 05/10/2019    Onychomycosis 06/26/2018    Dupuytren's contracture of left hand 06/26/2018    Other male erectile dysfunction 02/08/2018    Microalbuminuria due to type 2 diabetes mellitus ([facility]) 07/20/2017    Cervical radiculopathy at C7 07/20/2017    Essential hypertension 11/10/2015    Dyslipidemia 11/10/2015    Type 2 diabetes mellitus with microalbuminuria, without long-term current use of insulin ([facility]) 11/10/2015    Pain, chronic 11/10/2015     Current Outpatient Medications   Medication Sig Dispense Refill    Empagliflozin (JARDIANCE) 25 MG Tab Take 25 mg by mouth every day. 90 Tablet 3    metFORMIN ER (GLUCOPHAGE XR) 750 MG TABLET SR 24 HR TAKE 1 TABLET BY MOUTH 2 TIMES A  Tablet 0    metoprolol SR (TOPROL XL) 100 MG TABLET SR 24 HR TAKE 1 TABLET BY MOUTH EVERY DAY 90 Tablet 3    traZODone (DESYREL) 50 MG Tab Take 1 Tablet by mouth at bedtime. 90 Tablet 3    Lidocaine 4 % Patch Apply 1 patch to cover the most painful area, leave on for 12 hours/day and off for 12 hours/day before applying another patch 30 Patch 1    pravastatin (PRAVACHOL) 40 MG tablet Take 1 Tablet by mouth at bedtime. 100 Tablet 3    chlorthalidone (HYGROTON) 25 MG Tab Take 1 Tablet by mouth every day. 90 Tablet 0    fluticasone (FLONASE) 50 MCG/ACT nasal spray USE ONE SPRAY IN EACH NOSTRIL EVERY DAY 16 g 3    cetirizine (ZYRTEC) 10 MG Tab TAKE ONE TABLET  "BY MOUTH EVERY DAY 90 Tablet 3    lisinopril (PRINIVIL) 40 MG tablet Take 1 Tablet by mouth 2 times a day. 200 Tablet 3    baclofen (LIORESAL) 10 MG Tab Take 10 mg by mouth 2 times a day as needed.       Current Facility-Administered Medications   Medication Dose Route Frequency Provider Last Rate Last Admin    cyanocobalamin (VITAMIN B-12) injection 1,000 mcg  1,000 mcg Intramuscular Q30 DAYS ANDREW SiddiquiPESTELLANLakeshia   1,000 mcg at 02/01/23 1113     Allergies as of 02/01/2023 - Reviewed 02/01/2023   Allergen Reaction Noted    Penicillin g  07/01/2014    Flexeril [cyclobenzaprine]  10/07/2014    Penicillins  04/20/2009      ROS:  See HPI    /76   Pulse 84   Temp 36.6 °C (97.9 °F)   Resp 16   Ht 1.778 m (5' 10\")   Wt 91.2 kg (201 lb)   SpO2 97%   BMI 28.84 kg/m²     Physical Exam:  General: Well nourished, well developed male in NAD, awake and conversant.  Eyes: Normal conjunctiva, anicteric.  Round symmetrical pupils.  ENT: Hearing grossly intact.  No nasal discharge.  Neck: Neck is supple.  No masses or thyromegaly.  CV: No lower extremity edema.  Respiratory: Respirations are nonlabored.  No wheezing.  Abdomen: Non-Distended.  Skin: Warm.  No rashes or ulcers.  MSK: Normal ambulation.  No clubbing or cyanosis.  Neuro: Sensation and CN II-XII grossly normal.  Psych: Alert and oriented.  Cooperative, appropriate mood and affect, normal judgment.      Assessment and Plan.   67 y.o. male with the following issues:  1. Type 2 diabetes mellitus with microalbuminuria, without long-term current use of insulin (HCC)  2. Microalbuminuria due to type 2 diabetes mellitus (HCC)  Chronic, uncontrolled. Continue metformin  mg twice daily and start empagliflozin 25 mg daily. Continue lisinopril 40 mg twice daily. Follow up in 3 months in clinic to repeat A1C.  - Empagliflozin (JARDIANCE) 25 MG Tab; Take 25 mg by mouth every day.  Dispense: 90 Tablet; Refill: 3    3. Aortic ectasia, thoracic (HCC)  Chronic, " ongoing. Continue pravastatin 40 mg daily, does not need a refill at this time. Due for annual labs in July 2023.    4. Calcified granuloma of lung (HCC)  Chronic, ongoing. Due for annual lung cancer screening in August 2023.    5. Reactive depression  Chronic, ongoing. No longer taking fluoxetine. Continue trazodone 50 mg nightly, does not need a refill. Declines referral to therapy.    6. Uncomplicated opioid dependence (HCC)  Resolved, no longer taking opioid medications. Continue to follow with Dr. Turcios for pain management.     Return in about 3 months (around 5/1/2023) for Diabetes, A1C in Clinic.     Please note that this dictation was created using voice recognition software. I have worked with consultants from the vendor as well as technical experts from [facility] [employer] to optimize the interface. I have made every reasonable attempt to correct obvious errors, but I expect that there are errors of grammar and possibly content that I did not discover before finalizing the note.

## 2023-02-09 NOTE — ASSESSMENT & PLAN NOTE
Chronic, worsening.  Patient reports that he has been less active recently.  He is eating Pasta every once in a while and some salads.  Continues metformin  mg twice daily and lisinopril 40 mg twice daily..

## 2023-02-27 NOTE — ASSESSMENT & PLAN NOTE
Patient was seen on 2/2/2022 for this issue.  At that time, he had been experiencing frequent diarrhea for a couple of months up to 4-5 times per day.  His metformin was changed to Metformin  mg twice daily and he was recommended to take Metamucil packets twice daily.  Reports that he is always taking Metamucil at least once a day, but does not always get the second dose.  States that he has noticed an improvement in his diarrhea, he is only going 2-3 times a day and the stool is still loose, but more formed.  He has cut down on his coffee intake, but continues to drink coffee in the mornings.   Complex Repair And Graft Additional Text (Will Appearing After The Standard Complex Repair Text): The complex repair was not sufficient to completely close the primary defect. The remaining additional defect was repaired with the graft mentioned below.

## 2023-03-20 PROBLEM — G56.02 CARPAL TUNNEL SYNDROME ON LEFT: Status: ACTIVE | Noted: 2023-03-20

## 2023-03-23 DIAGNOSIS — I10 ESSENTIAL HYPERTENSION: ICD-10-CM

## 2023-03-23 RX ORDER — LISINOPRIL 40 MG/1
TABLET ORAL
Qty: 200 TABLET | Refills: 3 | Status: SHIPPED | OUTPATIENT
Start: 2023-03-23 | End: 2023-11-08 | Stop reason: SDUPTHER

## 2023-03-23 NOTE — TELEPHONE ENCOUNTER
Received request via: Patient    Was the patient seen in the last year in this department? Yes    Does the patient have an active prescription (recently filled or refills available) for medication(s) requested? No    Does the patient have longterm Plus and need 100 day supply (blood pressure, diabetes and cholesterol meds only)? Yes, quantity updated to 100 days

## 2023-03-24 NOTE — TELEPHONE ENCOUNTER
Requested Prescriptions     Pending Prescriptions Disp Refills   • lisinopril (PRINIVIL) 40 MG tablet [Pharmacy Med Name: LISINOPRIL 40MG TABS] 200 Tablet 3     Sig: TAKE ONE TABLET BY MOUTH TWICE A DAY       JOSÉ MIGUEL Siddiqui.

## 2023-04-05 ENCOUNTER — NON-PROVIDER VISIT (OUTPATIENT)
Dept: MEDICAL GROUP | Facility: PHYSICIAN GROUP | Age: 68
End: 2023-04-05
Payer: MEDICARE

## 2023-04-05 RX ADMIN — CYANOCOBALAMIN 1000 MCG: 1000 INJECTION, SOLUTION INTRAMUSCULAR; SUBCUTANEOUS at 00:00

## 2023-04-05 NOTE — PROGRESS NOTES
Fabio Vergara is a 67 y.o. male here for a non-provider visit for B-12 injection.    Reason for injection: deficiency   Order in MAR?: Yes  Patient supplied?:No  Minimum interval has been met for this injection (per MAR order): Yes    Patient tolerated injection and no adverse effects were observed or reported: Yes    # of Administrations remaining in MAR: 0

## 2023-04-24 ENCOUNTER — TELEPHONE (OUTPATIENT)
Dept: MEDICAL GROUP | Facility: PHYSICIAN GROUP | Age: 68
End: 2023-04-24
Payer: MEDICARE

## 2023-04-24 DIAGNOSIS — R80.9 TYPE 2 DIABETES MELLITUS WITH MICROALBUMINURIA, WITHOUT LONG-TERM CURRENT USE OF INSULIN (HCC): ICD-10-CM

## 2023-04-24 DIAGNOSIS — E11.29 TYPE 2 DIABETES MELLITUS WITH MICROALBUMINURIA, WITHOUT LONG-TERM CURRENT USE OF INSULIN (HCC): ICD-10-CM

## 2023-04-24 NOTE — TELEPHONE ENCOUNTER
Future Appointments         Provider Department Center    5/1/2023 9:20 AM (Arrive by 9:05 AM) TANISHA Siddiqui [facility] [place]          ESTABLISHED PATIENT PRE-VISIT PLANNING     Patient was NOT contacted to complete PVP.     Note: Patient will not be contacted if there is no indication to call.     1.  Reviewed notes from the last few office visits within the medical group: Yes, LOV 02/01/2023    2.  If any orders were placed at last visit or intended to be done for this visit (i.e. 6 mos follow-up), do we have Results/Consult Notes?           Labs - Labs were not ordered at last office visit.  Note: If patient appointment is for lab review and patient did not complete labs, check with provider if OK to reschedule patient until labs completed.         Imaging - Imaging was not ordered at last office visit.         Referrals - No referrals were ordered at last office visit.    3. Is this appointment scheduled as a Hospital Follow-Up? No    4.  Immunizations were updated in Epic using Reconcile Outside Information activity? Yes    5.  Patient is due for the following Health Maintenance Topics:   Health Maintenance Due   Topic Date Due    COVID-19 Vaccine (4 - Booster for Moderna series) 01/26/2022    DIABETES MONOFILAMENT / LE EXAM  05/18/2023     6.  AHA (Pulse8) form printed for Provider? N/A

## 2023-04-24 NOTE — TELEPHONE ENCOUNTER
Received request via: Pharmacy    Was the patient seen in the last year in this department? Yes    Does the patient have an active prescription (recently filled or refills available) for medication(s) requested? No    Does the patient have [facility] Plus and need 100 day supply (blood pressure, diabetes and cholesterol meds only)? Quantity updated

## 2023-04-27 RX ORDER — METFORMIN HYDROCHLORIDE 750 MG/1
TABLET, EXTENDED RELEASE ORAL
Qty: 200 TABLET | Refills: 0 | Status: SHIPPED | OUTPATIENT
Start: 2023-04-27 | End: 2023-05-01 | Stop reason: SDUPTHER

## 2023-04-27 NOTE — TELEPHONE ENCOUNTER
Requested Prescriptions     Pending Prescriptions Disp Refills   • metFORMIN ER (GLUCOPHAGE XR) 750 MG TABLET SR 24 HR [Pharmacy Med Name: METFORMIN HCL ER 750MG TB24] 200 Tablet 0     Sig: TAKE ONE TABLET BY MOUTH TWO TIMES A DAY       JOSÉ MIGUEL Siddiqui.

## 2023-05-01 ENCOUNTER — OFFICE VISIT (OUTPATIENT)
Dept: MEDICAL GROUP | Facility: PHYSICIAN GROUP | Age: 68
End: 2023-05-01
Payer: MEDICARE

## 2023-05-01 VITALS
HEIGHT: 70 IN | WEIGHT: 198 LBS | BODY MASS INDEX: 28.35 KG/M2 | SYSTOLIC BLOOD PRESSURE: 122 MMHG | DIASTOLIC BLOOD PRESSURE: 70 MMHG | OXYGEN SATURATION: 97 % | HEART RATE: 84 BPM | TEMPERATURE: 97.4 F | RESPIRATION RATE: 16 BRPM

## 2023-05-01 DIAGNOSIS — E78.5 DYSLIPIDEMIA: ICD-10-CM

## 2023-05-01 DIAGNOSIS — R80.9 TYPE 2 DIABETES MELLITUS WITH MICROALBUMINURIA, WITHOUT LONG-TERM CURRENT USE OF INSULIN (HCC): ICD-10-CM

## 2023-05-01 DIAGNOSIS — E55.9 VITAMIN D DEFICIENCY: ICD-10-CM

## 2023-05-01 DIAGNOSIS — F32.5 MAJOR DEPRESSIVE DISORDER WITH SINGLE EPISODE, IN FULL REMISSION (HCC): ICD-10-CM

## 2023-05-01 DIAGNOSIS — E11.29 TYPE 2 DIABETES MELLITUS WITH MICROALBUMINURIA, WITHOUT LONG-TERM CURRENT USE OF INSULIN (HCC): ICD-10-CM

## 2023-05-01 DIAGNOSIS — D51.3 OTHER DIETARY VITAMIN B12 DEFICIENCY ANEMIA: ICD-10-CM

## 2023-05-01 DIAGNOSIS — Z00.00 ROUTINE HEALTH MAINTENANCE: ICD-10-CM

## 2023-05-01 DIAGNOSIS — E11.29 MICROALBUMINURIA DUE TO TYPE 2 DIABETES MELLITUS (HCC): ICD-10-CM

## 2023-05-01 DIAGNOSIS — R80.9 MICROALBUMINURIA DUE TO TYPE 2 DIABETES MELLITUS (HCC): ICD-10-CM

## 2023-05-01 DIAGNOSIS — I10 ESSENTIAL HYPERTENSION: ICD-10-CM

## 2023-05-01 PROBLEM — Z47.89 ORTHOPEDIC AFTERCARE: Status: RESOLVED | Noted: 2022-03-29 | Resolved: 2023-05-01

## 2023-05-01 PROBLEM — R09.81 SINUS CONGESTION: Status: RESOLVED | Noted: 2022-06-28 | Resolved: 2023-05-01

## 2023-05-01 PROBLEM — R06.7 SNEEZING: Status: RESOLVED | Noted: 2022-02-02 | Resolved: 2023-05-01

## 2023-05-01 LAB
HBA1C MFR BLD: 7.5 % (ref ?–5.8)
POCT INT CON NEG: NEGATIVE
POCT INT CON POS: POSITIVE

## 2023-05-01 PROCEDURE — 83036 HEMOGLOBIN GLYCOSYLATED A1C: CPT | Performed by: NURSE PRACTITIONER

## 2023-05-01 PROCEDURE — 99214 OFFICE O/P EST MOD 30 MIN: CPT | Performed by: NURSE PRACTITIONER

## 2023-05-01 RX ORDER — EMPAGLIFLOZIN 25 MG/1
25 TABLET, FILM COATED ORAL DAILY
Qty: 100 TABLET | Refills: 3 | Status: ON HOLD
Start: 2023-05-01 | End: 2023-06-16

## 2023-05-01 RX ORDER — METFORMIN HYDROCHLORIDE 750 MG/1
750 TABLET, EXTENDED RELEASE ORAL 2 TIMES DAILY
Qty: 200 TABLET | Refills: 3 | Status: SHIPPED
Start: 2023-05-01 | End: 2023-07-03

## 2023-05-01 RX ORDER — LIDOCAINE 50 MG/G
2 PATCH TOPICAL PRN
Status: ON HOLD | COMMUNITY
Start: 2023-04-20 | End: 2023-07-17

## 2023-05-01 ASSESSMENT — PATIENT HEALTH QUESTIONNAIRE - PHQ9: CLINICAL INTERPRETATION OF PHQ2 SCORE: 0

## 2023-05-01 ASSESSMENT — FIBROSIS 4 INDEX: FIB4 SCORE: 0.86

## 2023-05-01 NOTE — PROGRESS NOTES
CC: Diagnoses of Type 2 diabetes mellitus with microalbuminuria, without long-term current use of insulin (HCC), Microalbuminuria due to type 2 diabetes mellitus (HCC), Major depressive disorder with single episode, in full remission (HCC), Dyslipidemia, Essential hypertension, Other dietary vitamin B12 deficiency anemia, Vitamin D deficiency, and Routine health maintenance were pertinent to this visit.                                                                                                                                       HPI:   Fabio presents today with the following concerns:    Type 2 diabetes mellitus with microalbuminuria, without long-term current use of insulin (HCC)  Chronic, ongoing.  Follows up today to review diabetes and A1c.  Last A1c 8.2%.  Patient continues metformin  mg twice daily and follows up after starting empagliflozin 25 mg daily.  Patient denies any side effects of the medication.  Reports that he feels like he has more energy since starting empagliflozin.  He has decreased his bread and pasta intake, eats less ice cream than previously.  He is not much of a snacker.  Will have a sandwich or hamburger, some chips, soup, or solids for dinner.  He tries to stay active in his backyard, but chronic back pain limits this.    Major depressive disorder with single episode, in full remission (HCC)  Chronic, ongoing without medication.  Patient reports that he feels okay mentally.  He is physically tired from being the primary caregiver to his wife.  He does continue trazodone nightly to help with sleeping.  Continues to follow with neurosurgery for chronic neck and back pain. The patient denies any suicidal ideations or thoughts of harming himself or others.       7/25/2022     9:20 AM 2/1/2023     9:46 AM 5/1/2023     9:20 AM   Depression Screen (PHQ-2/PHQ-9)   PHQ-2 Total Score  0    PHQ-2 Total Score 0  0   PHQ-9 Total Score  0      Interpretation of PHQ-9 Total Score   Score  Severity   1-4 No Depression   5-9 Mild Depression   10-14 Moderate Depression   15-19 Moderately Severe Depression   20-27 Severe Depression     Dyslipidemia  Chronic, ongoing.  Continues pravastatin 40 mg daily, denies side effects of medication.  Unable to take anticoagulants due to history of GI bleed with aspirin therapy.  Due for annual labs in July 2023.    Microalbuminuria due to type 2 diabetes mellitus (CMS-HCC)  Chronic, ongoing.  Follows up today to review diabetes and A1c.  Last A1c 8.2%.  Patient continues metformin  mg twice daily and follows up after starting empagliflozin 25 mg daily.  Patient denies any side effects of the medication.  Reports that he feels like he has more energy since starting empagliflozin.  He has decreased his bread and pasta intake, eats less ice cream than previously.  He is not much of a snacker.  Will have a sandwich or hamburger, some chips, soup, or solids for dinner.  He tries to stay active in his backyard, but chronic back pain limits this.    Other dietary vitamin B12 deficiency anemia  Chronic, ongoing.  Continues vitamin B12 injections monthly.  Due for annual labs in July 2023.    Vitamin D deficiency  Chronic, ongoing.  Continues vitamin D supplementation daily.  Due for annual labs in July 2023.    Patient Active Problem List    Diagnosis Date Noted    Carpal tunnel syndrome on left 03/20/2023    Anxiety 12/21/2022    Adjustment insomnia 12/21/2022    Rib pain on right side 10/04/2022    Rash of foot 05/18/2022    Cubital tunnel syndrome, right 02/16/2022    Functional diarrhea 02/02/2022    Aortic ectasia, thoracic ([facility]) 06/28/2021    Major depressive disorder with single episode, in full remission ([facility]) 06/04/2020    Left arm pain 02/20/2020    Vitamin D deficiency 02/20/2020    Other dietary vitamin B12 deficiency anemia 01/05/2020    Calcified granuloma of lung ([facility]) 05/10/2019    Onychomycosis 06/26/2018    Dupuytren's contracture of left hand  06/26/2018    Other male erectile dysfunction 02/08/2018    Microalbuminuria due to type 2 diabetes mellitus (HCC) 07/20/2017    Cervical radiculopathy at C7 07/20/2017    Essential hypertension 11/10/2015    Dyslipidemia 11/10/2015    Type 2 diabetes mellitus with microalbuminuria, without long-term current use of insulin (HCC) 11/10/2015    Pain, chronic 11/10/2015     Current Outpatient Medications   Medication Sig Dispense Refill    metFORMIN ER (GLUCOPHAGE XR) 750 MG TABLET SR 24 HR Take 1 Tablet by mouth 2 times a day. 200 Tablet 3    Empagliflozin (JARDIANCE) 25 MG Tab Take 25 mg by mouth every day. 100 Tablet 3    lisinopril (PRINIVIL) 40 MG tablet TAKE ONE TABLET BY MOUTH TWICE A  Tablet 3    naproxen (NAPROSYN) 500 MG Tab       metoprolol SR (TOPROL XL) 100 MG TABLET SR 24 HR TAKE 1 TABLET BY MOUTH EVERY DAY 90 Tablet 3    traZODone (DESYREL) 50 MG Tab Take 1 Tablet by mouth at bedtime. 90 Tablet 3    pravastatin (PRAVACHOL) 40 MG tablet Take 1 Tablet by mouth at bedtime. 100 Tablet 3    chlorthalidone (HYGROTON) 25 MG Tab Take 1 Tablet by mouth every day. 90 Tablet 0    fluticasone (FLONASE) 50 MCG/ACT nasal spray USE ONE SPRAY IN EACH NOSTRIL EVERY DAY 16 g 3    baclofen (LIORESAL) 10 MG Tab Take 10 mg by mouth 2 times a day as needed.      lidocaine (LIDODERM) 5 % Patch        Current Facility-Administered Medications   Medication Dose Route Frequency Provider Last Rate Last Admin    cyanocobalamin (VITAMIN B-12) injection 1,000 mcg  1,000 mcg Intramuscular Q30 DAYS Charley Harvey ALakeshiaP.RLakeshiaN.   1,000 mcg at 04/05/23 0000     Allergies as of 05/01/2023 - Reviewed 05/01/2023   Allergen Reaction Noted    Penicillin g  07/01/2014    Flexeril [cyclobenzaprine]  10/07/2014    Penicillins  04/20/2009      ROS:  Constitutional: No fevers, chills, malaise/fatigue.  Eyes: No eye pain.  ENT: No sore throat, congestion.   Resp: No cough, shortness of breath.  CV: No chest pain, leg swelling,  "palpitations.  GI: No nausea/vomiting, abdominal pain, constipation, diarrhea.  : No dysuria, hematuria.  MSK: No weakness.  Skin: No rashes.  Neuro: No dizziness, weakness, headaches.  Psych: No suicidal ideations.    All remaining systems reviewed and found to be negative, except as stated above.      /70 (BP Location: Left arm, Patient Position: Sitting, BP Cuff Size: Large adult)   Pulse 84   Temp 36.3 °C (97.4 °F) (Temporal)   Resp 16   Ht 1.778 m (5' 10\")   Wt 89.8 kg (198 lb)   SpO2 97%   BMI 28.41 kg/m²     Physical Exam:  General: Well nourished, well developed male in NAD, awake and conversant.  Eyes: Normal conjunctiva, anicteric.  Round symmetrical pupils.  ENT: Hearing grossly intact.  No nasal discharge.  Neck: Neck is supple.  No masses or thyromegaly.  CV: No lower extremity edema.  Respiratory: Respirations are nonlabored.  No wheezing.  Abdomen: Non-Distended.  Skin: Warm.  No rashes or ulcers.  MSK: Normal ambulation.  No clubbing or cyanosis.  Neuro: Sensation and CN II-XII grossly normal.  Psych: Alert and oriented.  Cooperative, appropriate mood and affect, normal judgment.      Monofilament testing with a 10 gram force: sensation intact: decreased on right  Visual Inspection: Feet without maceration, ulcers, fissures.  Pedal pulses: intact bilaterally    Assessment and Plan.   67 y.o. male with the following issues:  1. Type 2 diabetes mellitus with microalbuminuria, without long-term current use of insulin (HCC)  2. Microalbuminuria due to type 2 diabetes mellitus (HCC)  Chronic, improving.  POCT A1c 7.5%.  Continue metformin  mg twice daily and empagliflozin 25 mg daily, refill sent to pharmacy.  Monofilament completed in clinic. Due for updated annual labs in July 2023 prior to annual follow-up.  - POCT  A1C  - HEMOGLOBIN A1C; Future  - Comp Metabolic Panel; Future  - Lipid Profile; Future  - MICROALBUMIN CREAT RATIO URINE; Future  - metFORMIN ER (GLUCOPHAGE XR) 750 MG " TABLET SR 24 HR; Take 1 Tablet by mouth 2 times a day.  Dispense: 200 Tablet; Refill: 3  - Empagliflozin (JARDIANCE) 25 MG Tab; Take 25 mg by mouth every day.  Dispense: 100 Tablet; Refill: 3    3. Major depressive disorder with single episode, in full remission (HCC)  Chronic, stable without medication.  Declines medication or referral to therapy.    4. Dyslipidemia  Chronic, ongoing.  Continues pravastatin 40 mg daily, does not need refill at this time. Due for updated annual labs in July 2023 prior to annual follow-up.  - Comp Metabolic Panel; Future  - Lipid Profile; Future  - TSH WITH REFLEX TO FT4; Future    5. Essential hypertension  Chronic, ongoing.  Continues chlorthalidone 25 mg daily, lisinopril 40 mg twice daily, metoprolol  mg daily, does not need refills at this time. Due for updated annual labs in July 2023 prior to annual follow-up.  - CBC WITH DIFFERENTIAL; Future  - Comp Metabolic Panel; Future  - MICROALBUMIN CREAT RATIO URINE; Future  - TSH WITH REFLEX TO FT4; Future    6. Other dietary vitamin B12 deficiency anemia  Chronic, ongoing.  Continue monthly vitamin B12 injections, given today. Due for updated annual labs in July 2023 prior to annual follow-up.  - CBC WITH DIFFERENTIAL; Future  - VITAMIN B12; Future    7. Vitamin D deficiency  Chronic, ongoing.  Continue over-the-counter vitamin D supplement daily. Due for updated annual labs in July 2023 prior to annual follow-up.  - VITAMIN D,25 HYDROXY (DEFICIENCY); Future    8. Routine health maintenance  Due for updated annual labs in July 2023 prior to annual follow-up.  - HEMOGLOBIN A1C; Future  - CBC WITH DIFFERENTIAL; Future  - Comp Metabolic Panel; Future  - Lipid Profile; Future  - MICROALBUMIN CREAT RATIO URINE; Future  - TSH WITH REFLEX TO FT4; Future  - VITAMIN D,25 HYDROXY (DEFICIENCY); Future  - VITAMIN B12; Future     I have placed the below orders and discussed them with an approved delegating provider. The MA is performing the  below orders under the direction of Dr. Haro.      Return in about 4 months (around 8/30/2023) for AWV, Follow up Labs.     Please note that this dictation was created using voice recognition software. I have worked with consultants from the vendor as well as technical experts from [facility] to optimize the interface. I have made every reasonable attempt to correct obvious errors, but I expect that there are errors of grammar and possibly content that I did not discover before finalizing the note.

## 2023-05-01 NOTE — ASSESSMENT & PLAN NOTE
Chronic, ongoing without medication.  Patient reports that he feels okay mentally.  He is physically tired from being the primary caregiver to his wife.  He does continue trazodone nightly to help with sleeping.  Continues to follow with neurosurgery for chronic neck and back pain. The patient denies any suicidal ideations or thoughts of harming himself or others.       7/25/2022     9:20 AM 2/1/2023     9:46 AM 5/1/2023     9:20 AM   Depression Screen (PHQ-2/PHQ-9)   PHQ-2 Total Score  0    PHQ-2 Total Score 0  0   PHQ-9 Total Score  0      Interpretation of PHQ-9 Total Score   Score Severity   1-4 No Depression   5-9 Mild Depression   10-14 Moderate Depression   15-19 Moderately Severe Depression   20-27 Severe Depression

## 2023-05-01 NOTE — ASSESSMENT & PLAN NOTE
Chronic, ongoing.  Continues pravastatin 40 mg daily, denies side effects of medication.  Unable to take anticoagulants due to history of GI bleed with aspirin therapy.  Due for annual labs in July 2023.

## 2023-05-01 NOTE — ASSESSMENT & PLAN NOTE
Chronic, ongoing.  Follows up today to review diabetes and A1c.  Last A1c 8.2%.  Patient continues metformin  mg twice daily and follows up after starting empagliflozin 25 mg daily.  Patient denies any side effects of the medication.  Reports that he feels like he has more energy since starting empagliflozin.  He has decreased his bread and pasta intake, eats less ice cream than previously.  He is not much of a snacker.  Will have a sandwich or hamburger, some chips, soup, or solids for dinner.  He tries to stay active in his backyard, but chronic back pain limits this.

## 2023-05-02 ENCOUNTER — TELEPHONE (OUTPATIENT)
Dept: HEALTH INFORMATION MANAGEMENT | Facility: OTHER | Age: 68
End: 2023-05-02

## 2023-05-19 ENCOUNTER — APPOINTMENT (OUTPATIENT)
Dept: URGENT CARE | Facility: PHYSICIAN GROUP | Age: 68
End: 2023-05-19
Payer: MEDICARE

## 2023-05-22 ENCOUNTER — OFFICE VISIT (OUTPATIENT)
Dept: MEDICAL GROUP | Facility: PHYSICIAN GROUP | Age: 68
End: 2023-05-22
Payer: MEDICARE

## 2023-05-22 VITALS
BODY MASS INDEX: 27.92 KG/M2 | SYSTOLIC BLOOD PRESSURE: 122 MMHG | TEMPERATURE: 97.9 F | HEART RATE: 94 BPM | WEIGHT: 195 LBS | HEIGHT: 70 IN | OXYGEN SATURATION: 95 % | DIASTOLIC BLOOD PRESSURE: 72 MMHG

## 2023-05-22 DIAGNOSIS — H60.311 ACUTE DIFFUSE OTITIS EXTERNA OF RIGHT EAR: ICD-10-CM

## 2023-05-22 PROBLEM — H92.01 RIGHT EAR PAIN: Status: ACTIVE | Noted: 2023-05-22

## 2023-05-22 PROCEDURE — 3074F SYST BP LT 130 MM HG: CPT | Performed by: NURSE PRACTITIONER

## 2023-05-22 PROCEDURE — 3078F DIAST BP <80 MM HG: CPT | Performed by: NURSE PRACTITIONER

## 2023-05-22 PROCEDURE — 99214 OFFICE O/P EST MOD 30 MIN: CPT | Performed by: NURSE PRACTITIONER

## 2023-05-22 ASSESSMENT — FIBROSIS 4 INDEX: FIB4 SCORE: 0.87

## 2023-05-22 NOTE — ASSESSMENT & PLAN NOTE
New to examiner.  Patient reports having right ear pain for the last 2-3 weeks.  States that the pain has woken him up in the middle of the night and is keeping him from sleeping.  He has tried naproxen, acetaminophen, ibuprofen without relief.  Reports that the pain starts behind the ear and moves forward into the ear.  Also notes pressure behind both eyes.  Causes stomach upset due to the pain.  Has no left ear pain.  Does have chronic neck pain and will be seeing neurosurgery on Monday.  Continues Flonase for allergies.  Denies fevers or chills.

## 2023-05-23 NOTE — PROGRESS NOTES
CC: The encounter diagnosis was Acute diffuse otitis externa of right ear.                                                                                                                                       HPI:   Fabio presents today with the following concerns:    Right ear pain  New to examiner.  Patient reports having right ear pain for the last 2-3 weeks.  States that the pain has woken him up in the middle of the night and is keeping him from sleeping.  He has tried naproxen, acetaminophen, ibuprofen without relief.  Reports that the pain starts behind the ear and moves forward into the ear.  Also notes pressure behind both eyes.  Causes stomach upset due to the pain.  Has no left ear pain.  Does have chronic neck pain and will be seeing neurosurgery on Monday.  Continues Flonase for allergies.  Denies fevers or chills.    Patient Active Problem List    Diagnosis Date Noted    Right ear pain 05/22/2023    Carpal tunnel syndrome on left 03/20/2023    Anxiety 12/21/2022    Adjustment insomnia 12/21/2022    Rib pain on right side 10/04/2022    Rash of foot 05/18/2022    Cubital tunnel syndrome, right 02/16/2022    Functional diarrhea 02/02/2022    Aortic ectasia, thoracic ([facility]) 06/28/2021    Major depressive disorder with single episode, in full remission ([facility]) 06/04/2020    Left arm pain 02/20/2020    Vitamin D deficiency 02/20/2020    Other dietary vitamin B12 deficiency anemia 01/05/2020    Calcified granuloma of lung ([facility]) 05/10/2019    Onychomycosis 06/26/2018    Dupuytren's contracture of left hand 06/26/2018    Other male erectile dysfunction 02/08/2018    Microalbuminuria due to type 2 diabetes mellitus ([facility]) 07/20/2017    Cervical radiculopathy at C7 07/20/2017    Essential hypertension 11/10/2015    Dyslipidemia 11/10/2015    Type 2 diabetes mellitus with microalbuminuria, without long-term current use of insulin ([facility]) 11/10/2015    Pain, chronic 11/10/2015     Current Outpatient Medications  "  Medication Sig Dispense Refill    neomycin sulf/polymyx B sulf/HC soln (CORTISPORIN HC SOL) 3.5-16198-8 Solution Administer 3 Drops into the right ear 4 times a day for 7 days. 10 mL 0    lidocaine (LIDODERM) 5 % Patch       metFORMIN ER (GLUCOPHAGE XR) 750 MG TABLET SR 24 HR Take 1 Tablet by mouth 2 times a day. 200 Tablet 3    Empagliflozin (JARDIANCE) 25 MG Tab Take 25 mg by mouth every day. 100 Tablet 3    lisinopril (PRINIVIL) 40 MG tablet TAKE ONE TABLET BY MOUTH TWICE A  Tablet 3    naproxen (NAPROSYN) 500 MG Tab       metoprolol SR (TOPROL XL) 100 MG TABLET SR 24 HR TAKE 1 TABLET BY MOUTH EVERY DAY 90 Tablet 3    traZODone (DESYREL) 50 MG Tab Take 1 Tablet by mouth at bedtime. 90 Tablet 3    pravastatin (PRAVACHOL) 40 MG tablet Take 1 Tablet by mouth at bedtime. 100 Tablet 3    chlorthalidone (HYGROTON) 25 MG Tab Take 1 Tablet by mouth every day. 90 Tablet 0    fluticasone (FLONASE) 50 MCG/ACT nasal spray USE ONE SPRAY IN EACH NOSTRIL EVERY DAY 16 g 3    baclofen (LIORESAL) 10 MG Tab Take 10 mg by mouth 2 times a day as needed.       Current Facility-Administered Medications   Medication Dose Route Frequency Provider Last Rate Last Admin    cyanocobalamin (VITAMIN B-12) injection 1,000 mcg  1,000 mcg Intramuscular Q30 DAYS Charley Harvey A.P.R.N.   1,000 mcg at 04/05/23 0000     Allergies as of 05/22/2023 - Reviewed 05/22/2023   Allergen Reaction Noted    Penicillin g  07/01/2014    Flexeril [cyclobenzaprine]  10/07/2014    Penicillins  04/20/2009      ROS:  See HPI    /72 (BP Location: Left arm, Patient Position: Sitting, BP Cuff Size: Large adult)   Pulse 94   Temp 36.6 °C (97.9 °F) (Temporal)   Ht 1.778 m (5' 10\")   Wt 88.5 kg (195 lb)   SpO2 95%   BMI 27.98 kg/m²     Physical Exam:  General: Normal appearing. No distress.  HEENT:  Normocephalic. Eyes conjunctiva clear lids without ptosis, pupils equal and reactive to light accommodation, ears normal shape and contour, canals are " clear bilaterally, tympanic membranes are benign, right ear canal with erythema and edema and tender to touch.  Nasal mucosa benign, oropharynx is without erythema, edema or exudates. Sinuses (frontal and maxillary) nontender to palpation.  Pulmonary: Clear to ausculation.  Normal effort. No rales, rhonchi, or wheezing.  Cardiovascular: Regular rate and rhythm without murmur. Carotid and radial pulses are intact and equal bilaterally.  Abdomen: Soft, nontender, nondistended. Normal bowel sounds.   Neurologic: Grossly nonfocal.  Lymph: No cervical, supraclavicular or axillary lymph nodes are palpable.  Skin: Warm and dry.  No obvious lesions.  Musculoskeletal: Normal gait. No extremity cyanosis, clubbing, or edema.  Psych: Normal mood and affect. Alert and oriented x3. Judgment and insight is normal.     Assessment and Plan.   68 y.o. male with the following issues:  1. Acute diffuse otitis externa of right ear  New to examiner, symptoms ongoing for last 2-3 weeks.  Discussed with patient likely otitis externa, plan to treat with Cortisporin HC 3 drops into the right ear 4 times daily for 7 days.  Return to be seen if symptoms worsen or do not improve with treatment.  - neomycin sulf/polymyx B sulf/HC soln (CORTISPORIN HC SOL) [id number] Solution; Administer 3 Drops into the right ear 4 times a day for 7 days.  Dispense: 10 mL; Refill: 0     Return if symptoms worsen or fail to improve.     Please note that this dictation was created using voice recognition software. I have worked with consultants from the vendor as well as technical experts from [facility] [employer] to optimize the interface. I have made every reasonable attempt to correct obvious errors, but I expect that there are errors of grammar and possibly content that I did not discover before finalizing the note.

## 2023-05-30 ENCOUNTER — APPOINTMENT (OUTPATIENT)
Dept: ADMISSIONS | Facility: MEDICAL CENTER | Age: 68
DRG: 641 | End: 2023-05-30
Attending: NEUROLOGICAL SURGERY
Payer: MEDICARE

## 2023-06-01 ENCOUNTER — NON-PROVIDER VISIT (OUTPATIENT)
Dept: MEDICAL GROUP | Facility: PHYSICIAN GROUP | Age: 68
End: 2023-06-01
Payer: MEDICARE

## 2023-06-01 RX ADMIN — CYANOCOBALAMIN 1000 MCG: 1000 INJECTION, SOLUTION INTRAMUSCULAR; SUBCUTANEOUS at 10:42

## 2023-06-01 NOTE — PROGRESS NOTES
Fabio Vergara is a 68 y.o. male here for a non-provider visit for b12 injection.    Reason for injection: contnuationof therapy  Order in MAR?: Yes  Patient supplied?:No  Minimum interval has been met for this injection (per MAR order): Yes    Patient tolerated injection and no adverse effects were observed or reported: No    # of Administrations remaining in MAR: n/a

## 2023-06-02 ENCOUNTER — PRE-ADMISSION TESTING (OUTPATIENT)
Dept: ADMISSIONS | Facility: MEDICAL CENTER | Age: 68
DRG: 641 | End: 2023-06-02
Attending: NEUROLOGICAL SURGERY
Payer: MEDICARE

## 2023-06-02 ENCOUNTER — HOSPITAL ENCOUNTER (OUTPATIENT)
Dept: RADIOLOGY | Facility: MEDICAL CENTER | Age: 68
DRG: 641 | End: 2023-06-02
Attending: NEUROLOGICAL SURGERY | Admitting: NEUROLOGICAL SURGERY
Payer: MEDICARE

## 2023-06-02 DIAGNOSIS — G99.2 MYELOPATHY IN DISEASES CLASSIFIED ELSEWHERE (HCC): ICD-10-CM

## 2023-06-02 DIAGNOSIS — Z01.812 PRE-OPERATIVE LABORATORY EXAMINATION: ICD-10-CM

## 2023-06-02 DIAGNOSIS — M48.02 SPINAL STENOSIS OF CERVICAL REGION: ICD-10-CM

## 2023-06-02 LAB
ANION GAP SERPL CALC-SCNC: 15 MMOL/L (ref 7–16)
APPEARANCE UR: CLEAR
APTT PPP: 27.7 SEC (ref 24.7–36)
BASOPHILS # BLD AUTO: 0.8 % (ref 0–1.8)
BASOPHILS # BLD: 0.08 K/UL (ref 0–0.12)
BILIRUB UR QL STRIP.AUTO: NEGATIVE
BUN SERPL-MCNC: 11 MG/DL (ref 8–22)
CALCIUM SERPL-MCNC: 9 MG/DL (ref 8.5–10.5)
CHLORIDE SERPL-SCNC: 89 MMOL/L (ref 96–112)
CO2 SERPL-SCNC: 26 MMOL/L (ref 20–33)
COLOR UR: YELLOW
CREAT SERPL-MCNC: 0.81 MG/DL (ref 0.5–1.4)
EOSINOPHIL # BLD AUTO: 0.17 K/UL (ref 0–0.51)
EOSINOPHIL NFR BLD: 1.8 % (ref 0–6.9)
ERYTHROCYTE [DISTWIDTH] IN BLOOD BY AUTOMATED COUNT: 47 FL (ref 35.9–50)
GFR SERPLBLD CREATININE-BSD FMLA CKD-EPI: 95 ML/MIN/1.73 M 2
GLUCOSE SERPL-MCNC: 176 MG/DL (ref 65–99)
GLUCOSE UR STRIP.AUTO-MCNC: >=1000 MG/DL
HCT VFR BLD AUTO: 40.3 % (ref 42–52)
HGB BLD-MCNC: 14.1 G/DL (ref 14–18)
IMM GRANULOCYTES # BLD AUTO: 0.03 K/UL (ref 0–0.11)
IMM GRANULOCYTES NFR BLD AUTO: 0.3 % (ref 0–0.9)
INR PPP: 1 (ref 0.87–1.13)
KETONES UR STRIP.AUTO-MCNC: NEGATIVE MG/DL
LEUKOCYTE ESTERASE UR QL STRIP.AUTO: NEGATIVE
LYMPHOCYTES # BLD AUTO: 2.49 K/UL (ref 1–4.8)
LYMPHOCYTES NFR BLD: 25.8 % (ref 22–41)
MCH RBC QN AUTO: 33.7 PG (ref 27–33)
MCHC RBC AUTO-ENTMCNC: 35 G/DL (ref 32.3–36.5)
MCV RBC AUTO: 96.2 FL (ref 81.4–97.8)
MICRO URNS: ABNORMAL
MONOCYTES # BLD AUTO: 0.87 K/UL (ref 0–0.85)
MONOCYTES NFR BLD AUTO: 9 % (ref 0–13.4)
NEUTROPHILS # BLD AUTO: 6.02 K/UL (ref 1.82–7.42)
NEUTROPHILS NFR BLD: 62.3 % (ref 44–72)
NITRITE UR QL STRIP.AUTO: NEGATIVE
NRBC # BLD AUTO: 0 K/UL
NRBC BLD-RTO: 0 /100 WBC (ref 0–0.2)
PH UR STRIP.AUTO: 6.5 [PH] (ref 5–8)
PLATELET # BLD AUTO: 299 K/UL (ref 164–446)
PMV BLD AUTO: 10.6 FL (ref 9–12.9)
POTASSIUM SERPL-SCNC: 4.7 MMOL/L (ref 3.6–5.5)
PROT UR QL STRIP: NEGATIVE MG/DL
PROTHROMBIN TIME: 13.1 SEC (ref 12–14.6)
RBC # BLD AUTO: 4.19 M/UL (ref 4.7–6.1)
RBC UR QL AUTO: NEGATIVE
SODIUM SERPL-SCNC: 130 MMOL/L (ref 135–145)
SP GR UR STRIP.AUTO: 1.03
UROBILINOGEN UR STRIP.AUTO-MCNC: 0.2 MG/DL
WBC # BLD AUTO: 9.7 K/UL (ref 4.8–10.8)

## 2023-06-02 PROCEDURE — 85610 PROTHROMBIN TIME: CPT

## 2023-06-02 PROCEDURE — 85730 THROMBOPLASTIN TIME PARTIAL: CPT

## 2023-06-02 PROCEDURE — 85025 COMPLETE CBC W/AUTO DIFF WBC: CPT

## 2023-06-02 PROCEDURE — 36415 COLL VENOUS BLD VENIPUNCTURE: CPT

## 2023-06-02 PROCEDURE — 80048 BASIC METABOLIC PNL TOTAL CA: CPT

## 2023-06-02 PROCEDURE — 81003 URINALYSIS AUTO W/O SCOPE: CPT

## 2023-06-02 PROCEDURE — 71046 X-RAY EXAM CHEST 2 VIEWS: CPT

## 2023-06-02 PROCEDURE — 72040 X-RAY EXAM NECK SPINE 2-3 VW: CPT

## 2023-06-02 RX ORDER — AMMONIUM LACTATE 12 G/100G
1 LOTION TOPICAL
COMMUNITY
Start: 2023-05-30

## 2023-06-07 ENCOUNTER — APPOINTMENT (OUTPATIENT)
Dept: ADMISSIONS | Facility: MEDICAL CENTER | Age: 68
DRG: 641 | End: 2023-06-07
Attending: NEUROLOGICAL SURGERY
Payer: MEDICARE

## 2023-06-07 DIAGNOSIS — Z01.810 PRE-OPERATIVE CARDIOVASCULAR EXAMINATION: ICD-10-CM

## 2023-06-07 LAB — EKG IMPRESSION: NORMAL

## 2023-06-07 PROCEDURE — 93010 ELECTROCARDIOGRAM REPORT: CPT | Performed by: INTERNAL MEDICINE

## 2023-06-07 PROCEDURE — 93005 ELECTROCARDIOGRAM TRACING: CPT

## 2023-06-13 ENCOUNTER — ANESTHESIA EVENT (OUTPATIENT)
Dept: SURGERY | Facility: MEDICAL CENTER | Age: 68
DRG: 641 | End: 2023-06-13
Payer: MEDICARE

## 2023-06-14 ENCOUNTER — ANESTHESIA (OUTPATIENT)
Dept: SURGERY | Facility: MEDICAL CENTER | Age: 68
DRG: 641 | End: 2023-06-14
Payer: MEDICARE

## 2023-06-14 ENCOUNTER — APPOINTMENT (OUTPATIENT)
Dept: RADIOLOGY | Facility: MEDICAL CENTER | Age: 68
DRG: 641 | End: 2023-06-14
Attending: NEUROLOGICAL SURGERY
Payer: MEDICARE

## 2023-06-14 ENCOUNTER — HOSPITAL ENCOUNTER (INPATIENT)
Facility: MEDICAL CENTER | Age: 68
LOS: 2 days | DRG: 641 | End: 2023-06-16
Attending: NEUROLOGICAL SURGERY | Admitting: HOSPITALIST
Payer: MEDICARE

## 2023-06-14 DIAGNOSIS — D51.3 OTHER DIETARY VITAMIN B12 DEFICIENCY ANEMIA: ICD-10-CM

## 2023-06-14 DIAGNOSIS — E87.1 HYPONATREMIA: ICD-10-CM

## 2023-06-14 LAB
ALBUMIN SERPL BCP-MCNC: 4.3 G/DL (ref 3.2–4.9)
ALBUMIN/GLOB SERPL: 1.9 G/DL
ALP SERPL-CCNC: 59 U/L (ref 30–99)
ALT SERPL-CCNC: 21 U/L (ref 2–50)
ANION GAP SERPL CALC-SCNC: 12 MMOL/L (ref 7–16)
ANION GAP SERPL CALC-SCNC: 14 MMOL/L (ref 7–16)
ANION GAP SERPL CALC-SCNC: 15 MMOL/L (ref 7–16)
ANION GAP SERPL CALC-SCNC: 16 MMOL/L (ref 7–16)
AST SERPL-CCNC: 22 U/L (ref 12–45)
BILIRUB SERPL-MCNC: 0.6 MG/DL (ref 0.1–1.5)
BUN SERPL-MCNC: 10 MG/DL (ref 8–22)
BUN SERPL-MCNC: 6 MG/DL (ref 8–22)
BUN SERPL-MCNC: 6 MG/DL (ref 8–22)
BUN SERPL-MCNC: 7 MG/DL (ref 8–22)
CALCIUM ALBUM COR SERPL-MCNC: 8.7 MG/DL (ref 8.5–10.5)
CALCIUM SERPL-MCNC: 8.8 MG/DL (ref 8.5–10.5)
CALCIUM SERPL-MCNC: 8.9 MG/DL (ref 8.5–10.5)
CALCIUM SERPL-MCNC: 8.9 MG/DL (ref 8.5–10.5)
CALCIUM SERPL-MCNC: 9.1 MG/DL (ref 8.5–10.5)
CHLORIDE SERPL-SCNC: 80 MMOL/L (ref 96–112)
CHLORIDE SERPL-SCNC: 80 MMOL/L (ref 96–112)
CHLORIDE SERPL-SCNC: 83 MMOL/L (ref 96–112)
CHLORIDE SERPL-SCNC: 86 MMOL/L (ref 96–112)
CO2 SERPL-SCNC: 23 MMOL/L (ref 20–33)
CO2 SERPL-SCNC: 25 MMOL/L (ref 20–33)
CO2 SERPL-SCNC: 27 MMOL/L (ref 20–33)
CO2 SERPL-SCNC: 27 MMOL/L (ref 20–33)
CREAT SERPL-MCNC: 0.61 MG/DL (ref 0.5–1.4)
CREAT SERPL-MCNC: 0.62 MG/DL (ref 0.5–1.4)
CREAT SERPL-MCNC: 0.65 MG/DL (ref 0.5–1.4)
CREAT SERPL-MCNC: 0.82 MG/DL (ref 0.5–1.4)
GFR SERPLBLD CREATININE-BSD FMLA CKD-EPI: 101 ML/MIN/1.73 M 2
GFR SERPLBLD CREATININE-BSD FMLA CKD-EPI: 103 ML/MIN/1.73 M 2
GFR SERPLBLD CREATININE-BSD FMLA CKD-EPI: 103 ML/MIN/1.73 M 2
GFR SERPLBLD CREATININE-BSD FMLA CKD-EPI: 94 ML/MIN/1.73 M 2
GLOBULIN SER CALC-MCNC: 2.3 G/DL (ref 1.9–3.5)
GLUCOSE BLD STRIP.AUTO-MCNC: 130 MG/DL (ref 65–99)
GLUCOSE BLD STRIP.AUTO-MCNC: 137 MG/DL (ref 65–99)
GLUCOSE SERPL-MCNC: 133 MG/DL (ref 65–99)
GLUCOSE SERPL-MCNC: 139 MG/DL (ref 65–99)
GLUCOSE SERPL-MCNC: 155 MG/DL (ref 65–99)
GLUCOSE SERPL-MCNC: 169 MG/DL (ref 65–99)
MAGNESIUM SERPL-MCNC: 1.5 MG/DL (ref 1.5–2.5)
OSMOLALITY SERPL: 251 MOSM/KG H2O (ref 278–298)
OSMOLALITY UR: 196 MOSM/KG H2O (ref 300–900)
POTASSIUM SERPL-SCNC: 3.4 MMOL/L (ref 3.6–5.5)
POTASSIUM SERPL-SCNC: 3.5 MMOL/L (ref 3.6–5.5)
POTASSIUM SERPL-SCNC: 3.6 MMOL/L (ref 3.6–5.5)
POTASSIUM SERPL-SCNC: 3.8 MMOL/L (ref 3.6–5.5)
PROT SERPL-MCNC: 6.6 G/DL (ref 6–8.2)
SODIUM SERPL-SCNC: 119 MMOL/L (ref 135–145)
SODIUM SERPL-SCNC: 120 MMOL/L (ref 135–145)
SODIUM SERPL-SCNC: 120 MMOL/L (ref 135–145)
SODIUM SERPL-SCNC: 124 MMOL/L (ref 135–145)
SODIUM SERPL-SCNC: 125 MMOL/L (ref 135–145)
SODIUM UR-SCNC: 24 MMOL/L

## 2023-06-14 PROCEDURE — 700105 HCHG RX REV CODE 258: Performed by: HOSPITALIST

## 2023-06-14 PROCEDURE — 82962 GLUCOSE BLOOD TEST: CPT

## 2023-06-14 PROCEDURE — 80048 BASIC METABOLIC PNL TOTAL CA: CPT | Mod: 91

## 2023-06-14 PROCEDURE — 36415 COLL VENOUS BLD VENIPUNCTURE: CPT

## 2023-06-14 PROCEDURE — 83735 ASSAY OF MAGNESIUM: CPT

## 2023-06-14 PROCEDURE — 83935 ASSAY OF URINE OSMOLALITY: CPT

## 2023-06-14 PROCEDURE — 700102 HCHG RX REV CODE 250 W/ 637 OVERRIDE(OP): Performed by: HOSPITALIST

## 2023-06-14 PROCEDURE — 700111 HCHG RX REV CODE 636 W/ 250 OVERRIDE (IP): Performed by: HOSPITALIST

## 2023-06-14 PROCEDURE — 99222 1ST HOSP IP/OBS MODERATE 55: CPT | Mod: AI | Performed by: HOSPITALIST

## 2023-06-14 PROCEDURE — 84300 ASSAY OF URINE SODIUM: CPT

## 2023-06-14 PROCEDURE — A9270 NON-COVERED ITEM OR SERVICE: HCPCS | Performed by: HOSPITALIST

## 2023-06-14 PROCEDURE — 80053 COMPREHEN METABOLIC PANEL: CPT

## 2023-06-14 PROCEDURE — 770020 HCHG ROOM/CARE - TELE (206)

## 2023-06-14 PROCEDURE — 84295 ASSAY OF SERUM SODIUM: CPT

## 2023-06-14 PROCEDURE — 700105 HCHG RX REV CODE 258: Performed by: NEUROLOGICAL SURGERY

## 2023-06-14 PROCEDURE — 83930 ASSAY OF BLOOD OSMOLALITY: CPT

## 2023-06-14 RX ORDER — AMOXICILLIN 250 MG
2 CAPSULE ORAL 2 TIMES DAILY
Status: DISCONTINUED | OUTPATIENT
Start: 2023-06-14 | End: 2023-06-16 | Stop reason: HOSPADM

## 2023-06-14 RX ORDER — SODIUM CHLORIDE 9 MG/ML
INJECTION, SOLUTION INTRAVENOUS CONTINUOUS
Status: ACTIVE | OUTPATIENT
Start: 2023-06-14 | End: 2023-06-14

## 2023-06-14 RX ORDER — SODIUM CHLORIDE, SODIUM LACTATE, POTASSIUM CHLORIDE, CALCIUM CHLORIDE 600; 310; 30; 20 MG/100ML; MG/100ML; MG/100ML; MG/100ML
INJECTION, SOLUTION INTRAVENOUS CONTINUOUS
Status: ACTIVE | OUTPATIENT
Start: 2023-06-14 | End: 2023-06-14

## 2023-06-14 RX ORDER — METOPROLOL SUCCINATE 100 MG/1
100 TABLET, EXTENDED RELEASE ORAL DAILY
Status: DISCONTINUED | OUTPATIENT
Start: 2023-06-15 | End: 2023-06-16 | Stop reason: HOSPADM

## 2023-06-14 RX ORDER — FLUTICASONE PROPIONATE 50 MCG
1 SPRAY, SUSPENSION (ML) NASAL
Status: DISCONTINUED | OUTPATIENT
Start: 2023-06-14 | End: 2023-06-14

## 2023-06-14 RX ORDER — ACETAMINOPHEN 500 MG
500-1000 TABLET ORAL EVERY 6 HOURS PRN
COMMUNITY
End: 2023-07-21

## 2023-06-14 RX ORDER — LIDOCAINE 50 MG/G
2 PATCH TOPICAL
Status: DISCONTINUED | OUTPATIENT
Start: 2023-06-14 | End: 2023-06-16 | Stop reason: HOSPADM

## 2023-06-14 RX ORDER — OXYCODONE HYDROCHLORIDE 10 MG/1
10 TABLET ORAL
Status: DISCONTINUED | OUTPATIENT
Start: 2023-06-14 | End: 2023-06-16 | Stop reason: HOSPADM

## 2023-06-14 RX ORDER — BACLOFEN 10 MG/1
10 TABLET ORAL 2 TIMES DAILY PRN
Status: DISCONTINUED | OUTPATIENT
Start: 2023-06-14 | End: 2023-06-16 | Stop reason: HOSPADM

## 2023-06-14 RX ORDER — POTASSIUM CHLORIDE 20 MEQ/1
40 TABLET, EXTENDED RELEASE ORAL EVERY 6 HOURS
Status: DISPENSED | OUTPATIENT
Start: 2023-06-14 | End: 2023-06-14

## 2023-06-14 RX ORDER — CYANOCOBALAMIN 1000 UG/ML
1000 INJECTION, SOLUTION INTRAMUSCULAR; SUBCUTANEOUS
Status: DISCONTINUED | OUTPATIENT
Start: 2023-07-01 | End: 2023-06-16 | Stop reason: HOSPADM

## 2023-06-14 RX ORDER — LABETALOL HYDROCHLORIDE 5 MG/ML
10 INJECTION, SOLUTION INTRAVENOUS EVERY 6 HOURS PRN
Status: DISCONTINUED | OUTPATIENT
Start: 2023-06-14 | End: 2023-06-16 | Stop reason: HOSPADM

## 2023-06-14 RX ORDER — ACETAMINOPHEN 325 MG/1
650 TABLET ORAL EVERY 6 HOURS PRN
Status: DISCONTINUED | OUTPATIENT
Start: 2023-06-14 | End: 2023-06-16 | Stop reason: HOSPADM

## 2023-06-14 RX ORDER — LISINOPRIL 20 MG/1
40 TABLET ORAL DAILY
Status: DISCONTINUED | OUTPATIENT
Start: 2023-06-14 | End: 2023-06-16 | Stop reason: HOSPADM

## 2023-06-14 RX ORDER — TRAZODONE HYDROCHLORIDE 50 MG/1
50 TABLET ORAL
Status: DISCONTINUED | OUTPATIENT
Start: 2023-06-14 | End: 2023-06-16 | Stop reason: HOSPADM

## 2023-06-14 RX ORDER — METFORMIN HYDROCHLORIDE 750 MG/1
750 TABLET, EXTENDED RELEASE ORAL 2 TIMES DAILY
Status: DISCONTINUED | OUTPATIENT
Start: 2023-06-14 | End: 2023-06-16 | Stop reason: HOSPADM

## 2023-06-14 RX ORDER — SODIUM CHLORIDE 9 MG/ML
INJECTION, SOLUTION INTRAVENOUS
Status: DISPENSED
Start: 2023-06-14 | End: 2023-06-14

## 2023-06-14 RX ORDER — POLYETHYLENE GLYCOL 3350 17 G/17G
1 POWDER, FOR SOLUTION ORAL
Status: DISCONTINUED | OUTPATIENT
Start: 2023-06-14 | End: 2023-06-16 | Stop reason: HOSPADM

## 2023-06-14 RX ORDER — BISACODYL 10 MG
10 SUPPOSITORY, RECTAL RECTAL
Status: DISCONTINUED | OUTPATIENT
Start: 2023-06-14 | End: 2023-06-16 | Stop reason: HOSPADM

## 2023-06-14 RX ORDER — ENOXAPARIN SODIUM 100 MG/ML
40 INJECTION SUBCUTANEOUS DAILY
Status: DISCONTINUED | OUTPATIENT
Start: 2023-06-14 | End: 2023-06-16 | Stop reason: HOSPADM

## 2023-06-14 RX ORDER — PRAVASTATIN SODIUM 20 MG
40 TABLET ORAL DAILY
Status: DISCONTINUED | OUTPATIENT
Start: 2023-06-15 | End: 2023-06-16 | Stop reason: HOSPADM

## 2023-06-14 RX ORDER — MAGNESIUM SULFATE HEPTAHYDRATE 40 MG/ML
4 INJECTION, SOLUTION INTRAVENOUS ONCE
Status: COMPLETED | OUTPATIENT
Start: 2023-06-14 | End: 2023-06-14

## 2023-06-14 RX ORDER — OXYCODONE HYDROCHLORIDE 5 MG/1
5 TABLET ORAL
Status: DISCONTINUED | OUTPATIENT
Start: 2023-06-14 | End: 2023-06-16 | Stop reason: HOSPADM

## 2023-06-14 RX ADMIN — LISINOPRIL 40 MG: 20 TABLET ORAL at 15:17

## 2023-06-14 RX ADMIN — MAGNESIUM SULFATE HEPTAHYDRATE 4 G: 40 INJECTION, SOLUTION INTRAVENOUS at 15:21

## 2023-06-14 RX ADMIN — ACETAMINOPHEN 650 MG: 325 TABLET, FILM COATED ORAL at 20:08

## 2023-06-14 RX ADMIN — METFORMIN HYDROCHLORIDE 750 MG: 750 TABLET, EXTENDED RELEASE ORAL at 20:09

## 2023-06-14 RX ADMIN — SODIUM CHLORIDE, POTASSIUM CHLORIDE, SODIUM LACTATE AND CALCIUM CHLORIDE: 600; 310; 30; 20 INJECTION, SOLUTION INTRAVENOUS at 06:37

## 2023-06-14 RX ADMIN — THERA TABS 1 TABLET: TAB at 18:07

## 2023-06-14 RX ADMIN — CHOLECALCIFEROL (VITAMIN D3) 10 MCG (400 UNIT) TABLET 400 UNITS: at 20:08

## 2023-06-14 RX ADMIN — ACETAMINOPHEN 650 MG: 325 TABLET, FILM COATED ORAL at 11:53

## 2023-06-14 RX ADMIN — SODIUM CHLORIDE: 9 INJECTION, SOLUTION INTRAVENOUS at 12:40

## 2023-06-14 RX ADMIN — SENNOSIDES AND DOCUSATE SODIUM 2 TABLET: 50; 8.6 TABLET ORAL at 18:07

## 2023-06-14 RX ADMIN — ENOXAPARIN SODIUM 40 MG: 100 INJECTION SUBCUTANEOUS at 18:08

## 2023-06-14 RX ADMIN — TRAZODONE HYDROCHLORIDE 50 MG: 50 TABLET ORAL at 21:16

## 2023-06-14 RX ADMIN — POTASSIUM CHLORIDE 40 MEQ: 1500 TABLET, EXTENDED RELEASE ORAL at 15:17

## 2023-06-14 ASSESSMENT — ENCOUNTER SYMPTOMS
DOUBLE VISION: 0
DIARRHEA: 0
CHILLS: 0
SHORTNESS OF BREATH: 0
BACK PAIN: 0
BLURRED VISION: 0
SORE THROAT: 0
FEVER: 0
ABDOMINAL PAIN: 0
VOMITING: 0
HEADACHES: 0
NAUSEA: 1
PALPITATIONS: 0
LOSS OF CONSCIOUSNESS: 0
DIZZINESS: 0
WEAKNESS: 1
COUGH: 0

## 2023-06-14 ASSESSMENT — LIFESTYLE VARIABLES
ALCOHOL_USE: YES
TOTAL SCORE: 0
ON A TYPICAL DAY WHEN YOU DRINK ALCOHOL HOW MANY DRINKS DO YOU HAVE: 1
DOES PATIENT WANT TO STOP DRINKING: NO
TOTAL SCORE: 0
HOW MANY TIMES IN THE PAST YEAR HAVE YOU HAD 5 OR MORE DRINKS IN A DAY: 1
EVER FELT BAD OR GUILTY ABOUT YOUR DRINKING: NO
AVERAGE NUMBER OF DAYS PER WEEK YOU HAVE A DRINK CONTAINING ALCOHOL: 4
HAVE PEOPLE ANNOYED YOU BY CRITICIZING YOUR DRINKING: NO
CONSUMPTION TOTAL: POSITIVE
TOTAL SCORE: 0
EVER HAD A DRINK FIRST THING IN THE MORNING TO STEADY YOUR NERVES TO GET RID OF A HANGOVER: NO
HAVE YOU EVER FELT YOU SHOULD CUT DOWN ON YOUR DRINKING: NO

## 2023-06-14 ASSESSMENT — PATIENT HEALTH QUESTIONNAIRE - PHQ9
SUM OF ALL RESPONSES TO PHQ9 QUESTIONS 1 AND 2: 0
1. LITTLE INTEREST OR PLEASURE IN DOING THINGS: NOT AT ALL
2. FEELING DOWN, DEPRESSED, IRRITABLE, OR HOPELESS: NOT AT ALL

## 2023-06-14 ASSESSMENT — FIBROSIS 4 INDEX
FIB4 SCORE: 1.12
FIB4 SCORE: 1
FIB4 SCORE: 1.12

## 2023-06-14 ASSESSMENT — PAIN DESCRIPTION - PAIN TYPE
TYPE: CHRONIC PAIN

## 2023-06-14 NOTE — ANESTHESIA PREPROCEDURE EVALUATION
Case: [id number] Date/Time: 06/14/23 0645    Procedure: POSTERIOR C2-3 LAMINECTOMY    Pre-op diagnosis: SPINAL STENOSIS IN CERVICAL REGION WITH MYELOPATHY    Location: [facility]  / SURGERY [facility]    Surgeons: Sagar Bennett M.D.        67 yo M w/ HTN, T2DM. CASE CANCELLED due to hyponatremia (Na 120)    Relevant Problems   CARDIAC   (positive) Aortic ectasia, thoracic (HCC)   (positive) Essential hypertension         (positive) Microalbuminuria due to type 2 diabetes mellitus (HCC)      ENDO   (positive) Type 2 diabetes mellitus with microalbuminuria, without long-term current use of insulin (HCC)       Physical Exam    Airway   Mallampati: II  TM distance: >3 FB  Neck ROM: full       Cardiovascular - normal exam  Rhythm: regular  Rate: normal  (-) murmur     Dental - normal exam           Pulmonary - normal exam  Breath sounds clear to auscultation     Abdominal    Neurological - normal exam                 Anesthesia Plan    ASA 2       Plan - general       Airway plan will be ETT          Induction: intravenous    Postoperative Plan: Postoperative administration of opioids is intended.    Pertinent diagnostic labs and testing reviewed    Informed Consent:    Anesthetic plan and risks discussed with patient.    Use of blood products discussed with: patient whom consented to blood products.

## 2023-06-14 NOTE — ASSESSMENT & PLAN NOTE
Etiology uncertain  Patient has mild hypokalemia, and significant hypochloremia  Water intake appears to be reasonable with a total fluid intake of around 4 quarts daily  On exam the patient is euvolemic  On review of lab he did have a sodium of 130 in July of this year, so it is unclear if this is acute or chronic in onset  Only medication change recently was to stop his Jardiance  Studies consistent with renal loses will start low dose ns - continue serial bmp and adjust as needed  Continue to follow  Holding his NSAID and diuretic

## 2023-06-14 NOTE — PROGRESS NOTES
0800: Patient cancelled in pre-op due to critically low Sodium. Md Bennett at bedside. Pt to be admitted.  0900: Hospitalist at bedside with patient, urinalysis and labs sent per order. Pt resting comfortably, updated on plan, call light within reach     1230: Patient updated new room being cleaned, Pt drinking water and diet sprite. Report called to BRIGETTE Mccarthy on floor, NS fluids started, Pt resting comfortably, drinking water and diet sprite, eating trail mix, Friend Lisette at bedside, call light within reach     1320:Lab at bedside for repeat BMP    1418: Patient transport in preop, patient, all belongings and chart transported to floor.

## 2023-06-14 NOTE — ASSESSMENT & PLAN NOTE
Patient is maintained on lisinopril 40 mg, and chlorthalidone  Holding the chlorthalidone in setting of hyponatremia
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
## 2023-06-14 NOTE — ASSESSMENT & PLAN NOTE
Patient is maintained on metformin  mg, as well as empagliflozin 25 mg.  Empagliflozin was stopped a week ago due to UTI  Last A1c 7.5, this was 1 month ago.  Continue metformin  Placed on sliding scale  Carb consistent diet

## 2023-06-14 NOTE — PROGRESS NOTES
Patient in pre-op, assessment completed, patient and friend Lisette updated on plan of care, all questions answered, no further needs at this time, call light within reach.

## 2023-06-14 NOTE — ASSESSMENT & PLAN NOTE
Related to his neck injury  Parent support  Neurosurgery is following and will need to reschedule his operative repair

## 2023-06-15 LAB
ANION GAP SERPL CALC-SCNC: 12 MMOL/L (ref 7–16)
ANION GAP SERPL CALC-SCNC: 14 MMOL/L (ref 7–16)
ANION GAP SERPL CALC-SCNC: 15 MMOL/L (ref 7–16)
ANION GAP SERPL CALC-SCNC: 15 MMOL/L (ref 7–16)
BUN SERPL-MCNC: 10 MG/DL (ref 8–22)
BUN SERPL-MCNC: 10 MG/DL (ref 8–22)
BUN SERPL-MCNC: 8 MG/DL (ref 8–22)
BUN SERPL-MCNC: 9 MG/DL (ref 8–22)
CALCIUM SERPL-MCNC: 8.9 MG/DL (ref 8.5–10.5)
CALCIUM SERPL-MCNC: 8.9 MG/DL (ref 8.5–10.5)
CALCIUM SERPL-MCNC: 9.3 MG/DL (ref 8.5–10.5)
CALCIUM SERPL-MCNC: 9.5 MG/DL (ref 8.5–10.5)
CHLORIDE SERPL-SCNC: 87 MMOL/L (ref 96–112)
CHLORIDE SERPL-SCNC: 89 MMOL/L (ref 96–112)
CHLORIDE SERPL-SCNC: 89 MMOL/L (ref 96–112)
CHLORIDE SERPL-SCNC: 91 MMOL/L (ref 96–112)
CO2 SERPL-SCNC: 24 MMOL/L (ref 20–33)
CO2 SERPL-SCNC: 25 MMOL/L (ref 20–33)
CO2 SERPL-SCNC: 25 MMOL/L (ref 20–33)
CO2 SERPL-SCNC: 26 MMOL/L (ref 20–33)
CREAT SERPL-MCNC: 0.65 MG/DL (ref 0.5–1.4)
CREAT SERPL-MCNC: 0.65 MG/DL (ref 0.5–1.4)
CREAT SERPL-MCNC: 0.74 MG/DL (ref 0.5–1.4)
CREAT SERPL-MCNC: 0.77 MG/DL (ref 0.5–1.4)
GFR SERPLBLD CREATININE-BSD FMLA CKD-EPI: 101 ML/MIN/1.73 M 2
GFR SERPLBLD CREATININE-BSD FMLA CKD-EPI: 101 ML/MIN/1.73 M 2
GFR SERPLBLD CREATININE-BSD FMLA CKD-EPI: 96 ML/MIN/1.73 M 2
GFR SERPLBLD CREATININE-BSD FMLA CKD-EPI: 97 ML/MIN/1.73 M 2
GLUCOSE BLD STRIP.AUTO-MCNC: 141 MG/DL (ref 65–99)
GLUCOSE BLD STRIP.AUTO-MCNC: 151 MG/DL (ref 65–99)
GLUCOSE BLD STRIP.AUTO-MCNC: 165 MG/DL (ref 65–99)
GLUCOSE BLD STRIP.AUTO-MCNC: 213 MG/DL (ref 65–99)
GLUCOSE SERPL-MCNC: 143 MG/DL (ref 65–99)
GLUCOSE SERPL-MCNC: 158 MG/DL (ref 65–99)
GLUCOSE SERPL-MCNC: 164 MG/DL (ref 65–99)
GLUCOSE SERPL-MCNC: 204 MG/DL (ref 65–99)
MAGNESIUM SERPL-MCNC: 2.4 MG/DL (ref 1.5–2.5)
POTASSIUM SERPL-SCNC: 3.4 MMOL/L (ref 3.6–5.5)
POTASSIUM SERPL-SCNC: 3.4 MMOL/L (ref 3.6–5.5)
POTASSIUM SERPL-SCNC: 3.6 MMOL/L (ref 3.6–5.5)
POTASSIUM SERPL-SCNC: 3.8 MMOL/L (ref 3.6–5.5)
SODIUM SERPL-SCNC: 126 MMOL/L (ref 135–145)
SODIUM SERPL-SCNC: 127 MMOL/L (ref 135–145)
SODIUM SERPL-SCNC: 129 MMOL/L (ref 135–145)
SODIUM SERPL-SCNC: 130 MMOL/L (ref 135–145)

## 2023-06-15 PROCEDURE — A9270 NON-COVERED ITEM OR SERVICE: HCPCS | Performed by: HOSPITALIST

## 2023-06-15 PROCEDURE — 770020 HCHG ROOM/CARE - TELE (206)

## 2023-06-15 PROCEDURE — 80048 BASIC METABOLIC PNL TOTAL CA: CPT | Mod: 91

## 2023-06-15 PROCEDURE — 83735 ASSAY OF MAGNESIUM: CPT

## 2023-06-15 PROCEDURE — 700111 HCHG RX REV CODE 636 W/ 250 OVERRIDE (IP): Performed by: HOSPITALIST

## 2023-06-15 PROCEDURE — 99232 SBSQ HOSP IP/OBS MODERATE 35: CPT | Performed by: HOSPITALIST

## 2023-06-15 PROCEDURE — 700102 HCHG RX REV CODE 250 W/ 637 OVERRIDE(OP): Performed by: HOSPITALIST

## 2023-06-15 PROCEDURE — 82962 GLUCOSE BLOOD TEST: CPT | Mod: 91

## 2023-06-15 PROCEDURE — 700105 HCHG RX REV CODE 258: Performed by: HOSPITALIST

## 2023-06-15 RX ORDER — SODIUM CHLORIDE 9 MG/ML
INJECTION, SOLUTION INTRAVENOUS CONTINUOUS
Status: DISCONTINUED | OUTPATIENT
Start: 2023-06-15 | End: 2023-06-16 | Stop reason: HOSPADM

## 2023-06-15 RX ADMIN — SODIUM CHLORIDE: 9 INJECTION, SOLUTION INTRAVENOUS at 17:52

## 2023-06-15 RX ADMIN — ACETAMINOPHEN 650 MG: 325 TABLET, FILM COATED ORAL at 20:37

## 2023-06-15 RX ADMIN — METFORMIN HYDROCHLORIDE 750 MG: 750 TABLET, EXTENDED RELEASE ORAL at 17:53

## 2023-06-15 RX ADMIN — LISINOPRIL 40 MG: 20 TABLET ORAL at 06:17

## 2023-06-15 RX ADMIN — PRAVASTATIN SODIUM 40 MG: 20 TABLET ORAL at 06:16

## 2023-06-15 RX ADMIN — TRAZODONE HYDROCHLORIDE 50 MG: 50 TABLET ORAL at 20:37

## 2023-06-15 RX ADMIN — METOPROLOL SUCCINATE 100 MG: 100 TABLET, EXTENDED RELEASE ORAL at 06:17

## 2023-06-15 RX ADMIN — SENNOSIDES AND DOCUSATE SODIUM 2 TABLET: 50; 8.6 TABLET ORAL at 06:16

## 2023-06-15 RX ADMIN — ACETAMINOPHEN 650 MG: 325 TABLET, FILM COATED ORAL at 06:29

## 2023-06-15 RX ADMIN — THERA TABS 1 TABLET: TAB at 06:17

## 2023-06-15 RX ADMIN — CHOLECALCIFEROL (VITAMIN D3) 10 MCG (400 UNIT) TABLET 400 UNITS: at 06:17

## 2023-06-15 RX ADMIN — METFORMIN HYDROCHLORIDE 750 MG: 750 TABLET, EXTENDED RELEASE ORAL at 06:16

## 2023-06-15 RX ADMIN — ENOXAPARIN SODIUM 40 MG: 100 INJECTION SUBCUTANEOUS at 17:54

## 2023-06-15 ASSESSMENT — ENCOUNTER SYMPTOMS
RESPIRATORY NEGATIVE: 1
ABDOMINAL PAIN: 0
GASTROINTESTINAL NEGATIVE: 1
HEADACHES: 0
CHILLS: 0
BRUISES/BLEEDS EASILY: 0
NAUSEA: 0
VOMITING: 0
EYES NEGATIVE: 1
BACK PAIN: 1
WEAKNESS: 0
FOCAL WEAKNESS: 0
PALPITATIONS: 0
NECK PAIN: 1
SORE THROAT: 0
PSYCHIATRIC NEGATIVE: 1
MYALGIAS: 0
BLURRED VISION: 0
DEPRESSION: 0
SHORTNESS OF BREATH: 0
DIZZINESS: 0
COUGH: 0
FEVER: 0
CARDIOVASCULAR NEGATIVE: 1
CONSTITUTIONAL NEGATIVE: 1
WEIGHT LOSS: 0
DIAPHORESIS: 0
NEUROLOGICAL NEGATIVE: 1

## 2023-06-15 ASSESSMENT — FIBROSIS 4 INDEX: FIB4 SCORE: 1.12

## 2023-06-15 ASSESSMENT — LIFESTYLE VARIABLES: SUBSTANCE_ABUSE: 0

## 2023-06-15 NOTE — CARE PLAN
The patient is Watcher - Medium risk of patient condition declining or worsening    Shift Goals  Clinical Goals: electrolyte balance / pain control  Patient Goals: rest  Family Goals: monet    Progress made toward(s) clinical / shift goals:  yes    Patient is not progressing towards the following goals:      Problem: Psychosocial  Goal: Patient's ability to re-evaluate and adapt role responsibilities will improve  Outcome: Progressing     Problem: Communication  Goal: The ability to communicate needs accurately and effectively will improve  Outcome: Progressing     Problem: Hemodynamics  Goal: Patient's hemodynamics, fluid balance and neurologic status will be stable or improve  Outcome: Progressing     Problem: Fluid Volume  Goal: Fluid volume balance will be maintained  Outcome: Progressing     Problem: Nutrition  Goal: Patient's nutritional and fluid intake will be adequate or improve  Outcome: Progressing      Abdomen soft, non-tender, no guarding.

## 2023-06-15 NOTE — DISCHARGE PLANNING
Current LACE+ Readmission Score of 10; 6 clicks for ADL and mobility of 24/24. No TCN needs identified at this time. TCN remains available via voalte should needs arise necessitating TCN involvement in discharge planning. Thank you.

## 2023-06-15 NOTE — PROGRESS NOTES
Neurosurgery Progress Note    Subjective:  Pt in bed, feeling better this am    Exam:  AAO, cooperative, wallace's    BP  Min: 143/82  Max: 157/88  Pulse  Av.2  Min: 78  Max: 91  Resp  Av.5  Min: 15  Max: 18  Temp  Av.7 °C (98.1 °F)  Min: 36.5 °C (97.7 °F)  Max: 36.9 °C (98.4 °F)  SpO2  Av.3 %  Min: 93 %  Max: 96 %    No data recorded        Recent Labs     23  1758 06/15/23  0034 06/15/23  0603   SODIUM 125* 127* 129*   POTASSIUM 3.8 3.4* 3.4*   CHLORIDE 86* 89* 89*   CO2 27 26 25   GLUCOSE 169* 143* 164*   BUN 10 9 8   CREATININE 0.82 0.65 0.65   CALCIUM 9.1 8.9 8.9               Intake/Output                         23 0700 - 06/15/23 0659 06/15/23 0700 - 23 0659     [id number] 9286-6351 Total 8613-4871 4951-1071 Total                 Intake    P.O.  --  240 240  --  -- --    P.O. -- 240 240 -- -- --    Total Intake -- 240 240 -- -- --       Output    Total Output -- -- -- -- -- --       Net I/O     -- 240 240 -- -- --              Intake/Output Summary (Last 24 hours) at 6/15/2023 0944  Last data filed at 2023 2200  Gross per 24 hour   Intake 240 ml   Output --   Net 240 ml             traZODone  50 mg QHS    pravastatin  40 mg DAILY    multivitamin  1 Tablet DAILY    metoprolol SR  100 mg DAILY    metFORMIN ER  750 mg BID    lisinopril  40 mg DAILY    lidocaine  2 Patch QDAY PRN    [START ON 2023] cyanocobalamin  1,000 mcg Q30 DAYS    vitamin D3  400 Units DAILY    baclofen  10 mg BID PRN    senna-docusate  2 Tablet BID    And    polyethylene glycol/lytes  1 Packet QDAY PRN    And    magnesium hydroxide  30 mL QDAY PRN    And    bisacodyl  10 mg QDAY PRN    enoxaparin (LOVENOX) injection  40 mg DAILY AT 1800    acetaminophen  650 mg Q6HRS PRN    Pharmacy Consult Request  1 Each PHARMACY TO DOSE    oxyCODONE immediate-release  5 mg Q3HRS PRN    Or    oxyCODONE immediate-release  10 mg Q3HRS PRN    Or    morphine injection  4 mg Q3HRS PRN    insulin regular  2-9 Units  4X/DAY ACHS    And    dextrose bolus  25 g Q15 MIN PRN    labetalol  10 mg Q6HRS PRN       Assessment and Plan:  Hospital day # 2 sx cancelled, admitted for hyponatremia  POD# n/a  Chemical prophylactic DVT therapy: Yes - Lovenox 40mg/qd    Start date/time: per IM    Hold NSAIDs  Plan to reschedule surgery after discharge, hopefully in next 2 weeks  Activity as tolerated from Nsx perspective

## 2023-06-15 NOTE — PROGRESS NOTES
Received report from day shift RN. Pt a/o x4 , VSS, reports mild headache, given PRN tylenol with good effect, Call light in reach, bed in low position

## 2023-06-15 NOTE — PROGRESS NOTES
"[facility] Medicine Daily Progress Note    Date of Service  6/15/2023    Chief Complaint  Fabio Vergara is a 68 y.o. male admitted 6/14/2023 with abnormal labs    Hospital Course  Patient is a 68 year old male who presented to [facility] on 6/14/2023. He has a history of chronic neck pain, diabetes mellitus, coronary artery disease, dyslipidemia, hypertension, distant history of tobacco use, and narcotic use for chronic pain.     He presented to [facility] for an elective cervical decompression and fusion to be completed by Dr. ANISHA Bennett. He had preop labs done on June 14 at 630 which had documented a sodium of 119, potassium 3.6, and chloride of 80.  The day of admission, he had repeat labs done approximately 1 hour later with a sodium of 120, potassium 3.4, chloride 80.  His surgery was canceled while his hyponatremia was further evaluated and addressed.  While in preop, he received 100 mL of lactated Ringer's.     He reported that his only recent medication change was stopping Jardiance . He was also started on Macrobid at that time for treatment of a uti. Otherwise, he has been compliant with his normal medications which include Naprosyn, chlorthalidone, and lisinopril among others.  He reported that over the course of the last week to 10 days, he felt \"woozy\", generally weak, with a headache, and vaguely nauseous.  He denied feeling dizzy or lightheaded like he was going to pass out.  In terms of fluid intake, he reported that he drinks 224 ounce propel drinks every day, and usually 1 to 2 quarts of water beyond that as well as a cup or 2 of coffee.        Interval Problem Update  Axox3, no confusion, chronic neck pain stable. Denies cp, no sob. NA fluctuating, now decreasing. will start low dose na, continue to follow closely with serial bmp. Ros otherwise negative.     I have discussed this patient's plan of care and discharge plan at IDT rounds today with Case Management, Nursing, Nursing leadership, and other " members of the IDT team.    Consultants/Specialty    Code Status  Full Code    Disposition    I have placed the appropriate orders for post-discharge needs.    Review of Systems  Review of Systems   Constitutional: Negative.  Negative for chills, diaphoresis, fever, malaise/fatigue and weight loss.   HENT: Negative.  Negative for sore throat.    Eyes: Negative.  Negative for blurred vision.   Respiratory: Negative.  Negative for cough and shortness of breath.    Cardiovascular: Negative.  Negative for chest pain, palpitations and leg swelling.   Gastrointestinal: Negative.  Negative for abdominal pain, nausea and vomiting.   Genitourinary: Negative.  Negative for dysuria.   Musculoskeletal:  Positive for back pain and neck pain. Negative for myalgias.   Skin: Negative.  Negative for itching and rash.   Neurological: Negative.  Negative for dizziness, focal weakness, weakness and headaches.   Endo/Heme/Allergies: Negative.  Does not bruise/bleed easily.   Psychiatric/Behavioral: Negative.  Negative for depression, substance abuse and suicidal ideas.    All other systems reviewed and are negative.       Physical Exam  Temp:  [36.5 °C (97.7 °F)-36.8 °C (98.2 °F)] 36.8 °C (98.2 °F)  Pulse:  [78-91] 81  Resp:  [15-18] 18  BP: (145-157)/(81-89) 146/87  SpO2:  [93 %-96 %] 93 %    Physical Exam  Vitals and nursing note reviewed. Exam conducted with a chaperone present.   Constitutional:       General: He is not in acute distress.     Appearance: Normal appearance. He is not diaphoretic.   HENT:      Head: Normocephalic.      Nose: Nose normal.      Mouth/Throat:      Mouth: Mucous membranes are moist.   Eyes:      Pupils: Pupils are equal, round, and reactive to light.   Cardiovascular:      Rate and Rhythm: Normal rate and regular rhythm.      Pulses: Normal pulses.      Heart sounds: Normal heart sounds.   Pulmonary:      Effort: Pulmonary effort is normal.      Breath sounds: Normal breath sounds.   Abdominal:       General: Abdomen is flat. Bowel sounds are normal.      Palpations: Abdomen is soft.   Musculoskeletal:         General: No swelling or deformity. Normal range of motion.   Skin:     General: Skin is warm and dry.      Capillary Refill: Capillary refill takes less than 2 seconds.   Neurological:      General: No focal deficit present.      Mental Status: He is alert and oriented to person, place, and time.      Cranial Nerves: No cranial nerve deficit.   Psychiatric:         Mood and Affect: Mood normal.         Behavior: Behavior normal.         Fluids    Intake/Output Summary (Last 24 hours) at 6/15/2023 1643  Last data filed at 6/15/2023 0900  Gross per 24 hour   Intake 390 ml   Output --   Net 390 ml       Laboratory      Recent Labs     06/15/23  0034 06/15/23  0603 06/15/23  1201   SODIUM 127* 129* 126*   POTASSIUM 3.4* 3.4* 3.8   CHLORIDE 89* 89* 87*   CO2 26 25 25   GLUCOSE 143* 164* 158*   BUN 9 8 10   CREATININE 0.65 0.65 0.77   CALCIUM 8.9 8.9 9.3                   Imaging  No orders to display        Assessment/Plan  * Hyponatremia  Assessment & Plan  Etiology uncertain  Patient has mild hypokalemia, and significant hypochloremia  Water intake appears to be reasonable with a total fluid intake of around 4 quarts daily  On exam the patient is euvolemic  On review of lab he did have a sodium of 130 in July of this year, so it is unclear if this is acute or chronic in onset  Only medication change recently was to stop his Jardiance  Studies consistent with renal loses will start low dose ns - continue serial bmp and adjust as needed  Continue to follow  Holding his NSAID and diuretic    Pain, chronic- (present on admission)  Assessment & Plan  Related to his neck injury  Parent support  Neurosurgery is following and will need to reschedule his operative repair    Type 2 diabetes mellitus with microalbuminuria, without long-term current use of insulin ([facility])- (present on admission)  Assessment & Plan  Patient  is maintained on metformin  mg, as well as empagliflozin 25 mg.  Empagliflozin was stopped a week ago due to UTI  Last A1c 7.5, this was 1 month ago.  Continue metformin  Placed on sliding scale  Carb consistent diet    Dyslipidemia- (present on admission)  Assessment & Plan  Continue statin    Essential hypertension- (present on admission)  Assessment & Plan  Patient is maintained on lisinopril 40 mg, and chlorthalidone  Holding the chlorthalidone in setting of hyponatremia         VTE prophylaxis: enoxaparin ppx    I have performed a physical exam and reviewed and updated ROS and Plan today (6/15/2023). In review of yesterday's note (6/14/2023), there are no changes except as documented above.

## 2023-06-16 ENCOUNTER — PHARMACY VISIT (OUTPATIENT)
Dept: PHARMACY | Facility: MEDICAL CENTER | Age: 68
End: 2023-06-16
Payer: MEDICARE

## 2023-06-16 VITALS
BODY MASS INDEX: 27.17 KG/M2 | RESPIRATION RATE: 17 BRPM | OXYGEN SATURATION: 96 % | TEMPERATURE: 97.9 F | HEART RATE: 72 BPM | HEIGHT: 70 IN | SYSTOLIC BLOOD PRESSURE: 155 MMHG | WEIGHT: 189.82 LBS | DIASTOLIC BLOOD PRESSURE: 91 MMHG

## 2023-06-16 LAB
ANION GAP SERPL CALC-SCNC: 13 MMOL/L (ref 7–16)
ANION GAP SERPL CALC-SCNC: 13 MMOL/L (ref 7–16)
BUN SERPL-MCNC: 10 MG/DL (ref 8–22)
BUN SERPL-MCNC: 8 MG/DL (ref 8–22)
CALCIUM SERPL-MCNC: 8.9 MG/DL (ref 8.5–10.5)
CALCIUM SERPL-MCNC: 8.9 MG/DL (ref 8.5–10.5)
CHLORIDE SERPL-SCNC: 92 MMOL/L (ref 96–112)
CHLORIDE SERPL-SCNC: 94 MMOL/L (ref 96–112)
CO2 SERPL-SCNC: 25 MMOL/L (ref 20–33)
CO2 SERPL-SCNC: 25 MMOL/L (ref 20–33)
CREAT SERPL-MCNC: 0.63 MG/DL (ref 0.5–1.4)
CREAT SERPL-MCNC: 0.74 MG/DL (ref 0.5–1.4)
GFR SERPLBLD CREATININE-BSD FMLA CKD-EPI: 102 ML/MIN/1.73 M 2
GFR SERPLBLD CREATININE-BSD FMLA CKD-EPI: 97 ML/MIN/1.73 M 2
GLUCOSE BLD STRIP.AUTO-MCNC: 151 MG/DL (ref 65–99)
GLUCOSE SERPL-MCNC: 142 MG/DL (ref 65–99)
GLUCOSE SERPL-MCNC: 154 MG/DL (ref 65–99)
POTASSIUM SERPL-SCNC: 3.4 MMOL/L (ref 3.6–5.5)
POTASSIUM SERPL-SCNC: 3.6 MMOL/L (ref 3.6–5.5)
SODIUM SERPL-SCNC: 130 MMOL/L (ref 135–145)
SODIUM SERPL-SCNC: 132 MMOL/L (ref 135–145)

## 2023-06-16 PROCEDURE — RXMED WILLOW AMBULATORY MEDICATION CHARGE: Performed by: HOSPITALIST

## 2023-06-16 PROCEDURE — A9270 NON-COVERED ITEM OR SERVICE: HCPCS | Performed by: HOSPITALIST

## 2023-06-16 PROCEDURE — 700105 HCHG RX REV CODE 258: Performed by: HOSPITALIST

## 2023-06-16 PROCEDURE — 80048 BASIC METABOLIC PNL TOTAL CA: CPT

## 2023-06-16 PROCEDURE — 700102 HCHG RX REV CODE 250 W/ 637 OVERRIDE(OP): Performed by: HOSPITALIST

## 2023-06-16 PROCEDURE — 99239 HOSP IP/OBS DSCHRG MGMT >30: CPT | Performed by: HOSPITALIST

## 2023-06-16 PROCEDURE — 82962 GLUCOSE BLOOD TEST: CPT

## 2023-06-16 RX ORDER — AMLODIPINE BESYLATE 5 MG/1
5 TABLET ORAL
Status: DISCONTINUED | OUTPATIENT
Start: 2023-06-16 | End: 2023-06-16 | Stop reason: HOSPADM

## 2023-06-16 RX ORDER — AMLODIPINE BESYLATE 5 MG/1
5 TABLET ORAL DAILY
Qty: 30 TABLET | Refills: 1 | Status: SHIPPED | OUTPATIENT
Start: 2023-06-16 | End: 2023-07-03 | Stop reason: SDUPTHER

## 2023-06-16 RX ORDER — POTASSIUM CHLORIDE 20 MEQ/1
20 TABLET, EXTENDED RELEASE ORAL ONCE
Status: COMPLETED | OUTPATIENT
Start: 2023-06-16 | End: 2023-06-16

## 2023-06-16 RX ADMIN — SODIUM CHLORIDE: 9 INJECTION, SOLUTION INTRAVENOUS at 07:48

## 2023-06-16 RX ADMIN — POTASSIUM CHLORIDE 20 MEQ: 1500 TABLET, EXTENDED RELEASE ORAL at 08:02

## 2023-06-16 RX ADMIN — PRAVASTATIN SODIUM 40 MG: 20 TABLET ORAL at 05:52

## 2023-06-16 RX ADMIN — METOPROLOL SUCCINATE 100 MG: 100 TABLET, EXTENDED RELEASE ORAL at 05:54

## 2023-06-16 RX ADMIN — SENNOSIDES AND DOCUSATE SODIUM 2 TABLET: 50; 8.6 TABLET ORAL at 05:52

## 2023-06-16 RX ADMIN — THERA TABS 1 TABLET: TAB at 05:53

## 2023-06-16 RX ADMIN — CHOLECALCIFEROL (VITAMIN D3) 10 MCG (400 UNIT) TABLET 400 UNITS: at 05:53

## 2023-06-16 RX ADMIN — LISINOPRIL 40 MG: 20 TABLET ORAL at 05:53

## 2023-06-16 RX ADMIN — METFORMIN HYDROCHLORIDE 750 MG: 750 TABLET, EXTENDED RELEASE ORAL at 05:53

## 2023-06-16 RX ADMIN — ACETAMINOPHEN 650 MG: 325 TABLET, FILM COATED ORAL at 06:38

## 2023-06-16 RX ADMIN — AMLODIPINE BESYLATE 5 MG: 5 TABLET ORAL at 08:02

## 2023-06-16 NOTE — CARE PLAN
The patient is Stable - Low risk of patient condition declining or worsening    Shift Goals  Clinical Goals: electrolyte balance and pain control  Patient Goals: comfort  Family Goals: monet    Progress made toward(s) clinical / shift goals:  yes    Patient is not progressing towards the following goals:      Problem: Pain - Standard  Goal: Alleviation of pain or a reduction in pain to the patient’s comfort goal  Outcome: Progressing     Problem: Knowledge Deficit - Standard  Goal: Patient and family/care givers will demonstrate understanding of plan of care, disease process/condition, diagnostic tests and medications  Outcome: Progressing     Problem: Hemodynamics  Goal: Patient's hemodynamics, fluid balance and neurologic status will be stable or improve  Outcome: Progressing     Problem: Fluid Volume  Goal: Fluid volume balance will be maintained  Outcome: Progressing

## 2023-06-16 NOTE — DISCHARGE INSTRUCTIONS
Hyponatremia  Hyponatremia is when the amount of salt (sodium) in your blood is too low. When salt levels are low, your body may take in extra water. This can cause swelling throughout the body. The swelling often affects the brain.  What are the causes?  This condition may be caused by:  Certain medical problems or conditions.  Vomiting a lot.  Having watery poop (diarrhea) often.  Certain medicines or illegal drugs.  Not having enough water in the body (dehydration).  Drinking too much water.  Eating a diet that is low in salt.  Large burns on your body.  Too much sweating.  What increases the risk?  You are more likely to get this condition if you:  Have long-term (chronic) kidney disease.  Have heart failure.  Have a medical condition that causes you to have watery poop often.  Do very hard exercises.  Take medicines that affect the amount of salt is in your blood.  What are the signs or symptoms?  Symptoms of this condition include:  Headache.  Feeling like you may vomit (nausea).  Vomiting.  Being very tired (lethargic).  Muscle weakness and cramps.  Not wanting to eat as much as normal (loss of appetite).  Feeling weak or light-headed.  Severe symptoms of this condition include:  Confusion.  Feeling restless (agitation).  Having a fast heart rate.  Passing out (fainting).  Seizures.  Coma.  How is this treated?  Treatment for this condition depends on the cause. Treatment may include:  Getting fluids through an IV tube that is put into one of your veins.  Taking medicines to fix the salt levels in your blood. If medicines are causing the problem, your medicines will need to be changed.  Limiting how much water or fluid you take in.  Monitoring in the hospital to watch your symptoms.  Follow these instructions at home:    Take over-the-counter and prescription medicines only as told by your doctor. Many medicines can make this condition worse. Talk with your doctor about any medicines that you are taking.  Eat  and drink exactly as you are told by your doctor.  Eat only the foods you are told to eat.  Limit how much fluid you take.  Do not drink alcohol.  Keep all follow-up visits as told by your doctor. This is important.  Contact a doctor if:  You feel more like you may vomit.  You feel more tired.  Your headache gets worse.  You feel more confused.  You feel weaker.  Your symptoms go away and then they come back.  You have trouble following the diet instructions.  Get help right away if:  You have a seizure.  You pass out.  You keep having watery poop.  You keep vomiting.  Summary  Hyponatremia is when the amount of salt in your blood is too low.  When salt levels are low, you can have swelling throughout the body. The swelling mostly affects the brain.  Treatment depends on the cause. Treatment may include getting IV fluids, medicines, or not drinking as much fluid.  This information is not intended to replace advice given to you by your health care provider. Make sure you discuss any questions you have with your health care provider.  Document Released: 08/29/2012 Document Revised: 03/05/2020 Document Reviewed: 11/21/2019  [employer] Patient Education © 2020 [employer]

## 2023-06-16 NOTE — DISCHARGE SUMMARY
"Discharge Summary    CHIEF COMPLAINT ON ADMISSION  No chief complaint on file.      Reason for Admission  Hyponatremia     Admission Date  6/14/2023    CODE STATUS  Prior    HPI & HOSPITAL COURSE  Patient is a 68 year old male who presented to [facility] on 6/14/2023. He has a history of chronic neck pain, diabetes mellitus, coronary artery disease, dyslipidemia, hypertension, distant history of tobacco use, and narcotic use for chronic pain.     He presented to [facility] for an elective cervical decompression and fusion to be completed by Dr. ANISHA Bennett. He had preop labs done on June 14 at 630 which had documented a sodium of 119, potassium 3.6, and chloride of 80.  The day of admission, he had repeat labs done approximately 1 hour later with a sodium of 120, potassium 3.4, chloride 80.  His surgery was canceled while his hyponatremia was further evaluated and addressed.  While in preop, he received 100 mL of lactated Ringer's.     He reported that his only recent medication change was stopping Jardiance . He was also started on Macrobid at that time for treatment of a uti. Otherwise, he has been compliant with his normal medications which include Naprosyn, chlorthalidone, and lisinopril among others.  He reported that over the course of the last week to 10 days, he felt \"woozy\", generally weak, with a headache, and vaguely nauseous.  He denied feeling dizzy or lightheaded like he was going to pass out.  In terms of fluid intake, he reported that he drinks 224 ounce propel drinks every day, and usually 1 to 2 quarts of water beyond that as well as a cup or 2 of coffee.     His serum and urine osmols and sodium levels were consistent with renal loss of sodium, likely related to his thiazide. This was stopped. He also had evidence of mild dehydration so this was also treated. His sodium corrected at a safe level and his sodium is back near his baseline at 130-132 at time of discharge.  His mild hypokalemia was treated prior " to discharge.    He will follow up with neurosurgery and his pcp as planned. Norvasc was started to replace the thiazide that was stopped. He agrees to keep a home bp log and to follow up with his pcp for further titration and monitoring. He also agrees to return to the ER if needed.    Therefore, he is discharged in fair and stable condition to home with close outpatient follow-up.    The patient met 2-midnight criteria for an inpatient stay at the time of discharge.    Discharge Date  6/16/23    FOLLOW UP ITEMS POST DISCHARGE  Pcp  Neurosurgery    DISCHARGE DIAGNOSES  Principal Problem:    Hyponatremia (POA: Unknown)  Active Problems:    Essential hypertension (POA: Yes)    Dyslipidemia (POA: Yes)    Type 2 diabetes mellitus with microalbuminuria, without long-term current use of insulin (HCC) (POA: Yes)    Pain, chronic (POA: Yes)      Overview: Gabapentin when back or C7 flare  Resolved Problems:    GI bleed (POA: Yes)      FOLLOW UP  Future Appointments   Date Time Provider Department Center   6/29/2023  9:15 AM VISTA MA [facility]   8/30/2023  9:00 AM TANISHA Siddiqui [facility]IST[facility]     No follow-up provider specified.    MEDICATIONS ON DISCHARGE     Medication List        START taking these medications        Instructions   amLODIPine 5 MG Tabs  Commonly known as: NORVASC   Doctor's comments: Hold sbp less 115  Take 1 Tablet by mouth every day. Hold if sbp less than 115  Dose: 5 mg            CONTINUE taking these medications        Instructions   acetaminophen 500 MG Tabs  Commonly known as: TYLENOL   Take 500-1,000 mg by mouth every 6 hours as needed.  Dose: 500-1,000 mg     ammonium lactate 12 % Lotn  Commonly known as: LAC-HYDRIN      baclofen 10 MG Tabs  Commonly known as: LIORESAL   Take 10 mg by mouth 2 times a day as needed.  Dose: 10 mg     fluticasone 50 MCG/ACT nasal spray  Commonly known as: FLONASE   USE ONE SPRAY IN EACH NOSTRIL EVERY DAY     lidocaine 5 % Ptch  Commonly known as: LIDODERM    Place 2 Patches on the skin as needed.  Dose: 2 Patch     lisinopril 40 MG tablet  Commonly known as: PRINIVIL   TAKE ONE TABLET BY MOUTH TWICE A DAY     metFORMIN  MG Tb24  Commonly known as: GLUCOPHAGE XR   Take 1 Tablet by mouth 2 times a day.  Dose: 750 mg     metoprolol  MG Tb24  Commonly known as: TOPROL XL   TAKE 1 TABLET BY MOUTH EVERY DAY  Dose: 100 mg     multivitamin Tabs   Take 1 Tablet by mouth every day.  Dose: 1 Tablet     naproxen 500 MG Tabs  Commonly known as: NAPROSYN      Non Formulary Request   by Both Ears route 4 times a day. neomycin and polymyxin ear drops  3 drops 4 x a day     NON SPECIFIED   Take 100 mg by mouth 2 times a day. Nitrofurantoin   Antibiotic for uti  Dose: 100 mg     pravastatin 40 MG tablet  Commonly known as: PRAVACHOL   Take 1 Tablet by mouth at bedtime.  Dose: 40 mg     traZODone 50 MG Tabs  Commonly known as: DESYREL   Take 1 Tablet by mouth at bedtime.  Dose: 50 mg     VITAMIN D3 PO   Take 1 Tablet by mouth every day.  Dose: 1 Tablet            STOP taking these medications      chlorthalidone 25 MG Tabs  Commonly known as: HYGROTON     Jardiance 25 MG Tabs  Generic drug: Empagliflozin              Allergies  Allergies   Allergen Reactions    Flexeril [Cyclobenzaprine]      Weakness, spacie    Penicillins      Doesn't know the reaction  had this allergy when he was a child       DIET  No orders of the defined types were placed in this encounter.      ACTIVITY  As tolerated.  Weight bearing as tolerated    PROCEDURES  No orders to display         LABORATORY  Lab Results   Component Value Date    SODIUM 130 (L) 06/16/2023    POTASSIUM 3.6 06/16/2023    CHLORIDE 92 (L) 06/16/2023    CO2 25 06/16/2023    GLUCOSE 154 (H) 06/16/2023    BUN 8 06/16/2023    CREATININE 0.63 06/16/2023        Lab Results   Component Value Date    WBC 9.7 06/02/2023    HEMOGLOBIN 14.1 06/02/2023    HEMATOCRIT 40.3 (L) 06/02/2023    PLATELETCT 299 06/02/2023        Total time of the  discharge process exceeds 40 minutes.

## 2023-06-16 NOTE — PROGRESS NOTES
Patient to be discharged today - patient aware and agreeable to plan. D/c instructions reviewed with patient - ?'s/concerns answered. No piv, waiting on meds to beds, no home meds locked.

## 2023-06-19 ENCOUNTER — OFFICE VISIT (OUTPATIENT)
Dept: MEDICAL GROUP | Facility: PHYSICIAN GROUP | Age: 68
End: 2023-06-19
Payer: MEDICARE

## 2023-06-19 VITALS
OXYGEN SATURATION: 97 % | DIASTOLIC BLOOD PRESSURE: 64 MMHG | HEART RATE: 87 BPM | HEIGHT: 70 IN | TEMPERATURE: 97.3 F | BODY MASS INDEX: 27.2 KG/M2 | WEIGHT: 190 LBS | SYSTOLIC BLOOD PRESSURE: 128 MMHG

## 2023-06-19 DIAGNOSIS — E87.8 HYPOCHLOREMIA: ICD-10-CM

## 2023-06-19 DIAGNOSIS — I10 ESSENTIAL HYPERTENSION: ICD-10-CM

## 2023-06-19 DIAGNOSIS — E87.1 HYPONATREMIA: ICD-10-CM

## 2023-06-19 DIAGNOSIS — R80.9 TYPE 2 DIABETES MELLITUS WITH MICROALBUMINURIA, WITHOUT LONG-TERM CURRENT USE OF INSULIN (HCC): ICD-10-CM

## 2023-06-19 DIAGNOSIS — Z09 HOSPITAL DISCHARGE FOLLOW-UP: ICD-10-CM

## 2023-06-19 DIAGNOSIS — E87.6 HYPOKALEMIA: ICD-10-CM

## 2023-06-19 DIAGNOSIS — E11.29 TYPE 2 DIABETES MELLITUS WITH MICROALBUMINURIA, WITHOUT LONG-TERM CURRENT USE OF INSULIN (HCC): ICD-10-CM

## 2023-06-19 PROCEDURE — 3074F SYST BP LT 130 MM HG: CPT | Performed by: NURSE PRACTITIONER

## 2023-06-19 PROCEDURE — 3078F DIAST BP <80 MM HG: CPT | Performed by: NURSE PRACTITIONER

## 2023-06-19 PROCEDURE — 99214 OFFICE O/P EST MOD 30 MIN: CPT | Performed by: NURSE PRACTITIONER

## 2023-06-19 ASSESSMENT — FIBROSIS 4 INDEX: FIB4 SCORE: 1.12

## 2023-06-19 NOTE — ASSESSMENT & PLAN NOTE
His initial blood pressure today is 136/72.  He was started on amlodipine 5 mg daily after chlorthalidone was stopped during his recent hospitalization. He is taking metoprolol  mg daily and lisinopril 40 mg twice daily. He is checking his blood pressure at home and has readings 140-160/70-80's.
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
## 2023-06-19 NOTE — ASSESSMENT & PLAN NOTE
He presented on 6/14/2023 for an elective cervical decompression and fusion.  Preop labs showed a sodium of 124, potassium 3.5 and chloride of 83.  Repeat labs on day of surgery continue to show hyponatremia, hypokalemia and hypochloremia.  He was admitted for work-up.  Serum and urine osmolality did show renal loss of sodium.  His chlorthalidone was stopped.  He was started on amlodipine 5 mg daily for blood pressure.  His Jardiance was also stopped and he stopped for the surgery. He was on macrobid for recent UTI.  He was discharged home on 6/16/2023.

## 2023-06-19 NOTE — ASSESSMENT & PLAN NOTE
Started on Jardiance 25 mg daily in February.  He stopped his Jardiance 5 days before his scheduled surgery however this was canceled due to electrolyte abnormalities.  He has not resumed his Jardiance.  He continues with metformin  g twice daily.  Last A1c 7.5% on 5/1/2023.

## 2023-06-26 ENCOUNTER — TELEPHONE (OUTPATIENT)
Dept: MEDICAL GROUP | Facility: PHYSICIAN GROUP | Age: 68
End: 2023-06-26
Payer: MEDICARE

## 2023-06-26 ENCOUNTER — HOSPITAL ENCOUNTER (OUTPATIENT)
Dept: LAB | Facility: MEDICAL CENTER | Age: 68
End: 2023-06-26
Attending: NURSE PRACTITIONER
Payer: MEDICARE

## 2023-06-26 DIAGNOSIS — I10 ESSENTIAL HYPERTENSION: ICD-10-CM

## 2023-06-26 DIAGNOSIS — E87.6 HYPOKALEMIA: ICD-10-CM

## 2023-06-26 DIAGNOSIS — E87.1 HYPONATREMIA: ICD-10-CM

## 2023-06-26 DIAGNOSIS — E87.8 HYPOCHLOREMIA: ICD-10-CM

## 2023-06-26 LAB
ANION GAP SERPL CALC-SCNC: 11 MMOL/L (ref 7–16)
BUN SERPL-MCNC: 12 MG/DL (ref 8–22)
CALCIUM SERPL-MCNC: 9.5 MG/DL (ref 8.5–10.5)
CHLORIDE SERPL-SCNC: 98 MMOL/L (ref 96–112)
CO2 SERPL-SCNC: 26 MMOL/L (ref 20–33)
CREAT SERPL-MCNC: 0.65 MG/DL (ref 0.5–1.4)
GFR SERPLBLD CREATININE-BSD FMLA CKD-EPI: 101 ML/MIN/1.73 M 2
GLUCOSE SERPL-MCNC: 133 MG/DL (ref 65–99)
POTASSIUM SERPL-SCNC: 4.2 MMOL/L (ref 3.6–5.5)
SODIUM SERPL-SCNC: 135 MMOL/L (ref 135–145)

## 2023-06-26 PROCEDURE — 80048 BASIC METABOLIC PNL TOTAL CA: CPT

## 2023-06-26 PROCEDURE — 36415 COLL VENOUS BLD VENIPUNCTURE: CPT

## 2023-06-26 NOTE — TELEPHONE ENCOUNTER
Future Appointments         Provider Department Center    7/3/2023 8:40 AM (Arrive by 8:25 AM) [facility]    8/30/2023 9:00 AM (Arrive by 8:45 AM) TANISHA Siddiqui [facility]          ESTABLISHED PATIENT PRE-VISIT PLANNING     Patient was NOT contacted to complete PVP.     Note: Patient will not be contacted if there is no indication to call.     1.  Reviewed notes from the last few office visits within the medical group: Yes, lOV 06/19/2023    2.  If any orders were placed at last visit or intended to be done for this visit (i.e. 6 mos follow-up), do we have Results/Consult Notes?           Labs - Labs ordered, completed on 06/26/23 and results are in chart.  Note: If patient appointment is for lab review and patient did not complete labs, check with provider if OK to reschedule patient until labs completed.         Imaging - Imaging was not ordered at last office visit.         Referrals - No referrals were ordered at last office visit.    3. Is this appointment scheduled as a Hospital Follow-Up? No    4.  Immunizations were updated in Epic using Reconcile Outside Information activity? Yes    5.  Patient is due for the following Health Maintenance Topics:   Health Maintenance Due   Topic Date Due    COVID-19 Vaccine (4 - Moderna series) 01/26/2022    FASTING LIPID PROFILE  07/20/2023    URINE ACR / MICROALBUMIN  07/20/2023     6.  AHA (Pulse8) form printed for Provider? N/A

## 2023-07-03 ENCOUNTER — OFFICE VISIT (OUTPATIENT)
Dept: MEDICAL GROUP | Facility: PHYSICIAN GROUP | Age: 68
End: 2023-07-03
Payer: MEDICARE

## 2023-07-03 VITALS
HEIGHT: 70 IN | TEMPERATURE: 98.7 F | WEIGHT: 194 LBS | HEART RATE: 87 BPM | OXYGEN SATURATION: 98 % | SYSTOLIC BLOOD PRESSURE: 122 MMHG | BODY MASS INDEX: 27.77 KG/M2 | DIASTOLIC BLOOD PRESSURE: 68 MMHG

## 2023-07-03 DIAGNOSIS — I10 ESSENTIAL HYPERTENSION: ICD-10-CM

## 2023-07-03 DIAGNOSIS — R80.9 TYPE 2 DIABETES MELLITUS WITH MICROALBUMINURIA, WITHOUT LONG-TERM CURRENT USE OF INSULIN (HCC): ICD-10-CM

## 2023-07-03 DIAGNOSIS — E11.29 TYPE 2 DIABETES MELLITUS WITH MICROALBUMINURIA, WITHOUT LONG-TERM CURRENT USE OF INSULIN (HCC): ICD-10-CM

## 2023-07-03 DIAGNOSIS — D51.3 OTHER DIETARY VITAMIN B12 DEFICIENCY ANEMIA: ICD-10-CM

## 2023-07-03 DIAGNOSIS — E87.1 HYPONATREMIA: ICD-10-CM

## 2023-07-03 PROBLEM — Z09 HOSPITAL DISCHARGE FOLLOW-UP: Status: RESOLVED | Noted: 2020-01-15 | Resolved: 2023-07-03

## 2023-07-03 PROCEDURE — 99214 OFFICE O/P EST MOD 30 MIN: CPT | Performed by: NURSE PRACTITIONER

## 2023-07-03 PROCEDURE — 3078F DIAST BP <80 MM HG: CPT | Performed by: NURSE PRACTITIONER

## 2023-07-03 PROCEDURE — 3074F SYST BP LT 130 MM HG: CPT | Performed by: NURSE PRACTITIONER

## 2023-07-03 RX ORDER — CLINDAMYCIN HYDROCHLORIDE 300 MG/1
300 CAPSULE ORAL 3 TIMES DAILY
COMMUNITY
End: 2023-07-10

## 2023-07-03 RX ORDER — METFORMIN HYDROCHLORIDE 500 MG/1
1000 TABLET, EXTENDED RELEASE ORAL DAILY
Qty: 200 TABLET | Refills: 3 | Status: SHIPPED | OUTPATIENT
Start: 2023-07-03 | End: 2023-07-11 | Stop reason: SDUPTHER

## 2023-07-03 RX ORDER — AMLODIPINE BESYLATE 5 MG/1
5 TABLET ORAL DAILY
Qty: 100 TABLET | Refills: 0 | Status: SHIPPED | OUTPATIENT
Start: 2023-07-03 | End: 2023-11-08 | Stop reason: SDUPTHER

## 2023-07-03 RX ADMIN — CYANOCOBALAMIN 1000 MCG: 1000 INJECTION, SOLUTION INTRAMUSCULAR; SUBCUTANEOUS at 15:13

## 2023-07-03 ASSESSMENT — FIBROSIS 4 INDEX: FIB4 SCORE: 1.12

## 2023-07-03 NOTE — ASSESSMENT & PLAN NOTE
Chronic, ongoing.  Blood pressure on arrival 138/70, on home monitor 146/78.  Patient was previously on chlorthalidone and this was discontinued during recent hospitalization and started on amlodipine 5 mg daily.  Also continues lisinopril 40 mg twice daily and metoprolol  mg daily.

## 2023-07-03 NOTE — PROGRESS NOTES
CC: Diagnoses of Type 2 diabetes mellitus with microalbuminuria, without long-term current use of insulin ([facility]), Essential hypertension, Hyponatremia, and Other dietary vitamin B12 deficiency anemia were pertinent to this visit.                                                                                                                                       HPI:   Fabio presents today with the following concerns:    Essential hypertension  Chronic, ongoing.  Blood pressure on arrival 138/70, on home monitor 146/78.  Patient was previously on chlorthalidone and this was discontinued during recent hospitalization and started on amlodipine 5 mg daily.  Also continues lisinopril 40 mg twice daily and metoprolol  mg daily.    Hyponatremia  New to examiner. Repeat sodium 135 after discontinuation of chlorthalidone.    Other dietary vitamin B12 deficiency anemia  Chronic, ongoing. Continues vitamin B12 injections monthly, due today.    Type 2 diabetes mellitus with microalbuminuria, without long-term current use of insulin ([facility])  Chronic, ongoing. Patient had been started on jardiance 25 mg daily in February 2023. Medication was held before surgery, that was cancelled due to electrolyte abnormalities, has not restarted. Continues metformin  mg twice daily.    Patient Active Problem List    Diagnosis Date Noted    Hyponatremia 06/14/2023    Right ear pain 05/22/2023    Carpal tunnel syndrome on left 03/20/2023    Anxiety 12/21/2022    Adjustment insomnia 12/21/2022    Rib pain on right side 10/04/2022    Rash of foot 05/18/2022    Cubital tunnel syndrome, right 02/16/2022    Functional diarrhea 02/02/2022    Aortic ectasia, thoracic ([facility]) 06/28/2021    Major depressive disorder with single episode, in full remission ([facility]) 06/04/2020    Left arm pain 02/20/2020    Vitamin D deficiency 02/20/2020    Other dietary vitamin B12 deficiency anemia 01/05/2020    Calcified granuloma of lung ([facility]) 05/10/2019     Onychomycosis 06/26/2018    Dupuytren's contracture of left hand 06/26/2018    Other male erectile dysfunction 02/08/2018    Microalbuminuria due to type 2 diabetes mellitus (HCC) 07/20/2017    Cervical radiculopathy at C7 07/20/2017    Essential hypertension 11/10/2015    Dyslipidemia 11/10/2015    Type 2 diabetes mellitus with microalbuminuria, without long-term current use of insulin ([facility]) 11/10/2015    Pain, chronic 11/10/2015     Current Outpatient Medications   Medication Sig Dispense Refill    clindamycin (CLEOCIN) 300 MG Cap Take 300 mg by mouth 3 times a day.      metFORMIN ER (GLUCOPHAGE XR) 500 MG TABLET SR 24 HR Take 2 Tablets by mouth every day. 200 Tablet 3    amLODIPine (NORVASC) 5 MG Tab Take 1 Tablet by mouth every day. Hold if sbp less than 115 100 Tablet 0    acetaminophen (TYLENOL) 500 MG Tab Take 500-1,000 mg by mouth every 6 hours as needed.      Non Formulary Request by Both Ears route 4 times a day. neomycin and polymyxin ear drops  3 drops 4 x a day      ammonium lactate (LAC-HYDRIN) 12 % Lotion       multivitamin Tab Take 1 Tablet by mouth every day.      Cholecalciferol (VITAMIN D3 PO) Take 1 Tablet by mouth every day.      lidocaine (LIDODERM) 5 % Patch Place 2 Patches on the skin as needed.      lisinopril (PRINIVIL) 40 MG tablet TAKE ONE TABLET BY MOUTH TWICE A  Tablet 3    naproxen (NAPROSYN) 500 MG Tab       metoprolol SR (TOPROL XL) 100 MG TABLET SR 24 HR TAKE 1 TABLET BY MOUTH EVERY DAY 90 Tablet 3    traZODone (DESYREL) 50 MG Tab Take 1 Tablet by mouth at bedtime. 90 Tablet 3    pravastatin (PRAVACHOL) 40 MG tablet Take 1 Tablet by mouth at bedtime. 100 Tablet 3    fluticasone (FLONASE) 50 MCG/ACT nasal spray USE ONE SPRAY IN EACH NOSTRIL EVERY DAY 16 g 3    baclofen (LIORESAL) 10 MG Tab Take 10 mg by mouth 2 times a day as needed.       Current Facility-Administered Medications   Medication Dose Route Frequency Provider Last Rate Last Admin    cyanocobalamin (VITAMIN B-12)  "injection 1,000 mcg  1,000 mcg Intramuscular Q30 DAYS Charley Harvey A.P.R.NLakeshia   1,000 mcg at 07/03/23 1513     Allergies as of 07/03/2023 - Reviewed 07/03/2023   Allergen Reaction Noted    Flexeril [cyclobenzaprine]  10/07/2014    Penicillins  04/20/2009      ROS:  See HPI    /68 (BP Location: Left arm, Patient Position: Sitting, BP Cuff Size: Small adult)   Pulse 87   Temp 37.1 °C (98.7 °F) (Temporal)   Ht 1.778 m (5' 10\")   Wt 88 kg (194 lb)   SpO2 98%   BMI 27.84 kg/m²     Physical Exam:  General: Well nourished, well developed male in NAD, awake and conversant.  Eyes: Normal conjunctiva, anicteric.  Round symmetrical pupils.  ENT: Hearing grossly intact.  No nasal discharge.  Neck: Neck is supple.  No masses or thyromegaly.  CV: No lower extremity edema.  Respiratory: Respirations are nonlabored.  No wheezing.  Abdomen: Non-Distended.  Skin: Warm.  No rashes or ulcers.  MSK: Normal ambulation.  No clubbing or cyanosis.  Neuro: Sensation and CN II-XII grossly normal.  Psych: Alert and oriented.  Cooperative, appropriate mood and affect, normal judgment.      Assessment and Plan.   68 y.o. male with the following issues:  1. Type 2 diabetes mellitus with microalbuminuria, without long-term current use of insulin (HCC)  Chronic, ongoing. Jardiance has been discontinued. Plan to increase metformin from 750 mg twice daily to 1000 mg twice daily.  - metFORMIN ER (GLUCOPHAGE XR) 500 MG TABLET SR 24 HR; Take 2 Tablets by mouth every day.  Dispense: 200 Tablet; Refill: 3    2. Essential hypertension  3. Hyponatremia  Chronic, stable. Continue amlodipine 5 mg/day, lisinopril 40 mg twice daily, and metoprolol  mg/day. Continue to monitor blood pressure regularly. Plan to repeat BMP to monitor sodium levels.  - Basic Metabolic Panel; Future  - amLODIPine (NORVASC) 5 MG Tab; Take 1 Tablet by mouth every day. Hold if sbp less than 115  Dispense: 100 Tablet; Refill: 0    4. Other dietary vitamin B12 " deficiency anemia  Chronic, ongoing. Continue vitamin B12 1000 mcg monthly, given today.    I have placed the below orders and discussed them with an approved delegating provider. The MA is performing the below orders under the direction of Dr. Haro.       Return in about 8 weeks (around 8/30/2023) for AWV.     Please note that this dictation was created using voice recognition software. I have worked with consultants from the vendor as well as technical experts from [facility] to optimize the interface. I have made every reasonable attempt to correct obvious errors, but I expect that there are errors of grammar and possibly content that I did not discover before finalizing the note.

## 2023-07-05 ENCOUNTER — APPOINTMENT (OUTPATIENT)
Dept: ADMISSIONS | Facility: MEDICAL CENTER | Age: 68
End: 2023-07-05
Attending: NEUROLOGICAL SURGERY
Payer: MEDICARE

## 2023-07-05 NOTE — ASSESSMENT & PLAN NOTE
Chronic, ongoing. Patient had been started on jardiance 25 mg daily in February 2023. Medication was held before surgery, that was cancelled due to electrolyte abnormalities, has not restarted. Continues metformin  mg twice daily.

## 2023-07-10 ENCOUNTER — HOSPITAL ENCOUNTER (OUTPATIENT)
Dept: LAB | Facility: MEDICAL CENTER | Age: 68
End: 2023-07-10
Attending: NURSE PRACTITIONER
Payer: MEDICARE

## 2023-07-10 DIAGNOSIS — I10 ESSENTIAL HYPERTENSION: ICD-10-CM

## 2023-07-10 DIAGNOSIS — E87.1 HYPONATREMIA: ICD-10-CM

## 2023-07-10 LAB
ANION GAP SERPL CALC-SCNC: 16 MMOL/L (ref 7–16)
BUN SERPL-MCNC: 6 MG/DL (ref 8–22)
CALCIUM SERPL-MCNC: 9.2 MG/DL (ref 8.5–10.5)
CHLORIDE SERPL-SCNC: 96 MMOL/L (ref 96–112)
CO2 SERPL-SCNC: 22 MMOL/L (ref 20–33)
CREAT SERPL-MCNC: 0.63 MG/DL (ref 0.5–1.4)
GFR SERPLBLD CREATININE-BSD FMLA CKD-EPI: 102 ML/MIN/1.73 M 2
GLUCOSE SERPL-MCNC: 158 MG/DL (ref 65–99)
POTASSIUM SERPL-SCNC: 3.8 MMOL/L (ref 3.6–5.5)
SODIUM SERPL-SCNC: 134 MMOL/L (ref 135–145)

## 2023-07-10 PROCEDURE — 80048 BASIC METABOLIC PNL TOTAL CA: CPT

## 2023-07-10 PROCEDURE — 36415 COLL VENOUS BLD VENIPUNCTURE: CPT

## 2023-07-12 ENCOUNTER — PRE-ADMISSION TESTING (OUTPATIENT)
Dept: ADMISSIONS | Facility: MEDICAL CENTER | Age: 68
End: 2023-07-12
Attending: NEUROLOGICAL SURGERY
Payer: MEDICARE

## 2023-07-12 DIAGNOSIS — Z01.812 PRE-OPERATIVE LABORATORY EXAMINATION: ICD-10-CM

## 2023-07-12 DIAGNOSIS — Z01.810 PRE-OPERATIVE CARDIOVASCULAR EXAMINATION: ICD-10-CM

## 2023-07-12 LAB
BASOPHILS # BLD AUTO: 0.9 % (ref 0–1.8)
BASOPHILS # BLD: 0.08 K/UL (ref 0–0.12)
EKG IMPRESSION: NORMAL
EOSINOPHIL # BLD AUTO: 0.57 K/UL (ref 0–0.51)
EOSINOPHIL NFR BLD: 6.4 % (ref 0–6.9)
ERYTHROCYTE [DISTWIDTH] IN BLOOD BY AUTOMATED COUNT: 47.5 FL (ref 35.9–50)
HCT VFR BLD AUTO: 39.3 % (ref 42–52)
HGB BLD-MCNC: 13.9 G/DL (ref 14–18)
IMM GRANULOCYTES # BLD AUTO: 0.03 K/UL (ref 0–0.11)
IMM GRANULOCYTES NFR BLD AUTO: 0.3 % (ref 0–0.9)
LYMPHOCYTES # BLD AUTO: 2.58 K/UL (ref 1–4.8)
LYMPHOCYTES NFR BLD: 28.8 % (ref 22–41)
MCH RBC QN AUTO: 34.2 PG (ref 27–33)
MCHC RBC AUTO-ENTMCNC: 35.4 G/DL (ref 32.3–36.5)
MCV RBC AUTO: 96.6 FL (ref 81.4–97.8)
MONOCYTES # BLD AUTO: 1.08 K/UL (ref 0–0.85)
MONOCYTES NFR BLD AUTO: 12 % (ref 0–13.4)
NEUTROPHILS # BLD AUTO: 4.63 K/UL (ref 1.82–7.42)
NEUTROPHILS NFR BLD: 51.6 % (ref 44–72)
NRBC # BLD AUTO: 0 K/UL
NRBC BLD-RTO: 0 /100 WBC (ref 0–0.2)
PLATELET # BLD AUTO: 288 K/UL (ref 164–446)
PMV BLD AUTO: 9.9 FL (ref 9–12.9)
RBC # BLD AUTO: 4.07 M/UL (ref 4.7–6.1)
WBC # BLD AUTO: 9 K/UL (ref 4.8–10.8)

## 2023-07-12 PROCEDURE — 36415 COLL VENOUS BLD VENIPUNCTURE: CPT

## 2023-07-12 PROCEDURE — 93005 ELECTROCARDIOGRAM TRACING: CPT

## 2023-07-12 PROCEDURE — 93010 ELECTROCARDIOGRAM REPORT: CPT | Performed by: INTERNAL MEDICINE

## 2023-07-12 PROCEDURE — 85025 COMPLETE CBC W/AUTO DIFF WBC: CPT

## 2023-07-12 NOTE — RESULT ENCOUNTER NOTE
Please call the patient and let him know that his sodium is low by one point at 134.    Thank you,    JOSÉ MIGUEL Siddiqui.

## 2023-07-17 ENCOUNTER — ANESTHESIA (OUTPATIENT)
Dept: SURGERY | Facility: MEDICAL CENTER | Age: 68
End: 2023-07-17
Payer: MEDICARE

## 2023-07-17 ENCOUNTER — APPOINTMENT (OUTPATIENT)
Dept: RADIOLOGY | Facility: MEDICAL CENTER | Age: 68
End: 2023-07-17
Attending: NEUROLOGICAL SURGERY
Payer: MEDICARE

## 2023-07-17 ENCOUNTER — ANESTHESIA EVENT (OUTPATIENT)
Dept: SURGERY | Facility: MEDICAL CENTER | Age: 68
End: 2023-07-17
Payer: MEDICARE

## 2023-07-17 ENCOUNTER — HOSPITAL ENCOUNTER (OUTPATIENT)
Facility: MEDICAL CENTER | Age: 68
End: 2023-07-19
Attending: NEUROLOGICAL SURGERY | Admitting: NEUROLOGICAL SURGERY
Payer: MEDICARE

## 2023-07-17 DIAGNOSIS — M48.02 CERVICAL STENOSIS OF SPINE: ICD-10-CM

## 2023-07-17 LAB — GLUCOSE BLD STRIP.AUTO-MCNC: 125 MG/DL (ref 65–99)

## 2023-07-17 PROCEDURE — 82962 GLUCOSE BLOOD TEST: CPT

## 2023-07-17 PROCEDURE — 700101 HCHG RX REV CODE 250: Performed by: ANESTHESIOLOGY

## 2023-07-17 PROCEDURE — A9270 NON-COVERED ITEM OR SERVICE: HCPCS | Performed by: ANESTHESIOLOGY

## 2023-07-17 PROCEDURE — A9270 NON-COVERED ITEM OR SERVICE: HCPCS | Performed by: NURSE PRACTITIONER

## 2023-07-17 PROCEDURE — 99100 ANES PT EXTEME AGE<1 YR&>70: CPT | Performed by: ANESTHESIOLOGY

## 2023-07-17 PROCEDURE — 96365 THER/PROPH/DIAG IV INF INIT: CPT | Mod: XU

## 2023-07-17 PROCEDURE — 700111 HCHG RX REV CODE 636 W/ 250 OVERRIDE (IP): Performed by: ANESTHESIOLOGY

## 2023-07-17 PROCEDURE — 700102 HCHG RX REV CODE 250 W/ 637 OVERRIDE(OP): Performed by: ANESTHESIOLOGY

## 2023-07-17 PROCEDURE — 700102 HCHG RX REV CODE 250 W/ 637 OVERRIDE(OP): Performed by: NURSE PRACTITIONER

## 2023-07-17 PROCEDURE — 160009 HCHG ANES TIME/MIN: Performed by: NEUROLOGICAL SURGERY

## 2023-07-17 PROCEDURE — 700111 HCHG RX REV CODE 636 W/ 250 OVERRIDE (IP): Performed by: NURSE PRACTITIONER

## 2023-07-17 PROCEDURE — 95939 C MOTOR EVOKED UPR&LWR LIMBS: CPT | Mod: XU | Performed by: NEUROLOGICAL SURGERY

## 2023-07-17 PROCEDURE — 700101 HCHG RX REV CODE 250: Performed by: NURSE PRACTITIONER

## 2023-07-17 PROCEDURE — 700105 HCHG RX REV CODE 258: Mod: JZ | Performed by: NEUROLOGICAL SURGERY

## 2023-07-17 PROCEDURE — 110371 HCHG SHELL REV 272: Performed by: NEUROLOGICAL SURGERY

## 2023-07-17 PROCEDURE — 110454 HCHG SHELL REV 250: Performed by: NEUROLOGICAL SURGERY

## 2023-07-17 PROCEDURE — 160002 HCHG RECOVERY MINUTES (STAT): Performed by: NEUROLOGICAL SURGERY

## 2023-07-17 PROCEDURE — 95940 IONM IN OPERATNG ROOM 15 MIN: CPT | Mod: XU | Performed by: NEUROLOGICAL SURGERY

## 2023-07-17 PROCEDURE — 00670 ANES XTNSV SP&SPI CORD PX: CPT | Performed by: ANESTHESIOLOGY

## 2023-07-17 PROCEDURE — 95938 SOMATOSENSORY TESTING: CPT | Mod: XU,CG | Performed by: NEUROLOGICAL SURGERY

## 2023-07-17 PROCEDURE — 160029 HCHG SURGERY MINUTES - 1ST 30 MINS LEVEL 4: Performed by: NEUROLOGICAL SURGERY

## 2023-07-17 PROCEDURE — 700111 HCHG RX REV CODE 636 W/ 250 OVERRIDE (IP): Mod: JZ | Performed by: NEUROLOGICAL SURGERY

## 2023-07-17 PROCEDURE — 700111 HCHG RX REV CODE 636 W/ 250 OVERRIDE (IP): Mod: JZ | Performed by: ANESTHESIOLOGY

## 2023-07-17 PROCEDURE — 95937 NEUROMUSCULAR JUNCTION TEST: CPT | Mod: XU | Performed by: NEUROLOGICAL SURGERY

## 2023-07-17 PROCEDURE — 160048 HCHG OR STATISTICAL LEVEL 1-5: Performed by: NEUROLOGICAL SURGERY

## 2023-07-17 PROCEDURE — 700105 HCHG RX REV CODE 258: Performed by: NURSE PRACTITIONER

## 2023-07-17 PROCEDURE — G0378 HOSPITAL OBSERVATION PER HR: HCPCS

## 2023-07-17 PROCEDURE — 160035 HCHG PACU - 1ST 60 MINS PHASE I: Performed by: NEUROLOGICAL SURGERY

## 2023-07-17 PROCEDURE — 700101 HCHG RX REV CODE 250: Performed by: NEUROLOGICAL SURGERY

## 2023-07-17 PROCEDURE — 72020 X-RAY EXAM OF SPINE 1 VIEW: CPT

## 2023-07-17 PROCEDURE — 160041 HCHG SURGERY MINUTES - EA ADDL 1 MIN LEVEL 4: Performed by: NEUROLOGICAL SURGERY

## 2023-07-17 PROCEDURE — 95861 NEEDLE EMG 2 EXTREMITIES: CPT | Mod: XU | Performed by: NEUROLOGICAL SURGERY

## 2023-07-17 RX ORDER — HALOPERIDOL 5 MG/ML
1 INJECTION INTRAMUSCULAR
Status: DISCONTINUED | OUTPATIENT
Start: 2023-07-17 | End: 2023-07-17 | Stop reason: HOSPADM

## 2023-07-17 RX ORDER — LIDOCAINE HYDROCHLORIDE 20 MG/ML
INJECTION, SOLUTION EPIDURAL; INFILTRATION; INTRACAUDAL; PERINEURAL PRN
Status: DISCONTINUED | OUTPATIENT
Start: 2023-07-17 | End: 2023-07-17 | Stop reason: SURG

## 2023-07-17 RX ORDER — EPHEDRINE SULFATE 50 MG/ML
INJECTION, SOLUTION INTRAVENOUS PRN
Status: DISCONTINUED | OUTPATIENT
Start: 2023-07-17 | End: 2023-07-17 | Stop reason: SURG

## 2023-07-17 RX ORDER — OXYCODONE HCL 5 MG/5 ML
5 SOLUTION, ORAL ORAL
Status: COMPLETED | OUTPATIENT
Start: 2023-07-17 | End: 2023-07-17

## 2023-07-17 RX ORDER — CLINDAMYCIN HYDROCHLORIDE 300 MG/1
300 CAPSULE ORAL 3 TIMES DAILY
COMMUNITY
End: 2023-07-20 | Stop reason: ALTCHOICE

## 2023-07-17 RX ORDER — ACETAMINOPHEN 325 MG/1
650 TABLET ORAL EVERY 6 HOURS
Status: DISCONTINUED | OUTPATIENT
Start: 2023-07-17 | End: 2023-07-19 | Stop reason: HOSPADM

## 2023-07-17 RX ORDER — REMIFENTANIL HYDROCHLORIDE 1 MG/ML
INJECTION, POWDER, LYOPHILIZED, FOR SOLUTION INTRAVENOUS
Status: DISCONTINUED | OUTPATIENT
Start: 2023-07-17 | End: 2023-07-17 | Stop reason: SURG

## 2023-07-17 RX ORDER — LABETALOL HYDROCHLORIDE 5 MG/ML
5 INJECTION, SOLUTION INTRAVENOUS
Status: DISCONTINUED | OUTPATIENT
Start: 2023-07-17 | End: 2023-07-17 | Stop reason: HOSPADM

## 2023-07-17 RX ORDER — OXYCODONE HCL 10 MG/1
10 TABLET, FILM COATED, EXTENDED RELEASE ORAL ONCE
Status: COMPLETED | OUTPATIENT
Start: 2023-07-17 | End: 2023-07-17

## 2023-07-17 RX ORDER — CEFAZOLIN SODIUM 1 G/3ML
INJECTION, POWDER, FOR SOLUTION INTRAMUSCULAR; INTRAVENOUS PRN
Status: DISCONTINUED | OUTPATIENT
Start: 2023-07-17 | End: 2023-07-17 | Stop reason: SURG

## 2023-07-17 RX ORDER — ONDANSETRON 2 MG/ML
4 INJECTION INTRAMUSCULAR; INTRAVENOUS
Status: DISCONTINUED | OUTPATIENT
Start: 2023-07-17 | End: 2023-07-17 | Stop reason: HOSPADM

## 2023-07-17 RX ORDER — MIDAZOLAM HYDROCHLORIDE 1 MG/ML
1 INJECTION INTRAMUSCULAR; INTRAVENOUS
Status: DISCONTINUED | OUTPATIENT
Start: 2023-07-17 | End: 2023-07-17 | Stop reason: HOSPADM

## 2023-07-17 RX ORDER — ENEMA 19; 7 G/133ML; G/133ML
1 ENEMA RECTAL
Status: DISCONTINUED | OUTPATIENT
Start: 2023-07-17 | End: 2023-07-19 | Stop reason: HOSPADM

## 2023-07-17 RX ORDER — DIPHENHYDRAMINE HCL 25 MG
25 TABLET ORAL EVERY 6 HOURS PRN
Status: DISCONTINUED | OUTPATIENT
Start: 2023-07-17 | End: 2023-07-19 | Stop reason: HOSPADM

## 2023-07-17 RX ORDER — DIPHENHYDRAMINE HYDROCHLORIDE 50 MG/ML
25 INJECTION INTRAMUSCULAR; INTRAVENOUS EVERY 6 HOURS PRN
Status: DISCONTINUED | OUTPATIENT
Start: 2023-07-17 | End: 2023-07-19 | Stop reason: HOSPADM

## 2023-07-17 RX ORDER — OXYCODONE HYDROCHLORIDE 5 MG/1
5 TABLET ORAL
Status: DISCONTINUED | OUTPATIENT
Start: 2023-07-17 | End: 2023-07-19 | Stop reason: HOSPADM

## 2023-07-17 RX ORDER — HYDROMORPHONE HYDROCHLORIDE 1 MG/ML
0.2 INJECTION, SOLUTION INTRAMUSCULAR; INTRAVENOUS; SUBCUTANEOUS
Status: DISCONTINUED | OUTPATIENT
Start: 2023-07-17 | End: 2023-07-17 | Stop reason: HOSPADM

## 2023-07-17 RX ORDER — ONDANSETRON 2 MG/ML
4 INJECTION INTRAMUSCULAR; INTRAVENOUS EVERY 4 HOURS PRN
Status: DISCONTINUED | OUTPATIENT
Start: 2023-07-17 | End: 2023-07-19 | Stop reason: HOSPADM

## 2023-07-17 RX ORDER — BISACODYL 10 MG
10 SUPPOSITORY, RECTAL RECTAL
Status: DISCONTINUED | OUTPATIENT
Start: 2023-07-17 | End: 2023-07-19 | Stop reason: HOSPADM

## 2023-07-17 RX ORDER — EPHEDRINE SULFATE 50 MG/ML
5 INJECTION, SOLUTION INTRAVENOUS
Status: DISCONTINUED | OUTPATIENT
Start: 2023-07-17 | End: 2023-07-17 | Stop reason: HOSPADM

## 2023-07-17 RX ORDER — HYDROMORPHONE HYDROCHLORIDE 2 MG/ML
INJECTION, SOLUTION INTRAMUSCULAR; INTRAVENOUS; SUBCUTANEOUS PRN
Status: DISCONTINUED | OUTPATIENT
Start: 2023-07-17 | End: 2023-07-17 | Stop reason: SURG

## 2023-07-17 RX ORDER — AMLODIPINE BESYLATE 5 MG/1
5 TABLET ORAL DAILY
Status: DISCONTINUED | OUTPATIENT
Start: 2023-07-17 | End: 2023-07-19 | Stop reason: HOSPADM

## 2023-07-17 RX ORDER — ROCURONIUM BROMIDE 10 MG/ML
INJECTION, SOLUTION INTRAVENOUS PRN
Status: DISCONTINUED | OUTPATIENT
Start: 2023-07-17 | End: 2023-07-17 | Stop reason: SURG

## 2023-07-17 RX ORDER — ONDANSETRON 2 MG/ML
INJECTION INTRAMUSCULAR; INTRAVENOUS PRN
Status: DISCONTINUED | OUTPATIENT
Start: 2023-07-17 | End: 2023-07-17 | Stop reason: SURG

## 2023-07-17 RX ORDER — CEFAZOLIN SODIUM 1 G/3ML
INJECTION, POWDER, FOR SOLUTION INTRAMUSCULAR; INTRAVENOUS
Status: DISCONTINUED | OUTPATIENT
Start: 2023-07-17 | End: 2023-07-17 | Stop reason: HOSPADM

## 2023-07-17 RX ORDER — MEPERIDINE HYDROCHLORIDE 25 MG/ML
12.5 INJECTION INTRAMUSCULAR; INTRAVENOUS; SUBCUTANEOUS
Status: DISCONTINUED | OUTPATIENT
Start: 2023-07-17 | End: 2023-07-17 | Stop reason: HOSPADM

## 2023-07-17 RX ORDER — PRAVASTATIN SODIUM 20 MG
40 TABLET ORAL
Status: DISCONTINUED | OUTPATIENT
Start: 2023-07-17 | End: 2023-07-19 | Stop reason: HOSPADM

## 2023-07-17 RX ORDER — ACETAMINOPHEN 500 MG
1000 TABLET ORAL ONCE
Status: COMPLETED | OUTPATIENT
Start: 2023-07-17 | End: 2023-07-17

## 2023-07-17 RX ORDER — HYDROMORPHONE HYDROCHLORIDE 1 MG/ML
0.4 INJECTION, SOLUTION INTRAMUSCULAR; INTRAVENOUS; SUBCUTANEOUS
Status: DISCONTINUED | OUTPATIENT
Start: 2023-07-17 | End: 2023-07-17 | Stop reason: HOSPADM

## 2023-07-17 RX ORDER — TRANEXAMIC ACID 100 MG/ML
INJECTION, SOLUTION INTRAVENOUS PRN
Status: DISCONTINUED | OUTPATIENT
Start: 2023-07-17 | End: 2023-07-17 | Stop reason: SURG

## 2023-07-17 RX ORDER — AMOXICILLIN 250 MG
1 CAPSULE ORAL
Status: DISCONTINUED | OUTPATIENT
Start: 2023-07-17 | End: 2023-07-19 | Stop reason: HOSPADM

## 2023-07-17 RX ORDER — METOPROLOL TARTRATE 1 MG/ML
1 INJECTION, SOLUTION INTRAVENOUS
Status: DISCONTINUED | OUTPATIENT
Start: 2023-07-17 | End: 2023-07-17 | Stop reason: HOSPADM

## 2023-07-17 RX ORDER — LISINOPRIL 20 MG/1
40 TABLET ORAL 2 TIMES DAILY
Status: DISCONTINUED | OUTPATIENT
Start: 2023-07-17 | End: 2023-07-19 | Stop reason: HOSPADM

## 2023-07-17 RX ORDER — DIAZEPAM 5 MG/1
5 TABLET ORAL EVERY 6 HOURS PRN
Status: DISCONTINUED | OUTPATIENT
Start: 2023-07-17 | End: 2023-07-19 | Stop reason: HOSPADM

## 2023-07-17 RX ORDER — HYDRALAZINE HYDROCHLORIDE 20 MG/ML
5 INJECTION INTRAMUSCULAR; INTRAVENOUS
Status: DISCONTINUED | OUTPATIENT
Start: 2023-07-17 | End: 2023-07-17 | Stop reason: HOSPADM

## 2023-07-17 RX ORDER — METFORMIN HYDROCHLORIDE 500 MG/1
1000 TABLET, EXTENDED RELEASE ORAL 2 TIMES DAILY
Status: DISCONTINUED | OUTPATIENT
Start: 2023-07-17 | End: 2023-07-19 | Stop reason: HOSPADM

## 2023-07-17 RX ORDER — DIPHENHYDRAMINE HYDROCHLORIDE 50 MG/ML
12.5 INJECTION INTRAMUSCULAR; INTRAVENOUS
Status: DISCONTINUED | OUTPATIENT
Start: 2023-07-17 | End: 2023-07-17 | Stop reason: HOSPADM

## 2023-07-17 RX ORDER — OXYCODONE HYDROCHLORIDE 5 MG/1
10 TABLET ORAL
Status: DISCONTINUED | OUTPATIENT
Start: 2023-07-17 | End: 2023-07-19 | Stop reason: HOSPADM

## 2023-07-17 RX ORDER — SODIUM CHLORIDE, SODIUM LACTATE, POTASSIUM CHLORIDE, CALCIUM CHLORIDE 600; 310; 30; 20 MG/100ML; MG/100ML; MG/100ML; MG/100ML
INJECTION, SOLUTION INTRAVENOUS CONTINUOUS
Status: DISCONTINUED | OUTPATIENT
Start: 2023-07-17 | End: 2023-07-17 | Stop reason: HOSPADM

## 2023-07-17 RX ORDER — METOPROLOL SUCCINATE 100 MG/1
100 TABLET, EXTENDED RELEASE ORAL
Status: DISCONTINUED | OUTPATIENT
Start: 2023-07-18 | End: 2023-07-19 | Stop reason: HOSPADM

## 2023-07-17 RX ORDER — SODIUM CHLORIDE AND POTASSIUM CHLORIDE 150; 900 MG/100ML; MG/100ML
INJECTION, SOLUTION INTRAVENOUS CONTINUOUS
Status: DISCONTINUED | OUTPATIENT
Start: 2023-07-17 | End: 2023-07-19 | Stop reason: HOSPADM

## 2023-07-17 RX ORDER — SODIUM CHLORIDE, SODIUM LACTATE, POTASSIUM CHLORIDE, CALCIUM CHLORIDE 600; 310; 30; 20 MG/100ML; MG/100ML; MG/100ML; MG/100ML
INJECTION, SOLUTION INTRAVENOUS CONTINUOUS
Status: ACTIVE | OUTPATIENT
Start: 2023-07-17 | End: 2023-07-17

## 2023-07-17 RX ORDER — TRAZODONE HYDROCHLORIDE 50 MG/1
50 TABLET ORAL
Status: DISCONTINUED | OUTPATIENT
Start: 2023-07-17 | End: 2023-07-19 | Stop reason: HOSPADM

## 2023-07-17 RX ORDER — HYDROMORPHONE HYDROCHLORIDE 1 MG/ML
0.1 INJECTION, SOLUTION INTRAMUSCULAR; INTRAVENOUS; SUBCUTANEOUS
Status: DISCONTINUED | OUTPATIENT
Start: 2023-07-17 | End: 2023-07-17 | Stop reason: HOSPADM

## 2023-07-17 RX ORDER — OXYCODONE HCL 5 MG/5 ML
10 SOLUTION, ORAL ORAL
Status: COMPLETED | OUTPATIENT
Start: 2023-07-17 | End: 2023-07-17

## 2023-07-17 RX ORDER — POLYETHYLENE GLYCOL 3350 17 G/17G
1 POWDER, FOR SOLUTION ORAL 2 TIMES DAILY PRN
Status: DISCONTINUED | OUTPATIENT
Start: 2023-07-17 | End: 2023-07-19 | Stop reason: HOSPADM

## 2023-07-17 RX ORDER — PHENYLEPHRINE HCL IN 0.9% NACL 0.5 MG/5ML
SYRINGE (ML) INTRAVENOUS PRN
Status: DISCONTINUED | OUTPATIENT
Start: 2023-07-17 | End: 2023-07-17 | Stop reason: SURG

## 2023-07-17 RX ORDER — DOCUSATE SODIUM 100 MG/1
100 CAPSULE, LIQUID FILLED ORAL 2 TIMES DAILY
Status: DISCONTINUED | OUTPATIENT
Start: 2023-07-17 | End: 2023-07-19 | Stop reason: HOSPADM

## 2023-07-17 RX ORDER — TIZANIDINE 4 MG/1
2 TABLET ORAL EVERY 6 HOURS
Status: DISCONTINUED | OUTPATIENT
Start: 2023-07-17 | End: 2023-07-19 | Stop reason: HOSPADM

## 2023-07-17 RX ORDER — ONDANSETRON 4 MG/1
4 TABLET, ORALLY DISINTEGRATING ORAL EVERY 4 HOURS PRN
Status: DISCONTINUED | OUTPATIENT
Start: 2023-07-17 | End: 2023-07-19 | Stop reason: HOSPADM

## 2023-07-17 RX ORDER — ACETAMINOPHEN 325 MG/1
650 TABLET ORAL EVERY 6 HOURS PRN
Status: DISCONTINUED | OUTPATIENT
Start: 2023-07-22 | End: 2023-07-19 | Stop reason: HOSPADM

## 2023-07-17 RX ORDER — HYDROMORPHONE HYDROCHLORIDE 1 MG/ML
0.5 INJECTION, SOLUTION INTRAMUSCULAR; INTRAVENOUS; SUBCUTANEOUS
Status: DISCONTINUED | OUTPATIENT
Start: 2023-07-17 | End: 2023-07-19 | Stop reason: HOSPADM

## 2023-07-17 RX ORDER — BUPIVACAINE HYDROCHLORIDE AND EPINEPHRINE 2.5; 5 MG/ML; UG/ML
INJECTION, SOLUTION EPIDURAL; INFILTRATION; INTRACAUDAL; PERINEURAL
Status: DISCONTINUED | OUTPATIENT
Start: 2023-07-17 | End: 2023-07-17 | Stop reason: HOSPADM

## 2023-07-17 RX ADMIN — CEFAZOLIN 2 G: 1 INJECTION, POWDER, FOR SOLUTION INTRAMUSCULAR; INTRAVENOUS at 07:44

## 2023-07-17 RX ADMIN — PROPOFOL 50 MG: 10 INJECTION, EMULSION INTRAVENOUS at 08:59

## 2023-07-17 RX ADMIN — ROCURONIUM BROMIDE 30 MG: 50 INJECTION, SOLUTION INTRAVENOUS at 07:44

## 2023-07-17 RX ADMIN — FENTANYL CITRATE 25 MCG: 50 INJECTION, SOLUTION INTRAMUSCULAR; INTRAVENOUS at 09:32

## 2023-07-17 RX ADMIN — ACETAMINOPHEN 650 MG: 325 TABLET, FILM COATED ORAL at 15:47

## 2023-07-17 RX ADMIN — ACETAMINOPHEN 1000 MG: 500 TABLET, FILM COATED ORAL at 07:32

## 2023-07-17 RX ADMIN — FENTANYL CITRATE 50 MCG: 50 INJECTION, SOLUTION INTRAMUSCULAR; INTRAVENOUS at 07:44

## 2023-07-17 RX ADMIN — TIZANIDINE 2 MG: 4 TABLET ORAL at 15:48

## 2023-07-17 RX ADMIN — LIDOCAINE HYDROCHLORIDE 100 MG: 20 INJECTION, SOLUTION EPIDURAL; INFILTRATION; INTRACAUDAL at 07:44

## 2023-07-17 RX ADMIN — OXYCODONE HYDROCHLORIDE 10 MG: 10 TABLET, FILM COATED, EXTENDED RELEASE ORAL at 07:32

## 2023-07-17 RX ADMIN — TRAZODONE HYDROCHLORIDE 50 MG: 50 TABLET ORAL at 21:16

## 2023-07-17 RX ADMIN — FENTANYL CITRATE 25 MCG: 50 INJECTION, SOLUTION INTRAMUSCULAR; INTRAVENOUS at 09:38

## 2023-07-17 RX ADMIN — AMLODIPINE BESYLATE 5 MG: 5 TABLET ORAL at 11:11

## 2023-07-17 RX ADMIN — FENTANYL CITRATE 50 MCG: 50 INJECTION, SOLUTION INTRAMUSCULAR; INTRAVENOUS at 09:25

## 2023-07-17 RX ADMIN — Medication 100 MCG: at 08:13

## 2023-07-17 RX ADMIN — HYDROMORPHONE HYDROCHLORIDE 1 MG: 2 INJECTION INTRAMUSCULAR; INTRAVENOUS; SUBCUTANEOUS at 07:56

## 2023-07-17 RX ADMIN — SODIUM CHLORIDE, POTASSIUM CHLORIDE, SODIUM LACTATE AND CALCIUM CHLORIDE: 600; 310; 30; 20 INJECTION, SOLUTION INTRAVENOUS at 08:51

## 2023-07-17 RX ADMIN — TRANEXAMIC ACID 1000 MG: 100 INJECTION, SOLUTION INTRAVENOUS at 08:44

## 2023-07-17 RX ADMIN — OXYCODONE HYDROCHLORIDE 5 MG: 5 TABLET ORAL at 16:48

## 2023-07-17 RX ADMIN — FENTANYL CITRATE 50 MCG: 50 INJECTION, SOLUTION INTRAMUSCULAR; INTRAVENOUS at 07:50

## 2023-07-17 RX ADMIN — REMIFENTANIL HYDROCHLORIDE 0.1 MCG/KG/MIN: 1 INJECTION, POWDER, LYOPHILIZED, FOR SOLUTION INTRAVENOUS at 07:44

## 2023-07-17 RX ADMIN — CEFAZOLIN 2 G: 2 INJECTION, POWDER, FOR SOLUTION INTRAMUSCULAR; INTRAVENOUS at 15:58

## 2023-07-17 RX ADMIN — LISINOPRIL 40 MG: 20 TABLET ORAL at 17:59

## 2023-07-17 RX ADMIN — PROPOFOL 50 MG: 10 INJECTION, EMULSION INTRAVENOUS at 07:50

## 2023-07-17 RX ADMIN — Medication 100 MCG: at 08:19

## 2023-07-17 RX ADMIN — OXYCODONE HYDROCHLORIDE 10 MG: 5 SOLUTION ORAL at 09:25

## 2023-07-17 RX ADMIN — ONDANSETRON 4 MG: 2 INJECTION INTRAMUSCULAR; INTRAVENOUS at 08:44

## 2023-07-17 RX ADMIN — Medication 100 MCG: at 08:28

## 2023-07-17 RX ADMIN — SODIUM CHLORIDE, POTASSIUM CHLORIDE, SODIUM LACTATE AND CALCIUM CHLORIDE: 600; 310; 30; 20 INJECTION, SOLUTION INTRAVENOUS at 06:35

## 2023-07-17 RX ADMIN — POTASSIUM CHLORIDE AND SODIUM CHLORIDE: 900; 150 INJECTION, SOLUTION INTRAVENOUS at 15:55

## 2023-07-17 RX ADMIN — METFORMIN HYDROCHLORIDE 1000 MG: 500 TABLET, EXTENDED RELEASE ORAL at 18:00

## 2023-07-17 RX ADMIN — PROPOFOL 150 MG: 10 INJECTION, EMULSION INTRAVENOUS at 07:44

## 2023-07-17 RX ADMIN — PROPOFOL 100 MCG/KG/MIN: 10 INJECTION, EMULSION INTRAVENOUS at 07:45

## 2023-07-17 RX ADMIN — TIZANIDINE 2 MG: 4 TABLET ORAL at 21:16

## 2023-07-17 RX ADMIN — EPHEDRINE SULFATE 10 MG: 50 INJECTION, SOLUTION INTRAVENOUS at 08:35

## 2023-07-17 RX ADMIN — Medication 100 MCG: at 08:23

## 2023-07-17 ASSESSMENT — LIFESTYLE VARIABLES
CONSUMPTION TOTAL: POSITIVE
TOTAL SCORE: 0
DOES PATIENT WANT TO STOP DRINKING: NO
EVER FELT BAD OR GUILTY ABOUT YOUR DRINKING: NO
AVERAGE NUMBER OF DAYS PER WEEK YOU HAVE A DRINK CONTAINING ALCOHOL: 3
TOTAL SCORE: 0
ON A TYPICAL DAY WHEN YOU DRINK ALCOHOL HOW MANY DRINKS DO YOU HAVE: 2
HAVE YOU EVER FELT YOU SHOULD CUT DOWN ON YOUR DRINKING: NO
TOTAL SCORE: 0
HAVE PEOPLE ANNOYED YOU BY CRITICIZING YOUR DRINKING: NO
ALCOHOL_USE: YES
EVER HAD A DRINK FIRST THING IN THE MORNING TO STEADY YOUR NERVES TO GET RID OF A HANGOVER: NO
HOW MANY TIMES IN THE PAST YEAR HAVE YOU HAD 5 OR MORE DRINKS IN A DAY: 10

## 2023-07-17 ASSESSMENT — PAIN SCALES - GENERAL: PAIN_LEVEL: 6

## 2023-07-17 ASSESSMENT — PAIN DESCRIPTION - PAIN TYPE
TYPE: SURGICAL PAIN
TYPE: SURGICAL PAIN
TYPE: ACUTE PAIN;SURGICAL PAIN
TYPE: SURGICAL PAIN
TYPE: ACUTE PAIN;SURGICAL PAIN
TYPE: SURGICAL PAIN
TYPE: SURGICAL PAIN

## 2023-07-17 ASSESSMENT — FIBROSIS 4 INDEX: FIB4 SCORE: 1.17

## 2023-07-17 NOTE — ANESTHESIA PROCEDURE NOTES
Airway    Date/Time: 7/17/2023 7:46 AM    Performed by: You David M.D.  Authorized by: You David M.D.    Location:  OR  Urgency:  Elective  Indications for Airway Management:  Anesthesia      Spontaneous Ventilation: absent    Sedation Level:  Deep  Preoxygenated: Yes    Patient Position:  Sniffing  Mask Difficulty Assessment:  1 - vent by mask  Final Airway Type:  Endotracheal airway  Final Endotracheal Airway:  ETT  Cuffed: Yes    Technique Used for Successful ETT Placement:  Video laryngoscopy    Insertion Site:  Oral  Blade Type:  Glide  Laryngoscope Blade/Videolaryngoscope Blade Size:  3  ETT Size (mm):  7.5  Measured from:  Teeth  ETT to Teeth (cm):  24  Placement Verified by: auscultation and capnometry    Cormack-Lehane Classification:  Grade IIa - partial view of glottis  Number of Attempts at Approach:  1

## 2023-07-17 NOTE — ANESTHESIA POSTPROCEDURE EVALUATION
Patient: Fabio Vergara    Procedure Summary     Date: 07/17/23 Room / Location: [facility] 05 / SURGERY [facility]    Anesthesia Start: 0740 Anesthesia Stop: 0911    Procedure: POSTERIOR C2-3 LAMINECTOMY (Spine Cervical) Diagnosis: (SPINAL STENOSIS IN CERVICAL REGION WITH MYELOPATHY)    Surgeons: Sagar Bennett M.D. Responsible Provider: You David M.D.    Anesthesia Type: general ASA Status: 3          Final Anesthesia Type: general  Last vitals  BP   Blood Pressure : (!) 152/89    Temp   36.1 °C (96.9 °F)    Pulse   77   Resp   16    SpO2   96 %      Anesthesia Post Evaluation    Patient location during evaluation: PACU  Patient participation: complete - patient participated  Level of consciousness: awake and alert  Pain score: 6    Airway patency: patent  Anesthetic complications: no  Cardiovascular status: hemodynamically stable  Respiratory status: acceptable  Hydration status: euvolemic    PONV: none          No notable events documented.     Nurse Pain Score: 6 (NPRS)

## 2023-07-17 NOTE — OR NURSING
0907 Pt arrived to PACU with Anesthesiologist and OR RN. AAOx4. Even, unlabored respirations. VSS. Denies pain. Denies nausea. Posterior neck dressing CDI. Belongings returned to bedside.    1300 POC update given to aleena Fulton, over the phone. All questions answered.     1315 Pt meets floor criteria. Report called to BRIGETTE Fulton on Ortho.     1410 Pt transported to T313 with transport. Chart and full oxygen tank with patient.

## 2023-07-17 NOTE — ANESTHESIA TIME REPORT
Anesthesia Start and Stop Event Times     Date Time Event    7/17/2023 0724 Ready for Procedure     0740 Anesthesia Start     0911 Anesthesia Stop        Responsible Staff  07/17/23    Name Role Begin End    You David M.D. Anesth 0740 0911        Overtime Reason:  no overtime (within assigned shift)    Comments:

## 2023-07-17 NOTE — PROGRESS NOTES
Pt arrived to T3, ambulatory to bathroom, voided urine, standby assist, denies any pain, hemovac drain in place, strike through on bandage outlined

## 2023-07-17 NOTE — ANESTHESIA PREPROCEDURE EVALUATION
Case: [id number] Date/Time: 07/17/23 0715    Procedure: POSTERIOR C2-3 LAMINECTOMY    Pre-op diagnosis: SPINAL STENOSIS IN CERVICAL REGION WITH MYELOPATHY    Location: [facility] OR  / SURGERY [facility]    Surgeons: Sagar Bennett M.D.          Relevant Problems   CARDIAC   (positive) Aortic ectasia, thoracic (HCC)   (positive) Essential hypertension         (positive) Microalbuminuria due to type 2 diabetes mellitus (HCC)      ENDO   (positive) Type 2 diabetes mellitus with microalbuminuria, without long-term current use of insulin (HCC)       Physical Exam    Airway   Mallampati: II  TM distance: >3 FB  Neck ROM: full       Cardiovascular - normal exam  Rhythm: regular  Rate: normal  (-) murmur     Dental - normal exam           Pulmonary - normal exam  Breath sounds clear to auscultation     Abdominal    Neurological - normal exam                 Anesthesia Plan    ASA 3   ASA physical status 3 criteria: MI or angina - history (> 3 months)    Plan - general       Airway plan will be ETT          Induction: intravenous    Postoperative Plan: Postoperative administration of opioids is intended.    Pertinent diagnostic labs and testing reviewed    Informed Consent:    Anesthetic plan and risks discussed with patient.    Use of blood products discussed with: patient whom consented to blood products.

## 2023-07-17 NOTE — OP REPORT
DATE OF SERVICE:  07/17/2023     PREOPERATIVE DIAGNOSES:   Cervical stenosis with myelopathy.     POSTOPERATIVE DIAGNOSES:  Cervical stenosis with myelopathy.     PROCEDURE:  Decompressive cervical laminectomy, C2-C3, C3-C4.     SURGEON:  Sagar Bennett MD     ASSISTANT:  AYM Key     ANESTHESIA:  General anesthetic.     ANESTHESIOLOGIST:  You David MD     PREPARATION:  The patient had somatosensory evoked potentials, motor evokes   and EMGs monitored throughout the case.     DESCRIPTION OF PROCEDURE:  The patient was brought to the operating room and   following induction, the patient was intubated and placed under general   anesthetic.  Placed into Rogel tongs, he was subsequently rolled prone onto   the operating room table using chest, hip, thigh pads.  All pressure points   were padded.  Sequential stockings were in place.  His head was secured in a   neutral and slightly flexed position in the Levo system.  We aligned the neck   and shaved, prepped and draped in sterile fashion.     Injecting the midline incision after timeout, we made an incision from C1 down   through C5.  Subcutaneous tissue was opened and then we opened the dorsal   fascia.  We did a subperiosteal dissection at C2, C3, C4 and a portion of C5.    Cerebellar retractors were placed to maintain exposure.  Intraoperative x-ray   confirmed the location.     We removed the spinous process of C2, C3, C4 with a Leksell.  We drilled the   junction of lamina facet on each side to the ligamentum flavum and removed the   center portion of bone with Leksells.  Kerrisons were used to decompress   along the lateral borders of the thecal sac, at C2-C3 and C3-C4.  At the C4-C5   level there was slight stenosis as well and we were able to undercut this   area.  The area was copiously irrigated.  Meticulous hemostasis was obtained.    There was some irritation of the C6-C7 nerves on the left as far as EMG, but   motor evoked potentials  remained stable.     There is no CSF leak, there were no other complication.  Meticulous hemostasis   was obtained.  Medium size Hemovac was placed after ultrasound confirmed   decompression of the thecal sac.  We closed the fascia with #1 Vicryl suture,   subcutaneous tissue with 2-0 Vicryl, subcuticular with 3-0 Vicryl and skin   with staples.  All sponge and needle counts were correct and estimated blood   loss was approximately 80 mL.     FINDINGS:  Severe stenosis at C2-C3, with indentation and compression of the   thecal sac as well as the spinal cord.  There was some lateral recess stenosis   at C3-C4 and at C4-C5 just at the juncture there was some compression   laterally.     Ultrasound showed that the patient was well decompressed, with pulsatile   spinal cord.        ______________________________  MD JERRY CAMARGO/JAMAAL    DD:  07/17/2023 09:29  DT:  07/17/2023 11:15    Job#:  [id number]    CC:AMY Garzon

## 2023-07-18 PROCEDURE — 700111 HCHG RX REV CODE 636 W/ 250 OVERRIDE (IP): Performed by: NURSE PRACTITIONER

## 2023-07-18 PROCEDURE — 97166 OT EVAL MOD COMPLEX 45 MIN: CPT

## 2023-07-18 PROCEDURE — 97535 SELF CARE MNGMENT TRAINING: CPT

## 2023-07-18 PROCEDURE — 700101 HCHG RX REV CODE 250: Performed by: NURSE PRACTITIONER

## 2023-07-18 PROCEDURE — G0378 HOSPITAL OBSERVATION PER HR: HCPCS

## 2023-07-18 PROCEDURE — 97162 PT EVAL MOD COMPLEX 30 MIN: CPT

## 2023-07-18 PROCEDURE — A9270 NON-COVERED ITEM OR SERVICE: HCPCS | Performed by: NURSE PRACTITIONER

## 2023-07-18 PROCEDURE — 700102 HCHG RX REV CODE 250 W/ 637 OVERRIDE(OP): Performed by: NURSE PRACTITIONER

## 2023-07-18 PROCEDURE — 700105 HCHG RX REV CODE 258: Performed by: NURSE PRACTITIONER

## 2023-07-18 RX ADMIN — DOCUSATE SODIUM 100 MG: 100 CAPSULE, LIQUID FILLED ORAL at 05:28

## 2023-07-18 RX ADMIN — ACETAMINOPHEN 650 MG: 325 TABLET, FILM COATED ORAL at 23:37

## 2023-07-18 RX ADMIN — TIZANIDINE 2 MG: 4 TABLET ORAL at 01:09

## 2023-07-18 RX ADMIN — DOCUSATE SODIUM 100 MG: 100 CAPSULE, LIQUID FILLED ORAL at 17:35

## 2023-07-18 RX ADMIN — OXYCODONE HYDROCHLORIDE 5 MG: 5 TABLET ORAL at 01:09

## 2023-07-18 RX ADMIN — METFORMIN HYDROCHLORIDE 1000 MG: 500 TABLET, EXTENDED RELEASE ORAL at 17:35

## 2023-07-18 RX ADMIN — LISINOPRIL 40 MG: 20 TABLET ORAL at 17:35

## 2023-07-18 RX ADMIN — METOPROLOL SUCCINATE 100 MG: 100 TABLET, EXTENDED RELEASE ORAL at 05:28

## 2023-07-18 RX ADMIN — ACETAMINOPHEN 650 MG: 325 TABLET, FILM COATED ORAL at 05:28

## 2023-07-18 RX ADMIN — METFORMIN HYDROCHLORIDE 1000 MG: 500 TABLET, EXTENDED RELEASE ORAL at 05:28

## 2023-07-18 RX ADMIN — TRAZODONE HYDROCHLORIDE 50 MG: 50 TABLET ORAL at 22:02

## 2023-07-18 RX ADMIN — OXYCODONE HYDROCHLORIDE 5 MG: 5 TABLET ORAL at 22:02

## 2023-07-18 RX ADMIN — ACETAMINOPHEN 650 MG: 325 TABLET, FILM COATED ORAL at 12:37

## 2023-07-18 RX ADMIN — PRAVASTATIN SODIUM 40 MG: 20 TABLET ORAL at 22:02

## 2023-07-18 RX ADMIN — TIZANIDINE 2 MG: 4 TABLET ORAL at 23:38

## 2023-07-18 RX ADMIN — PRAVASTATIN SODIUM 40 MG: 20 TABLET ORAL at 01:10

## 2023-07-18 RX ADMIN — TIZANIDINE 2 MG: 4 TABLET ORAL at 12:37

## 2023-07-18 RX ADMIN — OXYCODONE HYDROCHLORIDE 10 MG: 5 TABLET ORAL at 07:02

## 2023-07-18 RX ADMIN — CEFAZOLIN 2 G: 2 INJECTION, POWDER, FOR SOLUTION INTRAMUSCULAR; INTRAVENOUS at 01:12

## 2023-07-18 RX ADMIN — OXYCODONE HYDROCHLORIDE 5 MG: 5 TABLET ORAL at 17:35

## 2023-07-18 RX ADMIN — AMLODIPINE BESYLATE 5 MG: 5 TABLET ORAL at 05:28

## 2023-07-18 RX ADMIN — OXYCODONE HYDROCHLORIDE 5 MG: 5 TABLET ORAL at 12:37

## 2023-07-18 RX ADMIN — TIZANIDINE 2 MG: 4 TABLET ORAL at 05:28

## 2023-07-18 RX ADMIN — POTASSIUM CHLORIDE AND SODIUM CHLORIDE: 900; 150 INJECTION, SOLUTION INTRAVENOUS at 01:14

## 2023-07-18 RX ADMIN — ACETAMINOPHEN 650 MG: 325 TABLET, FILM COATED ORAL at 01:08

## 2023-07-18 RX ADMIN — LISINOPRIL 40 MG: 20 TABLET ORAL at 05:28

## 2023-07-18 RX ADMIN — ACETAMINOPHEN 650 MG: 325 TABLET, FILM COATED ORAL at 17:34

## 2023-07-18 RX ADMIN — TIZANIDINE 2 MG: 4 TABLET ORAL at 17:36

## 2023-07-18 ASSESSMENT — PAIN DESCRIPTION - PAIN TYPE
TYPE: SURGICAL PAIN
TYPE: ACUTE PAIN;SURGICAL PAIN

## 2023-07-18 ASSESSMENT — COGNITIVE AND FUNCTIONAL STATUS - GENERAL
CLIMB 3 TO 5 STEPS WITH RAILING: A LITTLE
TURNING FROM BACK TO SIDE WHILE IN FLAT BAD: A LITTLE
WALKING IN HOSPITAL ROOM: A LITTLE
SUGGESTED CMS G CODE MODIFIER DAILY ACTIVITY: CH
MOVING FROM LYING ON BACK TO SITTING ON SIDE OF FLAT BED: A LITTLE
SUGGESTED CMS G CODE MODIFIER MOBILITY: CK
MOBILITY SCORE: 18
MOVING TO AND FROM BED TO CHAIR: A LITTLE
STANDING UP FROM CHAIR USING ARMS: A LITTLE
DAILY ACTIVITIY SCORE: 24

## 2023-07-18 ASSESSMENT — ACTIVITIES OF DAILY LIVING (ADL): TOILETING: INDEPENDENT

## 2023-07-18 ASSESSMENT — PAIN SCALES - PAIN ASSESSMENT IN ADVANCED DEMENTIA (PAINAD): BREATHING: NORMAL

## 2023-07-18 ASSESSMENT — GAIT ASSESSMENTS
DISTANCE (FEET): 110
GAIT LEVEL OF ASSIST: STANDBY ASSIST
ASSISTIVE DEVICE: FRONT WHEEL WALKER
DEVIATION: BRADYKINETIC

## 2023-07-18 NOTE — PROGRESS NOTES
Neurosurgery Progress Note    Subjective:  Pt says he's feeling a bit woozy off & on this am. He thinks the 10 mg Oxy may have been a bit too much. C/o posterior neck pain, arms/ hands good.    Exam:  AAO  Sitting upright  Drain 70cc with dressing  UE's strong    BP  Min: 129/68  Max: 172/89  Pulse  Av.5  Min: 62  Max: 88  Resp  Avg: 15.5  Min: 12  Max: 17  Temp  Av.9 °C (98.4 °F)  Min: 36.7 °C (98.1 °F)  Max: 37.1 °C (98.8 °F)  SpO2  Av.4 %  Min: 90 %  Max: 97 %    No data recorded                      Intake/Output                         23 - 23 - 23 Total  Total                 Intake    I.V.  1410.6  -- 1410.6  --  -- --    Phenylephrine Volume 4 -- 4 -- -- --    Propofol Volume 106.6 -- 106.6 -- -- --    Volume (mL) (lactated ringers infusion) 1300 -- 1300 -- -- --    IV Piggyback  10  -- 10  --  -- --    Volume (mL) (Tranexamic Acid (Cyklokapron) injection) 10 -- 10 -- -- --    Total Intake 1420.6 -- 1420.6 -- -- --       Output    Drains  --  120 120  --  -- --    Output (mL) (Closed/Suction Drain Posterior Neck Hemovac) -- 120 120 -- -- --    Blood  100  -- 100  --  -- --    Est. Blood Loss 100 -- 100 -- -- --    Total Output 100 120 220 -- -- --       Net I/O     1320.6 -120 1200.6 -- -- --              Intake/Output Summary (Last 24 hours) at 2023 0938  Last data filed at 2023 0523  Gross per 24 hour   Intake --   Output 120 ml   Net -120 ml             amLODIPine  5 mg DAILY    lisinopril  40 mg BID    metFORMIN ER  1,000 mg BID    metoprolol SR  100 mg QDAY    pravastatin  40 mg QHS    traZODone  50 mg QHS    Pharmacy Consult Request  1 Each PHARMACY TO DOSE    MD NGOC...DO NOT ADMINISTER NSAIDS or ASPIRIN unless ORDERED By Neurosurgery  1 Each PRN    docusate sodium  100 mg BID    senna-docusate  1 Tablet Q24HRS PRN    polyethylene glycol/lytes  1 Packet BID PRN    magnesium hydroxide  30  mL QDAY PRN    bisacodyl  10 mg Q24HRS PRN    sodium phosphate  1 Each Once PRN    0.9 % NaCl with KCl 20 mEq 1,000 mL   Continuous    acetaminophen  650 mg Q6HRS    Followed by    [START ON 7/22/2023] acetaminophen  650 mg Q6HRS PRN    oxyCODONE immediate-release  5 mg Q3HRS PRN    Or    oxyCODONE immediate-release  10 mg Q3HRS PRN    Or    HYDROmorphone  0.5 mg Q3HRS PRN    diphenhydrAMINE  25 mg Q6HRS PRN    Or    diphenhydrAMINE  25 mg Q6HRS PRN    ondansetron  4 mg Q4HRS PRN    ondansetron  4 mg Q4HRS PRN    tizanidine  2 mg Q6HRS    diazePAM  5 mg Q6HRS PRN       Assessment and Plan:  Hospital day #2  POD #1 Posterior C2-3 laminectomy  NM as above  Needs to mobilize & work on pain ctrl that helps but doesn't leave him woozy  Prophylactic anticoagulation: no         Start date/time: no need - mobilize

## 2023-07-18 NOTE — DISCHARGE PLANNING
HTH/SCP TCN chart review completed. Collaborated with TRISTAN Stark. The most current review of medical record, knowledge of pt's PLOF and social support, LACE+ score of 10 was considered.  No 6 clicks scores.       Pt seen at bedside. Introduced TCN program. Provided education regarding post acute levels of care. Discussed HTH/SCP plan benefits (Meds to Beds, medical uber and [facility] transitional care). Pt verbalizes understanding.     Patient states he lives with his spouse Yue in a one level home with 1 step in and was independent with ADL's, IADL's, mobility (no AD) and driving at baseline.  He denies any concerns with food, housing or transportation and states his friend can drive him home at discharge.  Patient states he would like outpatient PT f/u but is agreeable to HH if recommended by therapy.   Patient states he is close to his baseline level of function.     TCN will continue to follow and collaborate with discharge planning team as additional post acute needs arise. Thank you.     Completed today:  Awaiting PT/OT recommendations.  Choice obtained: None  [facility] referral (N). Not sent.  Low LACE score.      Addendum:  1250-  OT recommends home health on 7/18/23 for continued occupational therapy services.  PT recommends home with follow up with outpatient PT when appropriate and a FWW.   This TCN obtained choice for HH, [facility](AD) per therapy recommendations, faxed to [facility] and given to TRISTAN.

## 2023-07-18 NOTE — CARE PLAN
Problem: Knowledge Deficit - Standard  Goal: Patient and family/care givers will demonstrate understanding of plan of care, disease process/condition, diagnostic tests and medications  Outcome: Progressing  Note: POC discussed with pt at bedside. Pt asks appropriate questions, no additional concerns at this time.      Problem: Pain - Standard  Goal: Alleviation of pain or a reduction in pain to the patient’s comfort goal  Outcome: Progressing  Note: Pt states pain 6/10. Medications administered per MAR, ice pack applied, pillows for comfort and repositioning.    The patient is Stable - Low risk of patient condition declining or worsening    Shift Goals  Clinical Goals: rest, pain control  Patient Goals: rest    Progress made toward(s) clinical / shift goals:      Patient is not progressing towards the following goals:

## 2023-07-18 NOTE — THERAPY
Physical Therapy   Initial Evaluation     Patient Name: Fabio Vergara  Age:  68 y.o., Sex:  male  Medical Record #: [id number]  Today's Date: 7/18/2023     Precautions  Precautions: Fall Risk;Spinal / Back Precautions   Comments: liftinfg restriction 10lbs  soft cervical collar PRN    Assessment  Patient is 68 y.o. male s/p Decompressive cervical laminectomy, C2-C3, C3-C4. Pt doing well with mobility with FWW. Retired , independent. Pt familiar with equipment due to spouse had a brain injury previously. Pt able to amb in colindres with FWW and up/down 1 step with FWW. Pt will continue to benefit from acute physical therapy to assist progress towards pt's goals.  Anticipate pt will benefit from home and then outpatient therapy when appropriate upon DC from hospital.       Plan    Physical Therapy Initial Treatment Plan   Treatment Plan : Gait Training, Stair Training  Treatment Frequency: 5 Times per Week  Duration: Until Therapy Goals Met    DC Equipment Recommendations: Front-Wheel Walker (put in order for FWW, CM notified) and home with follow up with outpatient PT when appropriate.     Subjective    Pt ready for PT.        Objective       07/18/23 1015   Initial Contact Note    Initial Contact Note Order Received and Verified, Physical Therapy Evaluation in Progress with Full Report to Follow.   Precautions   Precautions Fall Risk;Spinal / Back Precautions    Comments liftinfg restriction 10lbs  soft cervical collar PRN   Pain 0 - 10 Group   Location Neck   Location Orientation Posterior   Therapist Pain Assessment Post Activity Pain Same as Prior to Activity;Nurse Notified   Prior Living Situation   Prior Services Home-Independent   Housing / Facility 1 Story House   Steps Into Home 1   Lives with - Patient's Self Care Capacity Spouse   Comments No FWW, 1 step to enter   Cognition    Cognition / Consciousness WDL   Strength Lower Body   Lower Body Strength  WDL   Sensation Lower Body   Lower  Extremity Sensation   WDL   Coordination Lower Body    Comments w   Balance Assessment   Sitting Balance (Static) Good   Sitting Balance (Dynamic) Good   Standing Balance (Static) Fair +   Standing Balance (Dynamic) Fair +   Weight Shift Sitting Good   Weight Shift Standing Good   Comments standing balance assessed with and without FWW, no LOB   Bed Mobility    Supine to Sit Supervised  (log roll to left, no bed rail)   Sit to Supine Supervised   Scooting Supervised   Rolling Supervised   Gait Analysis   Gait Level Of Assist Standby Assist   Assistive Device Front Wheel Walker   Distance (Feet) 110   # of Times Distance was Traveled 2   Deviation Bradykinetic   # of Stairs Climbed 1  (with FWW)   Level of Assist with Stairs Standby Assist   Functional Mobility   Sit to Stand Supervised   Bed, Chair, Wheelchair Transfer Supervised   Toilet Transfers Supervised  (with grab bar)   Mobility with FWW   Comments educated to keep FWW in front of him while on toilet for safety   How much difficulty does the patient currently have...   Turning over in bed (including adjusting bedclothes, sheets and blankets)? 3   Sitting down on and standing up from a chair with arms (e.g., wheelchair, bedside commode, etc.) 3   Moving from lying on back to sitting on the side of the bed? 3   How much help from another person does the patient currently need...   Moving to and from a bed to a chair (including a wheelchair)? 3   Need to walk in a hospital room? 3   Climbing 3-5 steps with a railing? 3   6 clicks Mobility Score 18   Education Group   Education Provided Cervical Precautions;Role of Physical Therapist;Gait Training;Stair Training;Use of Assistive Device;Brace Wear and Care  (soft c-collar PRN, pt kept it on throughout the eval except doffed it when back in bed)   Cervical Precautions Patient Response Patient;Acceptance;Explanation;Demonstration;Action Demonstration   Role of Physical Therapist Patient Response  Patient;Acceptance;Explanation   Gait Training Patient Response Patient;Acceptance;Explanation;Action Demonstration   Stair Training Patient Response Patient;Acceptance;Explanation;Action Demonstration   Use of Assistive Device Patient Response Patient;Acceptance;Explanation;Demonstration;Action Demonstration   Brace Wear & Care Patient Response Patient;Acceptance;Explanation;Action Demonstration   Physical Therapy Initial Treatment Plan    Treatment Plan  Gait Training;Stair Training   Treatment Frequency 5 Times per Week   Duration Until Therapy Goals Met   Problem List    Problems Impaired Transfers;Impaired Ambulation;Impaired Balance   Anticipated Discharge Equipment and Recommendations   DC Equipment Recommendations Front-Wheel Walker  (put in order for FWW, CM notified)   Interdisciplinary Plan of Care Collaboration   IDT Collaboration with  Nursing;Occupational Therapist;   Patient Position at End of Therapy In Bed;Call Light within Reach;Tray Table within Reach;Phone within Reach

## 2023-07-18 NOTE — PROGRESS NOTES
4 Eyes Skin Assessment Completed by BRIGETTE Durand and BRIGETTE Mccarthy.    Head WDL  Ears WDL  Nose WDL  Mouth WDL  Neck Incision  Breast/Chest WDL  Shoulder Blades WDL  Spine Incision  (R) Arm/Elbow/Hand WDL  (L) Arm/Elbow/Hand WDL  Abdomen WDL  Groin WDL  Scrotum/Coccyx/Buttocks WDL  (R) Leg WDL  (L) Leg WDL  (R) Heel/Foot/Toe WDL  (L) Heel/Foot/Toe WDL          Devices In Places Blood Pressure Cuff and Pulse Ox      Interventions In Place Gray Ear Foams    Possible Skin Injury No    Pictures Uploaded Into Epic N/A  Wound Consult Placed N/A  RN Wound Prevention Protocol Ordered No

## 2023-07-18 NOTE — DISCHARGE PLANNING
Case Management Discharge Planning    Admission Date: 7/17/2023  GMLOS:    ALOS: 0    6-Clicks ADL Score: 24  6-Clicks Mobility Score: 18      Anticipated Discharge Dispo: Discharge Disposition: D/T to home under HHA care in anticipation of covered skilled care (06)  Discharge Address: [address] [place] [address] [place] [address]  Discharge Contact Phone Number: See facesheet    DME Needed: Yes    DME Ordered: Yes    Action(s) Taken: Updated Provider/Nurse on Discharge Plan      Pt discussed in IDT rounds. Pt will likely dc tomorrow as medical staff would like to monitor patient's hemovac output. Discharge recommendations are to dc with home health and a walker. HARRIETT Cunha obtained choice for home health and walker.     Choice forms faxed to [facility]rest[facility]     Awaiting HH acceptance and delivery of FWW at bedside.     No other CM needs identified at this time.       Escalations Completed: None    Medically Clear: No    Next Steps: Pending HH acceptance and FWW delivery    Barriers to Discharge: Outpatient referrals pending    Is the patient up for discharge tomorrow: Yes    Is transport arranged for discharge disposition: No

## 2023-07-18 NOTE — THERAPY
"Occupational Therapy   Initial Evaluation     Patient Name: Fabio Vergara  Age:  68 y.o., Sex:  male  Medical Record #: [id number]  Today's Date: 7/18/2023     Precautions: (P) Fall Risk  Comments: (P) soft collar PRN    Assessment  Patient is 68 y.o. male seen s/p posterior C2-3 lami for OT evaluation. Pt reported feeling \"woozy\" after pain medication but agreeable to participate and no noted symptoms during ADLs. Pt demonstrated supervision level for dressing, donning/ doffing collar, ADL txfs, and G/H. Provided extensive education to pt regarding maintaining neutral spine position during ADLs, brace wear and care, pacing of activities at home, compensatory strategies for IADLs, and recommended AD. Pt receptive to education and verbalized understanding and agreement. No further OT needs at this time.    Patient will not be actively followed for occupational therapy services at this time, however may be seen if requested by physician for 1 more visit within 30 days to address any discharge or equipment needs.     Plan  DC needs only     DC Equipment Recommendations: (P) None  Discharge Recommendations: (P) Recommend home health for continued occupational therapy services        07/18/23 0908   Prior Living Situation   Prior Services Home-Independent   Housing / Facility 1 Story House   Bathroom Set up Walk In Shower;Bathtub / Shower Combination;Shower Chair;Grab Bars;Tub Transfer Bench   Equipment Owned Grab Bar(s) In Tub / Shower;Tub Transfer Bench;Tub / Shower Seat   Lives with - Patient's Self Care Capacity Spouse   Comments Pt has AE at house from spouse previous admit. Pt independent, lives in [facility] [facility] close to grocery store, wife can help minimally   Prior Level of ADL Function   Self Feeding Independent   Grooming / Hygiene Independent   Bathing Independent   Dressing Independent   Toileting Independent   Prior Level of IADL Function   Medication Management Independent   Laundry Independent "   Kitchen Mobility Independent   Finances Independent   Home Management Independent   Shopping Independent   Prior Level Of Mobility Independent Without Device in Community   Precautions   Precautions Fall Risk   Comments soft collar PRN   Cognition    Cognition / Consciousness WDL   Comments pleasant and agreeable   Active ROM Upper Body   Active ROM Upper Body  WDL   Comments limited by sx   Strength Upper Body   Upper Body Strength  WDL   Comments functionally no deficits, able to complete ADLs without assist   Sensation Upper Body   Upper Extremity Sensation  WDL   Balance Assessment   Sitting Balance (Static) Fair   Sitting Balance (Dynamic) Fair   Standing Balance (Static) Fair   Standing Balance (Dynamic) Fair -   Weight Shift Sitting Fair   Weight Shift Standing Fair   Comments with FWW   Bed Mobility    Supine to Sit Supervised   Sit to Supine Supervised   Scooting Supervised   Rolling Supervised   ADL Assessment   Grooming Supervision;Standing   Upper Body Dressing Supervision   Lower Body Dressing Supervision   Toileting Supervision   Comments donning collar, donning pants/socks   How much help from another person does the patient currently need...   6 Clicks Daily Activity Score 24   Functional Mobility   Sit to Stand Supervised   Bed, Chair, Wheelchair Transfer Supervised   Toilet Transfers Supervised   Patient / Family Goals   Patient / Family Goal #1 to go home tomorrow   Education Group   Education Provided Spinal Precautions   Role of Occupational Therapist Patient Response Patient;Acceptance;Explanation   Spinal Precautions Patient Response Patient;Acceptance;Explanation;Handout   Anticipated Discharge Equipment and Recommendations   DC Equipment Recommendations None   Discharge Recommendations Recommend home health for continued occupational therapy services

## 2023-07-19 ENCOUNTER — HOME HEALTH ADMISSION (OUTPATIENT)
Dept: HOME HEALTH SERVICES | Facility: HOME HEALTHCARE | Age: 68
End: 2023-07-19
Payer: MEDICARE

## 2023-07-19 ENCOUNTER — PHARMACY VISIT (OUTPATIENT)
Dept: PHARMACY | Facility: MEDICAL CENTER | Age: 68
End: 2023-07-19
Payer: MEDICARE

## 2023-07-19 VITALS
DIASTOLIC BLOOD PRESSURE: 61 MMHG | HEIGHT: 70 IN | RESPIRATION RATE: 16 BRPM | TEMPERATURE: 99.1 F | SYSTOLIC BLOOD PRESSURE: 113 MMHG | OXYGEN SATURATION: 91 % | BODY MASS INDEX: 27.3 KG/M2 | WEIGHT: 190.7 LBS | HEART RATE: 87 BPM

## 2023-07-19 PROCEDURE — 700102 HCHG RX REV CODE 250 W/ 637 OVERRIDE(OP): Performed by: NURSE PRACTITIONER

## 2023-07-19 PROCEDURE — 700111 HCHG RX REV CODE 636 W/ 250 OVERRIDE (IP): Performed by: NURSE PRACTITIONER

## 2023-07-19 PROCEDURE — G0378 HOSPITAL OBSERVATION PER HR: HCPCS

## 2023-07-19 PROCEDURE — RXMED WILLOW AMBULATORY MEDICATION CHARGE: Performed by: NURSE PRACTITIONER

## 2023-07-19 PROCEDURE — 97116 GAIT TRAINING THERAPY: CPT

## 2023-07-19 PROCEDURE — A9270 NON-COVERED ITEM OR SERVICE: HCPCS | Performed by: NURSE PRACTITIONER

## 2023-07-19 PROCEDURE — 97530 THERAPEUTIC ACTIVITIES: CPT

## 2023-07-19 RX ORDER — TIZANIDINE 2 MG/1
2 TABLET ORAL EVERY 6 HOURS
Qty: 50 TABLET | Refills: 0 | Status: SHIPPED | OUTPATIENT
Start: 2023-07-19 | End: 2023-07-26

## 2023-07-19 RX ORDER — OXYCODONE HYDROCHLORIDE 5 MG/1
TABLET ORAL
Qty: 42 TABLET | Refills: 0 | OUTPATIENT
Start: 2023-07-19 | End: 2023-11-08

## 2023-07-19 RX ORDER — ACETAMINOPHEN 325 MG/1
650 TABLET ORAL EVERY 6 HOURS
Qty: 30 TABLET | Refills: 0 | Status: SHIPPED | OUTPATIENT
Start: 2023-07-19

## 2023-07-19 RX ORDER — TIZANIDINE 2 MG/1
TABLET ORAL
Qty: 50 TABLET | Refills: 0 | OUTPATIENT
Start: 2023-07-19

## 2023-07-19 RX ADMIN — OXYCODONE HYDROCHLORIDE 5 MG: 5 TABLET ORAL at 05:39

## 2023-07-19 RX ADMIN — METOPROLOL SUCCINATE 100 MG: 100 TABLET, EXTENDED RELEASE ORAL at 05:30

## 2023-07-19 RX ADMIN — LISINOPRIL 40 MG: 20 TABLET ORAL at 05:29

## 2023-07-19 RX ADMIN — TIZANIDINE 2 MG: 4 TABLET ORAL at 11:06

## 2023-07-19 RX ADMIN — ACETAMINOPHEN 650 MG: 325 TABLET, FILM COATED ORAL at 11:06

## 2023-07-19 RX ADMIN — ACETAMINOPHEN 650 MG: 325 TABLET, FILM COATED ORAL at 05:29

## 2023-07-19 RX ADMIN — OXYCODONE HYDROCHLORIDE 5 MG: 5 TABLET ORAL at 11:06

## 2023-07-19 RX ADMIN — ONDANSETRON 4 MG: 4 TABLET, ORALLY DISINTEGRATING ORAL at 05:51

## 2023-07-19 RX ADMIN — METFORMIN HYDROCHLORIDE 1000 MG: 500 TABLET, EXTENDED RELEASE ORAL at 05:29

## 2023-07-19 RX ADMIN — TIZANIDINE 2 MG: 4 TABLET ORAL at 05:29

## 2023-07-19 RX ADMIN — AMLODIPINE BESYLATE 5 MG: 5 TABLET ORAL at 05:30

## 2023-07-19 RX ADMIN — DOCUSATE SODIUM 100 MG: 100 CAPSULE, LIQUID FILLED ORAL at 05:30

## 2023-07-19 ASSESSMENT — COGNITIVE AND FUNCTIONAL STATUS - GENERAL
SUGGESTED CMS G CODE MODIFIER MOBILITY: CH
MOBILITY SCORE: 24

## 2023-07-19 ASSESSMENT — PAIN SCALES - PAIN ASSESSMENT IN ADVANCED DEMENTIA (PAINAD)
BREATHING: NORMAL
BREATHING: NORMAL

## 2023-07-19 ASSESSMENT — GAIT ASSESSMENTS
DISTANCE (FEET): 300
ASSISTIVE DEVICE: FRONT WHEEL WALKER
GAIT LEVEL OF ASSIST: SUPERVISED

## 2023-07-19 ASSESSMENT — PAIN DESCRIPTION - PAIN TYPE
TYPE: ACUTE PAIN;SURGICAL PAIN

## 2023-07-19 NOTE — DISCHARGE PLANNING
HTH/SCP TCN chart review completed. Current discharge considerations are dc to home with [facility] and FWW. Noted on 7/18, OT with recs for HH and PT with recs for outpatient and FWW. Noted per review, FTF/order for HH in place and at time of writing this note (in PM) pt has received his FWW and is in DC lounge. Anticipating dc to home today as noted, though TCN will continue to follow and collaborate as pt remains in acute settting with discharge planning team as additional post acute needs arise. Thank you.    Completed:  PT/OT with recommendations as above  Choice obtained: [facility], DME (FWW per therapy recs)  [facility] referral not sent; LOW LACE

## 2023-07-19 NOTE — DISCHARGE PLANNING
Received choice form at: 1127  Agency/Facility name: [facility]  Referral sent per choice form at:  1132    Received choice form at: 1150  Agency/Facility name: [facility]  Referral sent per choice form at:  1210

## 2023-07-19 NOTE — DISCHARGE PLANNING
Received choice form at: 4346  Agency/Facility name: [facility]  Referral sent per choice form at:  7778

## 2023-07-19 NOTE — PROGRESS NOTES
Assumed care of pt at 1900H. Report received from dayshift RN. Pt is A&O x 4, on room air no complains of SOB, breathing is even and unlabored. Patient stated that their pain is 5/10 but knows he can't have the pain medicine yet and is alright with that. Pt's pain comfort goal is to ambulate comfortably. Was able to the up to the bathroom standby assist and walker no dizziness/light headedness, gait is steady. Hemovac in place draining serosanguinous output. Dressing is clean, dry and intact.     Pt educated on how to use the call light, pt verbalized understanding. Bed in lowest locked position, call light within reach, hourly rounding in place. Labs reviewed, orders reviewed, communication board updated.     POC discussed with patient, no concerns. Makes needs known and calls appropriately.     Pt declines any further needs at this time.

## 2023-07-19 NOTE — PROGRESS NOTES
Pt transferred to discharge lounge with transport. All questions answered. All personal belongings taken. Pt will get meds to bed from discharge lounge.

## 2023-07-19 NOTE — FACE TO FACE
Face to Face Supporting Documentation - Home Health    The encounter with this patient was in whole or in part the primary reason for home health admission.    Date of encounter:   Patient:                    MRN:                       YOB: 2025  Fabio Vergara  [id number]  1955     Home health to see patient for:  Skilled Nursing care for assessment, interventions & education    Skilled need for:  Surgical Aftercare post op    Skilled nursing interventions to include:  Comment: safety and mobility    Homebound status evidenced by:  Need the aid of supportive devices such as crutches, canes, wheelchairs or walkers. Leaving home requires a considerable and taxing effort. There is a normal inability to leave the home.    Community Physician to provide follow up care: TANISHA Siddiqui     Optional Interventions? No      I certify the face to face encounter for this home health care referral meets the CMS requirements and the encounter/clinical assessment with the patient was, in whole, or in part, for the medical condition(s) listed above, which is the primary reason for home health care. Based on my clinical findings: the service(s) are medically necessary, support the need for home health care, and the homebound criteria are met.  I certify that this patient has had a face to face encounter by myself.  RANDALL Ho. - NPI: [id number]

## 2023-07-19 NOTE — THERAPY
Physical Therapy   Daily Treatment     Patient Name: Fabio Vergara  Age:  68 y.o., Sex:  male  Medical Record #: [id number]  Today's Date: 7/19/2023     Precautions  Precautions: Spinal / Back Precautions     Assessment    Rec'd pt alert, in bed, pleasant and agreeable to work w/ PT.  He is able to log roll in/out of bed w/o assist and w/o use of bed features.  He is able to maintain his eob sitting balance w/o assist and stand w/ spv.  He is able to ambulate both w/ and w/o a fww.  He is able to perform stairs, also w/o assist.  He would prefer a fww, as he feels most secure when using the AD.  He is able to demonstrate his precautions w/o cues.  PT for d/c needs    Plan    Treatment Plan Status:    Type of Treatment: Gait Training, Stair Training  Treatment Frequency: 5 Times per Week  Treatment Duration: Discharge Needs Only    DC Equipment Recommendations: Front-Wheel Walker  Discharge Recommendations: Recommend home health for continued physical therapy services        Objective       07/19/23 0851   Balance   Sitting Balance (Static) Good   Sitting Balance (Dynamic) Good   Standing Balance (Static) Fair +   Standing Balance (Dynamic) Fair +   Weight Shift Sitting Good   Weight Shift Standing Good   Comments w/ and w/o fww   Bed Mobility    Supine to Sit Supervised   Sit to Supine Supervised   Rolling Supervised   Gait Analysis   Gait Level Of Assist Supervised   Assistive Device Front Wheel Walker   Distance (Feet) 300   # of Stairs Climbed 5   Level of Assist with Stairs Supervised   Functional Mobility   Sit to Stand Supervised   Bed, Chair, Wheelchair Transfer Supervised   Short Term Goals    Short Term Goal # 1 Bed mob log roll supervised in 6 visits   Goal Outcome # 1 Goal met   Short Term Goal # 2 transfers supervised with LRAD in 6 visits   Goal Outcome # 2 Goal met   Short Term Goal # 3 amb 100ft with LRAD in 6 vistis supervised   Goal Outcome # 3 Goal met   Short Term Goal # 4 independent with c/s  precautions in 6 visits   Goal Outcome # 4 Goal met   Short Term Goal # 5 up/down 5 steps SBA with LRAD or HHA in 6 visits   Goal Outcome # 5 Goal met   Physical Therapy Treatment Plan   Duration Discharge Needs Only   Anticipated Discharge Equipment and Recommendations   DC Equipment Recommendations Front-Wheel Walker   Discharge Recommendations Recommend home health for continued physical therapy services

## 2023-07-19 NOTE — PROGRESS NOTES
Neurosurgery Progress Note    Subjective:  Pt doing well, still with pain but a bit better controlled today    Exam:  AAO  Sitting upright with soft collar on  Dressing C&D  UE's strong    BP  Min: 113/61  Max: 147/74  Pulse  Av.4  Min: 85  Max: 98  Resp  Avg: 15.6  Min: 15  Max: 16  Temp  Av.2 °C (98.9 °F)  Min: 36.6 °C (97.9 °F)  Max: 37.5 °C (99.5 °F)  SpO2  Av.8 %  Min: 90 %  Max: 94 %    No data recorded                      Intake/Output                         23 - 23 - 23 Total  Total                 Intake    Total Intake -- -- -- -- -- --       Output    Urine  --  -- --  --  -- --    Number of Times Voided -- 3 x 3 x -- -- --    Drains  25  60 85  --  -- --    Output (mL) ([REMOVED] Closed/Suction Drain Posterior Neck Hemovac 23 0545) 25 60 85 -- -- --    Total Output 25 60 85 -- -- --       Net I/O     -25 -60 -85 -- -- --              Intake/Output Summary (Last 24 hours) at 2023 0924  Last data filed at 2023 0520  Gross per 24 hour   Intake --   Output 85 ml   Net -85 ml               amLODIPine  5 mg DAILY    lisinopril  40 mg BID    metFORMIN ER  1,000 mg BID    metoprolol SR  100 mg QDAY    pravastatin  40 mg QHS    traZODone  50 mg QHS    Pharmacy Consult Request  1 Each PHARMACY TO DOSE    MD ALERT...DO NOT ADMINISTER NSAIDS or ASPIRIN unless ORDERED By Neurosurgery  1 Each PRN    docusate sodium  100 mg BID    senna-docusate  1 Tablet Q24HRS PRN    polyethylene glycol/lytes  1 Packet BID PRN    magnesium hydroxide  30 mL QDAY PRN    bisacodyl  10 mg Q24HRS PRN    sodium phosphate  1 Each Once PRN    0.9 % NaCl with KCl 20 mEq 1,000 mL   Continuous    acetaminophen  650 mg Q6HRS    Followed by    [START ON 2023] acetaminophen  650 mg Q6HRS PRN    oxyCODONE immediate-release  5 mg Q3HRS PRN    Or    oxyCODONE immediate-release  10 mg Q3HRS PRN    Or    HYDROmorphone  0.5 mg  Q3HRS PRN    diphenhydrAMINE  25 mg Q6HRS PRN    Or    diphenhydrAMINE  25 mg Q6HRS PRN    ondansetron  4 mg Q4HRS PRN    ondansetron  4 mg Q4HRS PRN    tizanidine  2 mg Q6HRS    diazePAM  5 mg Q6HRS PRN       Assessment and Plan:  Hospital day # 3  POD #2 Posterior C2-3 laminectomy  NM as above  Plan to dc home  Meds sent to [facility] confirmed they have Oxy 5  Prophylactic anticoagulation: no         Start date/time: no need - mobilize

## 2023-07-19 NOTE — DISCHARGE PLANNING
Case Management Discharge Planning    Admission Date: 7/17/2023  GMLOS:    ALOS: 0    6-Clicks ADL Score: 24  6-Clicks Mobility Score: 18      Anticipated Discharge Dispo: Discharge Disposition: D/T to home under HHA care in anticipation of covered skilled care (06)  Discharge Address: [address] [place] [address] [place] [address]  Discharge Contact Phone Number: See facesheet    DME Needed: No    Action(s) Taken: OTHER    Pt discussed in IDT rounds. Patient will likely be medically cleared to discharge home with home health today. Choice forms scanned into media. LSW contacted [facility] requesting home health order, dme order, and face to face prior to sending referrals out. APRN to round on patient and enter orders. Once orders are entered, referrals can be sent to patient's home health agency of choice.     Escalations Completed: None    Medically Clear: Yes    Next Steps: Pending orders.     Barriers to Discharge: Outpatient referrals pending    Is the patient up for discharge tomorrow: No, possibly today.

## 2023-07-19 NOTE — CARE PLAN
Problem: Knowledge Deficit - Standard  Goal: Patient and family/care givers will demonstrate understanding of plan of care, disease process/condition, diagnostic tests and medications  Outcome: Progressing   Pt educated on possible discharge tomorrow. Pain control and plan of care.  Problem: Pain - Standard  Goal: Alleviation of pain or a reduction in pain to the patient’s comfort goal  Outcome: Progressing  Pt medicated for pain as prescribed.    The patient is Stable - Low risk of patient condition declining or worsening    Shift Goals  Clinical Goals: rest, pain control  Patient Goals: rest    Progress made toward(s) clinical / shift goals:  Pt verbalizes pain control with current pain management.    Patient is not progressing towards the following goals:

## 2023-07-19 NOTE — DISCHARGE PLANNING
ATTN: Case Management  RE: Referral for Home Health    As of 7/19/23, we have accepted the Home Health referral for the patient listed above.    A [facility] clinician will be out to see the patient within 48 hours. If you have any questions or concerns regarding the patient’s transition to Home Health, please do not hesitate to contact us at [phone].      We look forward to collaborating with you,  [facility] Team

## 2023-07-19 NOTE — CARE PLAN
The patient is Stable - Low risk of patient condition declining or worsening    Shift Goals  Clinical Goals: monitor hemovac output, pain control  Patient Goals: pain control, discharge as soon as able    Progress made toward(s) clinical / shift goals:      Pain well managed with prescribed PRN pain medication. Per patient he slept okay. Hemovac drain removed, had 15cc output this morning.     Problem: Knowledge Deficit - Standard  Goal: Patient and family/care givers will demonstrate understanding of plan of care, disease process/condition, diagnostic tests and medications  Outcome: Progressing     Problem: Pain - Standard  Goal: Alleviation of pain or a reduction in pain to the patient’s comfort goal  Outcome: Progressing

## 2023-07-20 ENCOUNTER — HOME CARE VISIT (OUTPATIENT)
Dept: HOME HEALTH SERVICES | Facility: HOME HEALTHCARE | Age: 68
End: 2023-07-20
Payer: MEDICARE

## 2023-07-20 PROCEDURE — 665001 SOC-HOME HEALTH

## 2023-07-20 PROCEDURE — G0493 RN CARE EA 15 MIN HH/HOSPICE: HCPCS

## 2023-07-20 RX ORDER — FLUTICASONE PROPIONATE 50 MCG
1 SPRAY, SUSPENSION (ML) NASAL
Qty: 16 G | Refills: 3 | Status: SHIPPED | OUTPATIENT
Start: 2023-07-20 | End: 2023-11-08 | Stop reason: SDUPTHER

## 2023-07-20 ASSESSMENT — ENCOUNTER SYMPTOMS
PAIN LOCATION - PAIN QUALITY: ACHY
PAIN: 1
PERSON REPORTING PAIN: PATIENT
PAIN LOCATION - PAIN SEVERITY: 7/10
PAIN LOCATION: RIGHT NECK

## 2023-07-20 ASSESSMENT — FIBROSIS 4 INDEX: FIB4 SCORE: 1.17

## 2023-07-21 ENCOUNTER — DOCUMENTATION (OUTPATIENT)
Dept: MEDICAL GROUP | Facility: PHYSICIAN GROUP | Age: 68
End: 2023-07-21
Payer: MEDICARE

## 2023-07-21 VITALS
BODY MASS INDEX: 27.2 KG/M2 | DIASTOLIC BLOOD PRESSURE: 65 MMHG | SYSTOLIC BLOOD PRESSURE: 115 MMHG | HEIGHT: 70 IN | WEIGHT: 190 LBS | TEMPERATURE: 98.4 F | OXYGEN SATURATION: 96 % | RESPIRATION RATE: 18 BRPM | HEART RATE: 100 BPM

## 2023-07-21 ASSESSMENT — ENCOUNTER SYMPTOMS
SUBJECTIVE PAIN PROGRESSION: UNCHANGED
VOMITING: DENIES
BOWEL PATTERN NORMAL: 1
PAIN LOCATION - PAIN FREQUENCY: CONSTANT
HIGHEST PAIN SEVERITY IN PAST 24 HOURS: 8/10
PAIN SEVERITY GOAL: 4/10
DRY SKIN: 1
PAIN LOCATION - PAIN DURATION: 24/7
SHORTNESS OF BREATH: 1
NAUSEA: DENIES
DEBILITATING PAIN: 1
LOWEST PAIN SEVERITY IN PAST 24 HOURS: 6/10
FATIGUES EASILY: 1
LAST BOWEL MOVEMENT: 66674
MENTAL STATUS CHANGE: 0
STOOL FREQUENCY: DAILY
DYSPNEA ACTIVITY LEVEL: AFTER AMBULATING MORE THAN 20 FT

## 2023-07-21 NOTE — PROGRESS NOTES
Medication chart review for [facility] services    Received referral from [facility].   Medications reviewed  compared with discharge summary if available.  Discharge summary date, if applicable: 6/16    Current medication list per [facility]     Medication list one, patient is currently taking    Current Outpatient Medications:     polyethylene glycol 3350, 17 g, Oral, QDAY PRN    fluticasone, 1 Spray, Nasal, QDAY    tizanidine, Take 1 tablet every 6 hours by oral route as needed.    oxyCODONE immediate-release, Take 1 tablet every 4 hours by oral route as needed for 7 days.    acetaminophen, 650 mg, Oral, Q6HRS    tizanidine, 2 mg, Oral, Q6HRS (Patient not taking: Reported on 7/21/2023)    metFORMIN ER, 1,000 mg, Oral, BID    amLODIPine, 5 mg, Oral, DAILY    ammonium lactate, 1 Application, Topical, QDAY PRN    multivitamin, 1 Tablet, Oral, DAILY    lisinopril, TAKE ONE TABLET BY MOUTH TWICE A DAY    metoprolol SR, 100 mg, Oral, QDAY    traZODone, 50 mg, Oral, QHS    pravastatin, 40 mg, Oral, QHS    baclofen, 10 mg, Oral, BID PRN      Medication list two, drugs that the patient has been prescribed or recommended to take by their healthcare provider on discharge summary         START taking these medications         Instructions   amLODIPine 5 MG Tabs  Commonly known as: NORVASC    Doctor's comments: Hold sbp less 115  Take 1 Tablet by mouth every day. Hold if sbp less than 115  Dose: 5 mg                CONTINUE taking these medications         Instructions   acetaminophen 500 MG Tabs  Commonly known as: TYLENOL    Take 500-1,000 mg by mouth every 6 hours as needed.  Dose: 500-1,000 mg      ammonium lactate 12 % Lotn  Commonly known as: LAC-HYDRIN        baclofen 10 MG Tabs  Commonly known as: LIORESAL    Take 10 mg by mouth 2 times a day as needed.  Dose: 10 mg      fluticasone 50 MCG/ACT nasal spray  Commonly known as: FLONASE    USE ONE SPRAY IN EACH NOSTRIL EVERY DAY      lidocaine 5 % Ptch  Commonly  known as: LIDODERM    Place 2 Patches on the skin as needed.  Dose: 2 Patch      lisinopril 40 MG tablet  Commonly known as: PRINIVIL    TAKE ONE TABLET BY MOUTH TWICE A DAY      metFORMIN  MG Tb24  Commonly known as: GLUCOPHAGE XR    Take 1 Tablet by mouth 2 times a day.  Dose: 750 mg      metoprolol  MG Tb24  Commonly known as: TOPROL XL    TAKE 1 TABLET BY MOUTH EVERY DAY  Dose: 100 mg      multivitamin Tabs    Take 1 Tablet by mouth every day.  Dose: 1 Tablet      naproxen 500 MG Tabs  Commonly known as: NAPROSYN        Non Formulary Request    by Both Ears route 4 times a day. neomycin and polymyxin ear drops  3 drops 4 x a day      NON SPECIFIED    Take 100 mg by mouth 2 times a day. Nitrofurantoin   Antibiotic for uti  Dose: 100 mg      pravastatin 40 MG tablet  Commonly known as: PRAVACHOL    Take 1 Tablet by mouth at bedtime.  Dose: 40 mg      traZODone 50 MG Tabs  Commonly known as: DESYREL    Take 1 Tablet by mouth at bedtime.  Dose: 50 mg      VITAMIN D3 PO    Take 1 Tablet by mouth every day.  Dose: 1 Tablet                STOP taking these medications       chlorthalidone 25 MG Tabs  Commonly known as: HYGROTON      Jardiance 25 MG Tabs  Generic drug: Empagliflozin          Allergies   Allergen Reactions    Flexeril [Cyclobenzaprine]      Weakness, spacie    Penicillins      Doesn't know the reaction  had this allergy when he was a child       Labs     Lab Results   Component Value Date/Time    SODIUM 134 (L) 07/10/2023 12:03 PM    POTASSIUM 3.8 07/10/2023 12:03 PM    CHLORIDE 96 07/10/2023 12:03 PM    CO2 22 07/10/2023 12:03 PM    GLUCOSE 158 (H) 07/10/2023 12:03 PM    BUN 6 (L) 07/10/2023 12:03 PM    CREATININE 0.63 07/10/2023 12:03 PM    BUNCREATRAT 11 02/22/2022 05:55 AM     Lab Results   Component Value Date/Time    ALKPHOSPHAT 59 06/14/2023 07:21 AM    ASTSGOT 22 06/14/2023 07:21 AM    ALTSGPT 21 06/14/2023 07:21 AM    TBILIRUBIN 0.6 06/14/2023 07:21 AM    INR 1.00 06/02/2023 12:49 PM     ALBUMIN 4.3 06/14/2023 07:21 AM    ALBUMIN 4.20 03/15/2019 12:13 PM        Assessment for clinically significant drug interactions, drug omissions/additions, duplicative therapies.        High  Duplicate Therapy: baclofen, tizanidine  CENTRAL MUSCLE RELAXANTS. Abuse/Dependency Potential.  Last overridden by: Corrina Belcher, PharmD on Jul 19, 2023 11:51 AM         CC   Charley Harvey, A.P.R.N.  [address]  [place] [address]  Fax: [phone]    [facility]  Phone [phone] fax [phone]    This note was created using voice recognition software (Dragon). The accuracy of the dictation is limited by the abilities of the software. I have reviewed the note prior to signing, however some errors in grammar and context are still possible. If you have any questions related to this note please do not hesitate to contact our office.

## 2023-07-21 NOTE — DISCHARGE SUMMARY
Discharge Summary    CHIEF COMPLAINT ON ADMISSION  No chief complaint on file.      Reason for Admission  Cervical stenosis of spine     Admission Date  7/17/2023    CODE STATUS  Full Code    HPI & HOSPITAL COURSE  This is a 68 y.o. male here with neck pain and cervical stenosis   No notes on file    Therefore, he is discharged in good and stable condition to home with close outpatient follow-up.    The patient met 2-midnight criteria for an inpatient stay at the time of discharge.    Discharge Date  7/19/2023    FOLLOW UP ITEMS POST DISCHARGE  2 weeks at [facility]    DISCHARGE DIAGNOSES  Principal Problem:    Cervical stenosis of spine (POA: Yes)  Resolved Problems:    * No resolved hospital problems. *      FOLLOW UP  Future Appointments   Date Time Provider Department Center   7/24/2023 To Be Determined Silva Hagen R.N. [facility] None   7/24/2023 To Be Determined Richelle العلي, PATSY [facility] None   7/26/2023  8:40 AM [place]ISHA Mart[place] [facility]   7/27/2023 To Be Determined Yue June R.N. [facility] None   7/31/2023 To Be Determined Yue June R.N. [facility] None   8/3/2023 To Be Determined Yue June R.N. [facility] None   8/7/2023 To Be Determined Yue June R.N. [facility] None   8/10/2023 To Be Determined Yue June R.N. [facility] None   8/30/2023  9:00 AM TANISHA Siddiqui [facility]     No follow-up provider specified.    MEDICATIONS ON DISCHARGE     Medication List        START taking these medications        Instructions   acetaminophen 325 MG Tabs  Commonly known as: Tylenol   Take 2 Tablets by mouth every 6 hours.  Dose: 650 mg     oxyCODONE immediate-release 5 MG Tabs  Commonly known as: Roxicodone   Doctor's comments: meds to bed  Take 1 tablet every 4 hours by oral route as needed for 7 days.     * tizanidine 2 MG tablet  Commonly known as: Zanaflex   Doctor's comments: meds to bed  Take 1 tablet every 6 hours by oral route as needed.     * tizanidine 2 MG tablet  Commonly  known as: Zanaflex   Take 1 Tablet by mouth every 6 hours.  Dose: 2 mg           * This list has 2 medication(s) that are the same as other medications prescribed for you. Read the directions carefully, and ask your doctor or other care provider to review them with you.                CONTINUE taking these medications        Instructions   amLODIPine 5 MG Tabs  Commonly known as: Norvasc   Take 1 Tablet by mouth every day. Hold if sbp less than 115  Dose: 5 mg     ammonium lactate 12 % Lotn  Commonly known as: Lac-Hydrin   Apply 1 Application topically 1 time a day as needed (FEET). Indications: dry skin  Dose: 1 Application     baclofen 10 MG Tabs  Commonly known as: Lioresal   Take 10 mg by mouth 2 times a day as needed. Indications: Muscle Spasm  Dose: 10 mg     lisinopril 40 MG tablet  Commonly known as: Prinivil   TAKE ONE TABLET BY MOUTH TWICE A DAY     metFORMIN  MG Tb24  Commonly known as: Glucophage XR   Take 2 Tablets by mouth 2 times a day.  Dose: 1,000 mg     metoprolol  MG Tb24  Commonly known as: Toprol XL   TAKE 1 TABLET BY MOUTH EVERY DAY  Dose: 100 mg     multivitamin Tabs   Take 1 Tablet by mouth every day. Indications: supplement  Dose: 1 Tablet     pravastatin 40 MG tablet  Commonly known as: Pravachol   Take 1 Tablet by mouth at bedtime.  Dose: 40 mg     traZODone 50 MG Tabs  Commonly known as: Desyrel   Take 1 Tablet by mouth at bedtime.  Dose: 50 mg            STOP taking these medications      naproxen 500 MG Tabs  Commonly known as: Naprosyn              Allergies  Allergies   Allergen Reactions    Flexeril [Cyclobenzaprine]      Weakness, spacie    Penicillins      Doesn't know the reaction  had this allergy when he was a child       DIET  No orders of the defined types were placed in this encounter.      ACTIVITY  As tolerated.  Weight bearing as tolerated    CONSULTATIONS  none    PROCEDURES  POD #2 Posterior C2-3 laminectomy    LABORATORY  Lab Results   Component Value Date     SODIUM 134 (L) 07/10/2023    POTASSIUM 3.8 07/10/2023    CHLORIDE 96 07/10/2023    CO2 22 07/10/2023    GLUCOSE 158 (H) 07/10/2023    BUN 6 (L) 07/10/2023    CREATININE 0.63 07/10/2023        Lab Results   Component Value Date    WBC 9.0 07/12/2023    HEMOGLOBIN 13.9 (L) 07/12/2023    HEMATOCRIT 39.3 (L) 07/12/2023    PLATELETCT 288 07/12/2023        Total time of the discharge process exceeds 30 minutes.

## 2023-07-24 ENCOUNTER — HOME CARE VISIT (OUTPATIENT)
Dept: HOME HEALTH SERVICES | Facility: HOME HEALTHCARE | Age: 68
End: 2023-07-24
Payer: MEDICARE

## 2023-07-24 VITALS
OXYGEN SATURATION: 96 % | DIASTOLIC BLOOD PRESSURE: 70 MMHG | TEMPERATURE: 98.1 F | SYSTOLIC BLOOD PRESSURE: 140 MMHG | HEART RATE: 90 BPM | RESPIRATION RATE: 18 BRPM

## 2023-07-24 PROCEDURE — G0299 HHS/HOSPICE OF RN EA 15 MIN: HCPCS

## 2023-07-24 ASSESSMENT — ENCOUNTER SYMPTOMS
PAIN LOCATION - EXACERBATING FACTORS: MOVEMENT
LAST BOWEL MOVEMENT: 66679
PAIN LOCATION: SURGICAL SITE
SUBJECTIVE PAIN PROGRESSION: WAXING AND WANING
STOOL FREQUENCY: DAILY
DEBILITATING PAIN: 1
HIGHEST PAIN SEVERITY IN PAST 24 HOURS: 7/10
PAIN LOCATION - PAIN FREQUENCY: INTERMITTENT
PAIN SEVERITY GOAL: 3/10
NAUSEA: PT DENIES ANY NAUSEA AT THIS TIME
LOWEST PAIN SEVERITY IN PAST 24 HOURS: 5/10
PAIN: 1
VOMITING: PT DENIES ANY EMESIS AT THIS TIME
BOWEL PATTERN NORMAL: 1
PAIN LOCATION - RELIEVING FACTORS: REST/MEDICATIONS
PAIN LOCATION - PAIN SEVERITY: 5/10
PERSON REPORTING PAIN: PATIENT

## 2023-07-25 ENCOUNTER — HOME CARE VISIT (OUTPATIENT)
Dept: HOME HEALTH SERVICES | Facility: HOME HEALTHCARE | Age: 68
End: 2023-07-25
Payer: MEDICARE

## 2023-07-25 VITALS
HEART RATE: 82 BPM | TEMPERATURE: 97.9 F | RESPIRATION RATE: 16 BRPM | OXYGEN SATURATION: 96 % | DIASTOLIC BLOOD PRESSURE: 74 MMHG | SYSTOLIC BLOOD PRESSURE: 142 MMHG

## 2023-07-25 PROCEDURE — G0151 HHCP-SERV OF PT,EA 15 MIN: HCPCS

## 2023-07-26 ENCOUNTER — OFFICE VISIT (OUTPATIENT)
Dept: MEDICAL GROUP | Facility: PHYSICIAN GROUP | Age: 68
End: 2023-07-26
Payer: MEDICARE

## 2023-07-26 VITALS
WEIGHT: 187 LBS | OXYGEN SATURATION: 99 % | TEMPERATURE: 98.1 F | HEART RATE: 80 BPM | BODY MASS INDEX: 26.77 KG/M2 | DIASTOLIC BLOOD PRESSURE: 72 MMHG | HEIGHT: 70 IN | SYSTOLIC BLOOD PRESSURE: 140 MMHG

## 2023-07-26 DIAGNOSIS — M48.02 CERVICAL STENOSIS OF SPINE: ICD-10-CM

## 2023-07-26 DIAGNOSIS — Z09 HOSPITAL DISCHARGE FOLLOW-UP: Primary | ICD-10-CM

## 2023-07-26 DIAGNOSIS — D51.3 OTHER DIETARY VITAMIN B12 DEFICIENCY ANEMIA: ICD-10-CM

## 2023-07-26 PROCEDURE — 3078F DIAST BP <80 MM HG: CPT | Performed by: NURSE PRACTITIONER

## 2023-07-26 PROCEDURE — 99213 OFFICE O/P EST LOW 20 MIN: CPT | Performed by: NURSE PRACTITIONER

## 2023-07-26 PROCEDURE — 3077F SYST BP >= 140 MM HG: CPT | Performed by: NURSE PRACTITIONER

## 2023-07-26 RX ORDER — CYANOCOBALAMIN 1000 UG/ML
1000 INJECTION, SOLUTION INTRAMUSCULAR; SUBCUTANEOUS
Status: DISCONTINUED | OUTPATIENT
Start: 2023-07-26 | End: 2023-11-08

## 2023-07-26 RX ADMIN — CYANOCOBALAMIN 1000 MCG: 1000 INJECTION, SOLUTION INTRAMUSCULAR; SUBCUTANEOUS at 09:44

## 2023-07-26 ASSESSMENT — FIBROSIS 4 INDEX: FIB4 SCORE: 1.17

## 2023-07-26 NOTE — ASSESSMENT & PLAN NOTE
New to examiner.  The patient was admitted to the hospital on 7/17/2023 and discharged home on 7/19/2023 for cervical stenosis of the spine.  While admitted he had posterior C2-3 laminectomy with Dr. Bennett.  Reports that he was told to rest and not lift anything >10 pounds.  States he will likely start physical therapy in 4-6 weeks.  He does have home health nurse and a home therapist.  He will follow-up with neurosurgery tomorrow for postop appointment and possible staple removal.  Patient reports that his muscles in his chest are sore and his neck hurts if he moves it too much, he does wear a soft neck brace as needed.  He continues pain medications about every hours as prescribed, does not take them before bed.  He is also taking acetaminophen and tizanidine as needed.  States that he continues to have pain and weakness in bilateral upper extremities, but it is slightly improved, right wrist has a tight sensation and he will experience tingles in certain positions.  Headaches that he was experiencing prior to surgery are improving.  He has had some constipation with the pain medications, last good bowel movement about 2 weeks ago.

## 2023-07-27 ENCOUNTER — HOME CARE VISIT (OUTPATIENT)
Dept: HOME HEALTH SERVICES | Facility: HOME HEALTHCARE | Age: 68
End: 2023-07-27
Payer: MEDICARE

## 2023-07-27 NOTE — PROGRESS NOTES
Subjective:     Fabio Vergara is a 68 y.o. male who presents for Hospital Follow-up.    Transitional Care Management     HPI:   Recently hospitalized for:    Hospital discharge follow-up  Cervical stenosis of spine  New to examiner.  The patient was admitted to the hospital on 7/17/2023 and discharged home on 7/19/2023 for cervical stenosis of the spine.  While admitted he had posterior C2-3 laminectomy with Dr. Bennett.  Reports that he was told to rest and not lift anything >10 pounds.  States he will likely start physical therapy in 4-6 weeks.  He does have home health nurse and a home therapist.  He will follow-up with neurosurgery tomorrow for postop appointment and possible staple removal.  Patient reports that his muscles in his chest are sore and his neck hurts if he moves it too much, he does wear a soft neck brace as needed.  He continues pain medications about every hours as prescribed, does not take them before bed.  He is also taking acetaminophen and tizanidine as needed.  States that he continues to have pain and weakness in bilateral upper extremities, but it is slightly improved, right wrist has a tight sensation and he will experience tingles in certain positions.  Headaches that he was experiencing prior to surgery are improving.  He has had some constipation with the pain medications, last good bowel movement about 2 weeks ago.    Other dietary vitamin B12 deficiency anemia  Chronic, ongoing.  Continues vitamin B12 injections monthly, requesting injection today.     Current medicines (including reconciliation performed today)  Current Outpatient Medications   Medication Sig Dispense Refill    polyethylene glycol 3350 (MIRALAX) 17 GM/SCOOP Powder Take 17 g by mouth 1 time a day as needed (constipation). Indications: Constipation      fluticasone (FLONASE) 50 MCG/ACT nasal spray Administer 1 Spray into affected nostril(S) every day. 16 g 3    tizanidine (ZANAFLEX) 2 MG tablet Take 1 tablet  every 6 hours by oral route as needed. 50 Tablet 0    oxyCODONE immediate-release (ROXICODONE) 5 MG Tab Take 1 tablet every 4 hours by oral route as needed for 7 days. 42 Tablet 0    acetaminophen (TYLENOL) 325 MG Tab Take 2 Tablets by mouth every 6 hours. 30 Tablet 0    metFORMIN ER (GLUCOPHAGE XR) 500 MG TABLET SR 24 HR Take 2 Tablets by mouth 2 times a day. 360 Tablet 3    amLODIPine (NORVASC) 5 MG Tab Take 1 Tablet by mouth every day. Hold if sbp less than 115 100 Tablet 0    ammonium lactate (LAC-HYDRIN) 12 % Lotion Apply 1 Application topically 1 time a day as needed (FEET). Indications: dry skin      multivitamin Tab Take 1 Tablet by mouth every day. Indications: supplement      lisinopril (PRINIVIL) 40 MG tablet TAKE ONE TABLET BY MOUTH TWICE A  Tablet 3    metoprolol SR (TOPROL XL) 100 MG TABLET SR 24 HR TAKE 1 TABLET BY MOUTH EVERY DAY 90 Tablet 3    traZODone (DESYREL) 50 MG Tab Take 1 Tablet by mouth at bedtime. 90 Tablet 3    pravastatin (PRAVACHOL) 40 MG tablet Take 1 Tablet by mouth at bedtime. 100 Tablet 3    baclofen (LIORESAL) 10 MG Tab Take 10 mg by mouth 2 times a day as needed. Indications: Muscle Spasm       Current Facility-Administered Medications   Medication Dose Route Frequency Provider Last Rate Last Admin    cyanocobalamin (Vitamin B-12) injection 1,000 mcg  1,000 mcg Intramuscular Q30 DAYS Charley Harvey A.P.R.N.   1,000 mcg at 07/26/23 0944     Allergies:   Flexeril [cyclobenzaprine] and Penicillins    Social History     Tobacco Use    Smoking status: Former     Packs/day: 1.00     Years: 35.00     Pack years: 35.00     Types: Cigarettes     Start date: 1/1/1988     Quit date: 7/20/2013     Years since quitting: 10.0     Passive exposure: Never    Smokeless tobacco: Never   Vaping Use    Vaping Use: Never used   Substance Use Topics    Alcohol use: Yes     Alcohol/week: 2.4 - 3.0 oz     Types: 4 - 5 Cans of beer per week     Comment: On Occasion >4 beers on one day.    Drug  "use: No     ROS:  Constitutional: No fevers, chills, malaise/fatigue.  Eyes: No eye pain.  ENT: No sore throat, congestion.   Resp: No cough, shortness of breath.  CV: No chest pain, leg swelling, palpitations.  GI: + constipation. No nausea/vomiting, abdominal pain, constipation, diarrhea.  : No dysuria, hematuria.  MSK: + BUE weakness, neck pain. No weakness.  Skin: + posterior neck incision. No rashes.  Neuro: No dizziness, weakness, headaches.  Psych: No suicidal ideations.    All remaining systems reviewed and found to be negative, except as stated above.      Objective:     Vitals:    07/26/23 0837 07/26/23 0913   BP: (!) 144/74 (!) 140/72   BP Location: Left arm Left arm   Patient Position: Sitting Sitting   BP Cuff Size: Adult Adult   Pulse: 80    Temp: 36.7 °C (98.1 °F)    TempSrc: Temporal    SpO2: 99%    Weight: 84.8 kg (187 lb)    Height: 1.778 m (5' 10\")      Body mass index is 26.83 kg/m².    Physical Exam:  General: Normal appearing. No distress.  HEENT: Normocephalic. Eyes conjunctiva clear lids without ptosis, pupils equal and reactive to light accommodation, ears normal shape and contour, canals are clear bilaterally, tympanic membranes are benign, nasal mucosa benign, oropharynx is without erythema, edema or exudates. Sinuses (frontal and maxillary) nontender to palpation.  Pulmonary: Clear to ausculation.  Normal effort. No rales, rhonchi, or wheezing.  Cardiovascular: Regular rate and rhythm without murmur. Carotid and radial pulses are intact and equal bilaterally.  Abdomen: Soft, nontender, nondistended. Normal bowel sounds. Liver and spleen are not palpable.  Neurologic: Grossly nonfocal.  Lymph: No cervical, supraclavicular or axillary lymph nodes are palpable.  Skin: Posterior neck incision BLAINE, no erythema or exudates, incision well approximated.  Warm and dry. .  Musculoskeletal: Normal gait. No extremity cyanosis, clubbing, or edema.  Psych: Normal mood and affect. Alert and oriented " x3. Judgment and insight is normal.     Assessment and Plan:   1. Hospital discharge follow-up  2. Cervical stenosis of spine  New to examiner, ongoing for patient.  Patient had cervical laminectomy C2-3 with Dr. Bennett on 7/17/2023, he was discharged home on 7/19/2023 and has home health and home therapy seeing him.  Follow-up appointment with neurosurgery tomorrow.  Continue pain medications as prescribed, continue tizanidine and acetaminophen as needed.  Recommend over-the-counter MiraLAX as needed for constipation.    3. Other dietary vitamin B12 deficiency anemia  Chronic, ongoing.  Vitamin B12 injection 1000 mcg given today.  Follow-up in 1 month for next injection.  - cyanocobalamin (Vitamin B-12) injection 1,000 mcg     - Chart and discharge summary were reviewed.   - Hospitalization and results reviewed with patient.   - Medications reviewed including instructions regarding high risk medications, dosing and side effects.  - Recommended Services: No services needed at this time  - Advance directive/POLST on file?  No     Follow-up:Return for As needed.    I have placed the below orders and discussed them with an approved delegating provider. The MA is performing the below orders under the direction of Dr. Haro.      Please note that this dictation was created using voice recognition software. I have worked with consultants from the vendor as well as technical experts from [employer] to optimize the interface. I have made every reasonable attempt to correct obvious errors, but I expect that there are errors of grammar and possibly content that I did not discover before finalizing the note.

## 2023-07-29 ASSESSMENT — ACTIVITIES OF DAILY LIVING (ADL): OASIS_M1830: 03

## 2023-07-31 ENCOUNTER — HOME CARE VISIT (OUTPATIENT)
Dept: HOME HEALTH SERVICES | Facility: HOME HEALTHCARE | Age: 68
End: 2023-07-31
Payer: MEDICARE

## 2023-07-31 VITALS
OXYGEN SATURATION: 99 % | TEMPERATURE: 97.8 F | HEART RATE: 72 BPM | SYSTOLIC BLOOD PRESSURE: 130 MMHG | RESPIRATION RATE: 16 BRPM | DIASTOLIC BLOOD PRESSURE: 70 MMHG

## 2023-07-31 PROCEDURE — G0299 HHS/HOSPICE OF RN EA 15 MIN: HCPCS

## 2023-07-31 ASSESSMENT — ENCOUNTER SYMPTOMS
LOWEST PAIN SEVERITY IN PAST 24 HOURS: 0/10
SUBJECTIVE PAIN PROGRESSION: GRADUALLY IMPROVING
PERSON REPORTING PAIN: PATIENT
VOMITING: PT DENIES ANY EMESIS AT THIS TIME
STOOL FREQUENCY: LESS THAN DAILY
PAIN SEVERITY GOAL: 0/10
PAIN LOCATION - PAIN FREQUENCY: FREQUENT
PAIN SEVERITY GOAL: 0/10
SUBJECTIVE PAIN PROGRESSION: GRADUALLY IMPROVING
PAIN LOCATION - PAIN DURATION: WEEK
LAST BOWEL MOVEMENT: 66685
PAIN LOCATION - PAIN QUALITY: ACHY
LOWEST PAIN SEVERITY IN PAST 24 HOURS: 2/10
PAIN LOCATION: NECK
PAIN: 1
PERSON REPORTING PAIN: PATIENT
PAIN LOCATION - PAIN QUALITY: SORE
PAIN LOCATION - RELIEVING FACTORS: REST/MEDS
HIGHEST PAIN SEVERITY IN PAST 24 HOURS: 3/10
NAUSEA: PT DENIES ANY NAUSEA AT THIS TIME
PAIN LOCATION - EXACERBATING FACTORS: ACTIVITY
LIMITED RANGE OF MOTION: 1
HIGHEST PAIN SEVERITY IN PAST 24 HOURS: 6/10
PAIN LOCATION - PAIN FREQUENCY: INTERMITTENT
PAIN: 1
PAIN LOCATION - PAIN SEVERITY: 2/10
BOWEL PATTERN NORMAL: 1
PAIN LOCATION - PAIN SEVERITY: 3/10

## 2023-07-31 ASSESSMENT — ACTIVITIES OF DAILY LIVING (ADL)
PHYSICAL TRANSFERS ASSESSED: 1
TOILETING: INDEPENDENT
TOILETING: 1
AMBULATION ASSISTANCE: INDEPENDENT
AMBULATION ASSISTANCE ON FLAT SURFACES: 1
AMBULATION ASSISTANCE: 1
CURRENT_FUNCTION: INDEPENDENT
TRANSPORTATION: DEPENDENT
TRANSPORTATION ASSESSED: 1

## 2023-07-31 NOTE — CASE COMMUNICATION
Quality Review for 7.20.23 SOC OASIS performed on by VALERIA Quinones RN on 7.29.2023:        Edits completed by VALERIA Quinones RN:  1.  and  dx coding updated per chart review.   2. Changed  to na, pt was output observation  3. Changed  to 3 per [id number] I response of 4. Changed  to 3 per [id number] E response of 4. Changed C02849 to 2 per DC3898 C response of 4. Changed  to 2 per [id number] F response of 4  4. Changed  to  3 per ambulation score   5. Changed  to 3 per ambulation score   6.  Updated F2F data

## 2023-08-01 NOTE — CASE COMMUNICATION
Noted and agree with all changes. Thank you.     Angela Hernandez RN    ----- Message -----  From: Anais Quinones R.N.  Sent: 7/31/2023   8:34 AM PDT  To: Vikas Hernandez R.N.      Quality Review for 7.20.23 SOC OASIS performed on by VALERIA Quinones RN on 7.29.2023:        Edits completed by VALERIA Quinones RN:  1.  and  dx coding updated per chart review.   2. Changed  to na, pt was output observation  3. Changed  to 3 per G  I response of 4. Changed  to 3 per CY0812 E response of 4. Changed [id number] to 2 per BR1615 C response of 4. Changed  to 2 per [id number] F response of 4  4. Changed  to 3 per ambulation score   5. Changed  to 3 per ambulation score   6.  Updated F2F data

## 2023-08-03 ENCOUNTER — HOME CARE VISIT (OUTPATIENT)
Dept: HOME HEALTH SERVICES | Facility: HOME HEALTHCARE | Age: 68
End: 2023-08-03
Payer: MEDICARE

## 2023-08-03 VITALS
TEMPERATURE: 98 F | HEART RATE: 80 BPM | RESPIRATION RATE: 16 BRPM | OXYGEN SATURATION: 98 % | DIASTOLIC BLOOD PRESSURE: 78 MMHG | SYSTOLIC BLOOD PRESSURE: 142 MMHG

## 2023-08-03 PROCEDURE — G0493 RN CARE EA 15 MIN HH/HOSPICE: HCPCS

## 2023-08-03 SDOH — ECONOMIC STABILITY: FOOD INSECURITY: SNACKS PER DAY: 2

## 2023-08-03 SDOH — ECONOMIC STABILITY: FOOD INSECURITY: MEALS PER DAY: 2

## 2023-08-03 ASSESSMENT — ENCOUNTER SYMPTOMS
VOMITING: PT DENIES ANY EMESIS AT THIS TIME
BOWEL PATTERN NORMAL: 1
PAIN LOCATION - PAIN QUALITY: ACHY
APPETITE LEVEL: GOOD
NAUSEA: PT DENIES ANY NAUSEA AT THIS TIME
PAIN LOCATION: BACK OF NECK
CHANGE IN APPETITE: UNCHANGED
PAIN LOCATION - PAIN SEVERITY: 2/10
PAIN LOCATION - EXACERBATING FACTORS: AS THE DAY GOES ON
PAIN LOCATION - RELIEVING FACTORS: REST
LAST BOWEL MOVEMENT: 66689
STOOL FREQUENCY: DAILY
PAIN: 1
HIGHEST PAIN SEVERITY IN PAST 24 HOURS: 2/10
PAIN LOCATION - PAIN FREQUENCY: INTERMITTENT
LOWEST PAIN SEVERITY IN PAST 24 HOURS: 0/10
SUBJECTIVE PAIN PROGRESSION: GRADUALLY IMPROVING
PAIN SEVERITY GOAL: 0/10
PERSON REPORTING PAIN: PATIENT

## 2023-08-03 ASSESSMENT — ACTIVITIES OF DAILY LIVING (ADL)
OASIS_M1830: 00
HOME_HEALTH_OASIS: 00

## 2023-08-04 ENCOUNTER — HOME CARE VISIT (OUTPATIENT)
Dept: HOME HEALTH SERVICES | Facility: HOME HEALTHCARE | Age: 68
End: 2023-08-04
Payer: MEDICARE

## 2023-08-04 NOTE — Clinical Note
I agree with these changes  Yue June RN  ----- Message -----  From: Ananya Menendez R.N.  Sent: 8/4/2023  10:20 AM PDT  To: Yue June R.N.         Quality Review for DC OASIS by JERMAINE Menendez RN on  August 4, 2023       Edits completed by JERMAINE Menendez RN:  1.  checked indication noted on opioid and hypoglycemic per the MAR

## 2023-08-04 NOTE — CASE COMMUNICATION
Quality Review for DC OASIS by JERMAINE Menendez, BRIGETTE on  August 4, 2023       Edits completed by JERMAINE Menendez RN:  1.  checked indication noted on opioid and hypoglycemic per the MAR

## 2023-08-09 PROCEDURE — G0180 MD CERTIFICATION HHA PATIENT: HCPCS | Performed by: NURSE PRACTITIONER

## 2023-08-23 ENCOUNTER — HOSPITAL ENCOUNTER (OUTPATIENT)
Dept: LAB | Facility: MEDICAL CENTER | Age: 68
End: 2023-08-23
Attending: NURSE PRACTITIONER
Payer: MEDICARE

## 2023-08-23 DIAGNOSIS — E78.5 DYSLIPIDEMIA: ICD-10-CM

## 2023-08-23 DIAGNOSIS — R80.9 MICROALBUMINURIA DUE TO TYPE 2 DIABETES MELLITUS (HCC): ICD-10-CM

## 2023-08-23 DIAGNOSIS — E11.29 TYPE 2 DIABETES MELLITUS WITH MICROALBUMINURIA, WITHOUT LONG-TERM CURRENT USE OF INSULIN (HCC): ICD-10-CM

## 2023-08-23 DIAGNOSIS — I10 ESSENTIAL HYPERTENSION: ICD-10-CM

## 2023-08-23 DIAGNOSIS — D51.3 OTHER DIETARY VITAMIN B12 DEFICIENCY ANEMIA: ICD-10-CM

## 2023-08-23 DIAGNOSIS — E11.29 MICROALBUMINURIA DUE TO TYPE 2 DIABETES MELLITUS (HCC): ICD-10-CM

## 2023-08-23 DIAGNOSIS — E55.9 VITAMIN D DEFICIENCY: ICD-10-CM

## 2023-08-23 DIAGNOSIS — Z00.00 ROUTINE HEALTH MAINTENANCE: ICD-10-CM

## 2023-08-23 DIAGNOSIS — R80.9 TYPE 2 DIABETES MELLITUS WITH MICROALBUMINURIA, WITHOUT LONG-TERM CURRENT USE OF INSULIN (HCC): ICD-10-CM

## 2023-08-23 LAB
25(OH)D3 SERPL-MCNC: 46 NG/ML (ref 30–100)
ALBUMIN SERPL BCP-MCNC: 4.5 G/DL (ref 3.2–4.9)
ALBUMIN/GLOB SERPL: 1.5 G/DL
ALP SERPL-CCNC: 89 U/L (ref 30–99)
ALT SERPL-CCNC: 13 U/L (ref 2–50)
ANION GAP SERPL CALC-SCNC: 13 MMOL/L (ref 7–16)
AST SERPL-CCNC: 17 U/L (ref 12–45)
BASOPHILS # BLD AUTO: 1 % (ref 0–1.8)
BASOPHILS # BLD: 0.1 K/UL (ref 0–0.12)
BILIRUB SERPL-MCNC: 0.4 MG/DL (ref 0.1–1.5)
BUN SERPL-MCNC: 4 MG/DL (ref 8–22)
CALCIUM ALBUM COR SERPL-MCNC: 9.1 MG/DL (ref 8.5–10.5)
CALCIUM SERPL-MCNC: 9.5 MG/DL (ref 8.5–10.5)
CHLORIDE SERPL-SCNC: 99 MMOL/L (ref 96–112)
CHOLEST SERPL-MCNC: 152 MG/DL (ref 100–199)
CO2 SERPL-SCNC: 25 MMOL/L (ref 20–33)
CREAT SERPL-MCNC: 0.72 MG/DL (ref 0.5–1.4)
CREAT UR-MCNC: 180.19 MG/DL
EOSINOPHIL # BLD AUTO: 0.77 K/UL (ref 0–0.51)
EOSINOPHIL NFR BLD: 7.8 % (ref 0–6.9)
ERYTHROCYTE [DISTWIDTH] IN BLOOD BY AUTOMATED COUNT: 53.9 FL (ref 35.9–50)
EST. AVERAGE GLUCOSE BLD GHB EST-MCNC: 146 MG/DL
FASTING STATUS PATIENT QL REPORTED: NORMAL
GFR SERPLBLD CREATININE-BSD FMLA CKD-EPI: 98 ML/MIN/1.73 M 2
GLOBULIN SER CALC-MCNC: 3 G/DL (ref 1.9–3.5)
GLUCOSE SERPL-MCNC: 115 MG/DL (ref 65–99)
HBA1C MFR BLD: 6.7 % (ref 4–5.6)
HCT VFR BLD AUTO: 41.3 % (ref 42–52)
HDLC SERPL-MCNC: 82 MG/DL
HGB BLD-MCNC: 13.9 G/DL (ref 14–18)
IMM GRANULOCYTES # BLD AUTO: 0.01 K/UL (ref 0–0.11)
IMM GRANULOCYTES NFR BLD AUTO: 0.1 % (ref 0–0.9)
LDLC SERPL CALC-MCNC: 56 MG/DL
LYMPHOCYTES # BLD AUTO: 3.04 K/UL (ref 1–4.8)
LYMPHOCYTES NFR BLD: 30.6 % (ref 22–41)
MCH RBC QN AUTO: 33.5 PG (ref 27–33)
MCHC RBC AUTO-ENTMCNC: 33.7 G/DL (ref 32.3–36.5)
MCV RBC AUTO: 99.5 FL (ref 81.4–97.8)
MICROALBUMIN UR-MCNC: 8.5 MG/DL
MICROALBUMIN/CREAT UR: 47 MG/G (ref 0–30)
MONOCYTES # BLD AUTO: 1 K/UL (ref 0–0.85)
MONOCYTES NFR BLD AUTO: 10.1 % (ref 0–13.4)
NEUTROPHILS # BLD AUTO: 5 K/UL (ref 1.82–7.42)
NEUTROPHILS NFR BLD: 50.4 % (ref 44–72)
NRBC # BLD AUTO: 0 K/UL
NRBC BLD-RTO: 0 /100 WBC (ref 0–0.2)
PLATELET # BLD AUTO: 309 K/UL (ref 164–446)
PMV BLD AUTO: 10.7 FL (ref 9–12.9)
POTASSIUM SERPL-SCNC: 4.8 MMOL/L (ref 3.6–5.5)
PROT SERPL-MCNC: 7.5 G/DL (ref 6–8.2)
RBC # BLD AUTO: 4.15 M/UL (ref 4.7–6.1)
SODIUM SERPL-SCNC: 137 MMOL/L (ref 135–145)
TRIGL SERPL-MCNC: 72 MG/DL (ref 0–149)
TSH SERPL DL<=0.005 MIU/L-ACNC: 1.34 UIU/ML (ref 0.38–5.33)
VIT B12 SERPL-MCNC: 706 PG/ML (ref 211–911)
WBC # BLD AUTO: 9.9 K/UL (ref 4.8–10.8)

## 2023-08-23 PROCEDURE — 84443 ASSAY THYROID STIM HORMONE: CPT

## 2023-08-23 PROCEDURE — 83036 HEMOGLOBIN GLYCOSYLATED A1C: CPT

## 2023-08-23 PROCEDURE — 82306 VITAMIN D 25 HYDROXY: CPT

## 2023-08-23 PROCEDURE — 36415 COLL VENOUS BLD VENIPUNCTURE: CPT

## 2023-08-23 PROCEDURE — 80053 COMPREHEN METABOLIC PANEL: CPT

## 2023-08-23 PROCEDURE — 82570 ASSAY OF URINE CREATININE: CPT

## 2023-08-23 PROCEDURE — 82043 UR ALBUMIN QUANTITATIVE: CPT

## 2023-08-23 PROCEDURE — 82607 VITAMIN B-12: CPT

## 2023-08-23 PROCEDURE — 80061 LIPID PANEL: CPT

## 2023-08-23 PROCEDURE — 85025 COMPLETE CBC W/AUTO DIFF WBC: CPT

## 2023-08-30 ENCOUNTER — NON-PROVIDER VISIT (OUTPATIENT)
Dept: MEDICAL GROUP | Facility: PHYSICIAN GROUP | Age: 68
End: 2023-08-30
Payer: MEDICARE

## 2023-08-30 RX ADMIN — CYANOCOBALAMIN 1000 MCG: 1000 INJECTION, SOLUTION INTRAMUSCULAR; SUBCUTANEOUS at 09:14

## 2023-08-30 NOTE — PROGRESS NOTES
Fabio Vergara is a 68 y.o. male here for a non-provider visit for Vitamin B-12 injection.    Reason for injection: Deficiancy  Order in MAR?: Yes  Patient supplied?:No  Minimum interval has been met for this injection (per MAR order): Yes    Patient tolerated injection and no adverse effects were observed or reported: Yes    # of Administrations remaining in MAR: 5

## 2023-09-01 ENCOUNTER — PHYSICAL THERAPY (OUTPATIENT)
Dept: PHYSICAL THERAPY | Facility: REHABILITATION | Age: 68
End: 2023-09-01
Attending: NURSE PRACTITIONER
Payer: MEDICARE

## 2023-09-01 DIAGNOSIS — M48.02 SPINAL STENOSIS, CERVICAL REGION: ICD-10-CM

## 2023-09-01 PROCEDURE — 97162 PT EVAL MOD COMPLEX 30 MIN: CPT | Performed by: PHYSICAL THERAPIST

## 2023-09-01 SDOH — ECONOMIC STABILITY: GENERAL: QUALITY OF LIFE: GOOD

## 2023-09-01 ASSESSMENT — ENCOUNTER SYMPTOMS
QUALITY: DULL ACHE
PAIN TIMING: INTERMITTENT
EXACERBATED BY: ACTIVITY
QUALITY: TIGHTNESS
ALLEVIATING FACTORS: REST
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
## 2023-09-06 ENCOUNTER — PHYSICAL THERAPY (OUTPATIENT)
Dept: PHYSICAL THERAPY | Facility: REHABILITATION | Age: 68
End: 2023-09-06
Attending: NURSE PRACTITIONER
Payer: MEDICARE

## 2023-09-06 DIAGNOSIS — M48.02 SPINAL STENOSIS, CERVICAL REGION: ICD-10-CM

## 2023-09-06 PROCEDURE — 97140 MANUAL THERAPY 1/> REGIONS: CPT | Performed by: PHYSICAL THERAPIST

## 2023-09-06 PROCEDURE — 97110 THERAPEUTIC EXERCISES: CPT | Performed by: PHYSICAL THERAPIST

## 2023-09-06 NOTE — OP THERAPY DAILY TREATMENT
Outpatient Physical Therapy  DAILY TREATMENT     [facility] [place]  [address]  [place] [address]  Phone:  [phone]  Fax:  [phone]    Date: 09/06/2023    Patient: Fabio Vergara  YOB: 1955  MRN: [id number]     Time Calculation    Start time: 0843  Stop time: 0930 Time Calculation (min): 47 minutes         Chief Complaint: Neck Problem    Visit #: 2    SUBJECTIVE:  Patient reports that he tried his initial home exercises and was able to perform them without much difficulty. He had mild tolerable soreness afterward.    OBJECTIVE:  Current objective measures: Patient seen for first time follow up with presentation consistent with initial evaluation          Therapeutic Exercises (CPT 85126):     1. Sci Fit UE w/u, level 2.5, 6' (3 FWD, 3 BWD)    2. Thread the needle EOT, x15 ea    3. Red digiflex , 1' ea    4. Straight arm pulldown, Green tubing x30    5. Scap protraction punch + trunk rotation, Green tubing x15 ea    6. Chin tuck ball on the wall, x10 ea    7. Chin tuck + rotation ball on the wall, x10 ea      Therapeutic Exercise Summary: HEP: upper trap and scalene stretch, chin tuck, row, W, scaption, composite gripping, key fob     Therapeutic Treatments and Modalities:     Therapeutic Treatment and Modalities Summary: Manual therapy:  Cx and Tx paraspinal STM. Scar mobilization. Upper trap, SCM, levator MFR.    Time-based treatments/modalities:    Physical Therapy Timed Treatment Charges  Manual therapy minutes (CPT 05503): 27 minutes  Therapeutic exercise minutes (CPT 42074): 20 minutes    ASSESSMENT:   Response to treatment: Initiated scar mobilization and manual therapy to improve cervical muscle length and decrease sensitivity. Patient reported subjective improvement from this. Introduced several light UE strengthening interventions with emphasis on cervicothoracic postural awareness and control. Progress next visit based on patient  response to initial treatment.    PLAN/RECOMMENDATIONS:   Plan for treatment: therapy treatment to continue next visit.  Planned interventions for next visit: continue with current treatment.

## 2023-09-08 ENCOUNTER — PHYSICAL THERAPY (OUTPATIENT)
Dept: PHYSICAL THERAPY | Facility: REHABILITATION | Age: 68
End: 2023-09-08
Attending: NURSE PRACTITIONER
Payer: MEDICARE

## 2023-09-08 DIAGNOSIS — M48.02 SPINAL STENOSIS, CERVICAL REGION: ICD-10-CM

## 2023-09-08 PROCEDURE — 97112 NEUROMUSCULAR REEDUCATION: CPT | Performed by: PHYSICAL THERAPIST

## 2023-09-08 PROCEDURE — 97110 THERAPEUTIC EXERCISES: CPT | Performed by: PHYSICAL THERAPIST

## 2023-09-08 PROCEDURE — 97140 MANUAL THERAPY 1/> REGIONS: CPT | Performed by: PHYSICAL THERAPIST

## 2023-09-08 NOTE — OP THERAPY DAILY TREATMENT
Outpatient Physical Therapy  DAILY TREATMENT     [facility] [place]  [address] [place] [address], [address]  [facility] [address]  Phone:  [phone]  Fax:  [phone]    Date: 09/08/2023    Patient: Fabio Vergara  YOB: 1955  MRN: [id number]     Time Calculation    Start time: 0800  Stop time: 0844 Time Calculation (min): 44 minutes         Chief Complaint: Neck Problem    Visit #: 3    SUBJECTIVE:  Patient reports that he felt fine after the last session    OBJECTIVE:  Current objective measures: Patient requires min mod verbal cueing to maintain neutral cervicothoracic postural control with exercises.          Therapeutic Exercises (CPT 53511):     1. Sci Fit UE w/u, level 3, 6' (3 FWD, 3 BWD)    2. Thread the needle EOT, x15 ea    3. Red digiflex , 1' ea    4. Straight arm pulldown, Green tubing x30, NT    5. Scap protraction punch + trunk rotation, Green tubing x15 ea    6. Chin tuck ball on the wall, x10 ea    7. Chin tuck + rotation ball on the wall, x10 ea    8. TRX row, 2x10    9. OH lift, 5# 2x10    10. bodyblade, NT      Therapeutic Exercise Summary: HEP: upper trap and scalene stretch, chin tuck, row, W, scaption, composite gripping, key fob     Therapeutic Treatments and Modalities:     Therapeutic Treatment and Modalities Summary: Manual therapy:  Cx and Tx paraspinal STM. Scar mobilization. Upper trap, SCM, levator MFR.    Time-based treatments/modalities:    Physical Therapy Timed Treatment Charges  Manual therapy minutes (CPT 75274): 16 minutes  Neuromusc re-ed, balance, coor, post minutes (CPT 22356): 12 minutes  Therapeutic exercise minutes (CPT 46994): 16 minutes      ASSESSMENT:   Response to treatment: Patient is demonstrating good improvement over time. His pain free cervical AROM is improving with manual therapy emphasis on relieving restrictive musculature and on cervical apophyseal glide. Continued light progress of UE strengthening with emphasis on  cervicothoracic postural control. Fabio with continued to     PLAN/RECOMMENDATIONS:   Plan for treatment: therapy treatment to continue next visit.  Planned interventions for next visit: continue with current treatment.

## 2023-09-15 ENCOUNTER — PHYSICAL THERAPY (OUTPATIENT)
Dept: PHYSICAL THERAPY | Facility: REHABILITATION | Age: 68
End: 2023-09-15
Attending: NURSE PRACTITIONER
Payer: MEDICARE

## 2023-09-15 DIAGNOSIS — M48.02 SPINAL STENOSIS, CERVICAL REGION: ICD-10-CM

## 2023-09-15 PROCEDURE — 97140 MANUAL THERAPY 1/> REGIONS: CPT | Performed by: PHYSICAL THERAPIST

## 2023-09-15 PROCEDURE — 97110 THERAPEUTIC EXERCISES: CPT | Performed by: PHYSICAL THERAPIST

## 2023-09-15 NOTE — OP THERAPY DAILY TREATMENT
Outpatient Physical Therapy  DAILY TREATMENT     [facility] Outpatient Physical Therapy [place]  [address] [place] [address], [address]  [facility] [address]  Phone:  [phone]  Fax:  [phone]    Date: 09/15/2023    Patient: Fabio Vergara  YOB: 1955  MRN: [id number]     Time Calculation    Start time: 0800  Stop time: 0844 Time Calculation (min): 44 minutes         Chief Complaint: Neck Problem    Visit #: 4    SUBJECTIVE:  Patient reports that he is feeling improvement over time. His low back was bothering him a little bit over the past couple days.     OBJECTIVE:  Current objective measures: Minimal impairment to upper trap, SCM, scalene muscle length          Therapeutic Exercises (CPT 88819):     1. Sci Fit UE w/u, level 4, 6' (3 FWD, 3 BWD)    2. Thread the needle EOT, x15 ea, NT    3. Red digiflex , 1' ea    4. Straight arm pulldown, Green tubing x30, NT    5. Scap protraction punch + trunk rotation, Green tubing x15 ea    6. Chin tuck ball on the wall, x10 ea, NT    7. Chin tuck + rotation ball on the wall, x10 ea, NT    8. TRX row, 2x10    9. OH lift, 5# 2x10, NT    10. Bodyblade, push/pull x1 to fatigue ea    11. D2 flexion, Pink TB x12 ea    12. ABC, NT      Therapeutic Exercise Summary: HEP: upper trap and scalene stretch, chin tuck, row, W, scaption, composite gripping, key fob     Therapeutic Treatments and Modalities:     Therapeutic Treatment and Modalities Summary: Manual therapy:  Cx and Tx paraspinal STM. Scar mobilization. Upper trap, SCM, levator MFR.    Time-based treatments/modalities:    Physical Therapy Timed Treatment Charges  Manual therapy minutes (CPT 83493): 15 minutes  Therapeutic exercise minutes (CPT 47924): 29 minutes      ASSESSMENT:   Response to treatment: Fabio is demonstrating excellent improvement over time. His cervical flexibility and AROM is improving over time. He is requiring less cueing to maintain neutral cervicothoracic postural control, and is reporting  less discomfort over time with this. Continue to transition from flexibility and mobility interventions to strengthening for UEs and postural stabilizers.    PLAN/RECOMMENDATIONS:   Plan for treatment: therapy treatment to continue next visit.  Planned interventions for next visit: continue with current treatment.

## 2023-09-22 ENCOUNTER — PHYSICAL THERAPY (OUTPATIENT)
Dept: PHYSICAL THERAPY | Facility: REHABILITATION | Age: 68
End: 2023-09-22
Attending: NURSE PRACTITIONER
Payer: MEDICARE

## 2023-09-22 DIAGNOSIS — M48.02 SPINAL STENOSIS, CERVICAL REGION: ICD-10-CM

## 2023-09-22 PROCEDURE — 97140 MANUAL THERAPY 1/> REGIONS: CPT | Performed by: PHYSICAL THERAPIST

## 2023-09-22 PROCEDURE — 97110 THERAPEUTIC EXERCISES: CPT | Performed by: PHYSICAL THERAPIST

## 2023-09-22 NOTE — OP THERAPY DAILY TREATMENT
Outpatient Physical Therapy  DAILY TREATMENT     [facility] Outpatient Physical Therapy [place]  [address], [address]  [facility] [address]  Phone:  [phone]  Fax:  [phone]    Date: 09/22/2023    Patient: Fabio Vergara  YOB: 1955  MRN: [id number]     Time Calculation    Start time: 0800  Stop time: 0846 Time Calculation (min): 46 minutes         Chief Complaint: Neck Problem    Visit #: 5    SUBJECTIVE:  Patient reports that he is continuing to feel some improvement over time. His neck and shoulder are a little bit sore because he was doing work outside, but are bothering him less than it normally would.     OBJECTIVE:  Current objective measures: Min restriction to muscle length for upper trap, levator scapulae, scalenes, SCM. Moderate Tx joint hypomobility          Therapeutic Exercises (CPT 90524):     1. Sci Fit UE w/u, level 4, 6' (3 FWD, 3 BWD)    2. Thread the needle EOT, x15 ea, NT    3. Red digiflex , 1' ea    4. Straight arm pulldown, Green tubing x30, NT    5. Scap protraction punch + trunk rotation, Green tubing x15 ea, NT    6. Chin tuck ball on the wall, x10 ea, NT    7. Chin tuck + rotation ball on the wall, x10 ea, NT    8. TRX row, 2x12    9. OH lift, 6# x15    10. Bodyblade, IR/ER to fatigue ea    11. D2 flexion, Pink TB x12 ea    12. ABC, NT    13. [place] EOT, NT    14. SNAG, x3 ea level    15. Flex bar  + wrist flex/ext, x15 ea    20. Snow angle      Therapeutic Exercise Summary: HEP: upper trap and scalene stretch, chin tuck, row, W, scaption, composite gripping, key fob     Therapeutic Treatments and Modalities:     Therapeutic Treatment and Modalities Summary: Manual therapy:  Cx and Tx paraspinal STM. Scar mobilization. Upper trap, SCM, levator MFR.    Time-based treatments/modalities:    Physical Therapy Timed Treatment Charges  Manual therapy minutes (CPT 45160): 20 minutes  Therapeutic exercise minutes (CPT 66651): 26 minutes    ASSESSMENT:    Response to treatment: Patient is demonstrating good improvement over time. His cervical musculature is now only minimally hypertonic and limited in muscle length. He continued to have some AROM restriction, especially in rotation with segmental limitation in apophyseal glide. Added SNAG to POC in order to continue to improve this.     PLAN/RECOMMENDATIONS:   Plan for treatment: therapy treatment to continue next visit.  Planned interventions for next visit: continue with current treatment.

## 2023-09-29 ENCOUNTER — PHYSICAL THERAPY (OUTPATIENT)
Dept: PHYSICAL THERAPY | Facility: REHABILITATION | Age: 68
End: 2023-09-29
Attending: NURSE PRACTITIONER
Payer: MEDICARE

## 2023-09-29 DIAGNOSIS — M48.02 SPINAL STENOSIS, CERVICAL REGION: ICD-10-CM

## 2023-09-29 PROCEDURE — 97124 MASSAGE THERAPY: CPT | Performed by: PHYSICAL THERAPIST

## 2023-09-29 PROCEDURE — 97110 THERAPEUTIC EXERCISES: CPT | Performed by: PHYSICAL THERAPIST

## 2023-09-29 NOTE — OP THERAPY DAILY TREATMENT
Outpatient Physical Therapy  DAILY TREATMENT     [facility] Outpatient Physical Therapy [place]  [address], [address]  [facility] [address]  Phone:  [phone]  Fax:  [phone]    Date: 09/29/2023    Patient: Fabio Vergara  YOB: 1955  MRN: [id number]     Time Calculation    Start time: 0800  Stop time: 0845 Time Calculation (min): 45 minutes         Chief Complaint: Back Problem    Visit #: 6    SUBJECTIVE:  Patient reports that he is feeling improvement. He was splitting wood the last few days. He actually felt more discomfort in his low back compared to his neck. He used some of what he has learned for his neck to treat his back as well, which was helpful in reducing discomfort.     OBJECTIVE:  Current objective measures: NT          Therapeutic Exercises (CPT 91122):     1. Sci Fit UE w/u, level 4, 6' (3 FWD, 3 BWD)    2. Thread the needle EOT, x15 ea, NT    3. Red digiflex , 1' ea    4. Straight arm pulldown, Green tubing x30, NT    5. Scap protraction punch + trunk rotation, Green tubing x15 ea, NT    6. Chin tuck ball on the wall, x10 ea, NT    7. Chin tuck + rotation ball on the wall, x10 ea, NT    8. TRX row, 2x12    9. OH lift, 6# x15    10. Bodyblade, IR/ER to fatigue ea    11. D2 flexion, Pink TB x12 ea    12. ABC, NT    13. [place] EOT, NT    14. SNAG, x3 ea level    15. Flex bar  + wrist flex/ext, x15 ea    16. Ball on the wall    20. Snow angle      Therapeutic Exercise Summary: HEP: upper trap and scalene stretch, chin tuck, row, W, scaption, composite gripping, key fob     Therapeutic Treatments and Modalities:     Therapeutic Treatment and Modalities Summary: Manual therapy:  Cx and Tx paraspinal STM. Scar mobilization. Upper trap, SCM, levator MFR.    Time-based treatments/modalities:    Physical Therapy Timed Treatment Charges  Massage therapy minutes (CPT 66013): 15 minutes  Therapeutic exercise minutes (CPT 13566): 30 minutes      ASSESSMENT:   Response to  treatment: Patient continues to show good overall improvement. He is reporting less pain at home even with increased activity, and has responded well to strengthening progression for UEs with emphasis on cervical stability. He still presents with endurance deficits to those stabilizer mm and to scapular and RTC musculature and will continue to benefit from physical therapy.    PLAN/RECOMMENDATIONS:   Plan for treatment: therapy treatment to continue next visit.  Planned interventions for next visit: continue with current treatment.

## 2023-10-06 ENCOUNTER — PHYSICAL THERAPY (OUTPATIENT)
Dept: PHYSICAL THERAPY | Facility: REHABILITATION | Age: 68
End: 2023-10-06
Attending: NURSE PRACTITIONER
Payer: MEDICARE

## 2023-10-06 DIAGNOSIS — M48.02 SPINAL STENOSIS, CERVICAL REGION: ICD-10-CM

## 2023-10-06 PROCEDURE — 97110 THERAPEUTIC EXERCISES: CPT | Performed by: PHYSICAL THERAPIST

## 2023-10-06 PROCEDURE — 97140 MANUAL THERAPY 1/> REGIONS: CPT | Performed by: PHYSICAL THERAPIST

## 2023-10-06 NOTE — OP THERAPY DAILY TREATMENT
Outpatient Physical Therapy  DAILY TREATMENT     [facility] Outpatient Physical Therapy [place]  [address], [address]  [facility] [address]  Phone:  [phone]  Fax:  [phone]    Date: 10/06/2023    Patient: Fabio Vergara  YOB: 1955  MRN: [id number]     Time Calculation    Start time: 0800  Stop time: 0843 Time Calculation (min): 43 minutes         Chief Complaint: Neck Problem    Visit #: 7    SUBJECTIVE:  Please see RI     OBJECTIVE:  Current objective measures: Please see RI          Therapeutic Exercises (CPT 97702):     1. Sci Fit UE w/u, level 4, 6' (3 FWD, 3 BWD)    2. Thread the needle EOT, x15 ea, NT    3. Red digiflex , 1' ea, NT    4. Straight arm pulldown, Green tubing x30, NT    5. Scap protraction punch + trunk rotation, Green tubing x15 ea, NT    6. Chin tuck ball on the wall, x10 ea, NT    7. Chin tuck + rotation ball on the wall, x10 ea, NT    8. TRX row, 2x12    9. OH lift, 6# x15    10. Bodyblade, IR/ER to fatigue ea    11. D2 flexion, Pink TB x12 ea    12. ABC, NT    13. [place] EOT, green TB x20    14. SNAG, x3 ea level    15. Flex bar  + wrist flex/ext, x15 ea    16. Ball on the wall    17. SL shoulder abduction, 2#    20. Snow angle      Therapeutic Exercise Summary: HEP: upper trap and scalene stretch, chin tuck, row, W, scaption, composite gripping, key fob     Therapeutic Treatments and Modalities:     Therapeutic Treatment and Modalities Summary: Manual therapy:  Cx and Tx paraspinal STM. Scar mobilization. Upper trap, SCM, levator MFR.    Time-based treatments/modalities:    Physical Therapy Timed Treatment Charges  Manual therapy minutes (CPT 52536): 13 minutes  Therapeutic exercise minutes (CPT 37790): 30 minutes      ASSESSMENT:   Response to treatment: Please see RI    PLAN/RECOMMENDATIONS:   Plan for treatment: therapy treatment to continue next visit.  Planned interventions for next visit: continue with current treatment.

## 2023-10-06 NOTE — OP THERAPY PROGRESS SUMMARY
Outpatient Physical Therapy  PROGRESS SUMMARY NOTE      [facility] [place]  [address], [address]  [place] [address]  Phone:  [phone]  Fax:  [phone]    Date of Visit: 10/06/2023    Patient: Fabio Vergara  YOB: 1955  MRN: [id number]     Referring Provider: TANISHA Boyd  [address] [place] [address]   Referring Diagnosis Spinal stenosis, cervical region [M48.02]     Visit Diagnoses     ICD-10-CM   1. Spinal stenosis, cervical region  M48.02       Rehab Potential: good    Progress Report Period: 09/01/203- 10/6/2023    Functional Assessment Used          Objective Findings and Assessment:   Patient progression towards goals: Patient reports that he doesn't feel as stiff. It feels easier to move his head and neck. He has less discomfort after certain activities. He reports he definitely feels like he is getting better and if anything now he is taking it for granted now that he can do certain things again    Objective findings and assessment details: ASSESSMENT  Fabio is making excellent improvement in physical therapy. His AROM has improved by nearly double in majority of ranges of motion! His  strength has also improved notably, and he is reporting less discomfort with activity at home and when performing exercises in session. He remains limited, especially with R cervical rotation and side bending compared to left (5-10 deg difference), and in strength, especially in L shoulder abduction.    OBJECTIVE      Observations   Central spine     Positive for forward head/neck, rounded shoulders and thoracic kyphosis.     Postural Observations  Seated posture: fair  Standing posture: fair  Correction of posture: makes symptoms better        Shoulder Screen    Shoulder active range of motion within functional limits.  Shoulder joint mobility within functional limits.     Neurological Testing      Reflexes   Left   Deltoid (C5): normal (2+)     Right    Deltoid (C5): normal (2+)     Dermatome testing   Cervical (left)   All left cervical dermatomes intact     Cervical (right)   All right cervical dermatomes intact     Palpation   Left   Mildly hypertonic in the levator scapulae, pectoralis minor, scalenes, sternocleidomastoid and upper trapezius.   Tenderness of the levator scapulae, scalenes, sternocleidomastoid and upper trapezius.      Right   Mildly hypertonic in the levator scapulae, pectoralis minor, scalenes, sternocleidomastoid and upper trapezius.   Tenderness of the levator scapulae, scalenes, sternocleidomastoid and upper trapezius.      Active Range of Motion      Cervical Spine   Flexion: Active cervical flexion: 45 deg.  Extension: Active cervical extension: 40 deg.  Left lateral flexion: Active left cervical lateral flexion: 30 deg.  Right lateral flexion: Active right cervical lateral flexion: 25 deg.  Left rotation: Active left cervical rotation: 45 deg.  Right rotation: Active right cervical rotation: 35 deg.     Strength:    Cervical Spine   Deep neck flexors: 4-  Deep neck extensors: 4-     Left Shoulder   Planes of Motion   Flexion: 4   Extension: 4  Abduction: 3+   External rotation at 0°: 4-   Internal rotation at 0°: 4   Isolated Muscles   Trapezius (middle): 3+     Right Shoulder   Planes of Motion   Flexion: 4  Extension: 4  Abduction: 4-  External rotation at 0°: 4-   Internal rotation at 0°: 4  Isolated Muscles   Trapezius (middle): 3+     Left Wrist/Hand    (2nd hand position)     Trial 1: 72#     Right Wrist/Hand    (2nd hand position)     Trial 1: 57#     General Comments      Spine Comments   Flexibility:     Moderate impairment to muscle length for levator scap, SCM, suboccipitals  Maximal impairment to muscle length for scalenes, and upper trap    Goals:   Short Term Goals:   1. Patient will demonstrate independent regular performance of tailored home exercise program in order to facilitate recovery and promote self  treatment and patient ownership of recovery --MET--  2. Patient will demo DNF strength >/= 4-/5 in order to improve cervicothoracic postural control --MET--  3. Patient will demo normal muscle length to bilateral SCM, and upper trap musculature in order to facilitate improved cervical AROM -- MET--  Short term goal time span:  2-4 weeks      Long Term Goals:    1. Patient will demo bilateral rhomboid / mid trap strength >/= 4-/5 in order to improve thoracic postural control and shoulder mechanics to reduce maladaptive changes to cervical region --IN PROGRESS--  2. Patient will demo R  strength >/= 60# --IN PROGRESS--  3. Patient will demo bilateral cervical rotation AROM >/= 50 deg in order to facilitate ability to safely scan environment while driving or walking --IN PROGRESS--    Long term goal time span:  6-8 weeks    Plan:   Planned therapy interventions:  Neuromuscular Re-education (CPT 34620), Therapeutic Exercise (CPT 12592), Therapeutic Activities (CPT 04855) and Manual Therapy (CPT 11649)  Frequency:  2x week  Duration in weeks:  4      Referring provider co-signature:  I have reviewed this plan of care and my co-signature certifies the need for services.     Certification Period: 10/06/2023 to 11/03/23    Physician Signature: ________________________________ Date: ______________

## 2023-10-09 ENCOUNTER — PHYSICAL THERAPY (OUTPATIENT)
Dept: PHYSICAL THERAPY | Facility: REHABILITATION | Age: 68
End: 2023-10-09
Attending: NURSE PRACTITIONER
Payer: MEDICARE

## 2023-10-09 DIAGNOSIS — M48.02 SPINAL STENOSIS, CERVICAL REGION: ICD-10-CM

## 2023-10-09 PROCEDURE — 97140 MANUAL THERAPY 1/> REGIONS: CPT | Performed by: PHYSICAL THERAPIST

## 2023-10-09 PROCEDURE — 97110 THERAPEUTIC EXERCISES: CPT | Performed by: PHYSICAL THERAPIST

## 2023-10-09 NOTE — OP THERAPY DAILY TREATMENT
"  Outpatient Physical Therapy  DAILY TREATMENT     [facility] Outpatient Physical Therapy [place]  [address] [place] [address], [address]  [facility] [address]  Phone:  [phone]  Fax:  [phone]    Date: 10/09/2023    Patient: Fabio Vergara  YOB: 1955  MRN: [id number]     Time Calculation    Start time: 0730  Stop time: 0811 Time Calculation (min): 41 minutes         Chief Complaint: Neck Problem    Visit #: 8    SUBJECTIVE:  Patient reports that he is continuing to feel good improvement overall    OBJECTIVE:  Current objective measures: Min-mod restriction in apophyseal glide           Therapeutic Exercises (CPT 40932):     1. Sci Fit UE w/u, level 4, 6' (3 FWD, 3 BWD)    3. Red digiflex , 1' ea, NT    4. Straight arm pulldown, Green tubing x30, NT    5. Scap protraction punch + trunk rotation, Green tubing x15 ea, NT    6. Chin tuck ball on the wall, x10 ea, NT    7. Chin tuck + rotation ball on the wall, x10 ea, NT    8. TRX row, 2x12    9. OH lift, 6# x15, NT    10. Bodyblade, IR/ER to fatigue ea, NT    11. D2 flexion, Pink TB x12 ea, NT    12. Zigzag RTC and scapular endurance, 1x ea vertical to fatigue    13. [place] EOT, green TB x20    14. SNAG, x3 ea level    16. Ball on the wall, x1 ea, lateral at 90 deg, NT    18. Flexbar isometric, x15+1\" ea    19. Abduction, 2# 2x12      Therapeutic Exercise Summary: HEP: upper trap and scalene stretch, chin tuck, row, W, scaption, composite gripping, key fob     Therapeutic Treatments and Modalities:     Therapeutic Treatment and Modalities Summary: Manual therapy:  R cervical apophyseal lateral glide and super/inferior glide to improve R cervical rotation and side bending AROM    Time-based treatments/modalities:    Physical Therapy Timed Treatment Charges  Manual therapy minutes (CPT 73294): 16 minutes  Therapeutic exercise minutes (CPT 99029): 25 minutes    ASSESSMENT:   Response to treatment: Fabio is continuing to demonstrate incremental improvement " to strength, and postural control, along with subjective improvement in symptoms intensity and frequency. His R cervical AROM improved 1-2 deg within session today after manual apophyseal glides and stretching, but remains limited. Continue to target functional improvement as we progress toward discharge.    PLAN/RECOMMENDATIONS:   Plan for treatment: therapy treatment to continue next visit.  Planned interventions for next visit: continue with current treatment.

## 2023-10-25 ENCOUNTER — APPOINTMENT (OUTPATIENT)
Dept: PHYSICAL THERAPY | Facility: REHABILITATION | Age: 68
End: 2023-10-25
Attending: NURSE PRACTITIONER
Payer: MEDICARE

## 2023-10-27 ENCOUNTER — APPOINTMENT (OUTPATIENT)
Dept: PHYSICAL THERAPY | Facility: REHABILITATION | Age: 68
End: 2023-10-27
Attending: NURSE PRACTITIONER
Payer: MEDICARE

## 2023-11-01 ENCOUNTER — PHYSICAL THERAPY (OUTPATIENT)
Dept: PHYSICAL THERAPY | Facility: REHABILITATION | Age: 68
End: 2023-11-01
Attending: NURSE PRACTITIONER
Payer: MEDICARE

## 2023-11-01 DIAGNOSIS — M48.02 SPINAL STENOSIS, CERVICAL REGION: ICD-10-CM

## 2023-11-01 PROCEDURE — 97140 MANUAL THERAPY 1/> REGIONS: CPT | Performed by: PHYSICAL THERAPIST

## 2023-11-01 PROCEDURE — 97110 THERAPEUTIC EXERCISES: CPT | Performed by: PHYSICAL THERAPIST

## 2023-11-01 NOTE — OP THERAPY DAILY TREATMENT
"  Outpatient Physical Therapy  DAILY TREATMENT     [facility] Outpatient Physical Therapy [place]  [address] [place] [address], [address]  [facility] [address]  Phone:  [phone]  Fax:  [phone]    Date: 11/01/2023    Patient: Fabio Vergara  YOB: 1955  MRN: [id number]     Time Calculation    Start time: 0800  Stop time: 0839 Time Calculation (min): 39 minutes         Chief Complaint: Neck Problem    Visit #: 9    SUBJECTIVE:  Fabio reports that he recently returned from his hunting trip. The hunting didn't go great but his head and neck felt pretty good throughout it. He had to do a lot of looking and turning his head as he was driving his rock crawler.    OBJECTIVE:  Current objective measures: Cervicothoracic flexibility WFL          Therapeutic Exercises (CPT 23140):     1. Sci Fit UE w/u, level 4, 6' (3 FWD, 3 BWD)    3. Red digiflex , 1' ea, NT    4. Straight arm pulldown, Green tubing x30, NT    5. Scap protraction punch + trunk rotation, Green tubing x15 ea, NT    6. Chin tuck ball on the wall, x10 ea, NT    7. Chin tuck + rotation ball on the wall, x10 ea, NT    8. TRX row, 2x12    9. OH lift, 6# x15, NT    10. Bodyblade, IR/ER to fatigue ea, NT    11. D2 flexion, Pink TB x12 ea    12. Zigzag RTC and scapular endurance, 1x ea vertical to fatigue    13. [place] EOT, green TB x20    14. SNAG, x3 ea level    16. Ball on the wall, x1 ea, lateral at 90 deg, NT    18. Flexbar isometric, x15+1\" ea    19. Abduction, 2# 2x12      Therapeutic Exercise Summary: HEP: upper trap and scalene stretch, chin tuck, row, W, scaption, composite gripping, key fob     Therapeutic Treatments and Modalities:     Therapeutic Treatment and Modalities Summary: Manual therapy:  R cervical apophyseal lateral glide and super/inferior glide to improve R cervical rotation and side bending AROM    Time-based treatments/modalities:    Physical Therapy Timed Treatment Charges  Manual therapy minutes (CPT 92387): 10 " minutes  Therapeutic exercise minutes (CPT 30521): 29 minutes        ASSESSMENT:   Response to treatment: Fabio has continued to demonstrate good improvement in physical therapy sessions. NJ next session to evaluate readiness for discharge    PLAN/RECOMMENDATIONS:   Plan for treatment: therapy treatment to continue next visit.  Planned interventions for next visit: continue with current treatment.

## 2023-11-03 ENCOUNTER — PHYSICAL THERAPY (OUTPATIENT)
Dept: PHYSICAL THERAPY | Facility: REHABILITATION | Age: 68
End: 2023-11-03
Attending: NURSE PRACTITIONER
Payer: MEDICARE

## 2023-11-03 DIAGNOSIS — M48.02 SPINAL STENOSIS, CERVICAL REGION: ICD-10-CM

## 2023-11-03 PROCEDURE — 97110 THERAPEUTIC EXERCISES: CPT | Performed by: PHYSICAL THERAPIST

## 2023-11-03 NOTE — OP THERAPY DAILY TREATMENT
Outpatient Physical Therapy  DAILY TREATMENT     [facility] Outpatient Physical Therapy [place]  [address]  [facility] [address]  Phone:  [phone]  Fax:  [phone]    Date: 11/03/2023    Patient: Fabio Vergara  YOB: 1955  MRN: [id number]     Time Calculation    Start time: 0800  Stop time: 0830 Time Calculation (min): 30 minutes         Chief Complaint: Neck Problem    Visit #: 10    SUBJECTIVE:  Please see DC note    OBJECTIVE:  Current objective measures: Please see DC note          Therapeutic Exercises (CPT 81769):     1. Sci Fit UE w/u, level 4, 6' (3 FWD, 3 BWD)    2. 2 way scalene stretch, 1'    3. SNAG, x10    4. thread the needle, x10    5. Row, x10    6. T, x10    7. W, x10    8. D2 flexion, x10      Therapeutic Exercise Summary: HEP: upper trap and scalene stretch, chin tuck, row, W, scaption, composite gripping, key fob     Updated HEP: 2 way scalene stretch, SNAG, thread the needle, row, T, W, D2 flexion    Therapeutic Treatments and Modalities:     Therapeutic Treatment and Modalities Summary: Manual therapy:  R cervical apophyseal lateral glide and super/inferior glide to improve R cervical rotation and side bending AROM    Time-based treatments/modalities:    Physical Therapy Timed Treatment Charges  Therapeutic exercise minutes (CPT 87409): 30 minutes    ASSESSMENT:   Response to treatment: Please see DC note    PLAN/RECOMMENDATIONS:   Plan for treatment: discharge patient due to accomplished goals.  Planned interventions for next visit:  DC .

## 2023-11-03 NOTE — OP THERAPY DISCHARGE SUMMARY
Outpatient Physical Therapy  DISCHARGE SUMMARY NOTE      [facility] [place]  [address], [address]  [place] [address]  Phone:  [phone]  Fax:  [phone]    Date of Visit: 11/03/2023    Patient: Fabio Vergara  YOB: 1955  MRN: [id number]     Referring Provider: TANISHA Boyd  [address] [place] [address]   Referring Diagnosis Spinal stenosis, cervical region [M48.02]         Functional Assessment Used        Your patient is being discharged from Physical Therapy with the following comments:   Goals met    Comments:  SUBJECTIVE:  Patient reports that he feels significantly better since he started physical therapy. He gets less pain, his neck feels less tight, and he feels like he can move and look normally now. He has continued to perform challenging activities like splitting wood for the winter, but now it doesn't really bother him anymore. He agrees that he is ready for discharge and plans to keep up with his HEP.      OBJECTIVE:     Observations   Central spine     Positive for forward head/neck, rounded shoulders and thoracic kyphosis.     Postural Observations  Seated posture: fair  Standing posture: fair  Correction of posture: makes symptoms better        Shoulder Screen    Shoulder active range of motion within functional limits.  Shoulder joint mobility within functional limits.     Neurological Testing      Reflexes   Left   Deltoid (C5): normal (2+)     Right   Deltoid (C5): normal (2+)     Dermatome testing   Cervical (left)   All left cervical dermatomes intact     Cervical (right)   All right cervical dermatomes intact     Palpation   Left   Hypertonic in the levator scapulae, pectoralis minor, scalenes, sternocleidomastoid and upper trapezius.   Tenderness of the levator scapulae, scalenes, sternocleidomastoid and upper trapezius.      Right   Hypertonic in the levator scapulae, pectoralis minor, scalenes, sternocleidomastoid and upper  trapezius.   Tenderness of the levator scapulae, scalenes, sternocleidomastoid and upper trapezius.      Active Range of Motion      Cervical Spine   Flexion: Active cervical flexion: 42 deg.  Extension: Active cervical extension: 40 deg.  Left lateral flexion: Active left cervical lateral flexion: 40 deg.  Right lateral flexion: Active right cervical lateral flexion: 39 deg.  Left rotation: Active left cervical rotation: 45 deg.  Right rotation: Active right cervical rotation: 40 deg.     Strength:    Cervical Spine   Deep neck flexors: 4  Deep neck extensors: 4     Left Shoulder   Planes of Motion   Flexion: 4   Extension: 4-  Abduction: 3+  External rotation at 0°: 3+   Internal rotation at 0°: 4  Isolated Muscles   Trapezius (middle): 3+     Right Shoulder   Planes of Motion   Flexion: 4  Extension: 4-   Abduction: 3+   External rotation at 0°: 3+   Internal rotation at 0°: 4   Isolated Muscles   Trapezius (middle): 3+     Left Wrist/Hand    (2nd hand position)     Trial 1: 70     Right Wrist/Hand    (2nd hand position)     Trial 1: 72     Additional Strength Details   strength R: 72#, L: 70#     General Comments      Spine Comments   Flexibility:     WFL muscle length for levator scap, SCM, suboccipitals, scalenes, and upper trap     Limitations Remaining:  Goals:   Short Term Goals:   1. Patient will demonstrate independent regular performance of tailored home exercise program in order to facilitate recovery and promote self treatment and patient ownership of recovery -- MET --  2. Patient will demo DNF strength >/= 4-/5 in order to improve cervicothoracic postural control -- MET --  3. Patient will demo normal muscle length to bilateral SCM, and upper trap musculature in order to facilitate improved cervical AROM -- MET --  Short term goal time span:  2-4 weeks      Long Term Goals:    1. Patient will demo bilateral rhomboid / mid trap strength >/= 4-/5 in order to improve thoracic postural  control and shoulder mechanics to reduce maladaptive changes to cervical region -- MET --  2. Patient will demo R  strength >/= 60# -- MET --  3. Patient will demo bilateral cervical rotation AROM >/= 50 deg in order to facilitate ability to safely scan environment while driving or walking -- MET --  Long term goal time span:  6-8 weeks    Recommendations:  Fabio has met all his goals, his strength has normalized (50# on R  strength @ eval  >> 72# @ DC), and he has regained over double his pain free AROM (~15 deg cervical lateral flexion @ eval >> ~40 deg @ DC). Fabio has good understanding of his pathology, and effective treatment. He is independent in continued HEP and we have updated recommendations at today's visit. He is ready for discharge.    Carlos A August    Date: 11/3/2023

## 2023-11-08 ENCOUNTER — OFFICE VISIT (OUTPATIENT)
Dept: MEDICAL GROUP | Facility: PHYSICIAN GROUP | Age: 68
End: 2023-11-08
Payer: MEDICARE

## 2023-11-08 VITALS
OXYGEN SATURATION: 97 % | BODY MASS INDEX: 27.92 KG/M2 | DIASTOLIC BLOOD PRESSURE: 62 MMHG | HEIGHT: 70 IN | RESPIRATION RATE: 20 BRPM | WEIGHT: 195 LBS | TEMPERATURE: 97.9 F | SYSTOLIC BLOOD PRESSURE: 148 MMHG | HEART RATE: 81 BPM

## 2023-11-08 DIAGNOSIS — Z00.00 MEDICARE ANNUAL WELLNESS VISIT, SUBSEQUENT: ICD-10-CM

## 2023-11-08 DIAGNOSIS — E78.5 DYSLIPIDEMIA: ICD-10-CM

## 2023-11-08 DIAGNOSIS — F51.02 ADJUSTMENT INSOMNIA: ICD-10-CM

## 2023-11-08 DIAGNOSIS — F41.9 ANXIETY: ICD-10-CM

## 2023-11-08 DIAGNOSIS — I10 ESSENTIAL HYPERTENSION: ICD-10-CM

## 2023-11-08 DIAGNOSIS — J84.10 CALCIFIED GRANULOMA OF LUNG (HCC): ICD-10-CM

## 2023-11-08 DIAGNOSIS — E87.1 HYPONATREMIA: ICD-10-CM

## 2023-11-08 DIAGNOSIS — D51.3 OTHER DIETARY VITAMIN B12 DEFICIENCY ANEMIA: ICD-10-CM

## 2023-11-08 DIAGNOSIS — Z00.00 ROUTINE HEALTH MAINTENANCE: ICD-10-CM

## 2023-11-08 DIAGNOSIS — R80.9 TYPE 2 DIABETES MELLITUS WITH MICROALBUMINURIA, WITHOUT LONG-TERM CURRENT USE OF INSULIN (HCC): ICD-10-CM

## 2023-11-08 DIAGNOSIS — Z87.891 PERSONAL HISTORY OF NICOTINE DEPENDENCE: ICD-10-CM

## 2023-11-08 DIAGNOSIS — Z23 NEED FOR VACCINATION: ICD-10-CM

## 2023-11-08 DIAGNOSIS — E11.29 TYPE 2 DIABETES MELLITUS WITH MICROALBUMINURIA, WITHOUT LONG-TERM CURRENT USE OF INSULIN (HCC): ICD-10-CM

## 2023-11-08 DIAGNOSIS — E55.9 VITAMIN D DEFICIENCY: ICD-10-CM

## 2023-11-08 DIAGNOSIS — Z87.891 HISTORY OF CIGARETTE SMOKING: ICD-10-CM

## 2023-11-08 PROCEDURE — G0008 ADMIN INFLUENZA VIRUS VAC: HCPCS | Performed by: NURSE PRACTITIONER

## 2023-11-08 PROCEDURE — G0439 PPPS, SUBSEQ VISIT: HCPCS | Mod: 25 | Performed by: NURSE PRACTITIONER

## 2023-11-08 PROCEDURE — 3078F DIAST BP <80 MM HG: CPT | Performed by: NURSE PRACTITIONER

## 2023-11-08 PROCEDURE — 3077F SYST BP >= 140 MM HG: CPT | Performed by: NURSE PRACTITIONER

## 2023-11-08 PROCEDURE — 90662 IIV NO PRSV INCREASED AG IM: CPT | Performed by: NURSE PRACTITIONER

## 2023-11-08 RX ORDER — CYANOCOBALAMIN 1000 UG/ML
1000 INJECTION, SOLUTION INTRAMUSCULAR; SUBCUTANEOUS
Status: SHIPPED | OUTPATIENT
Start: 2023-11-08

## 2023-11-08 RX ORDER — METOPROLOL SUCCINATE 100 MG/1
100 TABLET, EXTENDED RELEASE ORAL
Qty: 100 TABLET | Refills: 3 | Status: SHIPPED
Start: 2023-11-08 | End: 2024-03-14

## 2023-11-08 RX ORDER — TRAZODONE HYDROCHLORIDE 50 MG/1
50 TABLET ORAL
Qty: 100 TABLET | Refills: 3 | Status: SHIPPED | OUTPATIENT
Start: 2023-11-08

## 2023-11-08 RX ORDER — FLUTICASONE PROPIONATE 50 MCG
1 SPRAY, SUSPENSION (ML) NASAL
Qty: 16 G | Refills: 3 | Status: SHIPPED | OUTPATIENT
Start: 2023-11-08

## 2023-11-08 RX ORDER — AMLODIPINE BESYLATE 10 MG/1
10 TABLET ORAL DAILY
Qty: 100 TABLET | Refills: 3 | Status: SHIPPED
Start: 2023-11-08 | End: 2024-03-14

## 2023-11-08 RX ORDER — METFORMIN HYDROCHLORIDE 500 MG/1
1000 TABLET, EXTENDED RELEASE ORAL 2 TIMES DAILY
Qty: 400 TABLET | Refills: 3 | Status: SHIPPED | OUTPATIENT
Start: 2023-11-08

## 2023-11-08 RX ORDER — LISINOPRIL 40 MG/1
40 TABLET ORAL 2 TIMES DAILY
Qty: 200 TABLET | Refills: 3 | Status: SHIPPED
Start: 2023-11-08 | End: 2024-03-14

## 2023-11-08 RX ORDER — PRAVASTATIN SODIUM 40 MG
40 TABLET ORAL
Qty: 100 TABLET | Refills: 3 | Status: SHIPPED | OUTPATIENT
Start: 2023-11-08

## 2023-11-08 RX ADMIN — CYANOCOBALAMIN 1000 MCG: 1000 INJECTION, SOLUTION INTRAMUSCULAR; SUBCUTANEOUS at 10:52

## 2023-11-08 ASSESSMENT — FIBROSIS 4 INDEX: FIB4 SCORE: 1.07

## 2023-11-08 ASSESSMENT — ENCOUNTER SYMPTOMS: GENERAL WELL-BEING: GOOD

## 2023-11-08 ASSESSMENT — PATIENT HEALTH QUESTIONNAIRE - PHQ9: CLINICAL INTERPRETATION OF PHQ2 SCORE: 0

## 2023-11-08 ASSESSMENT — ACTIVITIES OF DAILY LIVING (ADL): BATHING_REQUIRES_ASSISTANCE: 0
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
## 2023-11-09 ENCOUNTER — HOSPITAL ENCOUNTER (OUTPATIENT)
Dept: RADIOLOGY | Facility: MEDICAL CENTER | Age: 68
End: 2023-11-09
Attending: NURSE PRACTITIONER
Payer: MEDICARE

## 2023-11-09 DIAGNOSIS — Z87.891 PERSONAL HISTORY OF NICOTINE DEPENDENCE: ICD-10-CM

## 2023-11-09 DIAGNOSIS — Z87.891 HISTORY OF CIGARETTE SMOKING: ICD-10-CM

## 2023-11-09 DIAGNOSIS — J84.10 CALCIFIED GRANULOMA OF LUNG (HCC): ICD-10-CM

## 2023-11-09 PROCEDURE — 71271 CT THORAX LUNG CANCER SCR C-: CPT

## 2023-11-10 NOTE — RESULT ENCOUNTER NOTE
Please call the patient and let him know that there are no suspicious pulmonary nodules or masses noted on his lung cancer screening CT, there is stable calcified granuloma in the right lower lung.  We will continue lung cancer screening CT yearly.    Thank you,    JOSÉ MIGUEL Siddiqui.

## 2023-11-12 PROBLEM — Z09 HOSPITAL DISCHARGE FOLLOW-UP: Status: RESOLVED | Noted: 2020-01-15 | Resolved: 2023-11-12

## 2023-11-12 NOTE — ASSESSMENT & PLAN NOTE
Chronic, ongoing.  Continues to be a non-smoker since 2013. Due for annual lung cancer screening CT.

## 2023-11-12 NOTE — ASSESSMENT & PLAN NOTE
Chronic, ongoing.  Continues vitamin D supplementation daily.  Due for annual labs in August 2024.

## 2023-11-12 NOTE — ASSESSMENT & PLAN NOTE
Chronic, ongoing. Continues amlodipine 10 mg/day, lisinopril 40 mg twice daily, metoprolol  mg daily. The patient denies chest pain, shortness of breath, headaches, dizziness, blurry vision, or dyspnea on exertion.

## 2023-12-11 ENCOUNTER — NON-PROVIDER VISIT (OUTPATIENT)
Dept: MEDICAL GROUP | Facility: PHYSICIAN GROUP | Age: 68
End: 2023-12-11
Payer: MEDICARE

## 2023-12-11 RX ADMIN — CYANOCOBALAMIN 1000 MCG: 1000 INJECTION, SOLUTION INTRAMUSCULAR; SUBCUTANEOUS at 10:01

## 2024-01-05 ENCOUNTER — APPOINTMENT (RX ONLY)
Dept: URBAN - METROPOLITAN AREA CLINIC 22 | Facility: CLINIC | Age: 69
Setting detail: DERMATOLOGY
End: 2024-01-05

## 2024-01-05 DIAGNOSIS — Z85.828 PERSONAL HISTORY OF OTHER MALIGNANT NEOPLASM OF SKIN: ICD-10-CM

## 2024-01-05 DIAGNOSIS — L81.4 OTHER MELANIN HYPERPIGMENTATION: ICD-10-CM

## 2024-01-05 DIAGNOSIS — L57.0 ACTINIC KERATOSIS: ICD-10-CM

## 2024-01-05 DIAGNOSIS — Z71.89 OTHER SPECIFIED COUNSELING: ICD-10-CM

## 2024-01-05 DIAGNOSIS — L82.1 OTHER SEBORRHEIC KERATOSIS: ICD-10-CM

## 2024-01-05 PROBLEM — D48.5 NEOPLASM OF UNCERTAIN BEHAVIOR OF SKIN: Status: ACTIVE | Noted: 2024-01-05

## 2024-01-05 PROCEDURE — ? COUNSELING

## 2024-01-05 PROCEDURE — 17004 DESTROY PREMAL LESIONS 15/>: CPT

## 2024-01-05 PROCEDURE — ? BIOPSY BY SHAVE METHOD

## 2024-01-05 PROCEDURE — 11102 TANGNTL BX SKIN SINGLE LES: CPT | Mod: 59

## 2024-01-05 PROCEDURE — ? LIQUID NITROGEN

## 2024-01-05 PROCEDURE — 99213 OFFICE O/P EST LOW 20 MIN: CPT | Mod: 25

## 2024-01-05 ASSESSMENT — LOCATION ZONE DERM
LOCATION ZONE: SCALP
LOCATION ZONE: FACE
LOCATION ZONE: EAR
LOCATION ZONE: ARM
LOCATION ZONE: NECK

## 2024-01-05 ASSESSMENT — LOCATION SIMPLE DESCRIPTION DERM
LOCATION SIMPLE: LEFT SCALP
LOCATION SIMPLE: LEFT FOREARM
LOCATION SIMPLE: POSTERIOR NECK
LOCATION SIMPLE: RIGHT FOREHEAD
LOCATION SIMPLE: RIGHT EAR
LOCATION SIMPLE: LEFT CHEEK
LOCATION SIMPLE: RIGHT FOREARM
LOCATION SIMPLE: RIGHT WRIST
LOCATION SIMPLE: POSTERIOR SCALP
LOCATION SIMPLE: LEFT FOREHEAD
LOCATION SIMPLE: RIGHT TEMPLE
LOCATION SIMPLE: RIGHT SCALP

## 2024-01-05 ASSESSMENT — LOCATION DETAILED DESCRIPTION DERM
LOCATION DETAILED: LEFT MEDIAL FOREHEAD
LOCATION DETAILED: RIGHT MEDIAL FRONTAL SCALP
LOCATION DETAILED: POSTERIOR MID-PARIETAL SCALP
LOCATION DETAILED: RIGHT SUPERIOR HELIX
LOCATION DETAILED: LEFT SUPERIOR CENTRAL MALAR CHEEK
LOCATION DETAILED: LEFT PROXIMAL RADIAL DORSAL FOREARM
LOCATION DETAILED: LEFT CENTRAL FRONTAL SCALP
LOCATION DETAILED: LEFT PROXIMAL DORSAL FOREARM
LOCATION DETAILED: RIGHT ANTIHELIX
LOCATION DETAILED: MID POSTERIOR NECK
LOCATION DETAILED: RIGHT MEDIAL DORSAL WRIST
LOCATION DETAILED: RIGHT SCAPHA
LOCATION DETAILED: RIGHT PROXIMAL DORSAL FOREARM
LOCATION DETAILED: RIGHT SUPERIOR FOREHEAD
LOCATION DETAILED: RIGHT MID TEMPLE
LOCATION DETAILED: LEFT DISTAL DORSAL FOREARM
LOCATION DETAILED: RIGHT DISTAL DORSAL FOREARM

## 2024-01-05 NOTE — PROCEDURE: LIQUID NITROGEN
Duration Of Freeze Thaw-Cycle (Seconds): 3
Post-Care Instructions: I reviewed with the patient in detail post-care instructions. Patient is to wear sunprotection, and avoid picking at any of the treated lesions. Pt may apply Vaseline to crusted or scabbing areas.
Show Aperture Variable?: Yes
Consent: The patient's consent was obtained including but not limited to risks of crusting, scabbing, blistering, scarring, darker or lighter pigmentary change, recurrence, incomplete removal and infection.
Render Note In Bullet Format When Appropriate: No
Number Of Freeze-Thaw Cycles: 2 freeze-thaw cycles
Detail Level: Detailed

## 2024-01-10 ENCOUNTER — NON-PROVIDER VISIT (OUTPATIENT)
Dept: MEDICAL GROUP | Facility: PHYSICIAN GROUP | Age: 69
End: 2024-01-10
Payer: MEDICARE

## 2024-01-10 RX ADMIN — CYANOCOBALAMIN 1000 MCG: 1000 INJECTION, SOLUTION INTRAMUSCULAR; SUBCUTANEOUS at 10:20

## 2024-01-10 NOTE — PROGRESS NOTES
Fabio Vergara is a 68 y.o. male here for a non-provider visit for b12 injection.    Reason for injection: b12 deficiency   Order in MAR?: Yes  Patient supplied?:No  Minimum interval has been met for this injection (per MAR order): Yes    Patient tolerated injection and no adverse effects were observed or reported: Yes    # of Administrations remaining in MAR: 0

## 2024-01-12 NOTE — PROGRESS NOTES
Fabio Vergara is a 68 y.o. male here for a non-provider visit for b12 injection.    Reason for injection: continuation  Order in MAR?: Yes  Patient supplied?:No  Minimum interval has been met for this injection (per MAR order): yes    Patient tolerated injection and no adverse effects were observed or reported: Yes    # of Administrations remaining in MAR: n/a

## 2024-01-25 ENCOUNTER — APPOINTMENT (RX ONLY)
Dept: URBAN - METROPOLITAN AREA CLINIC 22 | Facility: CLINIC | Age: 69
Setting detail: DERMATOLOGY
End: 2024-01-25

## 2024-01-25 PROBLEM — C44.399 OTHER SPECIFIED MALIGNANT NEOPLASM OF SKIN OF OTHER PARTS OF FACE: Status: ACTIVE | Noted: 2024-01-25

## 2024-01-25 PROCEDURE — 17312 MOHS ADDL STAGE: CPT

## 2024-01-25 PROCEDURE — ? MOHS SURGERY

## 2024-01-25 PROCEDURE — 13132 CMPLX RPR F/C/C/M/N/AX/G/H/F: CPT

## 2024-01-25 PROCEDURE — 17311 MOHS 1 STAGE H/N/HF/G: CPT

## 2024-01-25 NOTE — PROCEDURE: MOHS SURGERY
Mohs Case Number: [id number]
Previous Accession (Optional): [id number]
Biopsy Photograph Reviewed: Yes
Referring Physician (Optional): Carina
Consent Type: Consent 1 (Standard)
Eye Shield Used: No
Surgeon Performing Repair (Optional): Andi Marin M.D.
Initial Size Of Lesion: 0.7
X Size Of Lesion In Cm (Optional): 0.5
Number Of Stages: 1
Primary Defect Length In Cm (Final Defect Size - Required For Flaps/Grafts): 2.3
Primary Defect Width In Cm (Final Defect Size - Required For Flaps/Grafts): 1.5
Repair Type: Complex Repair
Which Eyelid Repair Cpt Are You Using?: 40579
Oculoplastic Surgeon Procedure Text (A): After obtaining clear surgical margins the patient was sent to oculoplastics for surgical repair.  The patient understands they will receive post-surgical care and follow-up from the referring physician's office.
Otolaryngologist Procedure Text (A): After obtaining clear surgical margins the patient was sent to otolaryngology for surgical repair.  The patient understands they will receive post-surgical care and follow-up from the referring physician's office.
Plastic Surgeon Procedure Text (A): After obtaining clear surgical margins the patient was sent to plastics for surgical repair.  The patient understands they will receive post-surgical care and follow-up from the referring physician's office.
Mid-Level Procedure Text (A): After obtaining clear surgical margins the patient was sent to a mid-level provider for surgical repair.  The patient understands they will receive post-surgical care and follow-up from the mid-level provider.
Provider Procedure Text (A): After obtaining clear surgical margins the defect was repaired by another provider.
Asc Procedure Text (A): After obtaining clear surgical margins the patient was sent to an ASC for surgical repair.  The patient understands they will receive post-surgical care and follow-up from the ASC physician.
Simple / Intermediate / Complex Repair - Final Wound Length In Cm: 5.3
Suturegard Retention Suture: 2-0 Nylon
Retention Suture Bite Size: 3 mm
Length To Time In Minutes Device Was In Place: 10
Width Of Defect Perpendicular To Closure In Cm (Required): 1.7
Distance Of Undermining In Cm (Required): 1.8
Undermining Type: Entire Wound
Debridement Text: The wound edges were debrided prior to proceeding with the closure to facilitate wound healing.
Helical Rim Text: The closure involved the helical rim.
Vermilion Border Text: The closure involved the vermilion border.
Nostril Rim Text: The closure involved the nostril rim.
Retention Suture Text: Retention sutures were placed to support the closure and prevent dehiscence.
Secondary Defect Length In Cm (Required For Flaps): 0
Location Indication Override (Is Already Calculated Based On Selected Body Location): Area M
Area H Indication Text: Tumors in this location are included in Area H (eyelids, eyebrows, nose, lips, chin, ear, pre-auricular, post-auricular, temple, genitalia, hands, feet, ankles and areola).  Tissue conservation is critical in these anatomic locations.
Area M Indication Text: Tumors in this location are included in Area M (cheek, forehead, scalp, neck, jawline and pretibial skin).  Mohs surgery is indicated for tumors in these anatomic locations.
Area L Indication Text: Tumors in this location are included in Area L (trunk and extremities).  Mohs surgery is indicated for larger tumors, or tumors with aggressive histologic features, in these anatomic locations.
Tumor Debulked?: curette
Depth Of Tumor Invasion (For Histology): epidermis
Perineural Invasion (For Histology - Be Specific If Possible): absent
Presence Of Scar Tissue (For Histology): present
Surgical Defect Width In Cm (Optional): 1.0
Special Stains Stage 1 - Results: Base On Clearance Noted Above
Stage 2: Additional Anesthesia Volume In Cc: 1.4
Stage 2: Additional Anesthesia Type: 1% lidocaine with epinephrine and a 1:10 solution of 8.4% sodium bicarbonate
Stage 4: Additional Anesthesia Type: 1% lidocaine with epinephrine
Include Tumor Staging In Mohs Note?: Please Select the Appropriate Response
Staging Info: By selecting yes to the question above you will include information on AJCC 8 tumor staging in your Mohs note. Information on tumor staging will be automatically added for SCCs on the head and neck. AJCC 8 includes tumor size, tumor depth, perineural involvement and bone invasion.
Tumor Depth: Less than 6mm from granular layer and no invasion beyond the subcutaneous fat
Medical Necessity Statement: Based on my medical judgement, Mohs surgery is the most appropriate treatment for this cancer compared to other treatments.
Alternatives Discussed Intro (Do Not Add Period): I discussed alternative treatments to Mohs surgery and specifically discussed the risks and benefits of
Consent 1/Introductory Paragraph: The rationale for Mohs was explained to the patient and consent was obtained. The risks, benefits and alternatives to therapy were discussed in detail. Specifically, the risks of infection, scarring, bleeding, prolonged wound healing, incomplete removal, allergy to anesthesia, nerve injury and recurrence were addressed. Prior to the procedure, the treatment site was clearly identified and confirmed by the patient. All components of Universal Protocol/PAUSE Rule completed.
Consent 2/Introductory Paragraph: Mohs surgery was explained to the patient and consent was obtained. The risks, benefits and alternatives to therapy were discussed in detail. Specifically, the risks of infection, scarring, bleeding, prolonged wound healing, incomplete removal, allergy to anesthesia, nerve injury and recurrence were addressed. Prior to the procedure, the treatment site was clearly identified and confirmed by the patient. All components of Universal Protocol/PAUSE Rule completed.
Consent 3/Introductory Paragraph: I gave the patient a chance to ask questions they had about the procedure.  Following this I explained the Mohs procedure and consent was obtained. The risks, benefits and alternatives to therapy were discussed in detail. Specifically, the risks of infection, scarring, bleeding, prolonged wound healing, incomplete removal, allergy to anesthesia, nerve injury and recurrence were addressed. Prior to the procedure, the treatment site was clearly identified and confirmed by the patient. All components of Universal Protocol/PAUSE Rule completed.
Consent (Temporal Branch)/Introductory Paragraph: The rationale for Mohs was explained to the patient and consent was obtained. The risks, benefits and alternatives to therapy were discussed in detail. Specifically, the risks of damage to the temporal branch of the facial nerve, infection, scarring, bleeding, prolonged wound healing, incomplete removal, allergy to anesthesia, and recurrence were addressed. Prior to the procedure, the treatment site was clearly identified and confirmed by the patient. All components of Universal Protocol/PAUSE Rule completed.
Consent (Marginal Mandibular)/Introductory Paragraph: The rationale for Mohs was explained to the patient and consent was obtained. The risks, benefits and alternatives to therapy were discussed in detail. Specifically, the risks of damage to the marginal mandibular branch of the facial nerve, infection, scarring, bleeding, prolonged wound healing, incomplete removal, allergy to anesthesia, and recurrence were addressed. Prior to the procedure, the treatment site was clearly identified and confirmed by the patient. All components of Universal Protocol/PAUSE Rule completed.
Consent (Spinal Accessory)/Introductory Paragraph: The rationale for Mohs was explained to the patient and consent was obtained. The risks, benefits and alternatives to therapy were discussed in detail. Specifically, the risks of damage to the spinal accessory nerve, infection, scarring, bleeding, prolonged wound healing, incomplete removal, allergy to anesthesia, and recurrence were addressed. Prior to the procedure, the treatment site was clearly identified and confirmed by the patient. All components of Universal Protocol/PAUSE Rule completed.
Consent (Near Eyelid Margin)/Introductory Paragraph: The rationale for Mohs was explained to the patient and consent was obtained. The risks, benefits and alternatives to therapy were discussed in detail. Specifically, the risks of ectropion or eyelid deformity, infection, scarring, bleeding, prolonged wound healing, incomplete removal, allergy to anesthesia, nerve injury and recurrence were addressed. Prior to the procedure, the treatment site was clearly identified and confirmed by the patient. All components of Universal Protocol/PAUSE Rule completed.
Consent (Ear)/Introductory Paragraph: The rationale for Mohs was explained to the patient and consent was obtained. The risks, benefits and alternatives to therapy were discussed in detail. Specifically, the risks of ear deformity, infection, scarring, bleeding, prolonged wound healing, incomplete removal, allergy to anesthesia, nerve injury and recurrence were addressed. Prior to the procedure, the treatment site was clearly identified and confirmed by the patient. All components of Universal Protocol/PAUSE Rule completed.
Consent (Nose)/Introductory Paragraph: The rationale for Mohs was explained to the patient and consent was obtained. The risks, benefits and alternatives to therapy were discussed in detail. Specifically, the risks of nasal deformity, changes in the flow of air through the nose, infection, scarring, bleeding, prolonged wound healing, incomplete removal, allergy to anesthesia, nerve injury and recurrence were addressed. Prior to the procedure, the treatment site was clearly identified and confirmed by the patient. All components of Universal Protocol/PAUSE Rule completed.
Consent (Lip)/Introductory Paragraph: The rationale for Mohs was explained to the patient and consent was obtained. The risks, benefits and alternatives to therapy were discussed in detail. Specifically, the risks of lip deformity, changes in the oral aperture, infection, scarring, bleeding, prolonged wound healing, incomplete removal, allergy to anesthesia, nerve injury and recurrence were addressed. Prior to the procedure, the treatment site was clearly identified and confirmed by the patient. All components of Universal Protocol/PAUSE Rule completed.
Consent (Scalp)/Introductory Paragraph: The rationale for Mohs was explained to the patient and consent was obtained. The risks, benefits and alternatives to therapy were discussed in detail. Specifically, the risks of changes in hair growth pattern secondary to repair, infection, scarring, bleeding, prolonged wound healing, incomplete removal, allergy to anesthesia, nerve injury and recurrence were addressed. Prior to the procedure, the treatment site was clearly identified and confirmed by the patient. All components of Universal Protocol/PAUSE Rule completed.
Detail Level: Detailed
Postop Diagnosis: same
Anesthesia Volume In Cc: 10.4
Hemostasis: Electrodesiccation
Estimated Blood Loss (Cc): minimal
Repair Anesthesia Method: local infiltration
Anesthesia Volume In Cc: 6
Undermining Location (Optional): in the deep fat
Brow Lift Text: A midfrontal incision was made medially to the defect to allow access to the tissues just superior to the left eyebrow. Following careful dissection inferiorly in a supraperiosteal plane to the level of the left eyebrow, several 3-0 monocryl sutures were used to resuspend the eyebrow orbicularis oculi muscular unit to the superior frontal bone periosteum. This resulted in an appropriate reapproximation of static eyebrow symmetry and correction of the left brow ptosis.
Deep Sutures: 4-0 PDS
Epidermal Sutures: 6-0 Surgipro
Epidermal Closure: running
Suturegard Intro: Intraoperative tissue expansion was performed, utilizing the SUTUREGARD device, in order to reduce wound tension.
Suturegard Body: The suture ends were repeatedly re-tightened and re-clamped to achieve the desired tissue expansion.
Hemigard Intro: Due to skin fragility and wound tension, it was decided to use HEMIGARD adhesive retention suture devices to permit a linear closure. The skin was cleaned and dried for a 6cm distance away from the wound. Excessive hair, if present, was removed to allow for adhesion.
Hemigard Postcare Instructions: The HEMIGARD strips are to remain completely dry for at least 5-7 days.
Donor Site Anesthesia Type: same as repair anesthesia
Epidermal Closure Graft Donor Site (Optional): simple interrupted
Graft Donor Site Bandage (Optional-Leave Blank If You Don't Want In Note): Steri-strips and a pressure bandage were applied to the donor site.
Closure 2 Information: This tab is for additional flaps and grafts, including complex repair and grafts and complex repair and flaps. You can also specify a different location for the additional defect, if the location is the same you do not need to select a new one. We will insert the automated text for the repair you select below just as we do for solitary flaps and grafts. Please note that at this time if you select a location with a different insurance zone you will need to override the ICD10 and CPT if appropriate.
Closure 3 Information: This tab is for additional flaps and grafts above and beyond our usual structured repairs.  Please note if you enter information here it will not currently bill and you will need to add the billing information manually.
Wound Care: Petrolatum
Dressing: pressure dressing with telfa
Dressing (No Sutures): dry sterile dressing
Suture Removal: 7 days
Unna Boot Text: An Unna boot was placed to help immobilize the limb and facilitate more rapid healing.
Home Suture Removal Text: Patient was provided instructions on removing sutures and will remove their sutures at home.  If they have any questions or difficulties they will call the office.
Post-Care Instructions: I reviewed with the patient in detail post-care instructions. Patient is not to engage in any heavy lifting, exercise, or swimming for the next 14 days. Should the patient develop any fevers, chills, bleeding, severe pain patient will contact the office immediately.
Pain Refusal Text: I offered to prescribe pain medication but the patient refused to take this medication.
Mauc Instructions: By selecting yes to the question below the MAUC number will be added into the note.  This will be calculated automatically based on the diagnosis chosen, the size entered, the body zone selected (H,M,L) and the specific indications you chose. You will also have the option to override the Mohs AUC if you disagree with the automatically calculated number and this option is found in the Case Summary tab.
Where Do You Want The Question To Include Opioid Counseling Located?: Case Summary Tab
Eye Protection Verbiage: Before proceeding with the stage, a plastic scleral shield was inserted. The globe was anesthetized with a few drops of 1% lidocaine with 1:100,000 epinephrine. Then, an appropriate sized scleral shield was chosen and coated with lacrilube ointment. The shield was gently inserted and left in place for the duration of each stage. After the stage was completed, the shield was gently removed.
Mohs Method Verbiage: An incision at a 45 degree angle following the standard Mohs approach was done and the specimen was harvested as a microscopic controlled layer.
Surgeon/Pathologist Verbiage (Will Incorporate Name Of Surgeon From Intro If Not Blank): operated in two distinct and integrated capacities as the surgeon and pathologist.
Mohs Histo Method Verbiage: Each section was then chromacoded and processed in the Mohs lab using the Mohs protocol and submitted for frozen section.
Subsequent Stages Histo Method Verbiage: Using a similar technique to that described above, a thin layer of tissue was removed from all areas where tumor was visible on the previous stage.  The tissue was again oriented, mapped, dyed, and processed as above.
Mohs Rapid Report Verbiage: The area of clinically evident tumor was marked with skin marking ink and appropriately hatched.  The initial incision was made following the Mohs approach through the skin.  The specimen was taken to the lab, divided into the necessary number of pieces, chromacoded and processed according to the Mohs protocol.  This was repeated in successive stages until a tumor free defect was achieved.
Complex Repair Preamble Text (Leave Blank If You Do Not Want): Extensive wide undermining was performed.
Intermediate Repair Preamble Text (Leave Blank If You Do Not Want): Undermining was performed with blunt dissection.
Non-Graft Cartilage Fenestration Text: The cartilage was fenestrated with a 2mm punch biopsy to help facilitate healing.
Graft Cartilage Fenestration Text: The cartilage was fenestrated with a 2mm punch biopsy to help facilitate graft survival and healing.
Secondary Intention Text (Leave Blank If You Do Not Want): The defect will heal with secondary intention.
No Repair - Repaired With Adjacent Surgical Defect Text (Leave Blank If You Do Not Want): After obtaining clear surgical margins the defect was repaired concurrently with another surgical defect which was in close approximation.
Adjacent Tissue Transfer Text: The defect edges were debeveled with a #15 scalpel blade. Given the location of the defect and the proximity to free margins an adjacent tissue transfer was deemed most appropriate. Using a sterile surgical marker, an appropriate flap was drawn incorporating the defect and placing the expected incisions within the relaxed skin tension lines where possible. The area thus outlined was incised deep to adipose tissue with a #15 scalpel blade. The skin margins were undermined to an appropriate distance in all directions utilizing iris scissors and carried over to close the primary defect.
Advancement Flap (Single) Text: The defect edges were debeveled with a #15 scalpel blade.  Given the location of the defect and the proximity to free margins a single advancement flap was deemed most appropriate.  Using a sterile surgical marker, an appropriate advancement flap was drawn incorporating the defect and placing the expected incisions within the relaxed skin tension lines where possible.    The area thus outlined was incised deep to adipose tissue with a #15 scalpel blade.  The skin margins were undermined to an appropriate distance in all directions utilizing iris scissors.
Advancement Flap (Double) Text: The defect edges were debeveled with a #15 scalpel blade.  Given the location of the defect and the proximity to free margins a double advancement flap was deemed most appropriate.  Using a sterile surgical marker, the appropriate advancement flaps were drawn incorporating the defect and placing the expected incisions within the relaxed skin tension lines where possible.    The area thus outlined was incised deep to adipose tissue with a #15 scalpel blade.  The skin margins were undermined to an appropriate distance in all directions utilizing iris scissors.
Burow's Advancement Flap Text: The defect edges were debeveled with a #15 scalpel blade.  Given the location of the defect and the proximity to free margins a Burow's advancement flap was deemed most appropriate.  Using a sterile surgical marker, the appropriate advancement flap was drawn incorporating the defect and placing the expected incisions within the relaxed skin tension lines where possible.    The area thus outlined was incised deep to adipose tissue with a #15 scalpel blade.  The skin margins were undermined to an appropriate distance in all directions utilizing iris scissors.
Chonodrocutaneous Helical Advancement Flap Text: The defect edges were debeveled with a #15 scalpel blade. Given the location of the defect and the proximity to free margins a chondrocutaneous helical advancement flap was deemed most appropriate. Using a sterile surgical marker, the appropriate advancement flap was drawn incorporating the defect and placing the expected incisions within the relaxed skin tension lines where possible. The area thus outlined was incised deep to adipose tissue with a #15 scalpel blade. The skin margins were undermined to an appropriate distance in all directions utilizing iris scissors. Following this, the designed flap was advanced and carried over into the primary defect and sutured into place.
Crescentic Advancement Flap Text: The defect edges were debeveled with a #15 scalpel blade.  Given the location of the defect and the proximity to free margins a crescentic advancement flap was deemed most appropriate.  Using a sterile surgical marker, the appropriate advancement flap was drawn incorporating the defect and placing the expected incisions within the relaxed skin tension lines where possible.    The area thus outlined was incised deep to adipose tissue with a #15 scalpel blade.  The skin margins were undermined to an appropriate distance in all directions utilizing iris scissors.
A-T Advancement Flap Text: The defect edges were debeveled with a #15 scalpel blade.  Given the location of the defect, shape of the defect and the proximity to free margins an A-T advancement flap was deemed most appropriate.  Using a sterile surgical marker, an appropriate advancement flap was drawn incorporating the defect and placing the expected incisions within the relaxed skin tension lines where possible.    The area thus outlined was incised deep to adipose tissue with a #15 scalpel blade.  The skin margins were undermined to an appropriate distance in all directions utilizing iris scissors.
O-T Advancement Flap Text: The defect edges were debeveled with a #15 scalpel blade.  Given the location of the defect, shape of the defect and the proximity to free margins an O-T advancement flap was deemed most appropriate.  Using a sterile surgical marker, an appropriate advancement flap was drawn incorporating the defect and placing the expected incisions within the relaxed skin tension lines where possible.    The area thus outlined was incised deep to adipose tissue with a #15 scalpel blade.  The skin margins were undermined to an appropriate distance in all directions utilizing iris scissors.
O-L Flap Text: The defect edges were debeveled with a #15 scalpel blade.  Given the location of the defect, shape of the defect and the proximity to free margins an O-L flap was deemed most appropriate.  Using a sterile surgical marker, an appropriate advancement flap was drawn incorporating the defect and placing the expected incisions within the relaxed skin tension lines where possible.    The area thus outlined was incised deep to adipose tissue with a #15 scalpel blade.  The skin margins were undermined to an appropriate distance in all directions utilizing iris scissors.
O-Z Flap Text: The defect edges were debeveled with a #15 scalpel blade. Given the location of the defect, shape of the defect and the proximity to free margins an O-Z flap was deemed most appropriate. Using a sterile surgical marker, an appropriate transposition flap was drawn incorporating the defect and placing the expected incisions within the relaxed skin tension lines where possible. The area thus outlined was incised deep to adipose tissue with a #15 scalpel blade. The skin margins were undermined to an appropriate distance in all directions utilizing iris scissors. Following this, the designed flap was carried over into the primary defect and sutured into place.
Double O-Z Flap Text: The defect edges were debeveled with a #15 scalpel blade. Given the location of the defect, shape of the defect and the proximity to free margins a Double O-Z flap was deemed most appropriate. Using a sterile surgical marker, an appropriate transposition flap was drawn incorporating the defect and placing the expected incisions within the relaxed skin tension lines where possible. The area thus outlined was incised deep to adipose tissue with a #15 scalpel blade. The skin margins were undermined to an appropriate distance in all directions utilizing iris scissors. Following this, the designed flap was carried over into the primary defect and sutured into place.
V-Y Flap Text: The defect edges were debeveled with a #15 scalpel blade.  Given the location of the defect, shape of the defect and the proximity to free margins a V-Y flap was deemed most appropriate.  Using a sterile surgical marker, an appropriate advancement flap was drawn incorporating the defect and placing the expected incisions within the relaxed skin tension lines where possible.    The area thus outlined was incised deep to adipose tissue with a #15 scalpel blade.  The skin margins were undermined to an appropriate distance in all directions utilizing iris scissors.
Advancement-Rotation Flap Text: The defect edges were debeveled with a #15 scalpel blade.  Given the location of the defect, shape of the defect and the proximity to free margins an advancement-rotation flap was deemed most appropriate.  Using a sterile surgical marker, an appropriate flap was drawn incorporating the defect and placing the expected incisions within the relaxed skin tension lines where possible. The area thus outlined was incised deep to adipose tissue with a #15 scalpel blade.  The skin margins were undermined to an appropriate distance in all directions utilizing iris scissors.
Mercedes Flap Text: The defect edges were debeveled with a #15 scalpel blade.  Given the location of the defect, shape of the defect and the proximity to free margins a Mercedes flap was deemed most appropriate.  Using a sterile surgical marker, an appropriate advancement flap was drawn incorporating the defect and placing the expected incisions within the relaxed skin tension lines where possible. The area thus outlined was incised deep to adipose tissue with a #15 scalpel blade.  The skin margins were undermined to an appropriate distance in all directions utilizing iris scissors.
Modified Advancement Flap Text: The defect edges were debeveled with a #15 scalpel blade.  Given the location of the defect, shape of the defect and the proximity to free margins a modified advancement flap was deemed most appropriate.  Using a sterile surgical marker, an appropriate advancement flap was drawn incorporating the defect and placing the expected incisions within the relaxed skin tension lines where possible.    The area thus outlined was incised deep to adipose tissue with a #15 scalpel blade.  The skin margins were undermined to an appropriate distance in all directions utilizing iris scissors.
Mucosal Advancement Flap Text: Given the location of the defect, shape of the defect and the proximity to free margins a mucosal advancement flap was deemed most appropriate. Incisions were made with a 15 blade scalpel in the appropriate fashion along the cutaneous vermilion border and the mucosal lip. The remaining actinically damaged mucosal tissue was excised.  The mucosal advancement flap was then elevated to the gingival sulcus with care taken to preserve the neurovascular structures and advanced into the primary defect. Care was taken to ensure that precise realignment of the vermilion border was achieved.
Peng Advancement Flap Text: The defect edges were debeveled with a #15 scalpel blade. Given the location of the defect, shape of the defect and the proximity to free margins a Peng advancement flap was deemed most appropriate. Using a sterile surgical marker, an appropriate advancement flap was drawn incorporating the defect and placing the expected incisions within the relaxed skin tension lines where possible. The area thus outlined was incised deep to adipose tissue with a #15 scalpel blade. The skin margins were undermined to an appropriate distance in all directions utilizing iris scissors. Following this, the designed flap was advanced and carried over into the primary defect and sutured into place.
Hatchet Flap Text: The defect edges were debeveled with a #15 scalpel blade.  Given the location of the defect, shape of the defect and the proximity to free margins a hatchet flap was deemed most appropriate.  Using a sterile surgical marker, an appropriate hatchet flap was drawn incorporating the defect and placing the expected incisions within the relaxed skin tension lines where possible.    The area thus outlined was incised deep to adipose tissue with a #15 scalpel blade.  The skin margins were undermined to an appropriate distance in all directions utilizing iris scissors.
Rotation Flap Text: The defect edges were debeveled with a #15 scalpel blade.  Given the location of the defect, shape of the defect and the proximity to free margins a rotation flap was deemed most appropriate.  Using a sterile surgical marker, an appropriate rotation flap was drawn incorporating the defect and placing the expected incisions within the relaxed skin tension lines where possible.    The area thus outlined was incised deep to adipose tissue with a #15 scalpel blade.  The skin margins were undermined to an appropriate distance in all directions utilizing iris scissors.
Bilateral Rotation Flap Text: The defect edges were debeveled with a #15 scalpel blade. Given the location of the defect, shape of the defect and the proximity to free margins a bilateral rotation flap was deemed most appropriate. Using a sterile surgical marker, an appropriate rotation flap was drawn incorporating the defect and placing the expected incisions within the relaxed skin tension lines where possible. The area thus outlined was incised deep to adipose tissue with a #15 scalpel blade. The skin margins were undermined to an appropriate distance in all directions utilizing iris scissors. Following this, the designed flap was carried over into the primary defect and sutured into place.
Spiral Flap Text: The defect edges were debeveled with a #15 scalpel blade.  Given the location of the defect, shape of the defect and the proximity to free margins a spiral flap was deemed most appropriate.  Using a sterile surgical marker, an appropriate rotation flap was drawn incorporating the defect and placing the expected incisions within the relaxed skin tension lines where possible. The area thus outlined was incised deep to adipose tissue with a #15 scalpel blade.  The skin margins were undermined to an appropriate distance in all directions utilizing iris scissors.
Staged Advancement Flap Text: The defect edges were debeveled with a #15 scalpel blade. Given the location of the defect, shape of the defect and the proximity to free margins a staged advancement flap was deemed most appropriate. Using a sterile surgical marker, an appropriate advancement flap was drawn incorporating the defect and placing the expected incisions within the relaxed skin tension lines where possible. The area thus outlined was incised deep to adipose tissue with a #15 scalpel blade. The skin margins were undermined to an appropriate distance in all directions utilizing iris scissors. Following this, the designed flap was carried over into the primary defect and sutured into place.
Star Wedge Flap Text: The defect edges were debeveled with a #15 scalpel blade.  Given the location of the defect, shape of the defect and the proximity to free margins a star wedge flap was deemed most appropriate.  Using a sterile surgical marker, an appropriate rotation flap was drawn incorporating the defect and placing the expected incisions within the relaxed skin tension lines where possible. The area thus outlined was incised deep to adipose tissue with a #15 scalpel blade.  The skin margins were undermined to an appropriate distance in all directions utilizing iris scissors.
Transposition Flap Text: The defect edges were debeveled with a #15 scalpel blade.  Given the location of the defect and the proximity to free margins a transposition flap was deemed most appropriate.  Using a sterile surgical marker, an appropriate transposition flap was drawn incorporating the defect.    The area thus outlined was incised deep to adipose tissue with a #15 scalpel blade.  The skin margins were undermined to an appropriate distance in all directions utilizing iris scissors.
Muscle Hinge Flap Text: The defect edges were debeveled with a #15 scalpel blade.  Given the size, depth and location of the defect and the proximity to free margins a muscle hinge flap was deemed most appropriate.  Using a sterile surgical marker, an appropriate hinge flap was drawn incorporating the defect. The area thus outlined was incised with a #15 scalpel blade.  The skin margins were undermined to an appropriate distance in all directions utilizing iris scissors.
Mustarde Flap Text: The defect edges were debeveled with a #15 scalpel blade.  Given the size, depth and location of the defect and the proximity to free margins a Mustarde flap was deemed most appropriate. Using a sterile surgical marker, an appropriate flap was drawn incorporating the defect. The area thus outlined was incised with a #15 scalpel blade. The skin margins were undermined to an appropriate distance in all directions utilizing iris scissors. Following this, the designed flap was carried into the primary defect and sutured into place.
Nasal Turnover Hinge Flap Text: The defect edges were debeveled with a #15 scalpel blade.  Given the size, depth, location of the defect and the defect being full thickness a nasal turnover hinge flap was deemed most appropriate. Using a sterile surgical marker, an appropriate hinge flap was drawn incorporating the defect. The area thus outlined was incised with a #15 scalpel blade. The flap was designed to recreate the nasal mucosal lining and the alar rim. The skin margins were undermined to an appropriate distance in all directions utilizing iris scissors. Following this, the designed flap was carried over into the primary defect and sutured into place
Nasalis-Muscle-Based Myocutaneous Island Pedicle Flap Text: Using a #15 blade, an incision was made around the donor flap to the level of the nasalis muscle. Wide lateral undermining was then performed in both the subcutaneous plane above the nasalis muscle, and in a submuscular plane just above periosteum. This allowed the formation of a free nasalis muscle axial pedicle (based on the angular artery) which was still attached to the actual cutaneous flap, increasing its mobility and vascular viability. Hemostasis was obtained with pinpoint electrocoagulation. The flap was mobilized into position and the pivotal anchor points positioned and stabilized with buried interrupted sutures. Subcutaneous and dermal tissues were closed in a multilayered fashion with sutures. Tissue redundancies were excised, and the epidermal edges were apposed without significant tension and sutured with sutures.
Orbicularis Oris Muscle Flap Text: The defect edges were debeveled with a #15 scalpel blade.  Given that the defect affected the competency of the oral sphincter an orbicularis oris muscle flap was deemed most appropriate to restore this competency and normal muscle function.  Using a sterile surgical marker, an appropriate flap was drawn incorporating the defect. The area thus outlined was incised with a #15 scalpel blade. Following this, the designed flap was carried over into the primary defect and sutured into place.
Melolabial Transposition Flap Text: The defect edges were debeveled with a #15 scalpel blade.  Given the location of the defect and the proximity to free margins a melolabial flap was deemed most appropriate.  Using a sterile surgical marker, an appropriate melolabial transposition flap was drawn incorporating the defect.    The area thus outlined was incised deep to adipose tissue with a #15 scalpel blade.  The skin margins were undermined to an appropriate distance in all directions utilizing iris scissors.
Rhombic Flap Text: The defect edges were debeveled with a #15 scalpel blade.  Given the location of the defect and the proximity to free margins a rhombic flap was deemed most appropriate.  Using a sterile surgical marker, an appropriate rhombic flap was drawn incorporating the defect.    The area thus outlined was incised deep to adipose tissue with a #15 scalpel blade.  The skin margins were undermined to an appropriate distance in all directions utilizing iris scissors.
Rhomboid Transposition Flap Text: The defect edges were debeveled with a #15 scalpel blade. Given the location of the defect and the proximity to free margins a rhomboid transposition flap was deemed most appropriate. Using a sterile surgical marker, an appropriate rhomboid flap was drawn incorporating the defect. The area thus outlined was incised deep to adipose tissue with a #15 scalpel blade. The skin margins were undermined to an appropriate distance in all directions utilizing iris scissors. Following this, the designed flap was carried over into the primary defect and sutured into place.
Bi-Rhombic Flap Text: The defect edges were debeveled with a #15 scalpel blade.  Given the location of the defect and the proximity to free margins a bi-rhombic flap was deemed most appropriate.  Using a sterile surgical marker, an appropriate rhombic flap was drawn incorporating the defect. The area thus outlined was incised deep to adipose tissue with a #15 scalpel blade.  The skin margins were undermined to an appropriate distance in all directions utilizing iris scissors.
Helical Rim Advancement Flap Text: The defect edges were debeveled with a #15 blade scalpel.  Given the location of the defect and the proximity to free margins (helical rim) a double helical rim advancement flap was deemed most appropriate.  Using a sterile surgical marker, the appropriate advancement flaps were drawn incorporating the defect and placing the expected incisions between the helical rim and antihelix where possible.  The area thus outlined was incised through and through with a #15 scalpel blade.  With a skin hook and iris scissors, the flaps were gently and sharply undermined and freed up.
Bilateral Helical Rim Advancement Flap Text: The defect edges were debeveled with a #15 blade scalpel.  Given the location of the defect and the proximity to free margins (helical rim) a bilateral helical rim advancement flap was deemed most appropriate.  Using a sterile surgical marker, the appropriate advancement flaps were drawn incorporating the defect and placing the expected incisions between the helical rim and antihelix where possible.  The area thus outlined was incised through and through with a #15 scalpel blade.  With a skin hook and iris scissors, the flaps were gently and sharply undermined and freed up.
Ear Star Wedge Flap Text: The defect edges were debeveled with a #15 blade scalpel.  Given the location of the defect and the proximity to free margins (helical rim) an ear star wedge flap was deemed most appropriate.  Using a sterile surgical marker, the appropriate flap was drawn incorporating the defect and placing the expected incisions between the helical rim and antihelix where possible.  The area thus outlined was incised through and through with a #15 scalpel blade.
Banner Transposition Flap Text: The defect edges were debeveled with a #15 scalpel blade.  Given the location of the defect and the proximity to free margins a Banner transposition flap was deemed most appropriate.  Using a sterile surgical marker, an appropriate flap drawn around the defect. The area thus outlined was incised deep to adipose tissue with a #15 scalpel blade.  The skin margins were undermined to an appropriate distance in all directions utilizing iris scissors.
Bilobed Flap Text: The defect edges were debeveled with a #15 scalpel blade.  Given the location of the defect and the proximity to free margins a bilobe flap was deemed most appropriate.  Using a sterile surgical marker, an appropriate bilobe flap drawn around the defect.    The area thus outlined was incised deep to adipose tissue with a #15 scalpel blade.  The skin margins were undermined to an appropriate distance in all directions utilizing iris scissors.
Bilobed Transposition Flap Text: The defect edges were debeveled with a #15 scalpel blade.  Given the location of the defect and the proximity to free margins a bilobed transposition flap was deemed most appropriate.  Using a sterile surgical marker, an appropriate bilobe flap drawn around the defect.    The area thus outlined was incised deep to adipose tissue with a #15 scalpel blade.  The skin margins were undermined to an appropriate distance in all directions utilizing iris scissors.
Trilobed Flap Text: The defect edges were debeveled with a #15 scalpel blade.  Given the location of the defect and the proximity to free margins a trilobed flap was deemed most appropriate.  Using a sterile surgical marker, an appropriate trilobed flap drawn around the defect.    The area thus outlined was incised deep to adipose tissue with a #15 scalpel blade.  The skin margins were undermined to an appropriate distance in all directions utilizing iris scissors.
Dorsal Nasal Flap Text: The defect edges were debeveled with a #15 scalpel blade.  Given the location of the defect and the proximity to free margins a dorsal nasal flap was deemed most appropriate.  Using a sterile surgical marker, an appropriate dorsal nasal flap was drawn around the defect.    The area thus outlined was incised deep to adipose tissue with a #15 scalpel blade.  The skin margins were undermined to an appropriate distance in all directions utilizing iris scissors.
Island Pedicle Flap Text: The defect edges were debeveled with a #15 scalpel blade.  Given the location of the defect, shape of the defect and the proximity to free margins an island pedicle advancement flap was deemed most appropriate.  Using a sterile surgical marker, an appropriate advancement flap was drawn incorporating the defect, outlining the appropriate donor tissue and placing the expected incisions within the relaxed skin tension lines where possible.    The area thus outlined was incised deep to adipose tissue with a #15 scalpel blade.  The skin margins were undermined to an appropriate distance in all directions around the primary defect and laterally outward around the island pedicle utilizing iris scissors.  There was minimal undermining beneath the pedicle flap.
Island Pedicle Flap With Canthal Suspension Text: The defect edges were debeveled with a #15 scalpel blade.  Given the location of the defect, shape of the defect and the proximity to free margins an island pedicle advancement flap was deemed most appropriate.  Using a sterile surgical marker, an appropriate advancement flap was drawn incorporating the defect, outlining the appropriate donor tissue and placing the expected incisions within the relaxed skin tension lines where possible. The area thus outlined was incised deep to adipose tissue with a #15 scalpel blade.  The skin margins were undermined to an appropriate distance in all directions around the primary defect and laterally outward around the island pedicle utilizing iris scissors.  There was minimal undermining beneath the pedicle flap. A suspension suture was placed in the canthal tendon to prevent tension and prevent ectropion.
Alar Island Pedicle Flap Text: The defect edges were debeveled with a #15 scalpel blade.  Given the location of the defect, shape of the defect and the proximity to the alar rim an island pedicle advancement flap was deemed most appropriate.  Using a sterile surgical marker, an appropriate advancement flap was drawn incorporating the defect, outlining the appropriate donor tissue and placing the expected incisions within the nasal ala running parallel to the alar rim. The area thus outlined was incised with a #15 scalpel blade.  The skin margins were undermined minimally to an appropriate distance in all directions around the primary defect and laterally outward around the island pedicle utilizing iris scissors.  There was minimal undermining beneath the pedicle flap.
Double Island Pedicle Flap Text: The defect edges were debeveled with a #15 scalpel blade.  Given the location of the defect, shape of the defect and the proximity to free margins a double island pedicle advancement flap was deemed most appropriate.  Using a sterile surgical marker, an appropriate advancement flap was drawn incorporating the defect, outlining the appropriate donor tissue and placing the expected incisions within the relaxed skin tension lines where possible.    The area thus outlined was incised deep to adipose tissue with a #15 scalpel blade.  The skin margins were undermined to an appropriate distance in all directions around the primary defect and laterally outward around the island pedicle utilizing iris scissors.  There was minimal undermining beneath the pedicle flap.
Island Pedicle Flap-Requiring Vessel Identification Text: The defect edges were debeveled with a #15 scalpel blade.  Given the location of the defect, shape of the defect and the proximity to free margins an island pedicle advancement flap was deemed most appropriate.  Using a sterile surgical marker, an appropriate advancement flap was drawn, based on the axial vessel mentioned above, incorporating the defect, outlining the appropriate donor tissue and placing the expected incisions within the relaxed skin tension lines where possible.    The area thus outlined was incised deep to adipose tissue with a #15 scalpel blade.  The skin margins were undermined to an appropriate distance in all directions around the primary defect and laterally outward around the island pedicle utilizing iris scissors.  There was minimal undermining beneath the pedicle flap.
Keystone Flap Text: The defect edges were debeveled with a #15 scalpel blade.  Given the location of the defect, shape of the defect a keystone flap was deemed most appropriate.  Using a sterile surgical marker, an appropriate keystone flap was drawn incorporating the defect, outlining the appropriate donor tissue and placing the expected incisions within the relaxed skin tension lines where possible. The area thus outlined was incised deep to adipose tissue with a #15 scalpel blade.  The skin margins were undermined to an appropriate distance in all directions around the primary defect and laterally outward around the flap utilizing iris scissors.
O-T Plasty Text: The defect edges were debeveled with a #15 scalpel blade.  Given the location of the defect, shape of the defect and the proximity to free margins an O-T plasty was deemed most appropriate.  Using a sterile surgical marker, an appropriate O-T plasty was drawn incorporating the defect and placing the expected incisions within the relaxed skin tension lines where possible.    The area thus outlined was incised deep to adipose tissue with a #15 scalpel blade.  The skin margins were undermined to an appropriate distance in all directions utilizing iris scissors.
O-Z Plasty Text: The defect edges were debeveled with a #15 scalpel blade.  Given the location of the defect, shape of the defect and the proximity to free margins an O-Z plasty (double transposition flap) was deemed most appropriate.  Using a sterile surgical marker, the appropriate transposition flaps were drawn incorporating the defect and placing the expected incisions within the relaxed skin tension lines where possible.    The area thus outlined was incised deep to adipose tissue with a #15 scalpel blade.  The skin margins were undermined to an appropriate distance in all directions utilizing iris scissors.  Hemostasis was achieved with electrocautery.  The flaps were then transposed into place, one clockwise and the other counterclockwise, and anchored with interrupted buried subcutaneous sutures.
Double O-Z Plasty Text: The defect edges were debeveled with a #15 scalpel blade. Given the location of the defect, shape of the defect and the proximity to free margins a Double O-Z plasty (double transposition flap) was deemed most appropriate. Using a sterile surgical marker, the appropriate transposition flaps were drawn incorporating the defect and placing the expected incisions within the relaxed skin tension lines where possible. The area thus outlined was incised deep to adipose tissue with a #15 scalpel blade. The skin margins were undermined to an appropriate distance in all directions utilizing iris scissors. Hemostasis was achieved with electrocautery. The flaps were then transposed and carried over into place, one clockwise and the other counterclockwise, and anchored with interrupted buried subcutaneous sutures.
V-Y Plasty Text: The defect edges were debeveled with a #15 scalpel blade.  Given the location of the defect, shape of the defect and the proximity to free margins an V-Y advancement flap was deemed most appropriate.  Using a sterile surgical marker, an appropriate advancement flap was drawn incorporating the defect and placing the expected incisions within the relaxed skin tension lines where possible.    The area thus outlined was incised deep to adipose tissue with a #15 scalpel blade.  The skin margins were undermined to an appropriate distance in all directions utilizing iris scissors.
H Plasty Text: Given the location of the defect, shape of the defect and the proximity to free margins a H-plasty was deemed most appropriate for repair.  Using a sterile surgical marker, the appropriate advancement arms of the H-plasty were drawn incorporating the defect and placing the expected incisions within the relaxed skin tension lines where possible. The area thus outlined was incised deep to adipose tissue with a #15 scalpel blade. The skin margins were undermined to an appropriate distance in all directions utilizing iris scissors.  The opposing advancement arms were then advanced into place in opposite direction and anchored with interrupted buried subcutaneous sutures.
W Plasty Text: The lesion was extirpated to the level of the fat with a #15 scalpel blade.  Given the location of the defect, shape of the defect and the proximity to free margins a W-plasty was deemed most appropriate for repair.  Using a sterile surgical marker, the appropriate transposition arms of the W-plasty were drawn incorporating the defect and placing the expected incisions within the relaxed skin tension lines where possible.    The area thus outlined was incised deep to adipose tissue with a #15 scalpel blade.  The skin margins were undermined to an appropriate distance in all directions utilizing iris scissors.  The opposing transposition arms were then transposed into place in opposite direction and anchored with interrupted buried subcutaneous sutures.
Z Plasty Text: The lesion was extirpated to the level of the fat with a #15 scalpel blade.  Given the location of the defect, shape of the defect and the proximity to free margins a Z-plasty was deemed most appropriate for repair.  Using a sterile surgical marker, the appropriate transposition arms of the Z-plasty were drawn incorporating the defect and placing the expected incisions within the relaxed skin tension lines where possible.    The area thus outlined was incised deep to adipose tissue with a #15 scalpel blade.  The skin margins were undermined to an appropriate distance in all directions utilizing iris scissors.  The opposing transposition arms were then transposed into place in opposite direction and anchored with interrupted buried subcutaneous sutures.
Double Z Plasty Text: The lesion was extirpated to the level of the fat with a #15 scalpel blade. Given the location of the defect, shape of the defect and the proximity to free margins a double Z-plasty was deemed most appropriate for repair. Using a sterile surgical marker, the appropriate transposition arms of the double Z-plasty were drawn incorporating the defect and placing the expected incisions within the relaxed skin tension lines where possible. The area thus outlined was incised deep to adipose tissue with a #15 scalpel blade. The skin margins were undermined to an appropriate distance in all directions utilizing iris scissors. The opposing transposition arms were then transposed and carried over into place in opposite direction and anchored with interrupted buried subcutaneous sutures.
Zygomaticofacial Flap Text: Given the location of the defect, shape of the defect and the proximity to free margins a zygomaticofacial flap was deemed most appropriate for repair. Using a sterile surgical marker, the appropriate flap was drawn incorporating the defect and placing the expected incisions within the relaxed skin tension lines where possible. The area thus outlined was incised deep to adipose tissue with a #15 scalpel blade with preservation of a vascular pedicle.  The skin margins were undermined to an appropriate distance in all directions utilizing iris scissors. The flap was then carried over into the defect and anchored with interrupted buried subcutaneous sutures.
Cheek Interpolation Flap Text: A decision was made to reconstruct the defect utilizing an interpolation axial flap and a staged reconstruction.  A telfa template was made of the defect.  This telfa template was then used to outline the Cheek Interpolation flap.  The donor area for the pedicle flap was then injected with anesthesia.  The flap was excised through the skin and subcutaneous tissue down to the layer of the underlying musculature.  The interpolation flap was carefully excised within this deep plane to maintain its blood supply.  The edges of the donor site were undermined.   The donor site was closed in a primary fashion.  The pedicle was then rotated into position and sutured.  Once the tube was sutured into place, adequate blood supply was confirmed with blanching and refill.  The pedicle was then wrapped with xeroform gauze and dressed appropriately with a telfa and gauze bandage to ensure continued blood supply and protect the attached pedicle.
Cheek-To-Nose Interpolation Flap Text: A decision was made to reconstruct the defect utilizing an interpolation axial flap and a staged reconstruction.  A telfa template was made of the defect.  This telfa template was then used to outline the Cheek-To-Nose Interpolation flap.  The donor area for the pedicle flap was then injected with anesthesia.  The flap was excised through the skin and subcutaneous tissue down to the layer of the underlying musculature.  The interpolation flap was carefully excised within this deep plane to maintain its blood supply.  The edges of the donor site were undermined.   The donor site was closed in a primary fashion.  The pedicle was then rotated into position and sutured.  Once the tube was sutured into place, adequate blood supply was confirmed with blanching and refill.  The pedicle was then wrapped with xeroform gauze and dressed appropriately with a telfa and gauze bandage to ensure continued blood supply and protect the attached pedicle.
Interpolation Flap Text: A decision was made to reconstruct the defect utilizing an interpolation axial flap and a staged reconstruction.  A telfa template was made of the defect.  This telfa template was then used to outline the interpolation flap.  The donor area for the pedicle flap was then injected with anesthesia.  The flap was excised through the skin and subcutaneous tissue down to the layer of the underlying musculature.  The interpolation flap was carefully excised within this deep plane to maintain its blood supply.  The edges of the donor site were undermined.   The donor site was closed in a primary fashion.  The pedicle was then rotated into position and sutured.  Once the tube was sutured into place, adequate blood supply was confirmed with blanching and refill.  The pedicle was then wrapped with xeroform gauze and dressed appropriately with a telfa and gauze bandage to ensure continued blood supply and protect the attached pedicle.
Melolabial Interpolation Flap Text: A decision was made to reconstruct the defect utilizing an interpolation axial flap and a staged reconstruction.  A telfa template was made of the defect.  This telfa template was then used to outline the melolabial interpolation flap.  The donor area for the pedicle flap was then injected with anesthesia.  The flap was excised through the skin and subcutaneous tissue down to the layer of the underlying musculature.  The pedicle flap was carefully excised within this deep plane to maintain its blood supply.  The edges of the donor site were undermined.   The donor site was closed in a primary fashion.  The pedicle was then rotated into position and sutured.  Once the tube was sutured into place, adequate blood supply was confirmed with blanching and refill.  The pedicle was then wrapped with xeroform gauze and dressed appropriately with a telfa and gauze bandage to ensure continued blood supply and protect the attached pedicle.
Mastoid Interpolation Flap Text: A decision was made to reconstruct the defect utilizing an interpolation axial flap and a staged reconstruction.  A telfa template was made of the defect.  This telfa template was then used to outline the mastoid interpolation flap.  The donor area for the pedicle flap was then injected with anesthesia.  The flap was excised through the skin and subcutaneous tissue down to the layer of the underlying musculature.  The pedicle flap was carefully excised within this deep plane to maintain its blood supply.  The edges of the donor site were undermined.   The donor site was closed in a primary fashion.  The pedicle was then rotated into position and sutured.  Once the tube was sutured into place, adequate blood supply was confirmed with blanching and refill.  The pedicle was then wrapped with xeroform gauze and dressed appropriately with a telfa and gauze bandage to ensure continued blood supply and protect the attached pedicle.
Posterior Auricular Interpolation Flap Text: A decision was made to reconstruct the defect utilizing an interpolation axial flap and a staged reconstruction.  A telfa template was made of the defect.  This telfa template was then used to outline the posterior auricular interpolation flap.  The donor area for the pedicle flap was then injected with anesthesia.  The flap was excised through the skin and subcutaneous tissue down to the layer of the underlying musculature.  The pedicle flap was carefully excised within this deep plane to maintain its blood supply.  The edges of the donor site were undermined.   The donor site was closed in a primary fashion.  The pedicle was then rotated into position and sutured.  Once the tube was sutured into place, adequate blood supply was confirmed with blanching and refill.  The pedicle was then wrapped with xeroform gauze and dressed appropriately with a telfa and gauze bandage to ensure continued blood supply and protect the attached pedicle.
Paramedian Forehead Flap Text: A decision was made to reconstruct the defect utilizing an interpolation axial flap and a staged reconstruction.  A telfa template was made of the defect.  This telfa template was then used to outline the paramedian forehead pedicle flap.  The donor area for the pedicle flap was then injected with anesthesia.  The flap was excised through the skin and subcutaneous tissue down to the layer of the underlying musculature.  The pedicle flap was carefully excised within this deep plane to maintain its blood supply.  The edges of the donor site were undermined.   The donor site was closed in a primary fashion.  The pedicle was then rotated into position and sutured.  Once the tube was sutured into place, adequate blood supply was confirmed with blanching and refill.  The pedicle was then wrapped with xeroform gauze and dressed appropriately with a telfa and gauze bandage to ensure continued blood supply and protect the attached pedicle.
Abbe Flap (Upper To Lower Lip) Text: The defect of the lower lip was assessed and measured.  Given the location and size of the defect, an Abbe flap was deemed most appropriate. Using a sterile surgical marker, an appropriate Abbe flap was measured and drawn on the upper lip. Local anesthesia was then infiltrated.  A scalpel was then used to incise the upper lip through and through the skin, vermilion, muscle and mucosa, leaving the flap pedicled on the opposite side.  The flap was then rotated and transferred to the lower lip defect.  The flap was then sutured into place with a three layer technique, closing the orbicularis oris muscle layer with subcutaneous buried sutures, followed by a mucosal layer and an epidermal layer.
Abbe Flap (Lower To Upper Lip) Text: The defect of the upper lip was assessed and measured.  Given the location and size of the defect, an Abbe flap was deemed most appropriate. Using a sterile surgical marker, an appropriate Abbe flap was measured and drawn on the lower lip. Local anesthesia was then infiltrated. A scalpel was then used to incise the upper lip through and through the skin, vermilion, muscle and mucosa, leaving the flap pedicled on the opposite side.  The flap was then rotated and transferred to the lower lip defect.  The flap was then sutured into place with a three layer technique, closing the orbicularis oris muscle layer with subcutaneous buried sutures, followed by a mucosal layer and an epidermal layer.
Estlander Flap (Upper To Lower Lip) Text: The defect of the lower lip was assessed and measured.  Given the location and size of the defect, an Estlander flap was deemed most appropriate. Using a sterile surgical marker, an appropriate Estlander flap was measured and drawn on the upper lip. Local anesthesia was then infiltrated. A scalpel was then used to incise the lateral aspect of the flap, through skin, muscle and mucosa, leaving the flap pedicled medially.  The flap was then rotated and positioned to fill the lower lip defect.  The flap was then sutured into place with a three layer technique, closing the orbicularis oris muscle layer with subcutaneous buried sutures, followed by a mucosal layer and an epidermal layer.
Cheiloplasty (Less Than 50%) Text: A decision was made to reconstruct the defect with a  cheiloplasty.  The defect was undermined extensively.  Additional obicularis oris muscle was excised with a 15 blade scalpel.  The defect was converted into a full thickness wedge, of less than 50% of the vertical height of the lip, to facilite a better cosmetic result.  Small vessels were then tied off with 5-0 monocyrl. The obicularis oris, superficial fascia, adipose and dermis were then reapproximated.  After the deeper layers were approximated the epidermis was reapproximated with particular care given to realign the vermilion border.
Cheiloplasty (Complex) Text: A decision was made to reconstruct the defect with a  cheiloplasty.  The defect was undermined extensively.  Additional obicularis oris muscle was excised with a 15 blade scalpel.  The defect was converted into a full thickness wedge to facilite a better cosmetic result.  Small vessels were then tied off with 5-0 monocyrl. The obicularis oris, superficial fascia, adipose and dermis were then reapproximated.  After the deeper layers were approximated the epidermis was reapproximated with particular care given to realign the vermilion border.
Ear Wedge Repair Text: A wedge excision was completed by carrying down an excision through the full thickness of the ear and cartilage with an inward facing Burow's triangle. The wound was then closed in a layered fashion.
Full Thickness Lip Wedge Repair (Flap) Text: Given the location of the defect and the proximity to free margins a full thickness wedge repair was deemed most appropriate.  Using a sterile surgical marker, the appropriate repair was drawn incorporating the defect and placing the expected incisions perpendicular to the vermilion border.  The vermilion border was also meticulously outlined to ensure appropriate reapproximation during the repair.  The area thus outlined was incised through and through with a #15 scalpel blade.  The muscularis and dermis were reaproximated with deep sutures following hemostasis. Care was taken to realign the vermilion border before proceeding with the superficial closure.  Once the vermilion was realigned the superfical and mucosal closure was finished.
Ftsg Text: The defect edges were debeveled with a #15 scalpel blade.  Given the location of the defect, shape of the defect and the proximity to free margins a full thickness skin graft was deemed most appropriate.  Using a sterile surgical marker, the primary defect shape was transferred to the donor site. The area thus outlined was incised deep to adipose tissue with a #15 scalpel blade.  The harvested graft was then trimmed of adipose tissue until only dermis and epidermis was left.  The skin margins of the secondary defect were undermined to an appropriate distance in all directions utilizing iris scissors.  The secondary defect was closed with interrupted buried subcutaneous sutures.  The skin edges were then re-apposed with running  sutures.  The skin graft was then placed in the primary defect and oriented appropriately.
Split-Thickness Skin Graft Text: The defect edges were debeveled with a #15 scalpel blade.  Given the location of the defect, shape of the defect and the proximity to free margins a split thickness skin graft was deemed most appropriate.  Using a sterile surgical marker, the primary defect shape was transferred to the donor site. The split thickness graft was then harvested.  The skin graft was then placed in the primary defect and oriented appropriately.
Pinch Graft Text: The defect edges were debeveled with a #15 scalpel blade. Given the location of the defect, shape of the defect and the proximity to free margins a pinch graft was deemed most appropriate. Using a sterile surgical marker, the primary defect shape was transferred to the donor site. The area thus outlined was incised deep to adipose tissue with a #15 scalpel blade.  The harvested graft was then trimmed of adipose tissue until only dermis and epidermis was left. The skin margins of the secondary defect were undermined to an appropriate distance in all directions utilizing iris scissors.  The secondary defect was closed with interrupted buried subcutaneous sutures.  The skin edges were then re-apposed with running  sutures.  The skin graft was then placed in the primary defect and oriented appropriately.
Burow's Graft Text: The defect edges were debeveled with a #15 scalpel blade. Given the location of the defect, shape of the defect, the proximity to free margins and the presence of a standing cone deformity a Burow's skin graft was deemed most appropriate. The standing cone was removed and this tissue was then trimmed to the shape of the primary defect. The adipose tissue was also removed until only dermis and epidermis were left.  The skin margins of the secondary defect were undermined to an appropriate distance in all directions utilizing iris scissors.  The secondary defect was closed with interrupted buried subcutaneous sutures.  The skin edges were then re-apposed with running  sutures.  The skin graft was then placed in the primary defect and oriented appropriately.
Cartilage Graft Text: The defect edges were debeveled with a #15 scalpel blade.  Given the location of the defect, shape of the defect, the fact the defect involved a full thickness cartilage defect a cartilage graft was deemed most appropriate.  An appropriate donor site was identified, cleansed, and anesthetized. The cartilage graft was then harvested and transferred to the recipient site, oriented appropriately and then sutured into place.  The secondary defect was then repaired using a primary closure.
Composite Graft Text: The defect edges were debeveled with a #15 scalpel blade.  Given the location of the defect, shape of the defect, the proximity to free margins and the fact the defect was full thickness a composite graft was deemed most appropriate.  The defect was outline and then transferred to the donor site.  A full thickness graft was then excised from the donor site. The graft was then placed in the primary defect, oriented appropriately and then sutured into place.  The secondary defect was then repaired using a primary closure.
Epidermal Autograft Text: The defect edges were debeveled with a #15 scalpel blade.  Given the location of the defect, shape of the defect and the proximity to free margins an epidermal autograft was deemed most appropriate.  Using a sterile surgical marker, the primary defect shape was transferred to the donor site. The epidermal graft was then harvested.  The skin graft was then placed in the primary defect and oriented appropriately.
Dermal Autograft Text: The defect edges were debeveled with a #15 scalpel blade.  Given the location of the defect, shape of the defect and the proximity to free margins a dermal autograft was deemed most appropriate.  Using a sterile surgical marker, the primary defect shape was transferred to the donor site. The area thus outlined was incised deep to adipose tissue with a #15 scalpel blade.  The harvested graft was then trimmed of adipose and epidermal tissue until only dermis was left.  The skin graft was then placed in the primary defect and oriented appropriately.
Skin Substitute Text: The defect edges were debeveled with a #15 scalpel blade.  Given the location of the defect, shape of the defect and the proximity to free margins a skin substitute graft was deemed most appropriate.  The graft material was trimmed to fit the size of the defect. The graft was then placed in the primary defect and oriented appropriately.
Tissue Cultured Epidermal Autograft Text: The defect edges were debeveled with a #15 scalpel blade.  Given the location of the defect, shape of the defect and the proximity to free margins a tissue cultured epidermal autograft was deemed most appropriate.  The graft was then trimmed to fit the size of the defect.  The graft was then placed in the primary defect and oriented appropriately.
Xenograft Text: The defect edges were debeveled with a #15 scalpel blade.  Given the location of the defect, shape of the defect and the proximity to free margins a xenograft was deemed most appropriate.  The graft was then trimmed to fit the size of the defect.  The graft was then placed in the primary defect and oriented appropriately.
Purse String (Simple) Text: Given the location of the defect and the characteristics of the surrounding skin a purse string closure was deemed most appropriate.  Undermining was performed circumfirentially around the surgical defect.  A purse string suture was then placed and tightened.
Purse String (Intermediate) Text: Given the location of the defect and the characteristics of the surrounding skin a purse string intermediate closure was deemed most appropriate.  Undermining was performed circumfirentially around the surgical defect.  A purse string suture was then placed and tightened.
Partial Purse String (Simple) Text: Given the location of the defect and the characteristics of the surrounding skin a simple purse string closure was deemed most appropriate.  Undermining was performed circumfirentially around the surgical defect.  A purse string suture was then placed and tightened. Wound tension only allowed a partial closure of the circular defect.
Partial Purse String (Intermediate) Text: Given the location of the defect and the characteristics of the surrounding skin an intermediate purse string closure was deemed most appropriate.  Undermining was performed circumfirentially around the surgical defect.  A purse string suture was then placed and tightened. Wound tension only allowed a partial closure of the circular defect.
Localized Dermabrasion With Wire Brush Text: The patient was draped in routine manner.  Localized dermabrasion using 3 x 17 mm wire brush was performed in routine manner to papillary dermis. This spot dermabrasion is being performed to complete skin cancer reconstruction. It also will eliminate the other sun damaged precancerous cells that are known to be part of the regional effect of a lifetime's worth of sun exposure. This localized dermabrasion is therapeutic and should not be considered cosmetic in any regard.
Tarsorrhaphy Text: A tarsorrhaphy was performed using Frost sutures.
Intermediate Repair And Flap Additional Text (Will Appearing After The Standard Complex Repair Text): The intermediate repair was not sufficient to completely close the primary defect. The remaining additional defect was repaired with the flap mentioned below.
Intermediate Repair And Graft Additional Text (Will Appearing After The Standard Complex Repair Text): The intermediate repair was not sufficient to completely close the primary defect. The remaining additional defect was repaired with the graft mentioned below.
Complex Repair And Flap Additional Text (Will Appearing After The Standard Complex Repair Text): The complex repair was not sufficient to completely close the primary defect. The remaining additional defect was repaired with the flap mentioned below.
Complex Repair And Graft Additional Text (Will Appearing After The Standard Complex Repair Text): The complex repair was not sufficient to completely close the primary defect. The remaining additional defect was repaired with the graft mentioned below.
Eyelid Full Thickness Repair - 25796: The eyelid defect was full thickness which required a wedge repair of the eyelid. Special care was taken to ensure that the eyelid margin was realligned when placing sutures.
Manual Repair Warning Statement: We plan on removing the manually selected variable below in favor of our much easier automatic structured text blocks found in the previous tab. We decided to do this to help make the flow better and give you the full power of structured data. Manual selection is never going to be ideal in our platform and I would encourage you to avoid using manual selection from this point on, especially since I will be sunsetting this feature. It is important that you do one of two things with the customized text below. First, you can save all of the text in a word file so you can have it for future reference. Second, transfer the text to the appropriate area in the Library tab. Lastly, if there is a flap or graft type which we do not have you need to let us know right away so I can add it in before the variable is hidden. No need to panic, we plan to give you roughly 6 months to make the change.
Same Histology In Subsequent Stages Text: The pattern and morphology of the tumor is as described in the first stage.
No Residual Tumor Seen Histology Text: There were no malignant cells seen in the sections examined.
Inflammation Suggestive Of Cancer Camouflage Histology Text: There was a dense lymphocytic infiltrate which prevented adequate histologic evaluation of adjacent structures.
Bcc Histology Text: There were numerous aggregates of basaloid cells.
Bcc Infiltrative Histology Text: There were numerous aggregates of basaloid cells demonstrating an infiltrative pattern.
Mart-1 - Positive Histology Text: MART-1 staining demonstrates areas of higher density and clustering of melanocytes with Pagetoid spread upwards within the epidermis. The surgical margins are positive for tumor cells.
Mart-1 - Negative Histology Text: MART-1 staining demonstrates a normal density and pattern of melanocytes along the dermal-epidermal junction. The surgical margins are negative for tumor cells.
Information: Selecting Yes will display possible errors in your note based on the variables you have selected. This validation is only offered as a suggestion for you. PLEASE NOTE THAT THE VALIDATION TEXT WILL BE REMOVED WHEN YOU FINALIZE YOUR NOTE. IF YOU WANT TO FAX A PRELIMINARY NOTE YOU WILL NEED TO TOGGLE THIS TO 'NO' IF YOU DO NOT WANT IT IN YOUR FAXED NOTE.

## 2024-01-29 NOTE — ASSESSMENT & PLAN NOTE
Chronic, ongoing. Continues lisinopril 40 mg twice daily, pravastatin 40 mg once daily, and metformin 500 mg twice daily. Due for updated annual labs in July 2022.  
Chronic, ongoing. Continues to take Metformin 500 mg twice daily. Reports new functional diarrhea that began a couple weeks ago. Denies nausea, vomiting, or abdominal pain. Due for updated annual labs in July 2022.  
Chronic, ongoing. Due for lung cancer screening in July 2022. Denies shortness of breath, dyspnea, dyspnea on exertion, cough.  
Chronic, ongoing. Mild on chest CT in March 2019. Continues pravastatin 40 mg/day. History of GI bleed with anticoagulants. Due for annual labs in July 2022.  
Chronic, uncontrolled. Continues lisinopril 40 mg twice daily and metoprolol  mg/day. Has a blood pressure monitor, but is not checking at home. The patient denies chest pain, shortness of breath, headaches, dizziness, blurry vision, or dyspnea on exertion.   
New to examiner. Patient reports that about a month ago, he began sneezing. Reports that he will sneeze sometimes 3-4 times in a row and that it is worse when the sun comes out. Denies known sick contacts, fevers, chills, cough, or postnasal drainage. Reports that his eyes will become puffy at times. Has tried OTC Flonase which he states provides some relief of the sneezing and puffy eyes. Has not tried any other medications.   
New to examiner. Patient reports that he began to experience diarrhea a couple of months ago. States that it has been better today and yesterday, but that he had been having diarrhea up to 4 to 5 times a day. Denies diet changes, nausea, vomiting, abdominal pain, or visible blood in stool. Continues to take metformin 500 mg twice daily. Reports consuming large amounts of coffee and that he tries to keep up with fiber intake. Patient is wondering if he could be having problems with his pancreas. Has not yet tried any medications for this issue.  
None

## 2024-01-31 ENCOUNTER — OFFICE VISIT (OUTPATIENT)
Dept: MEDICAL GROUP | Facility: PHYSICIAN GROUP | Age: 69
End: 2024-01-31
Payer: MEDICARE

## 2024-01-31 VITALS
OXYGEN SATURATION: 95 % | BODY MASS INDEX: 28.92 KG/M2 | HEIGHT: 70 IN | DIASTOLIC BLOOD PRESSURE: 70 MMHG | SYSTOLIC BLOOD PRESSURE: 148 MMHG | WEIGHT: 202 LBS | TEMPERATURE: 97.9 F | RESPIRATION RATE: 17 BRPM | HEART RATE: 86 BPM

## 2024-01-31 DIAGNOSIS — F32.5 MAJOR DEPRESSIVE DISORDER WITH SINGLE EPISODE, IN FULL REMISSION (HCC): ICD-10-CM

## 2024-01-31 DIAGNOSIS — I77.810 AORTIC ECTASIA, THORACIC (HCC): ICD-10-CM

## 2024-01-31 DIAGNOSIS — E11.29 MICROALBUMINURIA DUE TO TYPE 2 DIABETES MELLITUS (HCC): ICD-10-CM

## 2024-01-31 DIAGNOSIS — R80.9 MICROALBUMINURIA DUE TO TYPE 2 DIABETES MELLITUS (HCC): ICD-10-CM

## 2024-01-31 DIAGNOSIS — R80.9 TYPE 2 DIABETES MELLITUS WITH MICROALBUMINURIA, WITHOUT LONG-TERM CURRENT USE OF INSULIN (HCC): ICD-10-CM

## 2024-01-31 DIAGNOSIS — E11.29 TYPE 2 DIABETES MELLITUS WITH MICROALBUMINURIA, WITHOUT LONG-TERM CURRENT USE OF INSULIN (HCC): ICD-10-CM

## 2024-01-31 DIAGNOSIS — I10 ESSENTIAL HYPERTENSION: ICD-10-CM

## 2024-01-31 DIAGNOSIS — R60.0 BILATERAL LOWER EXTREMITY EDEMA: ICD-10-CM

## 2024-01-31 DIAGNOSIS — J84.10 CALCIFIED GRANULOMA OF LUNG (HCC): ICD-10-CM

## 2024-01-31 PROBLEM — M79.89 RIGHT LEG SWELLING: Status: ACTIVE | Noted: 2024-01-31

## 2024-01-31 PROCEDURE — 99214 OFFICE O/P EST MOD 30 MIN: CPT | Performed by: NURSE PRACTITIONER

## 2024-01-31 PROCEDURE — 3077F SYST BP >= 140 MM HG: CPT | Performed by: NURSE PRACTITIONER

## 2024-01-31 PROCEDURE — 3078F DIAST BP <80 MM HG: CPT | Performed by: NURSE PRACTITIONER

## 2024-01-31 ASSESSMENT — PATIENT HEALTH QUESTIONNAIRE - PHQ9: CLINICAL INTERPRETATION OF PHQ2 SCORE: 0

## 2024-01-31 ASSESSMENT — FIBROSIS 4 INDEX: FIB4 SCORE: 1.07

## 2024-02-01 ENCOUNTER — APPOINTMENT (RX ONLY)
Dept: URBAN - METROPOLITAN AREA CLINIC 22 | Facility: CLINIC | Age: 69
Setting detail: DERMATOLOGY
End: 2024-02-01

## 2024-02-01 DIAGNOSIS — Z48.817 ENCOUNTER FOR SURGICAL AFTERCARE FOLLOWING SURGERY ON THE SKIN AND SUBCUTANEOUS TISSUE: ICD-10-CM

## 2024-02-01 PROCEDURE — 99024 POSTOP FOLLOW-UP VISIT: CPT

## 2024-02-01 PROCEDURE — ? POST-OP WOUND EVALUATION

## 2024-02-01 ASSESSMENT — LOCATION SIMPLE DESCRIPTION DERM: LOCATION SIMPLE: RIGHT FOREHEAD

## 2024-02-01 ASSESSMENT — LOCATION ZONE DERM: LOCATION ZONE: FACE

## 2024-02-01 ASSESSMENT — LOCATION DETAILED DESCRIPTION DERM: LOCATION DETAILED: RIGHT INFERIOR MEDIAL FOREHEAD

## 2024-02-01 NOTE — PROCEDURE: POST-OP WOUND EVALUATION
Detail Level: Detailed
Add 42385 Cpt? (Important Note: In 2017 The Use Of 09371 Is Being Tracked By Cms To Determine Future Global Period Reimbursement For Global Periods): yes
Wound Evaluated By (Optional): Anid Marin MD
Wound Diameter In Cm(Optional): 0
Wound Crusting?: clean
Sutures?: intact
Wound Edema?: mild
Wound Color?: pink
Any New Or Residual Neoplasm?: No

## 2024-02-02 ENCOUNTER — HOSPITAL ENCOUNTER (OUTPATIENT)
Dept: RADIOLOGY | Facility: MEDICAL CENTER | Age: 69
End: 2024-02-02
Attending: NURSE PRACTITIONER
Payer: MEDICARE

## 2024-02-02 DIAGNOSIS — R60.0 BILATERAL LOWER EXTREMITY EDEMA: ICD-10-CM

## 2024-02-02 PROCEDURE — 93970 EXTREMITY STUDY: CPT

## 2024-02-02 NOTE — PROGRESS NOTES
CC: Diagnoses of Bilateral lower extremity edema, Essential hypertension, Aortic ectasia, thoracic (HCC), Calcified granuloma of lung (HCC), Major depressive disorder with single episode, in full remission (HCC), Type 2 diabetes mellitus with microalbuminuria, without long-term current use of insulin (HCC), and Microalbuminuria due to type 2 diabetes mellitus (HCC) were pertinent to this visit.                                                                                                                                       HPI:   Fabio presents today with the following concerns:    Bilateral lower extremity edema  New to examiner, chronic for patient. Reports that he has had bilateral lower extremity edema for at least a couple of months, right worse than left. Lately it has been worse and he is experiencing some soreness in his right calf.    Essential hypertension  Chronic, ongoing. Continues amlodipine 10 mg/day, lisinopril[ril 40 mg twice daily, metoprolol  mg/day. Experiencing BLE for a few months, right worse than left.    Aortic ectasia, thoracic (HCC)  Chronic, ongoing.  Noted on chest CT in March 2019.  Continues pravastatin 40 mg daily, does not take anticoagulant or aspirin due to history of GI bleed.    Calcified granuloma of lung (HCC)  Chronic, ongoing.  Continues to be a non-smoker since 2013. Due for annual lung cancer screening CT in November 2024.    Major depressive disorder with single episode, in full remission (HCC)  Chronic, ongoing without medication.  Patient reports that he feels okay mentally.  He is physically tired from being the primary caregiver to his wife.  He does continue trazodone 50 mg nightly to help with sleeping.  Continues to follow with neurosurgery for chronic neck and back pain. The patient denies any suicidal ideations or thoughts of harming himself or others.       7/17/2023     3:50 PM 11/8/2023     9:20 AM 1/31/2024     8:00 AM   Depression Screen  (PHQ-2/PHQ-9)   PHQ-2 Total Score 0     PHQ-2 Total Score  0 0     Interpretation of PHQ-9 Total Score   Score Severity   1-4 No Depression   5-9 Mild Depression   10-14 Moderate Depression   15-19 Moderately Severe Depression   20-27 Severe Depression     Type 2 diabetes mellitus with microalbuminuria, without long-term current use of insulin ([facility])  Chronic, ongoing. Continues metformin ER 1000 mg twice daily, denies side effects of the medication. Due for update labs in August 2024.    Microalbuminuria due to type 2 diabetes mellitus ([facility])  Chronic, ongoing. Continues metformin ER 1000 mg twice daily, lisinopril 40 mg twice daily, pravastatin 40 mg nightly. Due for annual labs in August 2024.    Patient Active Problem List    Diagnosis Date Noted    Bilateral lower extremity edema 01/31/2024    Cervical stenosis of spine 07/17/2023    Hyponatremia 06/14/2023    Right ear pain 05/22/2023    Carpal tunnel syndrome on left 03/20/2023    Anxiety 12/21/2022    Adjustment insomnia 12/21/2022    Rib pain on right side 10/04/2022    Rash of foot 05/18/2022    Cubital tunnel syndrome, right 02/16/2022    Functional diarrhea 02/02/2022    Aortic ectasia, thoracic ([facility]) 06/28/2021    Major depressive disorder with single episode, in full remission ([facility]) 06/04/2020    Left arm pain 02/20/2020    Vitamin D deficiency 02/20/2020    Other dietary vitamin B12 deficiency anemia 01/05/2020    Calcified granuloma of lung ([facility]) 05/10/2019    Onychomycosis 06/26/2018    Dupuytren's contracture of left hand 06/26/2018    Other male erectile dysfunction 02/08/2018    Microalbuminuria due to type 2 diabetes mellitus ([facility]) 07/20/2017    Cervical radiculopathy at C7 07/20/2017    Essential hypertension 11/10/2015    Dyslipidemia 11/10/2015    Type 2 diabetes mellitus with microalbuminuria, without long-term current use of insulin ([facility]) 11/10/2015    Pain, chronic 11/10/2015     Current Outpatient Medications   Medication Sig Dispense  Refill    lisinopril (PRINIVIL) 40 MG tablet Take 1 Tablet by mouth 2 times a day. Indications: High Blood Pressure Disorder 200 Tablet 3    metFORMIN ER (GLUCOPHAGE XR) 500 MG TABLET SR 24 HR Take 2 Tablets by mouth 2 times a day. Indications: Type 2 Diabetes 400 Tablet 3    metoprolol SR (TOPROL XL) 100 MG TABLET SR 24 HR Take 1 Tablet by mouth every day. Indications: High Blood Pressure Disorder 100 Tablet 3    pravastatin (PRAVACHOL) 40 MG tablet Take 1 Tablet by mouth at bedtime. Indications: High Amount of Fats in the Blood 100 Tablet 3    traZODone (DESYREL) 50 MG Tab Take 1 Tablet by mouth at bedtime. Indications: sleep 100 Tablet 3    fluticasone (FLONASE) 50 MCG/ACT nasal spray Administer 1 Spray into affected nostril(S) every day. Indications: Stuffy Nose 16 g 3    amLODIPine (NORVASC) 10 MG Tab Take 1 Tablet by mouth every day. Indications: High Blood Pressure Disorder 100 Tablet 3    polyethylene glycol 3350 (MIRALAX) 17 GM/SCOOP Powder Take 17 g by mouth 1 time a day as needed (constipation). Indications: Constipation      tizanidine (ZANAFLEX) 2 MG tablet Take 1 tablet every 6 hours by oral route as needed. 50 Tablet 0    acetaminophen (TYLENOL) 325 MG Tab Take 2 Tablets by mouth every 6 hours. 30 Tablet 0    ammonium lactate (LAC-HYDRIN) 12 % Lotion Apply 1 Application topically 1 time a day as needed (FEET). Indications: dry skin      multivitamin Tab Take 1 Tablet by mouth every day. Indications: supplement       Current Facility-Administered Medications   Medication Dose Route Frequency Provider Last Rate Last Admin    cyanocobalamin (Vitamin B-12) injection 1,000 mcg  1,000 mcg Intramuscular Q30 DAYS Charley Harvey A.P.R.N.   1,000 mcg at 01/10/24 1020     Allergies as of 01/31/2024 - Reviewed 01/31/2024   Allergen Reaction Noted    Flexeril [cyclobenzaprine]  10/07/2014    Penicillins  04/20/2009      ROS:  See HPI    BP (!) 148/70 (BP Location: Left arm, Patient Position: Sitting, BP Cuff  "Size: Large adult)   Pulse 86   Temp 36.6 °C (97.9 °F) (Temporal)   Resp 17   Ht 1.778 m (5' 10\")   Wt 91.6 kg (202 lb)   SpO2 95%   BMI 28.98 kg/m²     Physical Exam:  General: Normal appearing. No distress.  HEENT: Normocephalic. Eyes conjunctiva clear lids without ptosis, pupils equal and reactive to light accommodation, ears normal shape and contour.  Neck: Supple without JVD or bruit.   Pulmonary: Clear to ausculation.  Normal effort. No rales, rhonchi, or wheezing.  Cardiovascular: Bilateral lower extremity generalized pitting edema 2+ R>L. Regular rate and rhythm without murmur. Carotid and radial pulses are intact and equal bilaterally.  Neurologic: Grossly nonfocal.  Skin: Warm and dry.  No obvious lesions.  Musculoskeletal: Normal gait. No extremity cyanosis, clubbing.  Psych: Normal mood and affect. Alert and oriented x3. Judgment and insight is normal.     Assessment and Plan.   68 y.o. male with the following issues:  1. Bilateral lower extremity edema  2. Essential hypertension  New to examiner, ongoing for patient for a few months. Discussed with the patient that BLE could be related to amlodipine 10 mg daily. Plan to complete BLE ultrasound to rule out DVT, echocardiogram, and referral to vascular medicine for evaluation and treatment of hypertension. Also continues lisinopril 40 mg twice daily and metoprolol  mg/day. Patient understands to be seen in the ER with worsening or concerning symptoms, chest pain, headache, difficulty breathing, stroke like symptoms.  - US-EXTREMITY VENOUS LOWER BILAT; Future  - EC-ECHOCARDIOGRAM COMPLETE W/O CONT; Future  - Referral to Vascular Medicine    3. Aortic ectasia, thoracic (HCC)  Chronic, ongoing. Continue pravastatin 40 mg daily. Anticoagulation contraindicated due to history of GI bleed.    4. Calcified granuloma of lung (HCC)  Chronic, ongoing. Due for annual lung cancer screening CT scan in November 2024.    5. Major depressive disorder with " single episode, in full remission (HCC)  Chronic, stable without medication. Continue to monitor.    6. Type 2 diabetes mellitus with microalbuminuria, without long-term current use of insulin (HCC)  7. Microalbuminuria due to type 2 diabetes mellitus (HCC)  Chronic, ongoing. Continue metformin ER 1000 mg twice daily, lisinopril 40 mg twice daily, pravastatin 40 mg nightly. Due for annual labs in August 2024.     Return if symptoms worsen or fail to improve.     Please note that this dictation was created using voice recognition software. I have worked with consultants from the vendor as well as technical experts from [facility] to optimize the interface. I have made every reasonable attempt to correct obvious errors, but I expect that there are errors of grammar and possibly content that I did not discover before finalizing the note.

## 2024-02-04 NOTE — ASSESSMENT & PLAN NOTE
Chronic, ongoing.  Continues to be a non-smoker since 2013. Due for annual lung cancer screening CT in November 2024.

## 2024-02-04 NOTE — ASSESSMENT & PLAN NOTE
Chronic, ongoing without medication.  Patient reports that he feels okay mentally.  He is physically tired from being the primary caregiver to his wife.  He does continue trazodone 50 mg nightly to help with sleeping.  Continues to follow with neurosurgery for chronic neck and back pain. The patient denies any suicidal ideations or thoughts of harming himself or others.       7/17/2023     3:50 PM 11/8/2023     9:20 AM 1/31/2024     8:00 AM   Depression Screen (PHQ-2/PHQ-9)   PHQ-2 Total Score 0     PHQ-2 Total Score  0 0     Interpretation of PHQ-9 Total Score   Score Severity   1-4 No Depression   5-9 Mild Depression   10-14 Moderate Depression   15-19 Moderately Severe Depression   20-27 Severe Depression

## 2024-02-04 NOTE — ASSESSMENT & PLAN NOTE
New to examiner, chronic for patient. Reports that he has had bilateral lower extremity edema for at least a couple of months, right worse than left. Lately it has been worse and he is experiencing some soreness in his right calf.

## 2024-02-04 NOTE — ASSESSMENT & PLAN NOTE
Chronic, ongoing. Continues amlodipine 10 mg/day, lisinopril[ril 40 mg twice daily, metoprolol  mg/day. Experiencing BLE for a few months, right worse than left.

## 2024-02-04 NOTE — RESULT ENCOUNTER NOTE
Please call the patient and let him know that his bilateral lower extremity ultrasounds were negative for a blood clot.    Thank you,    JOSÉ MIGUEL Siddiqui.

## 2024-02-04 NOTE — ASSESSMENT & PLAN NOTE
Chronic, ongoing. Continues metformin ER 1000 mg twice daily, denies side effects of the medication. Due for update labs in August 2024.

## 2024-02-04 NOTE — ASSESSMENT & PLAN NOTE
Chronic, ongoing. Continues metformin ER 1000 mg twice daily, lisinopril 40 mg twice daily, pravastatin 40 mg nightly. Due for annual labs in August 2024.

## 2024-02-07 ENCOUNTER — NON-PROVIDER VISIT (OUTPATIENT)
Dept: MEDICAL GROUP | Facility: PHYSICIAN GROUP | Age: 69
End: 2024-02-07
Payer: MEDICARE

## 2024-02-07 RX ADMIN — CYANOCOBALAMIN 1000 MCG: 1000 INJECTION, SOLUTION INTRAMUSCULAR; SUBCUTANEOUS at 09:04

## 2024-02-07 NOTE — RESULT ENCOUNTER NOTE
Spoke with patient this date and shared results of his US exam.  All questions were answered.  Thank you,  Davida Nash RN

## 2024-02-07 NOTE — PROGRESS NOTES
Fabio Vergara is a 68 y.o. male here for a non-provider visit for B-12   injection.    Reason for injection: Deficiency  Order in MAR?: Yes  Patient supplied?:No  Minimum interval has been met for this injection (per MAR order): Yes    Patient tolerated injection and no adverse effects were observed or reported: Yes    # of Administrations remaining in MAR: NA

## 2024-02-16 ENCOUNTER — ANCILLARY PROCEDURE (OUTPATIENT)
Dept: CARDIOLOGY | Facility: MEDICAL CENTER | Age: 69
End: 2024-02-16
Attending: NURSE PRACTITIONER
Payer: MEDICARE

## 2024-02-16 DIAGNOSIS — R60.0 BILATERAL LOWER EXTREMITY EDEMA: ICD-10-CM

## 2024-02-16 LAB
LV EJECT FRACT  99904: 63
LV EJECT FRACT MOD 2C 99903: 66.27
LV EJECT FRACT MOD 4C 99902: 57.25
LV EJECT FRACT MOD BP 99901: 62.89

## 2024-02-16 PROCEDURE — 93306 TTE W/DOPPLER COMPLETE: CPT | Mod: 26 | Performed by: INTERNAL MEDICINE

## 2024-02-16 PROCEDURE — 93306 TTE W/DOPPLER COMPLETE: CPT

## 2024-02-25 NOTE — RESULT ENCOUNTER NOTE
Please call the patient and let him know that his echocardiogram shows normal right and left ventricular size and function (right and left lower portions of the heart), the ejection fraction is good at 63%, there is no significant valvular disease, mildly enlarged left atrium (left upper chamber of the heart). His leg swelling is not likely due to his heart.    Thank you,    JOSÉ MIGUEL Siddiqui.

## 2024-03-13 ENCOUNTER — TELEPHONE (OUTPATIENT)
Dept: VASCULAR LAB | Facility: MEDICAL CENTER | Age: 69
End: 2024-03-13
Payer: MEDICARE

## 2024-03-13 NOTE — TELEPHONE ENCOUNTER
Spoke to patient in regarding NP appointment with  Dr. Livingston.    Patient has seen a Vascular Provider before?  No    Any recent testing (labs or imaging) outside of [facility]?  No    Were any records requested?  No    New patient packet sent via [employer] [facility] or mail?  Yes  Correct appointment type (New/Established/virtual)? Yes  Referral attached to appointment (renewals and New patients only)? Yes

## 2024-03-14 ENCOUNTER — OFFICE VISIT (OUTPATIENT)
Dept: VASCULAR LAB | Facility: MEDICAL CENTER | Age: 69
End: 2024-03-14
Attending: FAMILY MEDICINE
Payer: MEDICARE

## 2024-03-14 VITALS
WEIGHT: 198 LBS | SYSTOLIC BLOOD PRESSURE: 148 MMHG | BODY MASS INDEX: 28.35 KG/M2 | DIASTOLIC BLOOD PRESSURE: 82 MMHG | HEIGHT: 70 IN | HEART RATE: 93 BPM

## 2024-03-14 DIAGNOSIS — R80.9 TYPE 2 DIABETES MELLITUS WITH MICROALBUMINURIA, WITHOUT LONG-TERM CURRENT USE OF INSULIN (HCC): ICD-10-CM

## 2024-03-14 DIAGNOSIS — I10 PRIMARY HYPERTENSION: ICD-10-CM

## 2024-03-14 DIAGNOSIS — I70.0 AORTIC ATHEROSCLEROSIS (HCC): ICD-10-CM

## 2024-03-14 DIAGNOSIS — E78.5 DYSLIPIDEMIA: ICD-10-CM

## 2024-03-14 DIAGNOSIS — R60.0 BILATERAL LOWER EXTREMITY EDEMA: ICD-10-CM

## 2024-03-14 DIAGNOSIS — E11.29 MICROALBUMINURIA DUE TO TYPE 2 DIABETES MELLITUS (HCC): ICD-10-CM

## 2024-03-14 DIAGNOSIS — I25.10 CORONARY ARTERY CALCIFICATION SEEN ON CT SCAN: ICD-10-CM

## 2024-03-14 DIAGNOSIS — R80.9 MICROALBUMINURIA DUE TO TYPE 2 DIABETES MELLITUS (HCC): ICD-10-CM

## 2024-03-14 DIAGNOSIS — E11.29 TYPE 2 DIABETES MELLITUS WITH MICROALBUMINURIA, WITHOUT LONG-TERM CURRENT USE OF INSULIN (HCC): ICD-10-CM

## 2024-03-14 DIAGNOSIS — I25.2 HISTORY OF ACUTE ANTERIOR WALL MYOCARDIAL INFARCTION: ICD-10-CM

## 2024-03-14 PROCEDURE — 3077F SYST BP >= 140 MM HG: CPT | Performed by: FAMILY MEDICINE

## 2024-03-14 PROCEDURE — 99213 OFFICE O/P EST LOW 20 MIN: CPT

## 2024-03-14 PROCEDURE — 99204 OFFICE O/P NEW MOD 45 MIN: CPT | Performed by: FAMILY MEDICINE

## 2024-03-14 PROCEDURE — 3078F DIAST BP <80 MM HG: CPT | Performed by: FAMILY MEDICINE

## 2024-03-14 RX ORDER — VERAPAMIL HYDROCHLORIDE 120 MG/1
120 CAPSULE, EXTENDED RELEASE ORAL DAILY
Qty: 30 CAPSULE | Refills: 3 | Status: SHIPPED | OUTPATIENT
Start: 2024-03-14

## 2024-03-14 RX ORDER — NEBIVOLOL 5 MG/1
5 TABLET ORAL DAILY
Qty: 100 TABLET | Refills: 3 | Status: SHIPPED | OUTPATIENT
Start: 2024-03-14

## 2024-03-14 RX ORDER — TELMISARTAN 80 MG/1
80 TABLET ORAL
Qty: 100 TABLET | Refills: 3 | Status: SHIPPED | OUTPATIENT
Start: 2024-03-14

## 2024-03-14 ASSESSMENT — ENCOUNTER SYMPTOMS
SPUTUM PRODUCTION: 0
PALPITATIONS: 0
ORTHOPNEA: 0
PND: 0
FEVER: 0
CLAUDICATION: 0
WHEEZING: 0
COUGH: 0
CHILLS: 0
SHORTNESS OF BREATH: 0
HEMOPTYSIS: 0

## 2024-03-14 ASSESSMENT — FIBROSIS 4 INDEX: FIB4 SCORE: 1.07
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
## 2024-03-19 ENCOUNTER — OFFICE VISIT (OUTPATIENT)
Dept: URGENT CARE | Facility: PHYSICIAN GROUP | Age: 69
End: 2024-03-19
Payer: MEDICARE

## 2024-03-19 VITALS
WEIGHT: 202 LBS | RESPIRATION RATE: 14 BRPM | SYSTOLIC BLOOD PRESSURE: 140 MMHG | OXYGEN SATURATION: 96 % | HEIGHT: 70 IN | DIASTOLIC BLOOD PRESSURE: 68 MMHG | BODY MASS INDEX: 28.92 KG/M2 | HEART RATE: 98 BPM | TEMPERATURE: 98.6 F
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
## 2024-03-19 DIAGNOSIS — S50.361A INSECT BITE OF RIGHT ELBOW, INITIAL ENCOUNTER: ICD-10-CM

## 2024-03-19 PROCEDURE — 3077F SYST BP >= 140 MM HG: CPT | Performed by: PHYSICIAN ASSISTANT

## 2024-03-19 PROCEDURE — 3078F DIAST BP <80 MM HG: CPT | Performed by: PHYSICIAN ASSISTANT

## 2024-03-19 PROCEDURE — 99213 OFFICE O/P EST LOW 20 MIN: CPT | Performed by: PHYSICIAN ASSISTANT

## 2024-03-19 ASSESSMENT — FIBROSIS 4 INDEX: FIB4 SCORE: 1.07
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
## 2024-03-27 ENCOUNTER — HOSPITAL ENCOUNTER (OUTPATIENT)
Facility: MEDICAL CENTER | Age: 69
End: 2024-03-27
Payer: MEDICARE

## 2024-03-27 ENCOUNTER — OFFICE VISIT (OUTPATIENT)
Dept: URGENT CARE | Facility: PHYSICIAN GROUP | Age: 69
End: 2024-03-27
Payer: MEDICARE

## 2024-03-27 VITALS
SYSTOLIC BLOOD PRESSURE: 134 MMHG | BODY MASS INDEX: 28.63 KG/M2 | HEIGHT: 70 IN | WEIGHT: 200 LBS | OXYGEN SATURATION: 97 % | HEART RATE: 85 BPM | TEMPERATURE: 98.1 F | RESPIRATION RATE: 15 BRPM | DIASTOLIC BLOOD PRESSURE: 78 MMHG

## 2024-03-27 DIAGNOSIS — R51.9 ACUTE NONINTRACTABLE HEADACHE, UNSPECIFIED HEADACHE TYPE: ICD-10-CM

## 2024-03-27 LAB
ALBUMIN SERPL BCP-MCNC: 4.4 G/DL (ref 3.2–4.9)
ALBUMIN/GLOB SERPL: 2 G/DL
ALP SERPL-CCNC: 62 U/L (ref 30–99)
ALT SERPL-CCNC: 18 U/L (ref 2–50)
ANION GAP SERPL CALC-SCNC: 12 MMOL/L (ref 7–16)
AST SERPL-CCNC: 18 U/L (ref 12–45)
BILIRUB SERPL-MCNC: 0.4 MG/DL (ref 0.1–1.5)
BUN SERPL-MCNC: 9 MG/DL (ref 8–22)
CALCIUM ALBUM COR SERPL-MCNC: 9 MG/DL (ref 8.5–10.5)
CALCIUM SERPL-MCNC: 9.3 MG/DL (ref 8.5–10.5)
CHLORIDE SERPL-SCNC: 100 MMOL/L (ref 96–112)
CO2 SERPL-SCNC: 26 MMOL/L (ref 20–33)
CREAT SERPL-MCNC: 0.7 MG/DL (ref 0.5–1.4)
GFR SERPLBLD CREATININE-BSD FMLA CKD-EPI: 99 ML/MIN/1.73 M 2
GLOBULIN SER CALC-MCNC: 2.2 G/DL (ref 1.9–3.5)
GLUCOSE SERPL-MCNC: 180 MG/DL (ref 65–99)
POTASSIUM SERPL-SCNC: 4.8 MMOL/L (ref 3.6–5.5)
PROT SERPL-MCNC: 6.6 G/DL (ref 6–8.2)
SODIUM SERPL-SCNC: 138 MMOL/L (ref 135–145)

## 2024-03-27 PROCEDURE — 80053 COMPREHEN METABOLIC PANEL: CPT

## 2024-03-27 ASSESSMENT — ENCOUNTER SYMPTOMS: HEADACHES: 1

## 2024-03-27 ASSESSMENT — FIBROSIS 4 INDEX: FIB4 SCORE: 1.07
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
## 2024-03-30 ENCOUNTER — TELEPHONE (OUTPATIENT)
Dept: URGENT CARE | Facility: PHYSICIAN GROUP | Age: 69
End: 2024-03-30
Payer: MEDICARE

## 2024-03-31 NOTE — TELEPHONE ENCOUNTER
The patient presented to the urgent care for a review of his most recent CMP.  All lab values were reviewed with the patient.  He reports that he spoke with Dr. Livingston about his new onset headaches after starting 3 new medications.  He was told by his provider that headaches were very typical side effects and to monitor this over the next week to 2 weeks.  He reports that his headaches have subsided over the last 24 hours.  No neurological changes, fever.     Strict ER precautions remain in place in the event that the patient's headache worsens or fails to improve despite supportive measures.  He was also advised to follow-up with his primary care provider regarding this issue.

## 2024-04-01 ENCOUNTER — DOCUMENTATION (OUTPATIENT)
Dept: VASCULAR LAB | Facility: MEDICAL CENTER | Age: 69
End: 2024-04-01
Payer: MEDICARE

## 2024-04-01 NOTE — PROGRESS NOTES
Patient with a few recent calls and an urgent care visit regarding his BP. Has had a number of recent med changes. + headache.   Will ask MA to contact patient and see if can see me this thursday in office for further evaluation.    Michael Bloch, MD  [facility]

## 2024-04-04 ENCOUNTER — OFFICE VISIT (OUTPATIENT)
Dept: VASCULAR LAB | Facility: MEDICAL CENTER | Age: 69
End: 2024-04-04
Attending: INTERNAL MEDICINE
Payer: MEDICARE

## 2024-04-04 VITALS
BODY MASS INDEX: 28.49 KG/M2 | WEIGHT: 199 LBS | HEART RATE: 80 BPM | DIASTOLIC BLOOD PRESSURE: 90 MMHG | HEIGHT: 70 IN | SYSTOLIC BLOOD PRESSURE: 170 MMHG

## 2024-04-04 DIAGNOSIS — I10 PRIMARY HYPERTENSION: ICD-10-CM

## 2024-04-04 DIAGNOSIS — R80.9 TYPE 2 DIABETES MELLITUS WITH MICROALBUMINURIA, WITHOUT LONG-TERM CURRENT USE OF INSULIN (HCC): ICD-10-CM

## 2024-04-04 DIAGNOSIS — E78.5 DYSLIPIDEMIA: ICD-10-CM

## 2024-04-04 DIAGNOSIS — E11.29 TYPE 2 DIABETES MELLITUS WITH MICROALBUMINURIA, WITHOUT LONG-TERM CURRENT USE OF INSULIN (HCC): ICD-10-CM

## 2024-04-04 PROCEDURE — G2211 COMPLEX E/M VISIT ADD ON: HCPCS | Performed by: INTERNAL MEDICINE

## 2024-04-04 PROCEDURE — 3077F SYST BP >= 140 MM HG: CPT | Performed by: INTERNAL MEDICINE

## 2024-04-04 PROCEDURE — 99212 OFFICE O/P EST SF 10 MIN: CPT

## 2024-04-04 PROCEDURE — 99214 OFFICE O/P EST MOD 30 MIN: CPT | Performed by: INTERNAL MEDICINE

## 2024-04-04 PROCEDURE — 3079F DIAST BP 80-89 MM HG: CPT | Performed by: INTERNAL MEDICINE

## 2024-04-04 RX ORDER — DOXAZOSIN 2 MG/1
2 TABLET ORAL DAILY
Qty: 30 TABLET | Refills: 11 | Status: SHIPPED | OUTPATIENT
Start: 2024-04-04

## 2024-04-04 RX ORDER — VERAPAMIL HYDROCHLORIDE 240 MG/1
240 TABLET, FILM COATED, EXTENDED RELEASE ORAL DAILY
Qty: 30 TABLET | Refills: 3 | Status: SHIPPED | OUTPATIENT
Start: 2024-04-04

## 2024-04-04 ASSESSMENT — FIBROSIS 4 INDEX: FIB4 SCORE: 0.96

## 2024-04-04 NOTE — PATIENT INSTRUCTIONS
Continue telmisartan 80 mg daily    Stop bystolic (nevibilol)    Increase verapamil ER to 240 mg (new prescription available when you run out of 120s    Start doxazosin 1/2 tab each night for one week and then 1 full tablet each night     Continue to follow bp at home    Follow up:  May 2 at 840a    Michael Bloch, MD  [facility]  [phone]

## 2024-04-04 NOTE — PROGRESS NOTES
"[facility] CLINIC - Follow up VISIT  04/04/24     Here for urgent visit due to elevated BP    Subjective      EDEMA:  Has essentially resolved since stopping amlodipine  Prior work-up: echo, RLE venous duplex   Tx attempted to date:    Compression? no   Diuretics? no   Other? no     HTN:   H/o severe electrolyte imbalance on diuretics in past - normal in ED recently  Has been 130s in evenings and 150s in am  Was having some headache with higher BPs, but now much improved. No other symptoms of high bp  Good adherence  Rare nsaids   No stimulants or other interfering substances  Off amlodipine  On low dose of both nevibilol and verapail  Tolerating telmisartan    Antithrombotic tx: no - no hx of bleeding      HLD: Stable, current treatment: Moderate intensity - prava - tolerating, good adherence  Did not get repeat blood work prior to this urgent visit     T2D: Stable. Tolerating metformin and no recent med changes.     Social History     Tobacco Use    Smoking status: Former     Current packs/day: 0.00     Average packs/day: 1 pack/day for 35.0 years (35.0 ttl pk-yrs)     Types: Cigarettes     Start date: 1/1/1988     Quit date: 7/20/2013     Years since quitting: 10.7     Passive exposure: Never    Smokeless tobacco: Never   Vaping Use    Vaping Use: Never used   Substance Use Topics    Alcohol use: Yes     Alcohol/week: 2.4 - 3.0 oz     Types: 4 - 5 Cans of beer per week     Comment: On Occasion >4 beers on one day.    Drug use: No     DIET AND EXERCISE:  Weight Change:stable   Diet: common adult   Exercise: minimal exercise         Objective    Vitals:    04/04/24 1448 04/04/24 1451   BP: (!) 178/83 (!) 170/90   BP Location: Left arm Left arm   Patient Position: Sitting Sitting   BP Cuff Size: Adult Adult   Pulse: 87 80   Weight: 90.3 kg (199 lb)    Height: 1.778 m (5' 10\")         BP Readings from Last 4 Encounters:   04/04/24 (!) 170/90   03/27/24 134/78   03/19/24 (!) 140/68   03/14/24 (!) 148/82    "   Body mass index is 28.55 kg/m².   Wt Readings from Last 4 Encounters:   04/04/24 90.3 kg (199 lb)   03/27/24 90.7 kg (200 lb)   03/19/24 91.6 kg (202 lb)   03/14/24 89.8 kg (198 lb)      Physical Exam  Vitals reviewed.   Constitutional:       General: He is not in acute distress.     Appearance: He is not diaphoretic.   HENT:      Head: Normocephalic and atraumatic.   Eyes:      General: No scleral icterus.     Conjunctiva/sclera: Conjunctivae normal.   Neck:      Vascular: No carotid bruit.   Cardiovascular:      Rate and Rhythm: Normal rate and regular rhythm.      Heart sounds: Normal heart sounds. No murmur heard.  Pulmonary:      Effort: Pulmonary effort is normal. No respiratory distress.      Breath sounds: Normal breath sounds. No wheezing or rales.   Musculoskeletal:      Right lower leg: No edema.      Left lower leg: No edema.   Skin:     Coloration: Skin is not pale.   Neurological:      General: No focal deficit present.      Mental Status: He is alert and oriented to person, place, and time.      Cranial Nerves: No cranial nerve deficit.      Coordination: Coordination normal.      Gait: Gait is intact. Gait normal.   Psychiatric:         Mood and Affect: Mood and affect normal.         Behavior: Behavior normal.            DATA REVIEW    Lab Results   Component Value Date/Time    CHOLSTRLTOT 152 08/23/2023 08:12 AM    LDL 56 08/23/2023 08:12 AM    HDL 82 08/23/2023 08:12 AM    TRIGLYCERIDE 72 08/23/2023 08:12 AM       Lab Results   Component Value Date/Time    SODIUM 138 03/27/2024 02:44 PM    POTASSIUM 4.8 03/27/2024 02:44 PM    CHLORIDE 100 03/27/2024 02:44 PM    CO2 26 03/27/2024 02:44 PM    GLUCOSE 180 (H) 03/27/2024 02:44 PM    BUN 9 03/27/2024 02:44 PM    CREATININE 0.70 03/27/2024 02:44 PM    BUNCREATRAT 11 02/22/2022 05:55 AM     Lab Results   Component Value Date/Time    ALKPHOSPHAT 62 03/27/2024 02:44 PM    ASTSGOT 18 03/27/2024 02:44 PM    ALTSGPT 18 03/27/2024 02:44 PM    TBILIRUBIN 0.4  03/27/2024 02:44 PM       Lab Results   Component Value Date/Time    HBA1C 6.7 (H) 08/23/2023 08:12 AM       Lab Results   Component Value Date/Time    MALBCRT 47 (H) 08/23/2023 08:12 AM    MICROALBUR 8.5 08/23/2023 08:12 AM       Cardiovascular Imaging:    CT chest w/o 2019   Stable unenhanced CT of the thorax.   Remote granulomatous disease. RIGHT lower lobe hamartoma versus granuloma   Distal pancreatectomy, splenectomy, left hemidiaphragm elevation    AAA duplex 2022  There is mild atherosclerosis of the visualized abdominal aorta.   Proximal abdominal aorta measures 3.0 cm x 2.9 cm.   Mid abdominal aorta measures 2.0 cm x 2.7 cm.   Distal abdominal aorta measures 2.0 cm x 1.8 cm.   The right common iliac artery measures 1.1 cm x 1.0 cm, the left 1.2 cm x 1.2 cm.   No incidental abnormalities in the visualized portions of the retroperitoneum are identified.   IMPRESSION:   No sonographic evidence of abdominal aortic aneurysm.    LDCT 2023  Heart: Not enlarged. No pericardial effusion. Coronary calcifications.   Aorta and great vessels: No aneurysm. Atherosclerosis.  IMPRESSION:   No suspicious pulmonary nodules or masses. Stable calcified granuloma in the right lower lung.   Lung RADS: 1 - Negative: No nodules and definitely benign nodules     Echo 2/2024  Normal right and left ventricular size and function.   The ejection fraction is measured to be  63% by Manning's biplane.  Mildly dilated left atrium.  No significant valvular disease.   Normal right atrial pressure.   Unable to estimate pulmonary artery pressure due to an inadequate   tricuspid regurgitant jet.   No prior study is available for comparison.   Aorta  Normal aortic root for body surface area. The ascending aorta has a   diameter of  3.6cm.    RLE venous 2/2/24  No evidence of bilateral lower extremity deep venous thrombosis.         Medical Decision Making:  Today's Assessment / Status / Plan:     1. Type 2 diabetes mellitus with  "microalbuminuria, without long-term current use of insulin (HCC)        2. Dyslipidemia        3. Primary hypertension           Etiology of Established CVD if Present:     1) AAA, suprarenal with associated diffuse Ao athero- stable, no sx   2022 duplex = 3.0cm  (dilation vs minimal aneurysmal)  - no indications of co-morbid TAA per CT and echo in past   - Med mgmt unless 5.5+cm or rapid expansion rate of 1+cm/year (or >0.5cm/6mo) - indications for surg mgmt, generally with EVAR   Plan:  - continue medical management as per below  - check interval Ao/iliac duplex for sizing per Dr. Livingston -discussed at last visit, but not yet ordered    2) CAD, reported hx of MI - stable, no sx   Normal EF without wall motion abnormalities on echo  No current angina    - Continue med mgmt    LIPID MANAGEMENT  Qualifies for Statin Therapy Based on 2018 ACC/AHA Guidelines: yes, primary, possible secondary if true AAA  10-yr ASCVD risk score: The ASCVD Risk score (Rod DK, et al., 2019) failed to calculate., >20% \"high risk\"   Major ASCVD events: Aortic aneurysm due to atherosclerosis     High-risk conditions: >64yr old   Risk-enhancers: N/A  Currently on Statin: Yes  Tx goals: LDL-C <70 , consider <55   At goal? Yes   Plan:   -  continue prava 40mg daily   -  recheck lipid panel as previously ordered  - consider lp(a) in future    BLOOD PRESSURE MANAGEMENT  BP Goal ACC/AHA (2017) goal <130/80  Home BP at goal:  no  Office BP at goal:  no - stage 2 at presentation   24h ABPM:  not ordered to date   RDN candidate? YES  Contributing factors: tizanidine - can induce rebound HTN   Has h/o electrolyte disturbance w/ diuretics in past - normal in ED recently  Leg swelling resolved with discontinuation of amlodipine  No interfering substances  Good adherence  Plan:   Monitoring:   - continue home BP monitoring  - check PRA, dino, plasma mets, am cortisol, repeat TSH, gfr, lytes and urine for MA as previously ordered  Medications:  - " continue telmisartan 80mg daily   - stop nevibilol  - increase verapamil from ER from 120 mg to 240 mg daily  - add doxazosin 1-2 mg and increase as needed/tolerated    GLYCEMIC MANAGEMENT Diabetic, T2 with HTN, microalb  Goal A1c < 7  Lab Results   Component Value Date    HBA1C 6.7 (H) 08/23/2023      Lab Results   Component Value Date/Time    MALBCRT 47 (H) 08/23/2023 08:12 AM    MICROALBUR 8.5 08/23/2023 08:12 AM     Plan:  - continue meformin  - repeat a1c per pcp    ANTITHROMBOTIC THERAPY - consider at next visit given possible h/o MI. Would wait until bp a bit better controlled    LIFESTYLE INTERVENTIONS:    Tobacco: Quit  - continued complete avoidance of all tobacco products     Physical activity: encouraged some walking    Weight mgmt and nutrition: focus on sodium restriction    OTHER:     - Edema - no evidence of hypothyroidism. No thrombus on duplex. Essentially resolved with d/c of amlodipine. Can hold stockings. No further w/u at present    Studies to Be Obtained: consider aortoiliac duplex next visit  Labs to Be Obtained: as ordered by Dr. Livingston march 14 prior to next visit (fasting)    Follow up in: 1 months    Michael J Bloch, M.D.   [facility]  [facility]  [phone]

## 2024-04-06 ENCOUNTER — OFFICE VISIT (OUTPATIENT)
Dept: URGENT CARE | Facility: PHYSICIAN GROUP | Age: 69
End: 2024-04-06
Payer: MEDICARE

## 2024-04-06 VITALS
OXYGEN SATURATION: 94 % | DIASTOLIC BLOOD PRESSURE: 68 MMHG | RESPIRATION RATE: 14 BRPM | HEIGHT: 70 IN | HEART RATE: 96 BPM | BODY MASS INDEX: 28.35 KG/M2 | SYSTOLIC BLOOD PRESSURE: 124 MMHG | WEIGHT: 198 LBS | TEMPERATURE: 99 F

## 2024-04-06 DIAGNOSIS — R05.1 ACUTE COUGH: ICD-10-CM

## 2024-04-06 DIAGNOSIS — B34.9 VIRAL ILLNESS: ICD-10-CM

## 2024-04-06 LAB
FLUAV RNA SPEC QL NAA+PROBE: NEGATIVE
FLUBV RNA SPEC QL NAA+PROBE: NEGATIVE
RSV RNA SPEC QL NAA+PROBE: NEGATIVE
SARS-COV-2 RNA RESP QL NAA+PROBE: NEGATIVE

## 2024-04-06 PROCEDURE — 3078F DIAST BP <80 MM HG: CPT | Performed by: STUDENT IN AN ORGANIZED HEALTH CARE EDUCATION/TRAINING PROGRAM

## 2024-04-06 PROCEDURE — 99213 OFFICE O/P EST LOW 20 MIN: CPT | Performed by: STUDENT IN AN ORGANIZED HEALTH CARE EDUCATION/TRAINING PROGRAM

## 2024-04-06 PROCEDURE — 3074F SYST BP LT 130 MM HG: CPT | Performed by: STUDENT IN AN ORGANIZED HEALTH CARE EDUCATION/TRAINING PROGRAM

## 2024-04-06 PROCEDURE — 0241U POCT CEPHEID COV-2, FLU A/B, RSV - PCR: CPT | Performed by: STUDENT IN AN ORGANIZED HEALTH CARE EDUCATION/TRAINING PROGRAM

## 2024-04-06 ASSESSMENT — FIBROSIS 4 INDEX: FIB4 SCORE: 0.96
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
## 2024-04-08 ENCOUNTER — NON-PROVIDER VISIT (OUTPATIENT)
Dept: MEDICAL GROUP | Facility: PHYSICIAN GROUP | Age: 69
End: 2024-04-08
Payer: MEDICARE

## 2024-04-08 RX ADMIN — CYANOCOBALAMIN 1000 MCG: 1000 INJECTION, SOLUTION INTRAMUSCULAR; SUBCUTANEOUS at 08:24

## 2024-04-08 NOTE — PROGRESS NOTES
Fabio Vergara is a 68 y.o. male here for a non-provider visit for b-12 injection.    Reason for injection: b-12 deficiency  Order in MAR?: Yes  Patient supplied?:No  Minimum interval has been met for this injection (per MAR order): Yes    Patient tolerated injection and no adverse effects were observed or reported: Yes    # of Administrations remaining in MAR: 0

## 2024-04-12 ENCOUNTER — TELEPHONE (OUTPATIENT)
Dept: HEALTH INFORMATION MANAGEMENT | Facility: OTHER | Age: 69
End: 2024-04-12
Payer: MEDICARE

## 2024-04-19 ENCOUNTER — TELEPHONE (OUTPATIENT)
Dept: VASCULAR LAB | Facility: MEDICAL CENTER | Age: 69
End: 2024-04-19
Payer: MEDICARE

## 2024-04-19 NOTE — TELEPHONE ENCOUNTER
----- Message from Michael J Bloch, M.D. sent at 4/4/2024  3:20 PM PDT -----  Regarding: blood work  Pls remind patient to get the fasting blood work that Dr. Livingston ordered prior to his next visit  Mb

## 2024-04-19 NOTE — TELEPHONE ENCOUNTER
Phone Number Called: [phone] (home)       Call outcome: Spoke to patient regarding message below.    Message: patient understood to fast during lab work. Has no further questions at this time.    Kishore Phan Ass't  [facility]   Phone: [phone]   Fax: [phone]

## 2024-04-25 ENCOUNTER — APPOINTMENT (RX ONLY)
Dept: URBAN - METROPOLITAN AREA CLINIC 22 | Facility: CLINIC | Age: 69
Setting detail: DERMATOLOGY
End: 2024-04-25

## 2024-04-25 DIAGNOSIS — L72.8 OTHER FOLLICULAR CYSTS OF THE SKIN AND SUBCUTANEOUS TISSUE: ICD-10-CM

## 2024-04-25 DIAGNOSIS — L82.1 OTHER SEBORRHEIC KERATOSIS: ICD-10-CM

## 2024-04-25 DIAGNOSIS — L81.4 OTHER MELANIN HYPERPIGMENTATION: ICD-10-CM

## 2024-04-25 DIAGNOSIS — L57.0 ACTINIC KERATOSIS: ICD-10-CM

## 2024-04-25 DIAGNOSIS — D22 MELANOCYTIC NEVI: ICD-10-CM

## 2024-04-25 DIAGNOSIS — Z71.89 OTHER SPECIFIED COUNSELING: ICD-10-CM

## 2024-04-25 DIAGNOSIS — Z85.828 PERSONAL HISTORY OF OTHER MALIGNANT NEOPLASM OF SKIN: ICD-10-CM

## 2024-04-25 PROBLEM — D22.5 MELANOCYTIC NEVI OF TRUNK: Status: ACTIVE | Noted: 2024-04-25

## 2024-04-25 PROCEDURE — ? LIQUID NITROGEN

## 2024-04-25 PROCEDURE — 17004 DESTROY PREMAL LESIONS 15/>: CPT

## 2024-04-25 PROCEDURE — ? OBSERVATION

## 2024-04-25 PROCEDURE — 99213 OFFICE O/P EST LOW 20 MIN: CPT | Mod: 25

## 2024-04-25 PROCEDURE — ? COUNSELING

## 2024-04-25 ASSESSMENT — LOCATION DETAILED DESCRIPTION DERM
LOCATION DETAILED: LEFT INFERIOR TEMPLE
LOCATION DETAILED: LEFT DORSAL WRIST
LOCATION DETAILED: LEFT DISTAL POSTERIOR UPPER ARM
LOCATION DETAILED: RIGHT SUPERIOR MEDIAL FOREHEAD
LOCATION DETAILED: RIGHT PROXIMAL DORSAL FOREARM
LOCATION DETAILED: RIGHT PROXIMAL RADIAL DORSAL FOREARM
LOCATION DETAILED: LEFT SUPERIOR MEDIAL MALAR CHEEK
LOCATION DETAILED: LEFT PROXIMAL RADIAL DORSAL FOREARM
LOCATION DETAILED: RIGHT INFERIOR POSTERIOR NECK
LOCATION DETAILED: LEFT SUPERIOR POSTERIOR NECK
LOCATION DETAILED: RIGHT DISTAL DORSAL FOREARM
LOCATION DETAILED: LEFT MEDIAL UPPER BACK
LOCATION DETAILED: RIGHT DISTAL DORSAL FOREARM
LOCATION DETAILED: RIGHT INFERIOR POSTERIOR NECK
LOCATION DETAILED: LEFT DISTAL DORSAL FOREARM
LOCATION DETAILED: RIGHT ULNAR DORSAL HAND
LOCATION DETAILED: LEFT INFERIOR TEMPLE
LOCATION DETAILED: RIGHT RADIAL DORSAL HAND
LOCATION DETAILED: RIGHT SUPERIOR MEDIAL MIDBACK
LOCATION DETAILED: RIGHT MEDIAL UPPER BACK
LOCATION DETAILED: LEFT PROXIMAL DORSAL FOREARM
LOCATION DETAILED: LEFT PROXIMAL ULNAR DORSAL FOREARM
LOCATION DETAILED: LEFT SUPERIOR FOREHEAD
LOCATION DETAILED: RIGHT PROXIMAL POSTERIOR UPPER ARM
LOCATION DETAILED: RIGHT VENTRAL DISTAL FOREARM
LOCATION DETAILED: MID POSTERIOR NECK

## 2024-04-25 ASSESSMENT — LOCATION SIMPLE DESCRIPTION DERM
LOCATION SIMPLE: POSTERIOR NECK
LOCATION SIMPLE: RIGHT LOWER BACK
LOCATION SIMPLE: LEFT FOREHEAD
LOCATION SIMPLE: LEFT CHEEK
LOCATION SIMPLE: RIGHT HAND
LOCATION SIMPLE: RIGHT FOREHEAD
LOCATION SIMPLE: RIGHT FOREARM
LOCATION SIMPLE: POSTERIOR NECK
LOCATION SIMPLE: LEFT UPPER ARM
LOCATION SIMPLE: LEFT UPPER BACK
LOCATION SIMPLE: LEFT TEMPLE
LOCATION SIMPLE: LEFT FOREARM
LOCATION SIMPLE: RIGHT UPPER BACK
LOCATION SIMPLE: LEFT WRIST
LOCATION SIMPLE: LEFT TEMPLE
LOCATION SIMPLE: RIGHT UPPER ARM
LOCATION SIMPLE: RIGHT FOREARM

## 2024-04-25 ASSESSMENT — LOCATION ZONE DERM
LOCATION ZONE: TRUNK
LOCATION ZONE: NECK
LOCATION ZONE: NECK
LOCATION ZONE: FACE
LOCATION ZONE: FACE
LOCATION ZONE: ARM
LOCATION ZONE: HAND
LOCATION ZONE: ARM

## 2024-04-25 NOTE — PROCEDURE: LIQUID NITROGEN
Render Note In Bullet Format When Appropriate: No
Detail Level: Detailed
Consent: The patient's consent was obtained including but not limited to risks of crusting, scabbing, blistering, scarring, darker or lighter pigmentary change, recurrence, incomplete removal and infection.
Show Aperture Variable?: Yes
Number Of Freeze-Thaw Cycles: 2 freeze-thaw cycles
Duration Of Freeze Thaw-Cycle (Seconds): 3
Post-Care Instructions: I reviewed with the patient in detail post-care instructions. Patient is to wear sunprotection, and avoid picking at any of the treated lesions. Pt may apply Vaseline to crusted or scabbing areas.

## 2024-04-29 ENCOUNTER — HOSPITAL ENCOUNTER (OUTPATIENT)
Dept: LAB | Facility: MEDICAL CENTER | Age: 69
End: 2024-04-29
Attending: FAMILY MEDICINE
Payer: MEDICARE

## 2024-04-29 DIAGNOSIS — I10 PRIMARY HYPERTENSION: ICD-10-CM

## 2024-04-29 LAB
ALBUMIN SERPL BCP-MCNC: 4.2 G/DL (ref 3.2–4.9)
ALBUMIN/GLOB SERPL: 1.6 G/DL
ALP SERPL-CCNC: 69 U/L (ref 30–99)
ALT SERPL-CCNC: 16 U/L (ref 2–50)
ANION GAP SERPL CALC-SCNC: 14 MMOL/L (ref 7–16)
AST SERPL-CCNC: 19 U/L (ref 12–45)
BASOPHILS # BLD AUTO: 0.9 % (ref 0–1.8)
BASOPHILS # BLD: 0.07 K/UL (ref 0–0.12)
BILIRUB SERPL-MCNC: 0.3 MG/DL (ref 0.1–1.5)
BUN SERPL-MCNC: 8 MG/DL (ref 8–22)
CALCIUM ALBUM COR SERPL-MCNC: 9 MG/DL (ref 8.5–10.5)
CALCIUM SERPL-MCNC: 9.2 MG/DL (ref 8.5–10.5)
CHLORIDE SERPL-SCNC: 97 MMOL/L (ref 96–112)
CHOLEST SERPL-MCNC: 173 MG/DL (ref 100–199)
CO2 SERPL-SCNC: 23 MMOL/L (ref 20–33)
CORTIS SERPL-MCNC: 15.5 UG/DL (ref 0–23)
CREAT SERPL-MCNC: 0.61 MG/DL (ref 0.5–1.4)
CREAT UR-MCNC: 42.38 MG/DL
EOSINOPHIL # BLD AUTO: 0.32 K/UL (ref 0–0.51)
EOSINOPHIL NFR BLD: 4 % (ref 0–6.9)
ERYTHROCYTE [DISTWIDTH] IN BLOOD BY AUTOMATED COUNT: 51.6 FL (ref 35.9–50)
GFR SERPLBLD CREATININE-BSD FMLA CKD-EPI: 103 ML/MIN/1.73 M 2
GLOBULIN SER CALC-MCNC: 2.7 G/DL (ref 1.9–3.5)
GLUCOSE SERPL-MCNC: 127 MG/DL (ref 65–99)
HCT VFR BLD AUTO: 38.6 % (ref 42–52)
HDLC SERPL-MCNC: 118 MG/DL
HGB BLD-MCNC: 13.9 G/DL (ref 14–18)
IMM GRANULOCYTES # BLD AUTO: 0.03 K/UL (ref 0–0.11)
IMM GRANULOCYTES NFR BLD AUTO: 0.4 % (ref 0–0.9)
LDLC SERPL CALC-MCNC: 45 MG/DL
LYMPHOCYTES # BLD AUTO: 2.24 K/UL (ref 1–4.8)
LYMPHOCYTES NFR BLD: 27.9 % (ref 22–41)
MCH RBC QN AUTO: 34.2 PG (ref 27–33)
MCHC RBC AUTO-ENTMCNC: 36 G/DL (ref 32.3–36.5)
MCV RBC AUTO: 94.8 FL (ref 81.4–97.8)
MICROALBUMIN UR-MCNC: 14.5 MG/DL
MICROALBUMIN/CREAT UR: 342 MG/G (ref 0–30)
MONOCYTES # BLD AUTO: 0.78 K/UL (ref 0–0.85)
MONOCYTES NFR BLD AUTO: 9.7 % (ref 0–13.4)
NEUTROPHILS # BLD AUTO: 4.59 K/UL (ref 1.82–7.42)
NEUTROPHILS NFR BLD: 57.1 % (ref 44–72)
NRBC # BLD AUTO: 0 K/UL
NRBC BLD-RTO: 0 /100 WBC (ref 0–0.2)
PLATELET # BLD AUTO: 309 K/UL (ref 164–446)
PMV BLD AUTO: 10.8 FL (ref 9–12.9)
POTASSIUM SERPL-SCNC: 4.6 MMOL/L (ref 3.6–5.5)
PROT SERPL-MCNC: 6.9 G/DL (ref 6–8.2)
RBC # BLD AUTO: 4.07 M/UL (ref 4.7–6.1)
SODIUM SERPL-SCNC: 134 MMOL/L (ref 135–145)
TRIGL SERPL-MCNC: 52 MG/DL (ref 0–149)
TSH SERPL DL<=0.005 MIU/L-ACNC: 1.2 UIU/ML (ref 0.38–5.33)
WBC # BLD AUTO: 8 K/UL (ref 4.8–10.8)

## 2024-04-29 PROCEDURE — 82088 ASSAY OF ALDOSTERONE: CPT

## 2024-04-29 PROCEDURE — 83835 ASSAY OF METANEPHRINES: CPT

## 2024-04-29 PROCEDURE — 82570 ASSAY OF URINE CREATININE: CPT

## 2024-04-29 PROCEDURE — 84244 ASSAY OF RENIN: CPT

## 2024-04-29 PROCEDURE — 80061 LIPID PANEL: CPT

## 2024-04-29 PROCEDURE — 36415 COLL VENOUS BLD VENIPUNCTURE: CPT

## 2024-04-29 PROCEDURE — 84443 ASSAY THYROID STIM HORMONE: CPT

## 2024-04-29 PROCEDURE — 85025 COMPLETE CBC W/AUTO DIFF WBC: CPT

## 2024-04-29 PROCEDURE — 82533 TOTAL CORTISOL: CPT

## 2024-04-29 PROCEDURE — 80053 COMPREHEN METABOLIC PANEL: CPT

## 2024-04-29 PROCEDURE — 82043 UR ALBUMIN QUANTITATIVE: CPT

## 2024-05-01 LAB
ALDOST SERPL-MCNC: 28.6 NG/DL
METANEPHS SERPL-SCNC: 0.22 NMOL/L (ref 0–0.49)
NORMETANEPHRINE SERPL-SCNC: 0.72 NMOL/L (ref 0–0.89)
RENIN PLAS-CCNC: 1.1 NG/ML/HR

## 2024-05-02 ENCOUNTER — OFFICE VISIT (OUTPATIENT)
Dept: VASCULAR LAB | Facility: MEDICAL CENTER | Age: 69
End: 2024-05-02
Attending: FAMILY MEDICINE
Payer: MEDICARE

## 2024-05-02 VITALS
WEIGHT: 197 LBS | BODY MASS INDEX: 28.2 KG/M2 | SYSTOLIC BLOOD PRESSURE: 178 MMHG | HEIGHT: 70 IN | HEART RATE: 94 BPM | DIASTOLIC BLOOD PRESSURE: 82 MMHG

## 2024-05-02 DIAGNOSIS — I77.811 ABDOMINAL AORTIC ECTASIA (HCC): ICD-10-CM

## 2024-05-02 DIAGNOSIS — I10 PRIMARY HYPERTENSION: ICD-10-CM

## 2024-05-02 DIAGNOSIS — I25.10 CORONARY ARTERY CALCIFICATION SEEN ON CT SCAN: ICD-10-CM

## 2024-05-02 DIAGNOSIS — R80.9 TYPE 2 DIABETES MELLITUS WITH MICROALBUMINURIA, WITHOUT LONG-TERM CURRENT USE OF INSULIN (HCC): ICD-10-CM

## 2024-05-02 DIAGNOSIS — I25.2 HISTORY OF ACUTE ANTERIOR WALL MYOCARDIAL INFARCTION: ICD-10-CM

## 2024-05-02 DIAGNOSIS — E78.5 DYSLIPIDEMIA: ICD-10-CM

## 2024-05-02 DIAGNOSIS — E11.29 TYPE 2 DIABETES MELLITUS WITH MICROALBUMINURIA, WITHOUT LONG-TERM CURRENT USE OF INSULIN (HCC): ICD-10-CM

## 2024-05-02 DIAGNOSIS — I70.0 AORTIC ATHEROSCLEROSIS (HCC): ICD-10-CM

## 2024-05-02 DIAGNOSIS — E26.9 HIGH ALDOSTERONE TO RENIN RATIO (HCC): ICD-10-CM

## 2024-05-02 PROCEDURE — 3077F SYST BP >= 140 MM HG: CPT | Performed by: FAMILY MEDICINE

## 2024-05-02 PROCEDURE — 99214 OFFICE O/P EST MOD 30 MIN: CPT | Performed by: FAMILY MEDICINE

## 2024-05-02 PROCEDURE — 3079F DIAST BP 80-89 MM HG: CPT | Performed by: FAMILY MEDICINE

## 2024-05-02 RX ORDER — SODIUM CHLORIDE 1 G/1
1 TABLET ORAL 3 TIMES DAILY
Qty: 9 TABLET | Refills: 0 | Status: SHIPPED | OUTPATIENT
Start: 2024-05-02 | End: 2024-05-05

## 2024-05-02 ASSESSMENT — FIBROSIS 4 INDEX: FIB4 SCORE: 1.08

## 2024-05-02 NOTE — PROGRESS NOTES
V[facility]INE CLINIC - Follow up VISIT  24    Ongoing care for HTN mgmt, edema, est 3/2024     Subjective      Interval hx/concerns: last seen 2024,  completed labs with notable worsening UACR, dino with low renin, ARR 26.  Has been using tizanidine, started on doxazosin. Had labs.  Still noting higher BP     HTN: Stable, tolerating meds with good adherence, Home BP los/80s    Antithrombotic tx: no - no hx of bleeding      HLD: Stable, current treatment: Moderate intensity - prava - tolerating, good adherence    T2D: Stable. Tolerating metformin and no recent med changes.     EDEMA: Has essentially resolved since stopping amlodipine  Prior work-up: echo, RLE venous duplex   Tx attempted to date: stopped amloidipine     Current Outpatient Medications on File Prior to Visit   Medication Sig Dispense Refill    verapamil ER (CALAN SR) 240 MG Tab CR Take 1 Tablet by mouth every day. 30 Tablet 3    telmisartan (MICARDIS) 80 MG Tab Take 1 Tablet by mouth at bedtime. to lower blood pressure 100 Tablet 3    metFORMIN ER (GLUCOPHAGE XR) 500 MG TABLET SR 24 HR Take 2 Tablets by mouth 2 times a day. Indications: Type 2 Diabetes 400 Tablet 3    pravastatin (PRAVACHOL) 40 MG tablet Take 1 Tablet by mouth at bedtime. Indications: High Amount of Fats in the Blood 100 Tablet 3    traZODone (DESYREL) 50 MG Tab Take 1 Tablet by mouth at bedtime. Indications: sleep 100 Tablet 3    fluticasone (FLONASE) 50 MCG/ACT nasal spray Administer 1 Spray into affected nostril(S) every day. Indications: Stuffy Nose 16 g 3    polyethylene glycol 3350 (MIRALAX) 17 GM/SCOOP Powder Take 17 g by mouth 1 time a day as needed (constipation). Indications: Constipation      tizanidine (ZANAFLEX) 2 MG tablet Take 1 tablet every 6 hours by oral route as needed. 50 Tablet 0    acetaminophen (TYLENOL) 325 MG Tab Take 2 Tablets by mouth every 6 hours. 30 Tablet 0    ammonium lactate (LAC-HYDRIN) 12 % Lotion Apply 1 Application topically 1 time  "a day as needed (FEET). Indications: dry skin      multivitamin Tab Take 1 Tablet by mouth every day. Indications: supplement       Current Facility-Administered Medications on File Prior to Visit   Medication Dose Route Frequency Provider Last Rate Last Admin    cyanocobalamin (Vitamin B-12) injection 1,000 mcg  1,000 mcg Intramuscular Q30 DAYS Charley Harvey A.P.R.NLakeshia   1,000 mcg at 04/08/24 0824      Allergies   Allergen Reactions    Chlorthalidone      Severe hyponatremia      Flexeril [Cyclobenzaprine]      Weakness, spacie    Penicillins      Doesn't know the reaction  had this allergy when he was a child      Social History     Tobacco Use    Smoking status: Former     Current packs/day: 0.00     Average packs/day: 1 pack/day for 35.0 years (35.0 ttl pk-yrs)     Types: Cigarettes     Start date: 1/1/1988     Quit date: 7/20/2013     Years since quitting: 10.7     Passive exposure: Never    Smokeless tobacco: Never   Vaping Use    Vaping Use: Never used   Substance Use Topics    Alcohol use: Yes     Alcohol/week: 2.4 - 3.0 oz     Types: 4 - 5 Cans of beer per week     Comment: On Occasion >4 beers on one day.    Drug use: No     DIET AND EXERCISE:  Weight Change:stable   Diet: common adult   Exercise: minimal exercise       Objective    Vitals:    05/02/24 0837 05/02/24 0841   BP: (!) 175/84 (!) 178/82   BP Location: Left arm Left arm   Patient Position: Sitting Sitting   BP Cuff Size: Adult Adult   Pulse: 98 94   Weight: 89.4 kg (197 lb)    Height: 1.778 m (5' 10\")      BP Readings from Last 4 Encounters:   05/02/24 (!) 178/82   04/06/24 124/68   04/04/24 (!) 170/90   03/27/24 134/78      Body mass index is 28.27 kg/m².   Wt Readings from Last 4 Encounters:   05/02/24 89.4 kg (197 lb)   04/06/24 89.8 kg (198 lb)   04/04/24 90.3 kg (199 lb)   03/27/24 90.7 kg (200 lb)      Physical Exam  Vitals reviewed.   Constitutional:       General: He is not in acute distress.     Appearance: He is not diaphoretic. "   HENT:      Head: Normocephalic and atraumatic.   Eyes:      General: No scleral icterus.     Conjunctiva/sclera: Conjunctivae normal.   Neck:      Vascular: No carotid bruit.   Cardiovascular:      Rate and Rhythm: Normal rate and regular rhythm.      Heart sounds: Normal heart sounds. No murmur heard.  Pulmonary:      Effort: Pulmonary effort is normal. No respiratory distress.      Breath sounds: Normal breath sounds. No wheezing or rales.   Musculoskeletal:      Right lower leg: No edema.      Left lower leg: No edema.   Skin:     Coloration: Skin is not pale.   Neurological:      General: No focal deficit present.      Mental Status: He is alert and oriented to person, place, and time.      Cranial Nerves: No cranial nerve deficit.      Coordination: Coordination normal.      Gait: Gait is intact. Gait normal.   Psychiatric:         Mood and Affect: Mood and affect normal.         Behavior: Behavior normal.            DATA REVIEW    Lab Results   Component Value Date/Time    CHOLSTRLTOT 173 04/29/2024 08:15 AM    LDL 45 04/29/2024 08:15 AM     04/29/2024 08:15 AM    TRIGLYCERIDE 52 04/29/2024 08:15 AM       Lab Results   Component Value Date/Time    SODIUM 134 (L) 04/29/2024 08:15 AM    POTASSIUM 4.6 04/29/2024 08:15 AM    CHLORIDE 97 04/29/2024 08:15 AM    CO2 23 04/29/2024 08:15 AM    GLUCOSE 127 (H) 04/29/2024 08:15 AM    BUN 8 04/29/2024 08:15 AM    CREATININE 0.61 04/29/2024 08:15 AM    BUNCREATRAT 11 02/22/2022 05:55 AM     Lab Results   Component Value Date/Time    ALKPHOSPHAT 69 04/29/2024 08:15 AM    ASTSGOT 19 04/29/2024 08:15 AM    ALTSGPT 16 04/29/2024 08:15 AM    TBILIRUBIN 0.3 04/29/2024 08:15 AM       Lab Results   Component Value Date/Time    HBA1C 6.7 (H) 08/23/2023 08:12 AM       Lab Results   Component Value Date/Time    MALBCRT 342 (H) 04/29/2024 08:15 AM    MICROALBUR 14.5 04/29/2024 08:15 AM        Latest Reference Range & Units 04/29/24 08:14 04/29/24 08:15   Aldos Serum ng/dL   28.6   Renin Activity ng/mL/hr  1.1   Metanephrine Plasma 0.00 - 0.49 nmol/L 0.22    Normetanephrine Plasma 0.00 - 0.89 nmol/L 0.72      Cardiovascular Imaging:    CT chest w/o 2019   Stable unenhanced CT of the thorax.   Remote granulomatous disease. RIGHT lower lobe hamartoma versus granuloma   Distal pancreatectomy, splenectomy, left hemidiaphragm elevation    AAA duplex 2022  There is mild atherosclerosis of the visualized abdominal aorta.   Proximal abdominal aorta measures 3.0 cm x 2.9 cm.   Mid abdominal aorta measures 2.0 cm x 2.7 cm.   Distal abdominal aorta measures 2.0 cm x 1.8 cm.   The right common iliac artery measures 1.1 cm x 1.0 cm, the left 1.2 cm x 1.2 cm.   No incidental abnormalities in the visualized portions of the retroperitoneum are identified.   IMPRESSION:   No sonographic evidence of abdominal aortic aneurysm.    LDCT 2023  Heart: Not enlarged. No pericardial effusion. Coronary calcifications.   Aorta and great vessels: No aneurysm. Atherosclerosis.  IMPRESSION:   No suspicious pulmonary nodules or masses. Stable calcified granuloma in the right lower lung.   Lung RADS: 1 - Negative: No nodules and definitely benign nodules     Echo 2/2024  Normal right and left ventricular size and function.   The ejection fraction is measured to be  63% by Manning's biplane.  Mildly dilated left atrium.  No significant valvular disease.   Normal right atrial pressure.   Unable to estimate pulmonary artery pressure due to an inadequate   tricuspid regurgitant jet.   No prior study is available for comparison.   Aorta  Normal aortic root for body surface area. The ascending aorta has a   diameter of  3.6cm.    RLE venous 2/2/24  No evidence of bilateral lower extremity deep venous thrombosis.         Medical Decision Making:  Today's Assessment / Status / Plan:     1. Abdominal aortic ectasia (HCC)  Basic Metabolic Panel    CTA ABDOMEN PELVIS W & W/O POST PROCESS      2. Aortic atherosclerosis (HCC)       "  3. 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 Type 2 diabetes mellitus with microalbuminuria, without long-term current use of insulin (HCC)        5. Dyslipidemia        6. Primary hypertension  Basic Metabolic Panel    CTA ABDOMEN PELVIS W & W/O POST PROCESS    doxazosin (CARDURA) 4 MG Tab      7. Coronary artery calcification seen on CT scan        8. History of acute anterior wall myocardial infarction           Etiology of Established CVD if Present:     1) AAA, suprarenal with associated diffuse Ao athero- stable, no sx   2022 duplex = 3.0cm  (dilation vs minimal aneurysmal)  - no indications of co-morbid TAA per CT and echo in past   - Med mgmt unless 5.5+cm or rapid expansion rate of 1+cm/year (or >0.5cm/6mo) = indications for surg mgmt, generally with EVAR   Plan:  - continue medical management as per below  - check interval  CTA abd/pelvis then determine future surveillance     2) CAD, reported hx of MI - stable, no sx   Normal EF without wall motion abnormalities on echo  No current angina   Plan:   - Continue med mgmt    LIPID MANAGEMENT  Qualifies for Statin Therapy Based on 2018 ACC/AHA Guidelines: yes, primary, possible secondary if true AAA  10-yr ASCVD risk score: The ASCVD Risk score (Rod DK, et al., 2019) failed to calculate., >20% \"high risk\"   Major ASCVD events: Aortic aneurysm due to atherosclerosis     High-risk conditions: >64yr old   Risk-enhancers: N/A  Currently on Statin: Yes  Tx goals: LDL-C <70 , consider <55   At goal? Yes   Plan:   -  continue prava 40mg daily   -  recheck lipid panel as previously ordered  - consider lp(a) in future    BLOOD PRESSURE MANAGEMENT  BP Goal ACC/AHA (2017) goal <130/80  Home BP at goal:  no  Office BP at goal:  no - stage 2 at presentation   24h ABPM:  not ordered to date   RDN candidate? " YES  Contributing factors: tizanidine - can induce rebound HTN   -  hx of inducible severe hypoK+ with borderline supp PRA and high dino, ARR = 25, it is defintely worth eval for PA due to diff to control lifelong HTN and hx of vasc disease to help with targeted tx.   Has h/o electrolyte disturbance w/ diuretics in past - normal in ED recently  Leg swelling resolved with discontinuation of amlodipine  No interfering substances  Good adherence  Plan:   - continue home BP monitoring  -- due to mild supp pRA and high ARR, suggested for oral sodium loading confirmatory trial and eval with CT to assess adrenals  - briefly reviewed PA foundation website   - start high Na diet with NaCl 1g TID for 3d, on 3rd day start 24h Urine collection from 8a-8a for dino, Cr, Na evaluations   Medications:  - continue telmisartan 80mg daily   - avoid amlodipine due to excess swelling   - stop nevibilol  - increase verapamil from  mg daily  - increase doxazosin 4mg QHS     GLYCEMIC MANAGEMENT Diabetic, T2 with HTN, microalb  Goal A1c < 7  Lab Results   Component Value Date    HBA1C 6.7 (H) 08/23/2023      Lab Results   Component Value Date/Time    MALBCRT 342 (H) 04/29/2024 08:15 AM    MICROALBUR 14.5 04/29/2024 08:15 AM     Plan:  - continue meformin  - repeat a1c per pcp    ANTITHROMBOTIC THERAPY - consider at next visit given possible h/o MI. Would wait until bp a bit better controlled    LIFESTYLE INTERVENTIONS  TOBACCO: Stages of Change: Maintenance (sustained change >6mo)    reports that he quit smoking about 10 years ago. His smoking use included cigarettes. He started smoking about 36 years ago. He has a 35 pack-year smoking history. He has never been exposed to tobacco smoke. He has never used smokeless tobacco.   - continued complete avoidance of all tobacco products   PHYSICAL ACTIVITY: >150min/week of mod-intensity activity or as much as tolerated  NUTRITION: DASH  and reduce Na to 1500mg/day   ETOH: limit to 2 or less  standard drinks/day   WT MGMT: maintain healthy weight (reduction 1kg = reduction 1mmHg BP)     OTHER:     #  Edema - no evidence of hypothyroidism. No thrombus on duplex. Essentially resolved with d/c of amlodipine. Can hold stockings. No further w/u at present    Studies to Be Obtained: CTA abd/pelvis  - ordered - RN to track   Labs to Be Obtained: as noted in assessment     Follow up in:  4 weeks     Faustino Livingston M.D.   [facility]  [facility]  [phone]

## 2024-05-03 RX ORDER — DOXAZOSIN MESYLATE 4 MG/1
4 TABLET ORAL
Qty: 100 TABLET | Refills: 3 | Status: SHIPPED | OUTPATIENT
Start: 2024-05-03

## 2024-05-08 ENCOUNTER — HOSPITAL ENCOUNTER (OUTPATIENT)
Dept: RADIOLOGY | Facility: MEDICAL CENTER | Age: 69
End: 2024-05-08
Attending: FAMILY MEDICINE
Payer: MEDICARE

## 2024-05-08 DIAGNOSIS — I10 PRIMARY HYPERTENSION: ICD-10-CM

## 2024-05-08 DIAGNOSIS — I77.811 ABDOMINAL AORTIC ECTASIA (HCC): ICD-10-CM

## 2024-05-08 DIAGNOSIS — E26.9 HIGH ALDOSTERONE TO RENIN RATIO (HCC): ICD-10-CM

## 2024-05-08 RX ADMIN — IOHEXOL 100 ML: 350 INJECTION, SOLUTION INTRAVENOUS at 10:00

## 2024-05-14 ENCOUNTER — HOSPITAL ENCOUNTER (OUTPATIENT)
Dept: LAB | Facility: MEDICAL CENTER | Age: 69
End: 2024-05-14
Attending: FAMILY MEDICINE
Payer: MEDICARE

## 2024-05-14 DIAGNOSIS — E26.9 HIGH ALDOSTERONE TO RENIN RATIO (HCC): ICD-10-CM

## 2024-05-14 DIAGNOSIS — I10 PRIMARY HYPERTENSION: ICD-10-CM

## 2024-05-14 DIAGNOSIS — I77.811 ABDOMINAL AORTIC ECTASIA (HCC): ICD-10-CM

## 2024-05-14 LAB
ANION GAP SERPL CALC-SCNC: 13 MMOL/L (ref 7–16)
BUN SERPL-MCNC: 6 MG/DL (ref 8–22)
CALCIUM SERPL-MCNC: 9 MG/DL (ref 8.5–10.5)
CHLORIDE SERPL-SCNC: 101 MMOL/L (ref 96–112)
CO2 SERPL-SCNC: 25 MMOL/L (ref 20–33)
CREAT 24H UR-MSRATE: 788 MG/24 HR (ref 1000–2000)
CREAT SERPL-MCNC: 0.6 MG/DL (ref 0.5–1.4)
CREAT UR-MCNC: 27.17 MG/DL
GFR SERPLBLD CREATININE-BSD FMLA CKD-EPI: 103 ML/MIN/1.73 M 2
GLUCOSE SERPL-MCNC: 131 MG/DL (ref 65–99)
POTASSIUM SERPL-SCNC: 3.9 MMOL/L (ref 3.6–5.5)
SODIUM SERPL-SCNC: 139 MMOL/L (ref 135–145)
SPECIMEN VOL UR: 2900 ML

## 2024-05-16 LAB
COLLECT DURATION TIME SPEC: 24 HRS
CREAT 24H UR-MCNC: 31 MG/DL
SODIUM 24H UR-SCNC: 48 MMOL/L
SODIUM 24H UR-SRATE: 139 MMOL/D (ref 51–286)
SPECIMEN VOL ?TM UR: 2900 ML

## 2024-05-18 LAB
ALDOST 24H UR-MRATE: 5.8 UG/D (ref 1.2–28.1)
COLLECT DURATION TIME SPEC: 24 HRS
TOTAL VOLUME 1105: 2900 ML

## 2024-06-06 ENCOUNTER — OFFICE VISIT (OUTPATIENT)
Dept: VASCULAR LAB | Facility: MEDICAL CENTER | Age: 69
End: 2024-06-06
Attending: FAMILY MEDICINE
Payer: MEDICARE

## 2024-06-06 VITALS
BODY MASS INDEX: 28.77 KG/M2 | SYSTOLIC BLOOD PRESSURE: 154 MMHG | HEART RATE: 86 BPM | DIASTOLIC BLOOD PRESSURE: 78 MMHG | WEIGHT: 201 LBS | HEIGHT: 70 IN

## 2024-06-06 DIAGNOSIS — I77.811 ABDOMINAL AORTIC ECTASIA (HCC): ICD-10-CM

## 2024-06-06 DIAGNOSIS — R80.9 MICROALBUMINURIA DUE TO TYPE 2 DIABETES MELLITUS (HCC): ICD-10-CM

## 2024-06-06 DIAGNOSIS — E11.29 TYPE 2 DIABETES MELLITUS WITH MICROALBUMINURIA, WITHOUT LONG-TERM CURRENT USE OF INSULIN (HCC): ICD-10-CM

## 2024-06-06 DIAGNOSIS — I70.0 AORTIC ATHEROSCLEROSIS (HCC): ICD-10-CM

## 2024-06-06 DIAGNOSIS — E78.5 DYSLIPIDEMIA: ICD-10-CM

## 2024-06-06 DIAGNOSIS — I15.2 HYPERTENSION DUE TO ENDOCRINE DISORDER: ICD-10-CM

## 2024-06-06 DIAGNOSIS — E27.8 ADRENAL HYPERPLASIA (HCC): ICD-10-CM

## 2024-06-06 DIAGNOSIS — R60.0 BILATERAL LOWER EXTREMITY EDEMA: ICD-10-CM

## 2024-06-06 DIAGNOSIS — E11.29 MICROALBUMINURIA DUE TO TYPE 2 DIABETES MELLITUS (HCC): ICD-10-CM

## 2024-06-06 DIAGNOSIS — I25.10 CORONARY ARTERY CALCIFICATION SEEN ON CT SCAN: ICD-10-CM

## 2024-06-06 DIAGNOSIS — I25.2 HISTORY OF ACUTE ANTERIOR WALL MYOCARDIAL INFARCTION: ICD-10-CM

## 2024-06-06 DIAGNOSIS — E26.09 PRIMARY ALDOSTERONISM (HCC): ICD-10-CM

## 2024-06-06 DIAGNOSIS — R80.9 TYPE 2 DIABETES MELLITUS WITH MICROALBUMINURIA, WITHOUT LONG-TERM CURRENT USE OF INSULIN (HCC): ICD-10-CM

## 2024-06-06 PROCEDURE — 3077F SYST BP >= 140 MM HG: CPT | Performed by: FAMILY MEDICINE

## 2024-06-06 PROCEDURE — 3078F DIAST BP <80 MM HG: CPT | Performed by: FAMILY MEDICINE

## 2024-06-06 PROCEDURE — 99214 OFFICE O/P EST MOD 30 MIN: CPT | Performed by: FAMILY MEDICINE

## 2024-06-06 PROCEDURE — 99212 OFFICE O/P EST SF 10 MIN: CPT

## 2024-06-06 RX ORDER — SPIRONOLACTONE 25 MG/1
25 TABLET ORAL EVERY MORNING
Qty: 100 TABLET | Refills: 3 | Status: SHIPPED | OUTPATIENT
Start: 2024-06-06

## 2024-06-06 ASSESSMENT — FIBROSIS 4 INDEX: FIB4 SCORE: 1.09

## 2024-06-06 NOTE — PROGRESS NOTES
VASC[facility] MED[facility] CLINIC - Follow up VISIT  24   Ongoing care for HTN mgmt, edema, est 3/2024     Subjective      Interval hx/concerns: last seen 2024, completed oral Na loading test, CTA a/p.    Reports intermittent swelling since increase verap and doxazosin but o/w feeling well.     HTN: Stable, tolerating meds with good adherence, Home BP los/80s  Antithrombotic tx: no - no hx of bleeding    HLD: Stable, current treatment: Moderate intensity - prava - tolerating, good adherence  T2D: Stable. Tolerating metformin and no recent med changes.   EDEMA: Has essentially resolved since stopping amlodipine  Prior work-up: echo, RLE venous duplex   Tx attempted to date: stopped amloidipine     Current Outpatient Medications on File Prior to Visit   Medication Sig Dispense Refill    doxazosin (CARDURA) 4 MG Tab Take 1 Tablet by mouth at bedtime. to lower blood pressure 100 Tablet 3    verapamil ER (CALAN SR) 240 MG Tab CR Take 1 Tablet by mouth every day. 30 Tablet 3    telmisartan (MICARDIS) 80 MG Tab Take 1 Tablet by mouth at bedtime. to lower blood pressure 100 Tablet 3    metFORMIN ER (GLUCOPHAGE XR) 500 MG TABLET SR 24 HR Take 2 Tablets by mouth 2 times a day. Indications: Type 2 Diabetes 400 Tablet 3    pravastatin (PRAVACHOL) 40 MG tablet Take 1 Tablet by mouth at bedtime. Indications: High Amount of Fats in the Blood 100 Tablet 3    traZODone (DESYREL) 50 MG Tab Take 1 Tablet by mouth at bedtime. Indications: sleep 100 Tablet 3    fluticasone (FLONASE) 50 MCG/ACT nasal spray Administer 1 Spray into affected nostril(S) every day. Indications: Stuffy Nose 16 g 3    polyethylene glycol 3350 (MIRALAX) 17 GM/SCOOP Powder Take 17 g by mouth 1 time a day as needed (constipation). Indications: Constipation      tizanidine (ZANAFLEX) 2 MG tablet Take 1 tablet every 6 hours by oral route as needed. 50 Tablet 0    acetaminophen (TYLENOL) 325 MG Tab Take 2 Tablets by mouth every 6 hours. 30 Tablet 0    ammonium  "lactate (LAC-HYDRIN) 12 % Lotion Apply 1 Application topically 1 time a day as needed (FEET). Indications: dry skin      multivitamin Tab Take 1 Tablet by mouth every day. Indications: supplement       Current Facility-Administered Medications on File Prior to Visit   Medication Dose Route Frequency Provider Last Rate Last Admin    cyanocobalamin (Vitamin B-12) injection 1,000 mcg  1,000 mcg Intramuscular Q30 DAYS Charley Harvey A.P.R.N.   1,000 mcg at 04/08/24 0824      Allergies   Allergen Reactions    Chlorthalidone      Severe hyponatremia      Flexeril [Cyclobenzaprine]      Weakness, spacie    Penicillins      Doesn't know the reaction  had this allergy when he was a child      Social History     Tobacco Use    Smoking status: Former     Current packs/day: 0.00     Average packs/day: 1 pack/day for 35.0 years (35.0 ttl pk-yrs)     Types: Cigarettes     Start date: 1/1/1988     Quit date: 7/20/2013     Years since quitting: 10.8     Passive exposure: Never    Smokeless tobacco: Never   Vaping Use    Vaping status: Never Used   Substance Use Topics    Alcohol use: Yes     Alcohol/week: 2.4 - 3.0 oz     Types: 4 - 5 Cans of beer per week     Comment: On Occasion >4 beers on one day.    Drug use: No     DIET AND EXERCISE:  Weight Change:stable   Diet: common adult   Exercise: minimal exercise       Objective    Vitals:    06/06/24 1123 06/06/24 1126   BP: (!) 160/77 (!) 154/78   BP Location: Left arm Left arm   Patient Position: Sitting Sitting   BP Cuff Size: Adult Adult   Pulse: 85 86   Weight: 91.2 kg (201 lb)    Height: 1.778 m (5' 10\")      BP Readings from Last 4 Encounters:   06/06/24 (!) 154/78   05/02/24 (!) 178/82   04/06/24 124/68   04/04/24 (!) 170/90      Body mass index is 28.84 kg/m².   Wt Readings from Last 4 Encounters:   06/06/24 91.2 kg (201 lb)   05/02/24 89.4 kg (197 lb)   04/06/24 89.8 kg (198 lb)   04/04/24 90.3 kg (199 lb)      Physical Exam  Vitals reviewed.   Constitutional:       " General: He is not in acute distress.     Appearance: He is not diaphoretic.   HENT:      Head: Normocephalic and atraumatic.   Eyes:      General: No scleral icterus.     Conjunctiva/sclera: Conjunctivae normal.   Neck:      Vascular: No carotid bruit.   Cardiovascular:      Rate and Rhythm: Normal rate and regular rhythm.      Heart sounds: Normal heart sounds. No murmur heard.  Pulmonary:      Effort: Pulmonary effort is normal. No respiratory distress.      Breath sounds: Normal breath sounds. No wheezing or rales.   Musculoskeletal:      Right lower leg: No edema.      Left lower leg: No edema.   Skin:     Coloration: Skin is not pale.   Neurological:      General: No focal deficit present.      Mental Status: He is alert and oriented to person, place, and time.      Cranial Nerves: No cranial nerve deficit.      Coordination: Coordination normal.      Gait: Gait is intact. Gait normal.   Psychiatric:         Mood and Affect: Mood and affect normal.         Behavior: Behavior normal.         DATA REVIEW    Lab Results   Component Value Date/Time    CHOLSTRLTOT 173 04/29/2024 08:15 AM    LDL 45 04/29/2024 08:15 AM     04/29/2024 08:15 AM    TRIGLYCERIDE 52 04/29/2024 08:15 AM       Lab Results   Component Value Date/Time    SODIUM 139 05/14/2024 09:30 AM    POTASSIUM 3.9 05/14/2024 09:30 AM    CHLORIDE 101 05/14/2024 09:30 AM    CO2 25 05/14/2024 09:30 AM    GLUCOSE 131 (H) 05/14/2024 09:30 AM    BUN 6 (L) 05/14/2024 09:30 AM    CREATININE 0.60 05/14/2024 09:30 AM    BUNCREATRAT 11 02/22/2022 05:55 AM     Lab Results   Component Value Date/Time    ALKPHOSPHAT 69 04/29/2024 08:15 AM    ASTSGOT 19 04/29/2024 08:15 AM    ALTSGPT 16 04/29/2024 08:15 AM    TBILIRUBIN 0.3 04/29/2024 08:15 AM       Lab Results   Component Value Date/Time    HBA1C 6.7 (H) 08/23/2023 08:12 AM       Lab Results   Component Value Date/Time    MALBCRT 342 (H) 04/29/2024 08:15 AM    MICROALBUR 14.5 04/29/2024 08:15 AM        Latest  Reference Range & Units 04/29/24 08:14 04/29/24 08:15   Aldos Serum ng/dL  28.6   Renin Activity ng/mL/hr  1.1   Metanephrine Plasma 0.00 - 0.49 nmol/L 0.22    Normetanephrine Plasma 0.00 - 0.89 nmol/L 0.72       [facility] Reference Range & Units 04/29/24 08:15 05/14/24 06:00   Sodium, Urine -per volume mmol/L  48   Micro Alb Creat Ratio 0 - 30 mg/g 342 (H)    Creatinine, Urine mg/dL 42.38 27.17   Total Volume, Urine mL  2900   Creatinine 24 Hr Urine 1000 - 2000 mg/24 Hr  788 (L)   Collection Length Hrs  Hrs  24  24   Total Volume mL  mL  2900  2900   Sodium, Urine-per 24h 51 - 286 mmol/d  139   Microalbumin, Urine Random mg/dL 14.5    Aldost Urine 1.2 - 28.1 ug/d  5.8   Creatinine Urine mg/dL  31     Cardiovascular Imaging:    CT chest w/o 2019   Stable unenhanced CT of the thorax.   Remote granulomatous disease. RIGHT lower lobe hamartoma versus granuloma   Distal pancreatectomy, splenectomy, left hemidiaphragm elevation    AAA duplex 2022  There is mild atherosclerosis of the visualized abdominal aorta.   Proximal abdominal aorta measures 3.0 cm x 2.9 cm.   Mid abdominal aorta measures 2.0 cm x 2.7 cm.   Distal abdominal aorta measures 2.0 cm x 1.8 cm.   The right common iliac artery measures 1.1 cm x 1.0 cm, the left 1.2 cm x 1.2 cm.   No incidental abnormalities in the visualized portions of the retroperitoneum are identified.   IMPRESSION:   No sonographic evidence of abdominal aortic aneurysm.    LDCT 2023  Heart: Not enlarged. No pericardial effusion. Coronary calcifications.   Aorta and great vessels: No aneurysm. Atherosclerosis.  IMPRESSION:   No suspicious pulmonary nodules or masses. Stable calcified granuloma in the right lower lung.   Lung RADS: 1 - Negative: No nodules and definitely benign nodules     Echo 2/2024  Normal right and left ventricular size and function.   The ejection fraction is measured to be  63% by Manning's biplane.  Mildly dilated left atrium.  No significant valvular disease.    Normal right atrial pressure.   Unable to estimate pulmonary artery pressure due to an inadequate   tricuspid regurgitant jet.   No prior study is available for comparison.   Aorta  Normal aortic root for body surface area. The ascending aorta has a   diameter of  3.6cm.    RLE venous 2/2/24  No evidence of bilateral lower extremity deep venous thrombosis.     CTA a/p 5/2024  Aorta and vasculature: No significant abdominal aortic aneurysm or dissection. The aorta at the hiatus measures 2.6 x 2.5 cm. At the level of the renal arteries it measures 2.5 x 2.3 cm. The distal abdominal aorta just before the bifurcation measures 2.1   x 0.9 cm. There is multifocal atherosclerosis without occlusion or high-grade stenosis of the aorta or major branch arteries.  Mild nodular thickening of the adrenal glands. It is grossly similar in appearance to chest CT in 2019. It could represent tiny underlying adrenal nodules/adenomas or adenomatoid hyperplasia. No suspicious mass.  Kidneys enhance symmetrically.  1.  No significant abdominal aortic aneurysm or dissection.  2.  Atherosclerosis.  3.  Colonic diverticulosis.  4.  Mild nodular thickening of the adrenal glands, grossly unchanged in appearance 2019.          Medical Decision Making:  Today's Assessment / Status / Plan:     1. 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 Hypertension due to endocrine disorder  spironolactone (ALDACTONE) 25 MG Tab    Basic Metabolic Panel    RENIN ACTIVITY    ALDOSTERONE      3. Adrenal hyperplasia (HCC)  spironolactone (ALDACTONE) 25 MG Tab    Basic Metabolic Panel    RENIN ACTIVITY    ALDOSTERONE         Etiology of Established CVD if Present:     1) AAA, suprarenal with associated diffuse Ao athero- stable, no sx   2022 duplex = 3.0cm  (dilation vs minimal aneurysmal)  - no indications of co-morbid TAA per CT and echo in past   - Med mgmt  "unless 5.5+cm or rapid expansion rate of 1+cm/year (or >0.5cm/6mo) = indications for surg mgmt, generally with EVAR   Plan:  - continue medical management as per below  - check interval  CTA abd/pelvis then determine future surveillance     2) CAD, reported hx of MI - stable, no sx   Normal EF without wall motion abnormalities on echo  No current angina   Plan:   - Continue med mgmt    LIPID MANAGEMENT  Qualifies for Statin Therapy Based on 2018 ACC/AHA Guidelines: yes, primary, possible secondary if true AAA  10-yr ASCVD risk score: The ASCVD Risk score (Rod PEREZ, et al., 2019) failed to calculate., >20% \"high risk\"   Major ASCVD events: Aortic aneurysm due to atherosclerosis     High-risk conditions: >64yr old   Risk-enhancers: N/A  Currently on Statin: Yes  Tx goals: LDL-C <70 , consider <55   At goal? Yes   Plan:   -  continue prava 40mg daily   -  recheck lipid panel as previously ordered  - consider lp(a) in future    BLOOD PRESSURE MANAGEMENT  BP Goal ACC/AHA (2017) goal <130/80  Home BP at goal:  no  Office BP at goal:  no - stage 2 at presentation   24h ABPM:  not ordered to date   RDN candidate? YES  Contributing factors: tizanidine - can induce rebound HTN   -  hx of inducible severe hypoK+ with borderline supp PRA and high dino, ARR = 25  - definitely meets PA phenotype due to chronic, severe HTN from early age, T2D, vascular disease with h/o electrolyte disturbance w/ diuretics in past   - oral Na loading test yielded intermediate results for primary dino however did not achieve 24h Melissa >200 and had low 24h UCr with 24h U dino around 6 ug/d (standard threshold is >12 ug/d), however would have expected a more robust lowering of aldosterone, so still provides evidence   - CT shows bilat adrenal nodularity with hyperplasia, so we will conditionally dx primary dino from bilat hyperplasia   Plan:   - continue home BP monitoring  - avoid tizanidine to seek alternatives as this mimics central alpha agonist " and can worsen rebound HTN and ability to control BP   - update labs in 3 weeks   Medications:  - continue telmisartan 80mg daily   - avoid amlodipine due to excess swelling   - stop nevibilol  - continue verapamil  mg daily - will aim to reduce over time   - continue doxazosin 4mg QHS   - start spironolactone 25mg QAM - check lytes in 3 weeks - titrate up to 100mg, use eplerenone is intolerance     GLYCEMIC MANAGEMENT Diabetic, T2 with HTN, microalb  Goal A1c < 7  Lab Results   Component Value Date    HBA1C 6.7 (H) 08/23/2023      Lab Results   Component Value Date/Time    MALBCRT 342 (H) 04/29/2024 08:15 AM    MICROALBUR 14.5 04/29/2024 08:15 AM     Plan:  - continue meformin  - repeat a1c per pcp    ANTITHROMBOTIC THERAPY - consider at next visit given possible h/o MI. Would wait until bp a bit better controlled    LIFESTYLE INTERVENTIONS  TOBACCO: Stages of Change: Maintenance (sustained change >6mo)    reports that he quit smoking about 10 years ago. His smoking use included cigarettes. He started smoking about 36 years ago. He has a 35 pack-year smoking history. He has never been exposed to tobacco smoke. He has never used smokeless tobacco.   - continued complete avoidance of all tobacco products   PHYSICAL ACTIVITY: >150min/week of mod-intensity activity or as much as tolerated  NUTRITION: DASH  and reduce Na to 1500mg/day   ETOH: limit to 2 or less standard drinks/day   WT MGMT: maintain healthy weight (reduction 1kg = reduction 1mmHg BP)     OTHER:     #  Edema - presumed amlodipine related, improved when stopped. Echo normal   - possible will reduce verapamil in time     Studies to Be Obtained: none   Labs to Be Obtained: as noted in assessment     Follow up in:  6 weeks     Faustino Livingston M.D.   [facility]  [facility]  [phone]

## 2024-07-15 ENCOUNTER — TELEPHONE (OUTPATIENT)
Dept: VASCULAR LAB | Facility: MEDICAL CENTER | Age: 69
End: 2024-07-15
Payer: MEDICARE

## 2024-07-20 ENCOUNTER — HOSPITAL ENCOUNTER (OUTPATIENT)
Dept: LAB | Facility: MEDICAL CENTER | Age: 69
End: 2024-07-20
Attending: FAMILY MEDICINE
Payer: MEDICARE

## 2024-07-20 DIAGNOSIS — I15.2 HYPERTENSION DUE TO ENDOCRINE DISORDER: ICD-10-CM

## 2024-07-20 DIAGNOSIS — E27.8 ADRENAL HYPERPLASIA (HCC): ICD-10-CM

## 2024-07-20 DIAGNOSIS — E26.09 PRIMARY ALDOSTERONISM (HCC): ICD-10-CM

## 2024-07-20 LAB
ANION GAP SERPL CALC-SCNC: 14 MMOL/L (ref 7–16)
BUN SERPL-MCNC: 8 MG/DL (ref 8–22)
CALCIUM SERPL-MCNC: 9.2 MG/DL (ref 8.5–10.5)
CHLORIDE SERPL-SCNC: 99 MMOL/L (ref 96–112)
CO2 SERPL-SCNC: 21 MMOL/L (ref 20–33)
CREAT SERPL-MCNC: 0.71 MG/DL (ref 0.5–1.4)
GFR SERPLBLD CREATININE-BSD FMLA CKD-EPI: 98 ML/MIN/1.73 M 2
GLUCOSE SERPL-MCNC: 134 MG/DL (ref 65–99)
POTASSIUM SERPL-SCNC: 4.8 MMOL/L (ref 3.6–5.5)
SODIUM SERPL-SCNC: 134 MMOL/L (ref 135–145)

## 2024-07-20 PROCEDURE — 80048 BASIC METABOLIC PNL TOTAL CA: CPT

## 2024-07-20 PROCEDURE — 36415 COLL VENOUS BLD VENIPUNCTURE: CPT

## 2024-07-20 PROCEDURE — 84244 ASSAY OF RENIN: CPT

## 2024-07-20 PROCEDURE — 82088 ASSAY OF ALDOSTERONE: CPT

## 2024-07-22 ENCOUNTER — OFFICE VISIT (OUTPATIENT)
Dept: VASCULAR LAB | Facility: MEDICAL CENTER | Age: 69
End: 2024-07-22
Attending: FAMILY MEDICINE
Payer: MEDICARE

## 2024-07-22 VITALS
SYSTOLIC BLOOD PRESSURE: 149 MMHG | BODY MASS INDEX: 28.06 KG/M2 | DIASTOLIC BLOOD PRESSURE: 87 MMHG | HEIGHT: 70 IN | HEART RATE: 94 BPM | WEIGHT: 196 LBS

## 2024-07-22 DIAGNOSIS — E78.5 DYSLIPIDEMIA: ICD-10-CM

## 2024-07-22 DIAGNOSIS — E26.09 PRIMARY ALDOSTERONISM (HCC): ICD-10-CM

## 2024-07-22 DIAGNOSIS — R80.9 MICROALBUMINURIA DUE TO TYPE 2 DIABETES MELLITUS (HCC): ICD-10-CM

## 2024-07-22 DIAGNOSIS — I70.0 AORTIC ATHEROSCLEROSIS (HCC): ICD-10-CM

## 2024-07-22 DIAGNOSIS — R80.9 TYPE 2 DIABETES MELLITUS WITH MICROALBUMINURIA, WITHOUT LONG-TERM CURRENT USE OF INSULIN (HCC): ICD-10-CM

## 2024-07-22 DIAGNOSIS — E11.29 TYPE 2 DIABETES MELLITUS WITH MICROALBUMINURIA, WITHOUT LONG-TERM CURRENT USE OF INSULIN (HCC): ICD-10-CM

## 2024-07-22 DIAGNOSIS — I77.811 ABDOMINAL AORTIC ECTASIA (HCC): ICD-10-CM

## 2024-07-22 DIAGNOSIS — I25.10 CORONARY ARTERY CALCIFICATION SEEN ON CT SCAN: ICD-10-CM

## 2024-07-22 DIAGNOSIS — I25.2 HISTORY OF ACUTE ANTERIOR WALL MYOCARDIAL INFARCTION: ICD-10-CM

## 2024-07-22 DIAGNOSIS — I15.2 HYPERTENSION DUE TO ENDOCRINE DISORDER: ICD-10-CM

## 2024-07-22 DIAGNOSIS — E27.8 ADRENAL HYPERPLASIA (HCC): ICD-10-CM

## 2024-07-22 DIAGNOSIS — E11.29 MICROALBUMINURIA DUE TO TYPE 2 DIABETES MELLITUS (HCC): ICD-10-CM

## 2024-07-22 PROCEDURE — 99214 OFFICE O/P EST MOD 30 MIN: CPT | Performed by: FAMILY MEDICINE

## 2024-07-22 PROCEDURE — 99212 OFFICE O/P EST SF 10 MIN: CPT

## 2024-07-22 PROCEDURE — 3079F DIAST BP 80-89 MM HG: CPT | Performed by: FAMILY MEDICINE

## 2024-07-22 PROCEDURE — 3077F SYST BP >= 140 MM HG: CPT | Performed by: FAMILY MEDICINE

## 2024-07-22 RX ORDER — TELMISARTAN 80 MG/1
80 TABLET ORAL
Qty: 100 TABLET | Refills: 3 | Status: SHIPPED
Start: 2024-07-22 | End: 2024-07-22

## 2024-07-22 RX ORDER — TELMISARTAN 80 MG/1
80 TABLET ORAL
Qty: 100 TABLET | Refills: 3 | Status: SHIPPED | OUTPATIENT
Start: 2024-07-22

## 2024-07-22 RX ORDER — KETOCONAZOLE 20 MG/G
CREAM TOPICAL
COMMUNITY
Start: 2024-04-26

## 2024-07-22 RX ORDER — BACLOFEN 10 MG/1
TABLET ORAL
COMMUNITY
Start: 2024-07-10

## 2024-07-22 RX ORDER — SPIRONOLACTONE 50 MG/1
50 TABLET, FILM COATED ORAL EVERY MORNING
Qty: 100 TABLET | Refills: 3 | Status: SHIPPED | OUTPATIENT
Start: 2024-07-22

## 2024-07-22 ASSESSMENT — FIBROSIS 4 INDEX: FIB4 SCORE: 1.09

## 2024-07-24 LAB
ALDOST SERPL-MCNC: 34.8 NG/DL
RENIN PLAS-CCNC: 1.3 NG/ML/HR

## 2024-08-20 DIAGNOSIS — I10 PRIMARY HYPERTENSION: ICD-10-CM

## 2024-08-21 RX ORDER — VERAPAMIL HYDROCHLORIDE 240 MG/1
240 TABLET, FILM COATED, EXTENDED RELEASE ORAL
Qty: 100 TABLET | Refills: 3 | Status: SHIPPED | OUTPATIENT
Start: 2024-08-21

## 2024-09-04 ENCOUNTER — NON-PROVIDER VISIT (OUTPATIENT)
Dept: MEDICAL GROUP | Facility: PHYSICIAN GROUP | Age: 69
End: 2024-09-04
Payer: MEDICARE

## 2024-09-04 RX ADMIN — CYANOCOBALAMIN 1000 MCG: 1000 INJECTION, SOLUTION INTRAMUSCULAR; SUBCUTANEOUS at 07:54

## 2024-09-04 NOTE — PROGRESS NOTES
Fabio Vergara is a 69 y.o. male here for a non-provider visit for b12 injection.    Reason for injection: continuation of therapy  Order in MAR?: Yes  Patient supplied?:No  Minimum interval has been met for this injection (per MAR order): Yes    Patient tolerated injection and no adverse effects were observed or reported: Yes    # of Administrations remaining in MAR: n/a

## 2024-09-09 ENCOUNTER — TELEPHONE (OUTPATIENT)
Dept: VASCULAR LAB | Facility: MEDICAL CENTER | Age: 69
End: 2024-09-09
Payer: MEDICARE

## 2024-09-09 NOTE — TELEPHONE ENCOUNTER
[facility] MED    Caller: Fabio Gonzales Jai    Topic/issue: MEDICAL ADVICE    Fabio states that his BP has been really good, steady at 110/62 average. However he states that his HR has been elevated at 113, 108 , 116bpm. Fabio states that he is also feeling extremely fatigued and weak. Please advise.    Thank you,  Hector GUIDRY    Callback Number: [phone] (home)

## 2024-09-10 NOTE — TELEPHONE ENCOUNTER
Spoke with patient, he does not think he needs to go to . Patient moved appt up with Dr. Livingston for this Thursday (9/10).   Patient will get fasting blood work done in the morning.       Stephany Power, Medical Assistant   [facility]   Ph: [phone]  Fx: [phone]

## 2024-09-11 ENCOUNTER — HOSPITAL ENCOUNTER (OUTPATIENT)
Dept: LAB | Facility: MEDICAL CENTER | Age: 69
End: 2024-09-11
Attending: FAMILY MEDICINE
Payer: MEDICARE

## 2024-09-11 DIAGNOSIS — I15.2 HYPERTENSION DUE TO ENDOCRINE DISORDER: ICD-10-CM

## 2024-09-11 DIAGNOSIS — E26.09 PRIMARY ALDOSTERONISM (HCC): ICD-10-CM

## 2024-09-11 LAB
ANION GAP SERPL CALC-SCNC: 18 MMOL/L (ref 7–16)
BUN SERPL-MCNC: 7 MG/DL (ref 8–22)
CALCIUM SERPL-MCNC: 9.2 MG/DL (ref 8.5–10.5)
CHLORIDE SERPL-SCNC: 91 MMOL/L (ref 96–112)
CO2 SERPL-SCNC: 22 MMOL/L (ref 20–33)
CREAT SERPL-MCNC: 0.84 MG/DL (ref 0.5–1.4)
GFR SERPLBLD CREATININE-BSD FMLA CKD-EPI: 93 ML/MIN/1.73 M 2
GLUCOSE SERPL-MCNC: 224 MG/DL (ref 65–99)
POTASSIUM SERPL-SCNC: 5.2 MMOL/L (ref 3.6–5.5)
SODIUM SERPL-SCNC: 131 MMOL/L (ref 135–145)

## 2024-09-11 PROCEDURE — 82088 ASSAY OF ALDOSTERONE: CPT

## 2024-09-11 PROCEDURE — 36415 COLL VENOUS BLD VENIPUNCTURE: CPT

## 2024-09-11 PROCEDURE — 80048 BASIC METABOLIC PNL TOTAL CA: CPT

## 2024-09-11 PROCEDURE — 84244 ASSAY OF RENIN: CPT

## 2024-09-12 ENCOUNTER — OFFICE VISIT (OUTPATIENT)
Dept: VASCULAR LAB | Facility: MEDICAL CENTER | Age: 69
End: 2024-09-12
Attending: FAMILY MEDICINE
Payer: MEDICARE

## 2024-09-12 VITALS
DIASTOLIC BLOOD PRESSURE: 58 MMHG | BODY MASS INDEX: 27.63 KG/M2 | HEART RATE: 115 BPM | WEIGHT: 193 LBS | SYSTOLIC BLOOD PRESSURE: 118 MMHG | HEIGHT: 70 IN

## 2024-09-12 DIAGNOSIS — I25.2 HISTORY OF ACUTE ANTERIOR WALL MYOCARDIAL INFARCTION: ICD-10-CM

## 2024-09-12 DIAGNOSIS — E78.5 DYSLIPIDEMIA: ICD-10-CM

## 2024-09-12 DIAGNOSIS — I25.10 CORONARY ARTERY CALCIFICATION SEEN ON CT SCAN: ICD-10-CM

## 2024-09-12 DIAGNOSIS — I77.811 ABDOMINAL AORTIC ECTASIA (HCC): Chronic | ICD-10-CM

## 2024-09-12 DIAGNOSIS — E26.09 PRIMARY ALDOSTERONISM (HCC): Chronic | ICD-10-CM

## 2024-09-12 DIAGNOSIS — E11.29 MICROALBUMINURIA DUE TO TYPE 2 DIABETES MELLITUS (HCC): ICD-10-CM

## 2024-09-12 DIAGNOSIS — I25.10 CORONARY ARTERY DISEASE INVOLVING NATIVE CORONARY ARTERY OF NATIVE HEART WITHOUT ANGINA PECTORIS: Chronic | ICD-10-CM

## 2024-09-12 DIAGNOSIS — I70.0 AORTIC ATHEROSCLEROSIS (HCC): ICD-10-CM

## 2024-09-12 DIAGNOSIS — R80.9 MICROALBUMINURIA DUE TO TYPE 2 DIABETES MELLITUS (HCC): ICD-10-CM

## 2024-09-12 DIAGNOSIS — R80.9 TYPE 2 DIABETES MELLITUS WITH MICROALBUMINURIA, WITHOUT LONG-TERM CURRENT USE OF INSULIN (HCC): ICD-10-CM

## 2024-09-12 DIAGNOSIS — E11.29 TYPE 2 DIABETES MELLITUS WITH MICROALBUMINURIA, WITHOUT LONG-TERM CURRENT USE OF INSULIN (HCC): ICD-10-CM

## 2024-09-12 DIAGNOSIS — I15.2 HYPERTENSION DUE TO ENDOCRINE DISORDER: ICD-10-CM

## 2024-09-12 DIAGNOSIS — E27.8 ADRENAL HYPERPLASIA (HCC): ICD-10-CM

## 2024-09-12 PROCEDURE — 99212 OFFICE O/P EST SF 10 MIN: CPT

## 2024-09-12 PROCEDURE — 3074F SYST BP LT 130 MM HG: CPT | Performed by: FAMILY MEDICINE

## 2024-09-12 PROCEDURE — 3078F DIAST BP <80 MM HG: CPT | Performed by: FAMILY MEDICINE

## 2024-09-12 PROCEDURE — 99214 OFFICE O/P EST MOD 30 MIN: CPT | Performed by: FAMILY MEDICINE

## 2024-09-12 ASSESSMENT — FIBROSIS 4 INDEX: FIB4 SCORE: 1.09

## 2024-09-12 NOTE — PROGRESS NOTES
FOLLOW-UP VASCULAR MEDICINE CLINIC  24   Ongoing care for HTN mgmt, edema, est 3/2024     Subjective      Interval hx/concerns: last seen 2024, mild leg cramping, high HR, no syncope, dizziness.  Had IAM recently.  Intermittently myalgias diffusely.      HTN: Stable, tolerating meds with good adherence, Home BP lo-120s/70s, HR 90s  Antithrombotic tx: no - no hx of bleeding    HLD: Stable, current treatment: Moderate intensity - prava - tolerating, good adherence  T2D: Stable. Tolerating metformin and no recent med changes.   EDEMA: Has essentially resolved since stopping amlodipine  Prior work-up: echo, RLE venous duplex   Tx attempted to date: stopped amloidipine     Current Outpatient Medications on File Prior to Visit   Medication Sig Dispense Refill    verapamil ER (CALAN SR) 240 MG Tab CR TAKE ONE TABLET BY MOUTH EVERY  Tablet 3    ketoconazole (NIZORAL) 2 % Cream       baclofen (LIORESAL) 10 MG Tab       spironolactone (ALDACTONE) 50 MG Tab Take 1 Tablet by mouth every morning. to lower blood pressure 100 Tablet 3    telmisartan (MICARDIS) 80 MG Tab Take 1 Tablet by mouth at bedtime. to lower blood pressure 100 Tablet 3    doxazosin (CARDURA) 4 MG Tab Take 1 Tablet by mouth at bedtime. to lower blood pressure 100 Tablet 3    metFORMIN ER (GLUCOPHAGE XR) 500 MG TABLET SR 24 HR Take 2 Tablets by mouth 2 times a day. Indications: Type 2 Diabetes 400 Tablet 3    pravastatin (PRAVACHOL) 40 MG tablet Take 1 Tablet by mouth at bedtime. Indications: High Amount of Fats in the Blood 100 Tablet 3    traZODone (DESYREL) 50 MG Tab Take 1 Tablet by mouth at bedtime. Indications: sleep 100 Tablet 3    fluticasone (FLONASE) 50 MCG/ACT nasal spray Administer 1 Spray into affected nostril(S) every day. Indications: Stuffy Nose 16 g 3    polyethylene glycol 3350 (MIRALAX) 17 GM/SCOOP Powder Take 17 g by mouth 1 time a day as needed (constipation). Indications: Constipation      acetaminophen (TYLENOL) 325  "MG Tab Take 2 Tablets by mouth every 6 hours. 30 Tablet 0    ammonium lactate (LAC-HYDRIN) 12 % Lotion Apply 1 Application topically 1 time a day as needed (FEET). Indications: dry skin      multivitamin Tab Take 1 Tablet by mouth every day. Indications: supplement       Current Facility-Administered Medications on File Prior to Visit   Medication Dose Route Frequency Provider Last Rate Last Admin    cyanocobalamin (Vitamin B-12) injection 1,000 mcg  1,000 mcg Intramuscular Q30 DAYS FRIEDA Siddiqui.P.R.N.   1,000 mcg at 24 0754      Allergies   Allergen Reactions    Chlorthalidone      Severe hyponatremia      Flexeril [Cyclobenzaprine]      Weakness, spacie    Penicillins      Doesn't know the reaction  had this allergy when he was a child      Social History     Tobacco Use    Smoking status: Former     Current packs/day: 0.00     Average packs/day: 1 pack/day for 35.0 years (35.0 ttl pk-yrs)     Types: Cigarettes     Start date: 1988     Quit date: 2013     Years since quittin.1     Passive exposure: Never    Smokeless tobacco: Never   Vaping Use    Vaping status: Never Used   Substance Use Topics    Alcohol use: Yes     Alcohol/week: 2.4 - 3.0 oz     Types: 4 - 5 Cans of beer per week     Comment: On Occasion >4 beers on one day.    Drug use: No     DIET AND EXERCISE:  Weight Change:stable   Diet: common adult   Exercise: minimal exercise       Objective    Vitals:    24 1320   BP: 118/58   BP Location: Left arm   Patient Position: Sitting   BP Cuff Size: Adult   Pulse: (!) 115   Weight: 87.5 kg (193 lb)   Height: 1.778 m (5' 10\")     BP Readings from Last 10 Encounters:   24 118/58   24 (!) 149/87   24 (!) 154/78   24 (!) 178/82   24 124/68   24 (!) 170/90   24 134/78   24 (!) 140/68   24 (!) 148/82   24 (!) 148/70      Body mass index is 27.69 kg/m².   Wt Readings from Last 4 Encounters:   24 87.5 kg (193 lb) " "  07/22/24 88.9 kg (196 lb)   06/06/24 91.2 kg (201 lb)   05/02/24 89.4 kg (197 lb)      Physical Exam  Vitals reviewed.   Constitutional:       General: He is not in acute distress.     Appearance: He is not diaphoretic.   HENT:      Head: Normocephalic and atraumatic.   Eyes:      General: No scleral icterus.     Conjunctiva/sclera: Conjunctivae normal.   Neck:      Vascular: No carotid bruit.   Cardiovascular:      Rate and Rhythm: Normal rate and regular rhythm.      Heart sounds: Normal heart sounds. No murmur heard.  Pulmonary:      Effort: Pulmonary effort is normal. No respiratory distress.      Breath sounds: Normal breath sounds. No wheezing or rales.   Musculoskeletal:      Right lower leg: No edema.      Left lower leg: No edema.   Skin:     Coloration: Skin is not pale.   Neurological:      General: No focal deficit present.      Mental Status: He is alert and oriented to person, place, and time.      Cranial Nerves: No cranial nerve deficit.      Coordination: Coordination normal.      Gait: Gait is intact. Gait normal.   Psychiatric:         Mood and Affect: Mood and affect normal.         Behavior: Behavior normal.       DATA REVIEW    Lab Results   Component Value Date/Time    CHOLSTRLTOT 173 04/29/2024 08:15 AM    LDL 45 04/29/2024 08:15 AM     04/29/2024 08:15 AM    TRIGLYCERIDE 52 04/29/2024 08:15 AM     No results found for: \"LIPOPROTA\"   No results found for: \"APOB\"   Lab Results   Component Value Date/Time    CRPHIGHSEN 1.6 02/13/2020 06:43 AM       Lab Results   Component Value Date/Time    SODIUM 131 (L) 09/11/2024 06:49 AM    POTASSIUM 5.2 09/11/2024 06:49 AM    CHLORIDE 91 (L) 09/11/2024 06:49 AM    CO2 22 09/11/2024 06:49 AM    GLUCOSE 224 (H) 09/11/2024 06:49 AM    BUN 7 (L) 09/11/2024 06:49 AM    CREATININE 0.84 09/11/2024 06:49 AM    BUNCREATRAT 11 02/22/2022 05:55 AM     Lab Results   Component Value Date/Time    ALKPHOSPHAT 69 04/29/2024 08:15 AM    ASTSGOT 19 04/29/2024 08:15 " AM    ALTSGPT 16 04/29/2024 08:15 AM    TBILIRUBIN 0.3 04/29/2024 08:15 AM       Lab Results   Component Value Date/Time    HBA1C 6.7 (H) 08/23/2023 08:12 AM       Lab Results   Component Value Date/Time    MALBCRT 342 (H) 04/29/2024 08:15 AM    MICROALBUR 14.5 04/29/2024 08:15 AM        Latest Reference Range & Units 04/29/24 08:14 04/29/24 08:15   Aldos Serum ng/dL  28.6   Renin Activity ng/mL/hr  1.1   Metanephrine Plasma 0.00 - 0.49 nmol/L 0.22    Normetanephrine Plasma 0.00 - 0.89 nmol/L 0.72       Latest Reference Range & Units 04/29/24 08:15 05/14/24 06:00   Sodium, Urine -per volume mmol/L  48   Micro Alb Creat Ratio 0 - 30 mg/g 342 (H)    Creatinine, Urine mg/dL 42.38 27.17   Total Volume, Urine mL  2900   Creatinine 24 Hr Urine 1000 - 2000 mg/24 Hr  788 (L)   Collection Length Hrs  Hrs  24  24   Total Volume mL  mL  2900  2900   Sodium, Urine-per 24h 51 - 286 mmol/d  139   Microalbumin, Urine Random mg/dL 14.5    Aldost Urine 1.2 - 28.1 ug/d  5.8   Creatinine Urine mg/dL  31     Cardiovascular Imaging:    CT chest w/o 2019   Stable unenhanced CT of the thorax.   Remote granulomatous disease. RIGHT lower lobe hamartoma versus granuloma   Distal pancreatectomy, splenectomy, left hemidiaphragm elevation    AAA duplex 2022  There is mild atherosclerosis of the visualized abdominal aorta.   Proximal abdominal aorta measures 3.0 cm x 2.9 cm.   Mid abdominal aorta measures 2.0 cm x 2.7 cm.   Distal abdominal aorta measures 2.0 cm x 1.8 cm.   The right common iliac artery measures 1.1 cm x 1.0 cm, the left 1.2 cm x 1.2 cm.   No incidental abnormalities in the visualized portions of the retroperitoneum are identified.   IMPRESSION:   No sonographic evidence of abdominal aortic aneurysm.    LDCT 2023  Heart: Not enlarged. No pericardial effusion. Coronary calcifications.   Aorta and great vessels: No aneurysm. Atherosclerosis.  IMPRESSION:   No suspicious pulmonary nodules or masses. Stable calcified granuloma in  the right lower lung.   Lung RADS: 1 - Negative: No nodules and definitely benign nodules     Echo 2/2024  Normal right and left ventricular size and function.   The ejection fraction is measured to be  63% by Manning's biplane.  Mildly dilated left atrium.  No significant valvular disease.   Normal right atrial pressure.   Unable to estimate pulmonary artery pressure due to an inadequate   tricuspid regurgitant jet.   No prior study is available for comparison.   Aorta  Normal aortic root for body surface area. The ascending aorta has a   diameter of  3.6cm.    RLE venous 2/2/24  No evidence of bilateral lower extremity deep venous thrombosis.     CTA a/p 5/2024  Aorta and vasculature: No significant abdominal aortic aneurysm or dissection. The aorta at the hiatus measures 2.6 x 2.5 cm. At the level of the renal arteries it measures 2.5 x 2.3 cm. The distal abdominal aorta just before the bifurcation measures 2.1 x 0.9 cm. There is multifocal atherosclerosis without occlusion or high-grade stenosis of the aorta or major branch arteries.  Mild nodular thickening of the adrenal glands. It is grossly similar in appearance to chest CT in 2019. It could represent tiny underlying adrenal nodules/adenomas or adenomatoid hyperplasia. No suspicious mass.  Kidneys enhance symmetrically.  1.  No significant abdominal aortic aneurysm or dissection.  2.  Atherosclerosis.  3.  Colonic diverticulosis.  4.  Mild nodular thickening of the adrenal glands, grossly unchanged in appearance 2019.          Medical Decision Making:  Today's Assessment / Status / Plan:     1. Primary aldosteronism (HCC)  ALDOSTERONE    RENIN ACTIVITY    MICROALBUMIN CREAT RATIO URINE    CBC WITHOUT DIFFERENTIAL    Lipid Profile      2. Hypertension due to endocrine disorder  ALDOSTERONE    RENIN ACTIVITY    MICROALBUMIN CREAT RATIO URINE    CBC WITHOUT DIFFERENTIAL    Lipid Profile      3. Dyslipidemia  Lipid Profile      4. Adrenal hyperplasia (HCC)       "  5. Abdominal aortic ectasia (HCC)        6. Aortic atherosclerosis (HCC)        7. Coronary artery calcification seen on CT scan        8. Type 2 diabetes mellitus with microalbuminuria, without long-term current use of insulin (HCC)        9. Microalbuminuria due to type 2 diabetes mellitus (HCC)        10. Coronary artery disease involving native coronary artery of native heart without angina pectoris        11. History of acute anterior wall myocardial infarction             Established CVD:     1) abdominal aortic ectasia, non-aneurysmal, with associated diffuse Ao athero- stable, no sx   2022 duplex = 3.0cm  (dilation vs minimal aneurysmal)  5/2024 CTA = 2.6cm (ectasia only)  - no indications of co-morbid TAA per CT and echo in past   - Med mgmt unless 5.5+cm or rapid expansion rate of 1+cm/year (or >0.5cm/6mo) = indications for surg mgmt, generally with EVAR   Plan:  - continue medical management as per below  - consider repeat AAA duplex in 3-5 yrs (2027-29)    2) CAD, reported hx of MI - stable, no sx   Normal EF without wall motion abnormalities on echo  No current angina   Plan:  - continue secondary prevention treatment goals, intensive med mgmt and lifestyle interventions     LIPID MANAGEMENT  Qualifies for Statin Therapy Based on 2018 ACC/AHA Guidelines: yes, primary, possible secondary if true AAA  10-yr ASCVD risk score: The ASCVD Risk score (Ronks DK, et al., 2019) failed to calculate., >20% \"high risk\"   Major ASCVD events: Aortic aneurysm due to atherosclerosis     High-risk conditions: >64yr old   Risk-enhancers: N/A  Currently on Statin: Yes  Tx goals: LDL-C <55   At goal? Yes   Plan:   -  continue prava 40mg daily   -  monitor lipids q6-12mo   - consider lp(a) in future    BLOOD PRESSURE MANAGEMENT  BP Goal ACC/AHA (2017) goal <130/80  Home BP at goal:  yes   Office BP at goal:  yes  24h ABPM:  not ordered to date   RDN candidate? YES  Contributing factors: prior use of tizanidine - can " induce rebound HTN   - established end organ damage as per high UACR, hx of MI  -  hx of inducible severe hypoK+ with borderline supp PRA and high dino, ARR = 25  - definitely meets PA phenotype due to chronic, severe HTN from early age, T2D, vascular disease with h/o electrolyte disturbance w/ diuretics in past   - oral Na loading test yielded intermediate results for primary dino however did not achieve 24h Melissa >200 and had low 24h UCr with 24h U dino around 6 ug/d (standard threshold is >12 ug/d), however would have expected a more robust lowering of aldosterone, so still provides evidence   - CT shows bilat adrenal nodularity with hyperplasia, so we will conditionally dx primary dino from bilat hyperplasia   - will continue with goals of normotension, normokalemia, and PRA >1.0   Plan:   - continue home BP monitoring  - stopped tizanidine to seek alternatives as this mimics central alpha agonist and can worsen rebound HTN and ability to control BP   - monitor lytes/gfr   Medications:  - continue telmisartan 80mg daily   - avoid amlodipine due to excess swelling   - prior stopped nevibilol  - continue verapamil  mg daily - will aim to reduce over time   - continue doxazosin 4mg QHS   - continue spironolactone 50mg daily - monitor Na, - mild low Na, mild leg cramping - aung consider transition to eplerenone if not tolerating spironolactone moving forward     GLYCEMIC MANAGEMENT Diabetic, T2 with HTN, microalb  Goal A1c < 7  Lab Results   Component Value Date    HBA1C 6.7 (H) 08/23/2023      Lab Results   Component Value Date/Time    MALBCRT 342 (H) 04/29/2024 08:15 AM    MICROALBUR 14.5 04/29/2024 08:15 AM     Plan:  - continue meformin  - defer ongoing labs to PCP     ANTIplatTHERAPY  consider ASA at next visit in light of hx of MI    LIFESTYLE INTERVENTIONS  TOBACCO: Stages of Change: Maintenance (sustained change >6mo)    reports that he quit smoking about 11 years ago. His smoking use included cigarettes.  He started smoking about 36 years ago. He has a 35 pack-year smoking history. He has never been exposed to tobacco smoke. He has never used smokeless tobacco.   - continued complete avoidance of all tobacco products   PHYSICAL ACTIVITY: >150min/week of mod-intensity activity or as much as tolerated  NUTRITION: DASH  and reduce Na to 1500mg/day   ETOH: limit to 2 or less standard drinks/day   WT MGMT: maintain healthy weight (reduction 1kg = reduction 1mmHg BP)     OTHER:     #  Edema - resolved.  presumed amlodipine related, improved when stopped. Echo normal   - possible will reduce verapamil in time     STUDIES: none   LABS: as noted above  Follow up in: RTC in 6 months     Faustino Livingston M.D.   [facility]  [facility]  [phone]

## 2024-09-13 LAB — ALDOST SERPL-MCNC: 30.1 NG/DL

## 2024-09-15 LAB — RENIN PLAS-CCNC: 16.8 NG/ML/HR

## 2024-09-24 ENCOUNTER — OFFICE VISIT (OUTPATIENT)
Dept: URGENT CARE | Facility: PHYSICIAN GROUP | Age: 69
End: 2024-09-24
Payer: MEDICARE

## 2024-09-24 VITALS
HEART RATE: 95 BPM | SYSTOLIC BLOOD PRESSURE: 116 MMHG | OXYGEN SATURATION: 96 % | BODY MASS INDEX: 28.43 KG/M2 | WEIGHT: 198.6 LBS | TEMPERATURE: 98.1 F | RESPIRATION RATE: 16 BRPM | DIASTOLIC BLOOD PRESSURE: 64 MMHG | HEIGHT: 70 IN

## 2024-09-24 DIAGNOSIS — R53.83 FATIGUE, UNSPECIFIED TYPE: ICD-10-CM

## 2024-09-24 DIAGNOSIS — R30.0 DYSURIA: ICD-10-CM

## 2024-09-24 LAB
APPEARANCE UR: CLEAR
BILIRUB UR STRIP-MCNC: NORMAL MG/DL
COLOR UR AUTO: YELLOW
GLUCOSE BLD-MCNC: 167 MG/DL (ref 65–99)
GLUCOSE UR STRIP.AUTO-MCNC: NORMAL MG/DL
KETONES UR STRIP.AUTO-MCNC: NORMAL MG/DL
LEUKOCYTE ESTERASE UR QL STRIP.AUTO: NORMAL
NITRITE UR QL STRIP.AUTO: NORMAL
PH UR STRIP.AUTO: 5.5 [PH] (ref 5–8)
PROT UR QL STRIP: NORMAL MG/DL
RBC UR QL AUTO: NORMAL
SP GR UR STRIP.AUTO: 1.01
UROBILINOGEN UR STRIP-MCNC: 0.2 MG/DL

## 2024-09-24 PROCEDURE — 3078F DIAST BP <80 MM HG: CPT | Performed by: STUDENT IN AN ORGANIZED HEALTH CARE EDUCATION/TRAINING PROGRAM

## 2024-09-24 PROCEDURE — 3074F SYST BP LT 130 MM HG: CPT | Performed by: STUDENT IN AN ORGANIZED HEALTH CARE EDUCATION/TRAINING PROGRAM

## 2024-09-24 PROCEDURE — 99214 OFFICE O/P EST MOD 30 MIN: CPT | Performed by: STUDENT IN AN ORGANIZED HEALTH CARE EDUCATION/TRAINING PROGRAM

## 2024-09-24 PROCEDURE — 93000 ELECTROCARDIOGRAM COMPLETE: CPT | Performed by: STUDENT IN AN ORGANIZED HEALTH CARE EDUCATION/TRAINING PROGRAM

## 2024-09-24 PROCEDURE — 82962 GLUCOSE BLOOD TEST: CPT | Performed by: STUDENT IN AN ORGANIZED HEALTH CARE EDUCATION/TRAINING PROGRAM

## 2024-09-24 PROCEDURE — 81002 URINALYSIS NONAUTO W/O SCOPE: CPT | Performed by: STUDENT IN AN ORGANIZED HEALTH CARE EDUCATION/TRAINING PROGRAM

## 2024-09-24 ASSESSMENT — FIBROSIS 4 INDEX: FIB4 SCORE: 1.09

## 2024-09-25 ENCOUNTER — HOSPITAL ENCOUNTER (OUTPATIENT)
Dept: LAB | Facility: MEDICAL CENTER | Age: 69
End: 2024-09-25
Attending: STUDENT IN AN ORGANIZED HEALTH CARE EDUCATION/TRAINING PROGRAM
Payer: MEDICARE

## 2024-09-25 DIAGNOSIS — R53.83 FATIGUE, UNSPECIFIED TYPE: ICD-10-CM

## 2024-09-25 LAB
ALBUMIN SERPL BCP-MCNC: 3.8 G/DL (ref 3.2–4.9)
ALBUMIN/GLOB SERPL: 1.3 G/DL
ALP SERPL-CCNC: 86 U/L (ref 30–99)
ALT SERPL-CCNC: 22 U/L (ref 2–50)
ANION GAP SERPL CALC-SCNC: 12 MMOL/L (ref 7–16)
AST SERPL-CCNC: 23 U/L (ref 12–45)
BASOPHILS # BLD AUTO: 0.9 % (ref 0–1.8)
BASOPHILS # BLD: 0.07 K/UL (ref 0–0.12)
BILIRUB SERPL-MCNC: 0.6 MG/DL (ref 0.1–1.5)
BUN SERPL-MCNC: 5 MG/DL (ref 8–22)
CALCIUM ALBUM COR SERPL-MCNC: 9.1 MG/DL (ref 8.5–10.5)
CALCIUM SERPL-MCNC: 8.9 MG/DL (ref 8.5–10.5)
CHLORIDE SERPL-SCNC: 92 MMOL/L (ref 96–112)
CO2 SERPL-SCNC: 21 MMOL/L (ref 20–33)
CREAT SERPL-MCNC: 0.77 MG/DL (ref 0.5–1.4)
EOSINOPHIL # BLD AUTO: 0.18 K/UL (ref 0–0.51)
EOSINOPHIL NFR BLD: 2.2 % (ref 0–6.9)
ERYTHROCYTE [DISTWIDTH] IN BLOOD BY AUTOMATED COUNT: 46.7 FL (ref 35.9–50)
EST. AVERAGE GLUCOSE BLD GHB EST-MCNC: 203 MG/DL
GFR SERPLBLD CREATININE-BSD FMLA CKD-EPI: 95 ML/MIN/1.73 M 2
GLOBULIN SER CALC-MCNC: 3 G/DL (ref 1.9–3.5)
GLUCOSE SERPL-MCNC: 213 MG/DL (ref 65–99)
HBA1C MFR BLD: 8.7 % (ref 4–5.6)
HCT VFR BLD AUTO: 34.8 % (ref 42–52)
HGB BLD-MCNC: 12.5 G/DL (ref 14–18)
IMM GRANULOCYTES # BLD AUTO: 0.03 K/UL (ref 0–0.11)
IMM GRANULOCYTES NFR BLD AUTO: 0.4 % (ref 0–0.9)
LYMPHOCYTES # BLD AUTO: 1.86 K/UL (ref 1–4.8)
LYMPHOCYTES NFR BLD: 22.7 % (ref 22–41)
MCH RBC QN AUTO: 34.9 PG (ref 27–33)
MCHC RBC AUTO-ENTMCNC: 35.9 G/DL (ref 32.3–36.5)
MCV RBC AUTO: 97.2 FL (ref 81.4–97.8)
MONOCYTES # BLD AUTO: 0.97 K/UL (ref 0–0.85)
MONOCYTES NFR BLD AUTO: 11.8 % (ref 0–13.4)
NEUTROPHILS # BLD AUTO: 5.09 K/UL (ref 1.82–7.42)
NEUTROPHILS NFR BLD: 62 % (ref 44–72)
NRBC # BLD AUTO: 0 K/UL
NRBC BLD-RTO: 0 /100 WBC (ref 0–0.2)
PLATELET # BLD AUTO: 341 K/UL (ref 164–446)
PMV BLD AUTO: 10.4 FL (ref 9–12.9)
POTASSIUM SERPL-SCNC: 5.2 MMOL/L (ref 3.6–5.5)
PROT SERPL-MCNC: 6.8 G/DL (ref 6–8.2)
RBC # BLD AUTO: 3.58 M/UL (ref 4.7–6.1)
SODIUM SERPL-SCNC: 125 MMOL/L (ref 135–145)
WBC # BLD AUTO: 8.2 K/UL (ref 4.8–10.8)

## 2024-09-25 PROCEDURE — 85025 COMPLETE CBC W/AUTO DIFF WBC: CPT

## 2024-09-25 PROCEDURE — 83036 HEMOGLOBIN GLYCOSYLATED A1C: CPT

## 2024-09-25 PROCEDURE — 36415 COLL VENOUS BLD VENIPUNCTURE: CPT

## 2024-09-25 PROCEDURE — 80053 COMPREHEN METABOLIC PANEL: CPT
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
## 2024-10-01 ENCOUNTER — HOSPITAL ENCOUNTER (OUTPATIENT)
Dept: LAB | Facility: MEDICAL CENTER | Age: 69
End: 2024-10-01
Attending: NURSE PRACTITIONER
Payer: MEDICARE

## 2024-10-01 DIAGNOSIS — E87.1 HYPONATREMIA: ICD-10-CM

## 2024-10-01 PROCEDURE — 36415 COLL VENOUS BLD VENIPUNCTURE: CPT

## 2024-10-01 PROCEDURE — 84295 ASSAY OF SERUM SODIUM: CPT

## 2024-10-02 LAB — SODIUM SERPL-SCNC: 132 MMOL/L (ref 135–145)

## 2024-10-03 ENCOUNTER — OFFICE VISIT (OUTPATIENT)
Dept: MEDICAL GROUP | Facility: PHYSICIAN GROUP | Age: 69
End: 2024-10-03
Payer: MEDICARE

## 2024-10-03 VITALS
HEIGHT: 70 IN | WEIGHT: 196.8 LBS | DIASTOLIC BLOOD PRESSURE: 50 MMHG | HEART RATE: 98 BPM | SYSTOLIC BLOOD PRESSURE: 120 MMHG | TEMPERATURE: 97.9 F | OXYGEN SATURATION: 97 % | BODY MASS INDEX: 28.18 KG/M2

## 2024-10-03 DIAGNOSIS — E11.29 TYPE 2 DIABETES MELLITUS WITH MICROALBUMINURIA, WITHOUT LONG-TERM CURRENT USE OF INSULIN (HCC): ICD-10-CM

## 2024-10-03 DIAGNOSIS — E87.1 HYPONATREMIA: ICD-10-CM

## 2024-10-03 DIAGNOSIS — Z23 NEED FOR VACCINATION: ICD-10-CM

## 2024-10-03 DIAGNOSIS — R80.9 TYPE 2 DIABETES MELLITUS WITH MICROALBUMINURIA, WITHOUT LONG-TERM CURRENT USE OF INSULIN (HCC): ICD-10-CM

## 2024-10-03 DIAGNOSIS — D51.3 OTHER DIETARY VITAMIN B12 DEFICIENCY ANEMIA: ICD-10-CM

## 2024-10-03 PROBLEM — R07.81 RIB PAIN ON RIGHT SIDE: Status: RESOLVED | Noted: 2022-10-04 | Resolved: 2024-10-03

## 2024-10-03 PROBLEM — H92.01 RIGHT EAR PAIN: Status: RESOLVED | Noted: 2023-05-22 | Resolved: 2024-10-03

## 2024-10-03 PROBLEM — R21 RASH OF FOOT: Status: RESOLVED | Noted: 2022-05-18 | Resolved: 2024-10-03

## 2024-10-03 PROBLEM — M79.602 LEFT ARM PAIN: Status: RESOLVED | Noted: 2020-02-20 | Resolved: 2024-10-03

## 2024-10-03 PROCEDURE — 3078F DIAST BP <80 MM HG: CPT | Performed by: NURSE PRACTITIONER

## 2024-10-03 PROCEDURE — 3074F SYST BP LT 130 MM HG: CPT | Performed by: NURSE PRACTITIONER

## 2024-10-03 PROCEDURE — G0008 ADMIN INFLUENZA VIRUS VAC: HCPCS | Performed by: NURSE PRACTITIONER

## 2024-10-03 PROCEDURE — 90662 IIV NO PRSV INCREASED AG IM: CPT | Performed by: NURSE PRACTITIONER

## 2024-10-03 PROCEDURE — 99214 OFFICE O/P EST MOD 30 MIN: CPT | Mod: 25 | Performed by: NURSE PRACTITIONER

## 2024-10-03 RX ADMIN — CYANOCOBALAMIN 1000 MCG: 1000 INJECTION, SOLUTION INTRAMUSCULAR; SUBCUTANEOUS at 17:42

## 2024-10-03 ASSESSMENT — ENCOUNTER SYMPTOMS
ALLERGIC/IMMUNOLOGIC NEGATIVE: 1
CONSTITUTIONAL NEGATIVE: 1
NEUROLOGICAL NEGATIVE: 1
RESPIRATORY NEGATIVE: 1
ENDOCRINE NEGATIVE: 1
GASTROINTESTINAL NEGATIVE: 1
EYES NEGATIVE: 1
CARDIOVASCULAR NEGATIVE: 1
MUSCULOSKELETAL NEGATIVE: 1
HEMATOLOGIC/LYMPHATIC NEGATIVE: 1
NERVOUS/ANXIOUS: 1

## 2024-10-03 ASSESSMENT — FIBROSIS 4 INDEX: FIB4 SCORE: 1.02

## 2024-11-05 ENCOUNTER — APPOINTMENT (RX ONLY)
Dept: URBAN - METROPOLITAN AREA CLINIC 22 | Facility: CLINIC | Age: 69
Setting detail: DERMATOLOGY
End: 2024-11-05

## 2024-11-05 DIAGNOSIS — Z71.89 OTHER SPECIFIED COUNSELING: ICD-10-CM

## 2024-11-05 DIAGNOSIS — Z85.828 PERSONAL HISTORY OF OTHER MALIGNANT NEOPLASM OF SKIN: ICD-10-CM

## 2024-11-05 PROCEDURE — ? OBSERVATION

## 2024-11-05 PROCEDURE — ? COUNSELING

## 2024-11-05 ASSESSMENT — LOCATION SIMPLE DESCRIPTION DERM: LOCATION SIMPLE: POSTERIOR NECK

## 2024-11-05 ASSESSMENT — LOCATION DETAILED DESCRIPTION DERM: LOCATION DETAILED: MID POSTERIOR NECK

## 2024-11-05 ASSESSMENT — LOCATION ZONE DERM: LOCATION ZONE: NECK

## 2024-11-11 ENCOUNTER — TELEPHONE (OUTPATIENT)
Dept: HEALTH INFORMATION MANAGEMENT | Facility: OTHER | Age: 69
End: 2024-11-11

## 2024-11-11 DIAGNOSIS — Z87.891 HISTORY OF TOBACCO USE: Primary | ICD-10-CM

## 2024-11-11 NOTE — TELEPHONE ENCOUNTER
Fabio is due for a low-dose chest CT for annual lung cancer screening. The patient has been asked the screening questions and qualifies and would like the LDCT to be completed. Please review the pended order and sign. If this is a test you wish for Fabio not to receive, please respond with reason why and route back to the Patient Outreach Team.       1. Age 50-77 yrs of age?   69  2. Total Yrs Smoking cigarettes?    (# yrs total smoking)   20  3.How many packs per day on average did you smoke?   20x1=20  4. Current smoker or if quit, has pt quit within last 15 yrs?  12  5. Completed Lung Cancer screen CT with [facility] previously?  no  6. Anything noted on previous CT involving lungs? (Nodules, Mass, Etc.)  no    7. Any signs or symptoms of lung cancer? ( New / change to Cough, Wheezing, S.O.B.,   no     8. Previous history of lung cancer?   no

## 2024-11-14 ENCOUNTER — TELEPHONE (OUTPATIENT)
Dept: HEALTH INFORMATION MANAGEMENT | Facility: OTHER | Age: 69
End: 2024-11-14
Payer: MEDICARE

## 2024-11-18 DIAGNOSIS — E78.5 DYSLIPIDEMIA: ICD-10-CM

## 2024-11-18 RX ORDER — PRAVASTATIN SODIUM 40 MG
TABLET ORAL
Qty: 100 TABLET | Refills: 3 | Status: SHIPPED | OUTPATIENT
Start: 2024-11-18

## 2024-11-18 NOTE — TELEPHONE ENCOUNTER
Requested Prescriptions     Pending Prescriptions Disp Refills    pravastatin (PRAVACHOL) 40 MG tablet [Pharmacy Med Name: PRAVASTATIN SODIUM 40MG TABS] 100 Tablet 3     Sig: TAKE ONE TABLET BY MOUTH EVERY DAY AT BEDTIME FOR HIGH AMOUNT OF FATS IN THE BLOOD       JOSÉ MIGUEL Siddiqui.

## 2024-11-27 ENCOUNTER — HOSPITAL ENCOUNTER (OUTPATIENT)
Dept: RADIOLOGY | Facility: MEDICAL CENTER | Age: 69
End: 2024-11-27
Attending: FAMILY MEDICINE
Payer: MEDICARE

## 2024-11-27 DIAGNOSIS — Z87.891 HISTORY OF TOBACCO USE: ICD-10-CM

## 2024-11-27 PROCEDURE — 71271 CT THORAX LUNG CANCER SCR C-: CPT

## 2024-12-02 ENCOUNTER — TELEPHONE (OUTPATIENT)
Dept: SLEEP MEDICINE | Facility: MEDICAL CENTER | Age: 69
End: 2024-12-02
Payer: MEDICARE

## 2024-12-02 NOTE — TELEPHONE ENCOUNTER
Phoned patient with results of LDCT exam performed 11/27/24.    Notified him that the results showed no concerning lung nodules  Recommend follow up CT in 12 months.    Informed patient of incidental findings of stable, calcified granulomas of the right lower lung and severe coronary artery calcifications with recommendation to follow up with PCP.    Patient agrees to all recommendations.    His PCP, Charley REYES, was notified of results and incidental findings via this communication.    Health maintenance updated and patient sent lung cancer screening result letter.        CT-LUNG CANCER-SCREENING    Result Date: 11/29/2024 11/27/2024 9:43 AM HISTORY/REASON FOR EXAM:  lung cancer screening 35.0 ttl pk-yrs TECHNIQUE/EXAM DESCRIPTION AND NUMBER OF VIEWS: Lung cancer screening without contrast. Low dose noncontrast helical images were obtained of the chest from the lung apices through the costophrenic sulci utilizing thin collimation and intervals with reconstructed images sent to PACS in axial, coronal and sagittal planes. Low dose optimization technique was utilized for this CT exam including automated exposure control and adjustment of the mA and/or kV according to patient size. COMPARISON: 11/9/2023. FINDINGS: Lung nodule: A calcified granuloma in the right lower lobe. No new or suspicious pulmonary nodule Lungs:  Bilateral lungs are clear. No airspace opacity. Fibrosis: None Airway: Patent Pleura: No pleural effusion or pneumothorax. Thoracic aorta and great vessels:  No aneurysm. Heart and pericardium:  There is severe coronary artery calcification. No pericardial effusion. Lymph nodes: No enlarged mediastinal or hilar lymph nodes. Thoracic spine:  No fracture or malalignment. No compression deformity. Chest wall:   Unremarkable. Visualized abdomen: No acute abnormality. ___________________________________     1. No new or suspicious pulmonary nodule. 2.  Unchanged calcified granulomas in the right lower  lobe Lung RADS: 1 - Negative: No nodules and definitely benign nodules Findings: no lung nodules nodule(s) with specific calcifications: complete; central; popcorn; concentric rings and fat containing nodules Management: Continue annual screening with LDCT in 12 months

## 2024-12-11 ENCOUNTER — APPOINTMENT (OUTPATIENT)
Dept: MEDICAL GROUP | Facility: PHYSICIAN GROUP | Age: 69
End: 2024-12-11
Payer: MEDICARE

## 2024-12-11 VITALS
HEIGHT: 70 IN | BODY MASS INDEX: 28.36 KG/M2 | TEMPERATURE: 97.5 F | OXYGEN SATURATION: 95 % | HEART RATE: 100 BPM | DIASTOLIC BLOOD PRESSURE: 60 MMHG | SYSTOLIC BLOOD PRESSURE: 120 MMHG | WEIGHT: 198.1 LBS

## 2024-12-11 DIAGNOSIS — I10 PRIMARY HYPERTENSION: ICD-10-CM

## 2024-12-11 DIAGNOSIS — E11.29 TYPE 2 DIABETES MELLITUS WITH MICROALBUMINURIA, WITHOUT LONG-TERM CURRENT USE OF INSULIN (HCC): ICD-10-CM

## 2024-12-11 DIAGNOSIS — R80.9 TYPE 2 DIABETES MELLITUS WITH MICROALBUMINURIA, WITHOUT LONG-TERM CURRENT USE OF INSULIN (HCC): ICD-10-CM

## 2024-12-11 DIAGNOSIS — F32.5 MAJOR DEPRESSIVE DISORDER WITH SINGLE EPISODE, IN FULL REMISSION (HCC): ICD-10-CM

## 2024-12-11 DIAGNOSIS — F41.9 ANXIETY: ICD-10-CM

## 2024-12-11 DIAGNOSIS — Z00.00 MEDICARE ANNUAL WELLNESS VISIT, SUBSEQUENT: ICD-10-CM

## 2024-12-11 DIAGNOSIS — D51.3 OTHER DIETARY VITAMIN B12 DEFICIENCY ANEMIA: ICD-10-CM

## 2024-12-11 PROCEDURE — G0439 PPPS, SUBSEQ VISIT: HCPCS | Performed by: NURSE PRACTITIONER

## 2024-12-11 PROCEDURE — 3074F SYST BP LT 130 MM HG: CPT | Performed by: NURSE PRACTITIONER

## 2024-12-11 PROCEDURE — 3078F DIAST BP <80 MM HG: CPT | Performed by: NURSE PRACTITIONER

## 2024-12-11 RX ORDER — FLUOXETINE 10 MG/1
10 CAPSULE ORAL DAILY
Qty: 100 CAPSULE | Refills: 3 | Status: SHIPPED | OUTPATIENT
Start: 2024-12-11

## 2024-12-11 ASSESSMENT — FIBROSIS 4 INDEX: FIB4 SCORE: 1.02

## 2024-12-11 ASSESSMENT — ENCOUNTER SYMPTOMS: GENERAL WELL-BEING: GOOD

## 2024-12-11 ASSESSMENT — ACTIVITIES OF DAILY LIVING (ADL): BATHING_REQUIRES_ASSISTANCE: 0

## 2024-12-11 ASSESSMENT — PATIENT HEALTH QUESTIONNAIRE - PHQ9: CLINICAL INTERPRETATION OF PHQ2 SCORE: 0
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
## 2024-12-26 DIAGNOSIS — R80.9 TYPE 2 DIABETES MELLITUS WITH MICROALBUMINURIA, WITHOUT LONG-TERM CURRENT USE OF INSULIN (HCC): ICD-10-CM

## 2024-12-26 DIAGNOSIS — E11.29 TYPE 2 DIABETES MELLITUS WITH MICROALBUMINURIA, WITHOUT LONG-TERM CURRENT USE OF INSULIN (HCC): ICD-10-CM

## 2024-12-26 RX ORDER — METFORMIN HYDROCHLORIDE 500 MG/1
1000 TABLET, EXTENDED RELEASE ORAL 2 TIMES DAILY
Qty: 400 TABLET | Refills: 3 | Status: SHIPPED | OUTPATIENT
Start: 2024-12-26

## 2024-12-26 NOTE — TELEPHONE ENCOUNTER
Received request via: Pharmacy    Was the patient seen in the last year in this department? Yes    Does the patient have an active prescription (recently filled or refills available) for medication(s) requested? No    [facility]    Does the patient have halfway Plus and need 100-day supply? (This applies to ALL medications) Patient does not have SCP

## 2024-12-27 NOTE — TELEPHONE ENCOUNTER
Requested Prescriptions     Pending Prescriptions Disp Refills    metFORMIN ER (GLUCOPHAGE XR) 500 MG TABLET SR 24 HR [Pharmacy Med Name: METFORMIN HCL ER 500MG TB24] 400 Tablet 3     Sig: TAKE TWO TABLETS BY MOUTH TWICE A DAY       JOSÉ MIGUEL Siddiqui.

## 2025-03-12 ENCOUNTER — OFFICE VISIT (OUTPATIENT)
Dept: VASCULAR LAB | Facility: MEDICAL CENTER | Age: 70
End: 2025-03-12
Attending: FAMILY MEDICINE
Payer: MEDICARE

## 2025-03-12 VITALS
WEIGHT: 199 LBS | BODY MASS INDEX: 28.49 KG/M2 | SYSTOLIC BLOOD PRESSURE: 109 MMHG | HEART RATE: 108 BPM | DIASTOLIC BLOOD PRESSURE: 63 MMHG | HEIGHT: 70 IN

## 2025-03-12 DIAGNOSIS — E27.8 ADRENAL HYPERPLASIA (HCC): ICD-10-CM

## 2025-03-12 DIAGNOSIS — E11.29 TYPE 2 DIABETES MELLITUS WITH MICROALBUMINURIA, WITHOUT LONG-TERM CURRENT USE OF INSULIN (HCC): ICD-10-CM

## 2025-03-12 DIAGNOSIS — I70.0 AORTIC ATHEROSCLEROSIS (HCC): ICD-10-CM

## 2025-03-12 DIAGNOSIS — I25.2 HISTORY OF ACUTE ANTERIOR WALL MYOCARDIAL INFARCTION: ICD-10-CM

## 2025-03-12 DIAGNOSIS — I15.2 HYPERTENSION DUE TO ENDOCRINE DISORDER: ICD-10-CM

## 2025-03-12 DIAGNOSIS — I25.10 CORONARY ARTERY DISEASE INVOLVING NATIVE CORONARY ARTERY OF NATIVE HEART WITHOUT ANGINA PECTORIS: ICD-10-CM

## 2025-03-12 DIAGNOSIS — I77.811 ABDOMINAL AORTIC ECTASIA (HCC): ICD-10-CM

## 2025-03-12 DIAGNOSIS — E26.09 PRIMARY ALDOSTERONISM (HCC): ICD-10-CM

## 2025-03-12 DIAGNOSIS — R80.9 TYPE 2 DIABETES MELLITUS WITH MICROALBUMINURIA, WITHOUT LONG-TERM CURRENT USE OF INSULIN (HCC): ICD-10-CM

## 2025-03-12 DIAGNOSIS — E11.29 MICROALBUMINURIA DUE TO TYPE 2 DIABETES MELLITUS (HCC): ICD-10-CM

## 2025-03-12 DIAGNOSIS — R80.9 MICROALBUMINURIA DUE TO TYPE 2 DIABETES MELLITUS (HCC): ICD-10-CM

## 2025-03-12 DIAGNOSIS — E78.5 DYSLIPIDEMIA: ICD-10-CM

## 2025-03-12 PROCEDURE — 99214 OFFICE O/P EST MOD 30 MIN: CPT | Performed by: FAMILY MEDICINE

## 2025-03-12 PROCEDURE — G2211 COMPLEX E/M VISIT ADD ON: HCPCS | Performed by: FAMILY MEDICINE

## 2025-03-12 PROCEDURE — 99212 OFFICE O/P EST SF 10 MIN: CPT

## 2025-03-12 ASSESSMENT — FIBROSIS 4 INDEX: FIB4 SCORE: 1.02

## 2025-03-12 NOTE — PROGRESS NOTES
FOLLOW-UP VASCULAR MEDICINE CLINIC  25   Ongoing care for HTN mgmt, edema, est 3/2024     Subjective      Interval hx/concerns: last seen 2024, wife  last week.    Has not completed labs yet.   Tolerating all meds     HTN: Stable, tolerating meds with good adherence, Home BP lo-120s/70s, HR 90s    HLD: Stable, current treatment: Moderate intensity - prava - tolerating, good adherence  T2D: Stable. Tolerating metformin and no recent med changes.   EDEMA: Has essentially resolved since stopping amlodipine  Prior work-up: echo, RLE venous duplex   Tx attempted to date: stopped amloidipine     Antithrombotic tx: no - no hx of bleeding      Current Outpatient Medications on File Prior to Visit   Medication Sig Dispense Refill    metFORMIN ER (GLUCOPHAGE XR) 500 MG TABLET SR 24 HR TAKE TWO TABLETS BY MOUTH TWICE A  Tablet 3    FLUoxetine (PROZAC) 10 MG Cap Take 1 Capsule by mouth every day. 100 Capsule 3    pravastatin (PRAVACHOL) 40 MG tablet TAKE ONE TABLET BY MOUTH EVERY DAY AT BEDTIME FOR HIGH AMOUNT OF FATS IN THE BLOOD 100 Tablet 3    Empagliflozin (JARDIANCE) 10 MG Tab tablet Take 1 Tablet by mouth every day. 90 Tablet 1    verapamil ER (CALAN SR) 240 MG Tab CR TAKE ONE TABLET BY MOUTH EVERY  Tablet 3    ketoconazole (NIZORAL) 2 % Cream       baclofen (LIORESAL) 10 MG Tab       spironolactone (ALDACTONE) 50 MG Tab Take 1 Tablet by mouth every morning. to lower blood pressure 100 Tablet 3    telmisartan (MICARDIS) 80 MG Tab Take 1 Tablet by mouth at bedtime. to lower blood pressure 100 Tablet 3    doxazosin (CARDURA) 4 MG Tab Take 1 Tablet by mouth at bedtime. to lower blood pressure 100 Tablet 3    traZODone (DESYREL) 50 MG Tab Take 1 Tablet by mouth at bedtime. Indications: sleep 100 Tablet 3    fluticasone (FLONASE) 50 MCG/ACT nasal spray Administer 1 Spray into affected nostril(S) every day. Indications: Stuffy Nose 16 g 3    polyethylene glycol 3350 (MIRALAX) 17 GM/SCOOP Powder  "Take 17 g by mouth 1 time a day as needed (constipation). Indications: Constipation      acetaminophen (TYLENOL) 325 MG Tab Take 2 Tablets by mouth every 6 hours. 30 Tablet 0    ammonium lactate (LAC-HYDRIN) 12 % Lotion Apply 1 Application topically 1 time a day as needed (FEET). Indications: dry skin      multivitamin Tab Take 1 Tablet by mouth every day. Indications: supplement       Current Facility-Administered Medications on File Prior to Visit   Medication Dose Route Frequency Provider Last Rate Last Admin    cyanocobalamin (Vitamin B-12) injection 1,000 mcg  1,000 mcg Intramuscular Q30 DAYS Charley Harvey A.P.R.N.   1,000 mcg at 10/03/24 9028      Allergies   Allergen Reactions    Chlorthalidone      Severe hyponatremia      Flexeril [Cyclobenzaprine]      Weakness, spacie    Penicillins      Doesn't know the reaction  had this allergy when he was a child      Social History     Tobacco Use    Smoking status: Former     Current packs/day: 0.00     Average packs/day: 1 pack/day for 35.0 years (35.0 ttl pk-yrs)     Types: Cigarettes     Start date: 1988     Quit date: 2013     Years since quittin.6     Passive exposure: Never    Smokeless tobacco: Never   Vaping Use    Vaping status: Never Used   Substance Use Topics    Alcohol use: Yes     Alcohol/week: 2.4 - 3.0 oz     Types: 4 - 5 Cans of beer per week     Comment: On Occasion >4 beers on one day.    Drug use: No     DIET AND EXERCISE:  Weight Change:stable   Diet: common adult   Exercise: minimal exercise       Objective    Vitals:    25 1028   BP: 109/63   BP Location: Left arm   Patient Position: Sitting   BP Cuff Size: Large adult   Pulse: (!) 108   Weight: 90.3 kg (199 lb)   Height: 1.778 m (5' 10\")     BP Readings from Last 10 Encounters:   25 109/63   24 120/60   10/03/24 120/50   24 116/64   24 118/58   24 (!) 149/87   24 (!) 154/78   24 (!) 178/82   24 124/68   24 (!) " "170/90      Body mass index is 28.55 kg/m².   Wt Readings from Last 4 Encounters:   03/12/25 90.3 kg (199 lb)   12/11/24 89.9 kg (198 lb 1.6 oz)   10/03/24 89.3 kg (196 lb 12.8 oz)   09/24/24 90.1 kg (198 lb 9.6 oz)      Physical Exam  Vitals reviewed.   Constitutional:       General: He is not in acute distress.     Appearance: He is not diaphoretic.   HENT:      Head: Normocephalic and atraumatic.   Eyes:      General: No scleral icterus.     Conjunctiva/sclera: Conjunctivae normal.   Neck:      Vascular: No carotid bruit.   Cardiovascular:      Rate and Rhythm: Normal rate and regular rhythm.      Heart sounds: Normal heart sounds. No murmur heard.  Pulmonary:      Effort: Pulmonary effort is normal. No respiratory distress.      Breath sounds: Normal breath sounds. No wheezing or rales.   Musculoskeletal:      Right lower leg: No edema.      Left lower leg: No edema.   Skin:     Coloration: Skin is not pale.   Neurological:      General: No focal deficit present.      Mental Status: He is alert and oriented to person, place, and time.      Cranial Nerves: No cranial nerve deficit.      Coordination: Coordination normal.      Gait: Gait is intact. Gait normal.   Psychiatric:         Mood and Affect: Mood and affect normal.         Behavior: Behavior normal.       DATA REVIEW    Lab Results   Component Value Date/Time    CHOLSTRLTOT 173 04/29/2024 08:15 AM    LDL 45 04/29/2024 08:15 AM     04/29/2024 08:15 AM    TRIGLYCERIDE 52 04/29/2024 08:15 AM     Lab Results   Component Value Date/Time    LDL 45 04/29/2024 08:15 AM    LDL 56 08/23/2023 08:12 AM    LDL 68 07/20/2022 10:41 AM    LDL 55 07/27/2021 07:31 AM    LDL 37 09/06/2019 07:30 AM         No results found for: \"LIPOPROTA\"   No results found for: \"APOB\"   Lab Results   Component Value Date/Time    CRPHIGHSEN 1.6 02/13/2020 06:43 AM       Lab Results   Component Value Date/Time    SODIUM 132 (L) 10/01/2024 01:27 PM    POTASSIUM 5.2 09/25/2024 09:44 AM "    CHLORIDE 92 (L) 09/25/2024 09:44 AM    CO2 21 09/25/2024 09:44 AM    GLUCOSE 213 (H) 09/25/2024 09:44 AM    BUN 5 (L) 09/25/2024 09:44 AM    CREATININE 0.77 09/25/2024 09:44 AM    BUNCREATRAT 11 02/22/2022 05:55 AM     Lab Results   Component Value Date/Time    ALKPHOSPHAT 86 09/25/2024 09:44 AM    ASTSGOT 23 09/25/2024 09:44 AM    ALTSGPT 22 09/25/2024 09:44 AM    TBILIRUBIN 0.6 09/25/2024 09:44 AM       Lab Results   Component Value Date/Time    HBA1C 8.7 (H) 09/25/2024 09:44 AM    HBA1C 6.7 (H) 08/23/2023 08:12 AM    HBA1C 7.5 (A) 05/01/2023 09:39 AM       Lab Results   Component Value Date/Time    MALBCRT 342 (H) 04/29/2024 08:15 AM    MICROALBUR 14.5 04/29/2024 08:15 AM        Latest Reference Range & Units 04/29/24 08:15 07/20/24 07:15 09/11/24 06:49   Aldos Serum ng/dL 28.6 34.8 30.1   Renin Activity ng/mL/hr 1.1 1.3 16.8        Latest Reference Range & Units 04/29/24 08:14 04/29/24 08:15   Metanephrine Plasma 0.00 - 0.49 nmol/L 0.22    Normetanephrine Plasma 0.00 - 0.89 nmol/L 0.72       Latest Reference Range & Units 04/29/24 08:15 05/14/24 06:00   Sodium, Urine -per volume mmol/L  48   Micro Alb Creat Ratio 0 - 30 mg/g 342 (H)    Creatinine, Urine mg/dL 42.38 27.17   Total Volume, Urine mL  2900   Creatinine 24 Hr Urine 1000 - 2000 mg/24 Hr  788 (L)   Collection Length Hrs  Hrs  24  24   Total Volume mL  mL  2900  2900   Sodium, Urine-per 24h 51 - 286 mmol/d  139   Microalbumin, Urine Random mg/dL 14.5    Aldost Urine 1.2 - 28.1 ug/d  5.8   Creatinine Urine mg/dL  31     Imaging:    CT chest w/o 2019   Stable unenhanced CT of the thorax.   Remote granulomatous disease. RIGHT lower lobe hamartoma versus granuloma   Distal pancreatectomy, splenectomy, left hemidiaphragm elevation    AAA duplex 2022  There is mild atherosclerosis of the visualized abdominal aorta.   Proximal abdominal aorta measures 3.0 cm x 2.9 cm.   Mid abdominal aorta measures 2.0 cm x 2.7 cm.   Distal abdominal aorta measures 2.0 cm x  1.8 cm.   The right common iliac artery measures 1.1 cm x 1.0 cm, the left 1.2 cm x 1.2 cm.   No incidental abnormalities in the visualized portions of the retroperitoneum are identified.   IMPRESSION:   No sonographic evidence of abdominal aortic aneurysm.    LDCT 2023  Heart: Not enlarged. No pericardial effusion. Coronary calcifications.   Aorta and great vessels: No aneurysm. Atherosclerosis.  IMPRESSION:   No suspicious pulmonary nodules or masses. Stable calcified granuloma in the right lower lung.   Lung RADS: 1 - Negative: No nodules and definitely benign nodules     Echo 2/2024  Normal right and left ventricular size and function.   The ejection fraction is measured to be  63% by Manning's biplane.  Mildly dilated left atrium.  No significant valvular disease.   Normal right atrial pressure.   Unable to estimate pulmonary artery pressure due to an inadequate   tricuspid regurgitant jet.   No prior study is available for comparison.   Aorta  Normal aortic root for body surface area. The ascending aorta has a   diameter of  3.6cm.    RLE venous 2/2/24  No evidence of bilateral lower extremity deep venous thrombosis.     CTA a/p 5/2024  Aorta and vasculature: No significant abdominal aortic aneurysm or dissection. The aorta at the hiatus measures 2.6 x 2.5 cm. At the level of the renal arteries it measures 2.5 x 2.3 cm. The distal abdominal aorta just before the bifurcation measures 2.1 x 0.9 cm. There is multifocal atherosclerosis without occlusion or high-grade stenosis of the aorta or major branch arteries.  Mild nodular thickening of the adrenal glands. It is grossly similar in appearance to chest CT in 2019. It could represent tiny underlying adrenal nodules/adenomas or adenomatoid hyperplasia. No suspicious mass.  Kidneys enhance symmetrically.  1.  No significant abdominal aortic aneurysm or dissection.  2.  Atherosclerosis.  3.  Colonic diverticulosis.  4.  Mild nodular thickening of the adrenal  "glands, grossly unchanged in appearance 2019.          Medical Decision Making:  Today's Assessment / Status / Plan:     1. Primary aldosteronism (HCC)        2. Adrenal hyperplasia (HCC)        3. Abdominal aortic ectasia (HCC)        4. Aortic atherosclerosis (HCC)        5. Type 2 diabetes mellitus with microalbuminuria, without long-term current use of insulin (HCC)        6. Microalbuminuria due to type 2 diabetes mellitus (HCC)        7. Hypertension due to endocrine disorder        8. Dyslipidemia        9. Coronary artery disease involving native coronary artery of native heart without angina pectoris        10. History of acute anterior wall myocardial infarction             Established CVD:     1) abdominal aortic ectasia, non-aneurysmal, with associated diffuse Ao athero- stable, no sx   2022 duplex = 3.0cm  (dilation vs minimal aneurysmal)  5/2024 CTA = 2.6cm (ectasia only)  - no indications of co-morbid TAA per CT and echo in past   - Med mgmt unless 5.5+cm or rapid expansion rate of 1+cm/year (or >0.5cm/6mo) = indications for surg mgmt, generally with EVAR   Plan:  - continue medical management as per below  - consider repeat AAA duplex in 3-5 yrs (2027-29)    2) CAD, reported hx of MI - stable, no sx   Normal EF without wall motion abnormalities on echo  No current angina   Plan:  - continue secondary prevention treatment goals, intensive med mgmt and lifestyle interventions     LIPID MANAGEMENT  Qualifies for Statin Therapy per ACC/AHA Guidelines: yes, primary, possible secondary if true AAA  The ASCVD Risk score (Rod DK, et al., 2019) failed to calculate., >20% \"high risk\"   Major ASCVD events: Aortic aneurysm due to atherosclerosis     High-risk conditions: >64yr old   Risk-enhancers: N/A  Currently on Statin: Yes  Tx goals: LDL-C <55   At goal? Yes   Plan:   -  continue prava 40mg daily   -  monitor lipids q6-12mo   - consider lp(a) in future    BLOOD PRESSURE MANAGEMENT  Office BP goal per " ACC/AHA <130/80  Home BP at goal:  yes   Office BP at goal:  yes  24h ABPM:  not ordered to date   Additional goals due to high prob primary dino:     1) non-suppressed PRA (>1):  yes   2) normokalemia: yes   RDN candidate? YES  Contributing factors: prior use of tizanidine - can induce rebound HTN   - established end organ damage as per high UACR, hx of MI  -  hx of inducible severe hypoK+ with borderline supp PRA and high dino, ARR = 25  - definitely meets PA phenotype due to chronic, severe HTN from early age, T2D, vascular disease with h/o electrolyte disturbance w/ diuretics in past   - oral Na loading test yielded intermediate results for primary dino however did not achieve 24h Melissa >200 and had low 24h UCr with 24h U dino around 6 ug/d (standard threshold is >12 ug/d), however would have expected a more robust lowering of aldosterone, so still provides evidence   - CT shows bilat adrenal nodularity with hyperplasia, so we will conditionally dx primary dino from bilat hyperplasia   Plan:   - continue home BP monitoring  - stopped tizanidine to seek alternatives as this mimics central alpha agonist and can worsen rebound HTN and ability to control BP   - monitor lytes/gfr   Medications:  - continue telmisartan 80mg daily   - avoid amlodipine due to excess swelling   - prior stopped nevibilol  - continue verapamil  mg daily - will aim to reduce over time   - continue doxazosin 4mg QHS   - continue spironolactone 50mg daily - monitor Na, - mild low Na, mild leg cramping - aung consider transition to eplerenone if not tolerating spironolactone moving forward     GLYCEMIC MANAGEMENT Diabetic, T2 with HTN, microalb  Goal A1c < 7  Lab Results   Component Value Date    HBA1C 8.7 (H) 09/25/2024      Lab Results   Component Value Date/Time    MALBCRT 342 (H) 04/29/2024 08:15 AM    MICROALBUR 14.5 04/29/2024 08:15 AM     Plan:  - continue meformin  - defer ongoing labs to PCP     ANTITHROMBOTIC TX consider ASA at  next visit in light of hx of MI    LIFESTYLE INTERVENTIONS  TOBACCO: Stages of Change: Maintenance (sustained change >6mo)    reports that he quit smoking about 11 years ago. His smoking use included cigarettes. He started smoking about 37 years ago. He has a 35 pack-year smoking history. He has never been exposed to tobacco smoke. He has never used smokeless tobacco.   - continued complete avoidance of all tobacco products   PHYSICAL ACTIVITY: >150min/week of mod-intensity activity or as much as tolerated  NUTRITION: DASH  and reduce Na to 1500mg/day   ETOH: limit to 2 or less standard drinks/day   WT MGMT: maintain healthy weight (reduction 1kg = reduction 1mmHg BP)     OTHER:     #  Edema - resolved.  presumed amlodipine related, improved when stopped. Echo normal   - possible will reduce verapamil in time     # bereavement - loss of wife, stable sleep and coping well     STUDIES: none   LABS: as noted above - pending labs, will contact with results   FOLLOW-UP:   12 months     Faustino Livingston M.D.   [facility]  [facility]  [phone]

## 2025-03-13 RX ORDER — EMPAGLIFLOZIN 10 MG/1
1 TABLET, FILM COATED ORAL
Qty: 100 TABLET | Refills: 3 | Status: SHIPPED | OUTPATIENT
Start: 2025-03-13

## 2025-03-13 NOTE — TELEPHONE ENCOUNTER
Received request via: Pharmacy    Was the patient seen in the last year in this department? Yes    Does the patient have an active prescription (recently filled or refills available) for medication(s) requested? No    [facility]    Does the patient have snf Plus and need 100-day supply? (This applies to ALL medications) Yes, quantity updated to 100 days

## 2025-03-14 NOTE — TELEPHONE ENCOUNTER
Requested Prescriptions     Pending Prescriptions Disp Refills    Empagliflozin (JARDIANCE) 10 MG Tab tablet [Pharmacy Med Name: JARDIANCE 10MG TABS] 100 Tablet 3     Sig: TAKE ONE TABLET BY MOUTH EVERY DAY       JOSÉ MIGUEL Siddiqui.

## 2025-03-18 DIAGNOSIS — F41.9 ANXIETY: ICD-10-CM

## 2025-03-18 DIAGNOSIS — F51.02 ADJUSTMENT INSOMNIA: ICD-10-CM

## 2025-03-18 RX ORDER — TRAZODONE HYDROCHLORIDE 50 MG/1
50-150 TABLET ORAL
Qty: 300 TABLET | Refills: 0 | Status: SHIPPED | OUTPATIENT
Start: 2025-03-18

## 2025-03-19 NOTE — TELEPHONE ENCOUNTER
Received request via: Pharmacy    Was the patient seen in the last year in this department? Yes    Does the patient have an active prescription (recently filled or refills available) for medication(s) requested? No    [facility] Name[facility]    Does the patient have assisted Plus and need 100-day supply? (This applies to ALL medications) Yes, quantity updated to 100 days

## 2025-03-19 NOTE — TELEPHONE ENCOUNTER
Requested Prescriptions     Pending Prescriptions Disp Refills    traZODone (DESYREL) 50 MG Tab 300 Tablet 0     Sig: Take 1-3 Tablets by mouth at bedtime. Indications: sleep       JOSÉ MIGUEL Siddiqui.

## 2025-05-30 DIAGNOSIS — I10 PRIMARY HYPERTENSION: ICD-10-CM

## 2025-05-30 RX ORDER — DOXAZOSIN 4 MG/1
TABLET ORAL
Qty: 100 TABLET | Refills: 3 | Status: SHIPPED | OUTPATIENT
Start: 2025-05-30

## 2025-06-02 ENCOUNTER — OFFICE VISIT (OUTPATIENT)
Dept: URGENT CARE | Facility: PHYSICIAN GROUP | Age: 70
End: 2025-06-02
Payer: MEDICARE

## 2025-06-02 VITALS
DIASTOLIC BLOOD PRESSURE: 62 MMHG | HEIGHT: 70 IN | SYSTOLIC BLOOD PRESSURE: 108 MMHG | TEMPERATURE: 97.3 F | BODY MASS INDEX: 28.06 KG/M2 | RESPIRATION RATE: 16 BRPM | HEART RATE: 95 BPM | OXYGEN SATURATION: 94 % | WEIGHT: 196 LBS

## 2025-06-02 DIAGNOSIS — J06.9 UPPER RESPIRATORY TRACT INFECTION, UNSPECIFIED TYPE: Primary | ICD-10-CM

## 2025-06-02 PROCEDURE — 3078F DIAST BP <80 MM HG: CPT | Performed by: PHYSICIAN ASSISTANT

## 2025-06-02 PROCEDURE — 99214 OFFICE O/P EST MOD 30 MIN: CPT | Performed by: PHYSICIAN ASSISTANT

## 2025-06-02 PROCEDURE — 3074F SYST BP LT 130 MM HG: CPT | Performed by: PHYSICIAN ASSISTANT

## 2025-06-02 RX ORDER — FLUTICASONE PROPIONATE 50 MCG
1 SPRAY, SUSPENSION (ML) NASAL DAILY
Qty: 16 G | Refills: 0 | Status: SHIPPED
Start: 2025-06-02 | End: 2025-06-11

## 2025-06-02 RX ORDER — BENZONATATE 100 MG/1
200 CAPSULE ORAL 3 TIMES DAILY PRN
Qty: 30 CAPSULE | Refills: 0 | Status: SHIPPED | OUTPATIENT
Start: 2025-06-02

## 2025-06-02 ASSESSMENT — ENCOUNTER SYMPTOMS
SPUTUM PRODUCTION: 1
NECK PAIN: 0
EYE REDNESS: 0
DIARRHEA: 0
SORE THROAT: 1
SHORTNESS OF BREATH: 0
COUGH: 1
VOMITING: 0
WHEEZING: 0
EYE DISCHARGE: 0
MYALGIAS: 1
FEVER: 0
HEADACHES: 0
DIZZINESS: 0

## 2025-06-02 ASSESSMENT — FIBROSIS 4 INDEX: FIB4 SCORE: 1.04
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
## 2025-06-09 ENCOUNTER — HOSPITAL ENCOUNTER (OUTPATIENT)
Dept: LAB | Facility: MEDICAL CENTER | Age: 70
End: 2025-06-09
Attending: NURSE PRACTITIONER
Payer: MEDICARE

## 2025-06-09 ENCOUNTER — HOSPITAL ENCOUNTER (OUTPATIENT)
Dept: LAB | Facility: MEDICAL CENTER | Age: 70
End: 2025-06-09
Attending: FAMILY MEDICINE
Payer: MEDICARE

## 2025-06-09 DIAGNOSIS — R80.9 TYPE 2 DIABETES MELLITUS WITH MICROALBUMINURIA, WITHOUT LONG-TERM CURRENT USE OF INSULIN (HCC): ICD-10-CM

## 2025-06-09 DIAGNOSIS — I15.2 HYPERTENSION DUE TO ENDOCRINE DISORDER: ICD-10-CM

## 2025-06-09 DIAGNOSIS — E26.09 PRIMARY ALDOSTERONISM (HCC): Chronic | ICD-10-CM

## 2025-06-09 DIAGNOSIS — E11.29 TYPE 2 DIABETES MELLITUS WITH MICROALBUMINURIA, WITHOUT LONG-TERM CURRENT USE OF INSULIN (HCC): ICD-10-CM

## 2025-06-09 DIAGNOSIS — E78.5 DYSLIPIDEMIA: ICD-10-CM

## 2025-06-09 LAB
EST. AVERAGE GLUCOSE BLD GHB EST-MCNC: 128 MG/DL
HBA1C MFR BLD: 6.1 % (ref 4–5.6)

## 2025-06-09 PROCEDURE — 36415 COLL VENOUS BLD VENIPUNCTURE: CPT

## 2025-06-09 PROCEDURE — 82043 UR ALBUMIN QUANTITATIVE: CPT

## 2025-06-09 PROCEDURE — 83036 HEMOGLOBIN GLYCOSYLATED A1C: CPT

## 2025-06-09 PROCEDURE — 80061 LIPID PANEL: CPT

## 2025-06-09 PROCEDURE — 82570 ASSAY OF URINE CREATININE: CPT

## 2025-06-09 PROCEDURE — 84244 ASSAY OF RENIN: CPT

## 2025-06-09 PROCEDURE — 82088 ASSAY OF ALDOSTERONE: CPT

## 2025-06-10 LAB
CHOLEST SERPL-MCNC: 138 MG/DL (ref 100–199)
CREAT UR-MCNC: 31 MG/DL
HDLC SERPL-MCNC: 89 MG/DL
LDLC SERPL CALC-MCNC: 39 MG/DL
MICROALBUMIN UR-MCNC: <1.2 MG/DL
MICROALBUMIN/CREAT UR: NORMAL MG/G (ref 0–30)
TRIGL SERPL-MCNC: 48 MG/DL (ref 0–149)

## 2025-06-11 ENCOUNTER — OFFICE VISIT (OUTPATIENT)
Dept: MEDICAL GROUP | Facility: PHYSICIAN GROUP | Age: 70
End: 2025-06-11
Payer: MEDICARE

## 2025-06-11 ENCOUNTER — RESULTS FOLLOW-UP (OUTPATIENT)
Dept: MEDICAL GROUP | Facility: PHYSICIAN GROUP | Age: 70
End: 2025-06-11

## 2025-06-11 VITALS
WEIGHT: 197.8 LBS | BODY MASS INDEX: 28.32 KG/M2 | HEIGHT: 70 IN | HEART RATE: 108 BPM | SYSTOLIC BLOOD PRESSURE: 120 MMHG | TEMPERATURE: 98.7 F | OXYGEN SATURATION: 94 % | DIASTOLIC BLOOD PRESSURE: 50 MMHG

## 2025-06-11 DIAGNOSIS — I10 PRIMARY HYPERTENSION: ICD-10-CM

## 2025-06-11 DIAGNOSIS — D51.3 OTHER DIETARY VITAMIN B12 DEFICIENCY ANEMIA: ICD-10-CM

## 2025-06-11 DIAGNOSIS — Z00.00 ENCOUNTER FOR WELL ADULT EXAM WITHOUT ABNORMAL FINDINGS: Primary | ICD-10-CM

## 2025-06-11 DIAGNOSIS — Z00.00 ROUTINE HEALTH MAINTENANCE: ICD-10-CM

## 2025-06-11 DIAGNOSIS — E11.29 TYPE 2 DIABETES MELLITUS WITH MICROALBUMINURIA, WITHOUT LONG-TERM CURRENT USE OF INSULIN (HCC): ICD-10-CM

## 2025-06-11 DIAGNOSIS — R80.9 TYPE 2 DIABETES MELLITUS WITH MICROALBUMINURIA, WITHOUT LONG-TERM CURRENT USE OF INSULIN (HCC): ICD-10-CM

## 2025-06-11 DIAGNOSIS — Z12.5 SCREENING FOR MALIGNANT NEOPLASM OF PROSTATE: ICD-10-CM

## 2025-06-11 DIAGNOSIS — E87.1 HYPONATREMIA: ICD-10-CM

## 2025-06-11 PROBLEM — E55.9 VITAMIN D DEFICIENCY: Status: RESOLVED | Noted: 2020-02-20 | Resolved: 2025-06-11

## 2025-06-11 LAB — ALDOST SERPL-MCNC: 49.4 NG/DL

## 2025-06-11 PROCEDURE — 99397 PER PM REEVAL EST PAT 65+ YR: CPT | Performed by: NURSE PRACTITIONER

## 2025-06-11 PROCEDURE — 3078F DIAST BP <80 MM HG: CPT | Performed by: NURSE PRACTITIONER

## 2025-06-11 PROCEDURE — 3074F SYST BP LT 130 MM HG: CPT | Performed by: NURSE PRACTITIONER

## 2025-06-11 ASSESSMENT — PATIENT HEALTH QUESTIONNAIRE - PHQ9
3. TROUBLE FALLING OR STAYING ASLEEP OR SLEEPING TOO MUCH: NOT AT ALL
1. LITTLE INTEREST OR PLEASURE IN DOING THINGS: NOT AT ALL
5. POOR APPETITE OR OVEREATING: NOT AT ALL
6. FEELING BAD ABOUT YOURSELF - OR THAT YOU ARE A FAILURE OR HAVE LET YOURSELF OR YOUR FAMILY DOWN: NOT AL ALL
2. FEELING DOWN, DEPRESSED, IRRITABLE, OR HOPELESS: NOT AT ALL
SUM OF ALL RESPONSES TO PHQ QUESTIONS 1-9: 0
4. FEELING TIRED OR HAVING LITTLE ENERGY: NOT AT ALL
SUM OF ALL RESPONSES TO PHQ9 QUESTIONS 1 AND 2: 0
7. TROUBLE CONCENTRATING ON THINGS, SUCH AS READING THE NEWSPAPER OR WATCHING TELEVISION: NOT AT ALL
9. THOUGHTS THAT YOU WOULD BE BETTER OFF DEAD, OR OF HURTING YOURSELF: NOT AT ALL
8. MOVING OR SPEAKING SO SLOWLY THAT OTHER PEOPLE COULD HAVE NOTICED. OR THE OPPOSITE, BEING SO FIGETY OR RESTLESS THAT YOU HAVE BEEN MOVING AROUND A LOT MORE THAN USUAL: NOT AT ALL

## 2025-06-11 ASSESSMENT — FIBROSIS 4 INDEX: FIB4 SCORE: 1.04
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
## 2025-06-12 LAB — RENIN PLAS-CCNC: 7.2 NG/ML/HR

## 2025-06-20 ENCOUNTER — TELEPHONE (OUTPATIENT)
Dept: HEALTH INFORMATION MANAGEMENT | Facility: OTHER | Age: 70
End: 2025-06-20
Payer: MEDICARE

## 2025-07-18 DIAGNOSIS — F41.9 ANXIETY: ICD-10-CM

## 2025-07-18 DIAGNOSIS — F51.02 ADJUSTMENT INSOMNIA: ICD-10-CM

## 2025-07-18 NOTE — TELEPHONE ENCOUNTER
Received request via: Pharmacy    Was the patient seen in the last year in this department? Yes    Does the patient have an active prescription (recently filled or refills available) for medication(s) requested? No    [facility]    Does the patient have care home Plus and need 100-day supply? (This applies to ALL medications) Yes, quantity updated to 100 days

## 2025-07-20 RX ORDER — TRAZODONE HYDROCHLORIDE 50 MG/1
TABLET ORAL
Qty: 300 TABLET | Refills: 3 | Status: SHIPPED | OUTPATIENT
Start: 2025-07-20

## 2025-07-21 NOTE — TELEPHONE ENCOUNTER
Requested Prescriptions     Pending Prescriptions Disp Refills    traZODone (DESYREL) 50 MG Tab [Pharmacy Med Name: TRAZODONE HCL 50MG TABS] 300 Tablet 3     Sig: TAKE 1 TO 3 TABLET BY MOUTH AT BEDTIME FOR SLEEP       JOSÉ MIGUEL Siddiqui.

## 2025-08-22 DIAGNOSIS — E26.09 PRIMARY ALDOSTERONISM (HCC): ICD-10-CM

## 2025-08-22 DIAGNOSIS — I15.2 HYPERTENSION DUE TO ENDOCRINE DISORDER: ICD-10-CM

## 2025-08-22 RX ORDER — SPIRONOLACTONE 50 MG/1
TABLET, FILM COATED ORAL
Qty: 100 TABLET | Refills: 3 | Status: SHIPPED | OUTPATIENT
Start: 2025-08-22

## 2025-08-26 DIAGNOSIS — R80.9 MICROALBUMINURIA DUE TO TYPE 2 DIABETES MELLITUS (HCC): ICD-10-CM

## 2025-08-26 DIAGNOSIS — E11.29 MICROALBUMINURIA DUE TO TYPE 2 DIABETES MELLITUS (HCC): ICD-10-CM

## 2025-08-26 RX ORDER — TELMISARTAN 80 MG/1
TABLET ORAL
Qty: 100 TABLET | Refills: 3 | Status: SHIPPED | OUTPATIENT
Start: 2025-08-26

## (undated) DEVICE — MIDAS LUBRICATOR DIFFUSER PACK (4EA/CA)

## (undated) DEVICE — GLOVE BIOGEL SZ 8.5 SURGICAL PF LTX - (50PR/BX 4BX/CA)

## (undated) DEVICE — SUCTION INSTRUMENT YANKAUER BULBOUS TIP W/O VENT (50EA/CA)

## (undated) DEVICE — LIGHTSOURCE LONG ROUND PHOTONSABER 12FR

## (undated) DEVICE — NEPTUNE 4 PORT MANIFOLD - (20/PK)

## (undated) DEVICE — TUBING C&T SET FLYING LEADS DRAIN TUBING (10EA/BX)

## (undated) DEVICE — SODIUM CHL IRRIGATION 0.9% 1000ML (12EA/CA)

## (undated) DEVICE — SUTURE 1 VICRYL PLUS CTX - 8 X 18 INCH (12/BX)

## (undated) DEVICE — SLEEVE, VASO, THIGH, MED

## (undated) DEVICE — LACTATED RINGERS INJ. 500 ML - (24EA/CA)

## (undated) DEVICE — GLOVE BIOGEL PI INDICATOR SZ 9.0 SURGICAL PF LF -(50PR/BX 4BX/CA)

## (undated) DEVICE — GLOVE BIOGEL INDICATOR SZ 7SURGICAL PF LTX - (50/BX 4BX/CA)

## (undated) DEVICE — ELECTRODE DUAL RETURN W/ CORD - (50/PK)

## (undated) DEVICE — BOVIE BLADE COATED &INSULATED - 25/PK

## (undated) DEVICE — SENSOR SPO2 NEO LNCS ADHESIVE (20/BX) SEE USER NOTES

## (undated) DEVICE — TOOL MR8 14CM MATCH HD SYM-TRI 3MM DIAMETER (1/EA)

## (undated) DEVICE — TOOL DISSECT MATCH HEAD

## (undated) DEVICE — KIT ROOM DECONTAMINATION

## (undated) DEVICE — CANISTER SUCTION 3000ML MECHANICAL FILTER AUTO SHUTOFF MEDI-VAC NONSTERILE LF DISP  (40EA/CA)

## (undated) DEVICE — ELECTRODE 850 FOAM ADHESIVE - HYDROGEL RADIOTRNSPRNT (50/PK)

## (undated) DEVICE — CHLORAPREP 26 ML APPLICATOR - ORANGE TINT(25/CA)

## (undated) DEVICE — SUTURE 3-0 VICRYL PLUS RB-1 - 8 X 18 INCH (12/BX)

## (undated) DEVICE — PIN HEAD MAYFIELD DISP. (3EA/PK 12PK/BX)

## (undated) DEVICE — SPONGE GAUZESTER 4 X 4 4PLY - (128PK/CA)

## (undated) DEVICE — SUTURE 2-0 VICRYL PLUS CT-1 - 8 X 18 INCH(12/BX)

## (undated) DEVICE — APPLICATOR COTTON TIP 6 IN - STERILE (2EA/PK 100PK/BX)

## (undated) DEVICE — DRAPE 36X28IN RAD CARM BND BG - (25/CA) O

## (undated) DEVICE — LACTATED RINGERS INJ 1000 ML - (14EA/CA 60CA/PF)

## (undated) DEVICE — SUTURE GENERAL

## (undated) DEVICE — DEVICE HEMOSTATIC CLIPPING RESOLUTION 360 DEGREES (20EA/BX)

## (undated) DEVICE — SUTURE 1 VICRYL PLUS CT-1 - 18 INCH (12/BX)

## (undated) DEVICE — GLOVE SZ 6.5 BIOGEL PI MICRO - PF LF (50PR/BX)

## (undated) DEVICE — PACK NEURO - (2EA/CA)

## (undated) DEVICE — KIT CUSTOM PROCEDURE SINGLE FOR ENDO  (15/CA)

## (undated) DEVICE — COVER MAYO STAND X-LG - (22EA/CA)

## (undated) DEVICE — NEEDLE NON SAFETY HYPO 22 GA X 1 1/2 IN (100/BX)

## (undated) DEVICE — MASK ANESTHESIA ADULT  - (100/CA)

## (undated) DEVICE — KIT SURGIFLO W/OUT THROMBIN - (6EA/CA)

## (undated) DEVICE — TUBING CLEARLINK DUO-VENT - C-FLO (48EA/CA)

## (undated) DEVICE — DRESSING POST OP BORDER 4 X 10 (5EA/BX)

## (undated) DEVICE — INTRAOP NEURO IN OR 1:1 PER 15 MIN

## (undated) DEVICE — SET EXTENSION WITH 2 PORTS (48EA/CA) ***PART #2C8610 IS A SUBSTITUTE*****

## (undated) DEVICE — BLADE CLIPPER FITS 2501 CLIPPER (BLUE)  (20EA/CA)

## (undated) DEVICE — COVER LIGHT HANDLE ALC PLUS DISP (18EA/BX)

## (undated) DEVICE — KIT ANESTHESIA W/CIRCUIT & 3/LT BAG W/FILTER (20EA/CA)

## (undated) DEVICE — LIGHT SOURCE MIS 12FT

## (undated) DEVICE — DRAPE MICROSCOPE X-LONG (10EA/CA)

## (undated) DEVICE — PACK JACKSON TABLE KIT W/OUT - HR (6EA/CA)

## (undated) DEVICE — SET LEADWIRE 5 LEAD BEDSIDE DISPOSABLE ECG (1SET OF 5/EA)

## (undated) DEVICE — GOWN WARMING STANDARD FLEX - (30/CA)

## (undated) DEVICE — KIT EVACUATER 3 SPRING PVC LF 1/8 DRAIN SIZE (10EA/CA)"

## (undated) DEVICE — SHEET PEDIATRIC LAPAROTOMY - (10/CA)

## (undated) DEVICE — BITE BLOCK ADULT 60FR (100EA/CA)

## (undated) DEVICE — GLOVE BIOGEL SZ 6.5 SURGICAL PF LTX (50PR/BX 4BX/CA)

## (undated) DEVICE — CLOSURE WOUND 1/4 X 4 (STERI - STRIP) (50/BX 4BX/CA)

## (undated) DEVICE — CATHERTER CLEAR SINGLE USE INJECTION THERAPY NEEDLE 25GA X 4MM  2.3MM X 240CM (5EA/BX)

## (undated) DEVICE — KIT GEL-FLOW NT ABORBABLE GELATIN (6EA/BX)

## (undated) DEVICE — STERI STRIP COMPOUND BENZOIN - TINCTURE 0.6ML WITH APPLICATOR (40EA/BX)

## (undated) DEVICE — BLADE SURGICAL CLIPPER - (50EA/CA)

## (undated) DEVICE — KNIFE KARLIN ULTRA 1.5MM - (10/PK)

## (undated) DEVICE — PROTECTOR ULNA NERVE - (36PR/CA)

## (undated) DEVICE — SENSOR OXIMETER ADULT SPO2 RD SET (20EA/BX)

## (undated) DEVICE — GLOVE BIOGEL PI INDICATOR SZ 8.5 SURGICAL PF LF - (50PR/BX 4BX/CA)

## (undated) DEVICE — GLOVE BIOGEL SZ 9 SURGICAL PF LTX - (50/BX 4BX/CA)

## (undated) DEVICE — NEEDLE NON-SAFETY HYPO 21 GA X 1 1/2 IN HYPO (100/BX)

## (undated) DEVICE — DRAPE MICROSCOPE ARMATEC 120IN X 46IN (10EA/CA)

## (undated) DEVICE — TOWEL STOP TIMEOUT SAFETY FLAG (40EA/CA)

## (undated) DEVICE — BAG SPONGE COUNT 10.25 X 32 - BLUE (250/CA)

## (undated) DEVICE — GOLD PROBE 7FR (5/BX)